# Patient Record
Sex: MALE | Race: WHITE | Employment: FULL TIME | URBAN - NONMETROPOLITAN AREA
[De-identification: names, ages, dates, MRNs, and addresses within clinical notes are randomized per-mention and may not be internally consistent; named-entity substitution may affect disease eponyms.]

---

## 2017-03-28 DIAGNOSIS — Z01.810 PREOP CARDIOVASCULAR EXAM: ICD-10-CM

## 2017-03-28 DIAGNOSIS — I25.10 CORONARY ARTERY DISEASE WITHOUT ANGINA PECTORIS, UNSPECIFIED VESSEL OR LESION TYPE, UNSPECIFIED WHETHER NATIVE OR TRANSPLANTED HEART: Primary | ICD-10-CM

## 2017-03-28 DIAGNOSIS — I10 ESSENTIAL HYPERTENSION: ICD-10-CM

## 2017-04-11 ENCOUNTER — HOSPITAL ENCOUNTER (OUTPATIENT)
Dept: NON INVASIVE DIAGNOSTICS | Age: 47
Discharge: HOME | End: 2017-04-11
Payer: COMMERCIAL

## 2017-04-11 DIAGNOSIS — I25.10 CORONARY ARTERY DISEASE WITHOUT ANGINA PECTORIS, UNSPECIFIED VESSEL OR LESION TYPE, UNSPECIFIED WHETHER NATIVE OR TRANSPLANTED HEART: ICD-10-CM

## 2017-04-11 DIAGNOSIS — I10 ESSENTIAL HYPERTENSION: ICD-10-CM

## 2017-04-11 DIAGNOSIS — Z01.810 PREOP CARDIOVASCULAR EXAM: ICD-10-CM

## 2017-04-11 LAB
LV EF: 50 %
LVEF MODALITY: NORMAL

## 2017-04-11 PROCEDURE — 93350 STRESS TTE ONLY: CPT

## 2017-04-20 ENCOUNTER — TELEPHONE (OUTPATIENT)
Dept: CARDIOLOGY | Age: 47
End: 2017-04-20

## 2017-08-03 ENCOUNTER — HOSPITAL ENCOUNTER (OUTPATIENT)
Dept: WOUND CARE | Age: 47
Discharge: HOME | End: 2017-08-03
Payer: COMMERCIAL

## 2017-08-03 VITALS
TEMPERATURE: 97.8 F | HEART RATE: 69 BPM | SYSTOLIC BLOOD PRESSURE: 165 MMHG | WEIGHT: 213 LBS | DIASTOLIC BLOOD PRESSURE: 89 MMHG | HEIGHT: 75 IN | BODY MASS INDEX: 26.49 KG/M2 | RESPIRATION RATE: 18 BRPM

## 2017-08-03 DIAGNOSIS — Z79.4 TYPE 2 DIABETES MELLITUS WITH FOOT ULCER, WITH LONG-TERM CURRENT USE OF INSULIN (HCC): Chronic | ICD-10-CM

## 2017-08-03 DIAGNOSIS — L97.509 TYPE 2 DIABETES MELLITUS WITH FOOT ULCER, WITH LONG-TERM CURRENT USE OF INSULIN (HCC): Chronic | ICD-10-CM

## 2017-08-03 DIAGNOSIS — L97.522 SKIN ULCER OF TOE OF LEFT FOOT WITH FAT LAYER EXPOSED (HCC): Chronic | ICD-10-CM

## 2017-08-03 DIAGNOSIS — E11.621 TYPE 2 DIABETES MELLITUS WITH FOOT ULCER, WITH LONG-TERM CURRENT USE OF INSULIN (HCC): Chronic | ICD-10-CM

## 2017-08-03 PROCEDURE — 11042 DBRDMT SUBQ TIS 1ST 20SQCM/<: CPT | Performed by: SURGERY

## 2017-08-03 PROCEDURE — 99213 OFFICE O/P EST LOW 20 MIN: CPT

## 2017-08-03 PROCEDURE — 99203 OFFICE O/P NEW LOW 30 MIN: CPT | Performed by: SURGERY

## 2017-08-03 PROCEDURE — 11042 DBRDMT SUBQ TIS 1ST 20SQCM/<: CPT

## 2017-08-09 ENCOUNTER — HOSPITAL ENCOUNTER (OUTPATIENT)
Dept: WOUND CARE | Age: 47
Discharge: HOME | End: 2017-08-09
Payer: COMMERCIAL

## 2017-08-09 VITALS
SYSTOLIC BLOOD PRESSURE: 159 MMHG | TEMPERATURE: 98 F | BODY MASS INDEX: 26.49 KG/M2 | HEART RATE: 77 BPM | RESPIRATION RATE: 16 BRPM | DIASTOLIC BLOOD PRESSURE: 82 MMHG | WEIGHT: 213 LBS | HEIGHT: 75 IN

## 2017-08-09 DIAGNOSIS — E11.621 TYPE 2 DIABETES MELLITUS WITH FOOT ULCER, WITH LONG-TERM CURRENT USE OF INSULIN (HCC): ICD-10-CM

## 2017-08-09 DIAGNOSIS — L97.509 TYPE 2 DIABETES MELLITUS WITH FOOT ULCER, WITH LONG-TERM CURRENT USE OF INSULIN (HCC): ICD-10-CM

## 2017-08-09 DIAGNOSIS — Z79.4 TYPE 2 DIABETES MELLITUS WITH FOOT ULCER, WITH LONG-TERM CURRENT USE OF INSULIN (HCC): ICD-10-CM

## 2017-08-09 DIAGNOSIS — L97.522 SKIN ULCER OF TOE OF LEFT FOOT WITH FAT LAYER EXPOSED (HCC): ICD-10-CM

## 2017-08-09 PROCEDURE — 11042 DBRDMT SUBQ TIS 1ST 20SQCM/<: CPT

## 2017-08-09 PROCEDURE — 11042 DBRDMT SUBQ TIS 1ST 20SQCM/<: CPT | Performed by: SURGERY

## 2017-08-09 ASSESSMENT — PAIN SCALES - GENERAL: PAINLEVEL_OUTOF10: 0

## 2017-08-16 ENCOUNTER — HOSPITAL ENCOUNTER (OUTPATIENT)
Dept: WOUND CARE | Age: 47
Discharge: HOME | End: 2017-08-16
Payer: COMMERCIAL

## 2017-08-16 VITALS
WEIGHT: 213 LBS | BODY MASS INDEX: 26.49 KG/M2 | HEART RATE: 72 BPM | HEIGHT: 75 IN | RESPIRATION RATE: 16 BRPM | DIASTOLIC BLOOD PRESSURE: 80 MMHG | TEMPERATURE: 98 F | SYSTOLIC BLOOD PRESSURE: 150 MMHG

## 2017-08-16 DIAGNOSIS — Z79.4 TYPE 2 DIABETES MELLITUS WITH FOOT ULCER, WITH LONG-TERM CURRENT USE OF INSULIN (HCC): ICD-10-CM

## 2017-08-16 DIAGNOSIS — L97.512 SKIN ULCER OF SMALL TOE OF RIGHT FOOT WITH FAT LAYER EXPOSED (HCC): Chronic | ICD-10-CM

## 2017-08-16 DIAGNOSIS — L97.522 SKIN ULCER OF TOE OF LEFT FOOT WITH FAT LAYER EXPOSED (HCC): ICD-10-CM

## 2017-08-16 DIAGNOSIS — L97.509 TYPE 2 DIABETES MELLITUS WITH FOOT ULCER, WITH LONG-TERM CURRENT USE OF INSULIN (HCC): ICD-10-CM

## 2017-08-16 DIAGNOSIS — E11.621 TYPE 2 DIABETES MELLITUS WITH FOOT ULCER, WITH LONG-TERM CURRENT USE OF INSULIN (HCC): ICD-10-CM

## 2017-08-16 PROCEDURE — 11042 DBRDMT SUBQ TIS 1ST 20SQCM/<: CPT

## 2017-08-16 PROCEDURE — 11042 DBRDMT SUBQ TIS 1ST 20SQCM/<: CPT | Performed by: SURGERY

## 2017-08-16 PROCEDURE — 97597 DBRDMT OPN WND 1ST 20 CM/<: CPT

## 2017-08-16 ASSESSMENT — PAIN SCALES - GENERAL: PAINLEVEL_OUTOF10: 0

## 2017-08-30 ENCOUNTER — HOSPITAL ENCOUNTER (OUTPATIENT)
Dept: WOUND CARE | Age: 47
Discharge: HOME | End: 2017-08-30
Payer: COMMERCIAL

## 2017-08-30 VITALS
BODY MASS INDEX: 26.49 KG/M2 | RESPIRATION RATE: 16 BRPM | DIASTOLIC BLOOD PRESSURE: 80 MMHG | SYSTOLIC BLOOD PRESSURE: 165 MMHG | HEART RATE: 68 BPM | HEIGHT: 75 IN | TEMPERATURE: 98.1 F | WEIGHT: 213 LBS

## 2017-08-30 DIAGNOSIS — L97.522 SKIN ULCER OF TOE OF LEFT FOOT WITH FAT LAYER EXPOSED (HCC): ICD-10-CM

## 2017-08-30 DIAGNOSIS — Z79.4 TYPE 2 DIABETES MELLITUS WITH FOOT ULCER, WITH LONG-TERM CURRENT USE OF INSULIN (HCC): ICD-10-CM

## 2017-08-30 DIAGNOSIS — L97.509 TYPE 2 DIABETES MELLITUS WITH FOOT ULCER, WITH LONG-TERM CURRENT USE OF INSULIN (HCC): ICD-10-CM

## 2017-08-30 DIAGNOSIS — E11.621 TYPE 2 DIABETES MELLITUS WITH FOOT ULCER, WITH LONG-TERM CURRENT USE OF INSULIN (HCC): ICD-10-CM

## 2017-08-30 DIAGNOSIS — L97.512 SKIN ULCER OF SMALL TOE OF RIGHT FOOT WITH FAT LAYER EXPOSED (HCC): ICD-10-CM

## 2017-08-30 PROCEDURE — 97597 DBRDMT OPN WND 1ST 20 CM/<: CPT

## 2017-08-30 PROCEDURE — 97597 DBRDMT OPN WND 1ST 20 CM/<: CPT | Performed by: SURGERY

## 2017-09-13 ENCOUNTER — HOSPITAL ENCOUNTER (OUTPATIENT)
Dept: WOUND CARE | Age: 47
Discharge: HOME | End: 2017-09-13
Payer: COMMERCIAL

## 2017-09-13 VITALS
RESPIRATION RATE: 16 BRPM | BODY MASS INDEX: 26.49 KG/M2 | DIASTOLIC BLOOD PRESSURE: 84 MMHG | WEIGHT: 213 LBS | SYSTOLIC BLOOD PRESSURE: 175 MMHG | HEART RATE: 73 BPM | TEMPERATURE: 98 F | HEIGHT: 75 IN

## 2017-09-13 DIAGNOSIS — L97.509 TYPE 2 DIABETES MELLITUS WITH FOOT ULCER, WITH LONG-TERM CURRENT USE OF INSULIN (HCC): ICD-10-CM

## 2017-09-13 DIAGNOSIS — L97.512 SKIN ULCER OF SMALL TOE OF RIGHT FOOT WITH FAT LAYER EXPOSED (HCC): ICD-10-CM

## 2017-09-13 DIAGNOSIS — Z79.4 TYPE 2 DIABETES MELLITUS WITH FOOT ULCER, WITH LONG-TERM CURRENT USE OF INSULIN (HCC): ICD-10-CM

## 2017-09-13 DIAGNOSIS — L97.522 SKIN ULCER OF TOE OF LEFT FOOT WITH FAT LAYER EXPOSED (HCC): ICD-10-CM

## 2017-09-13 DIAGNOSIS — E11.621 TYPE 2 DIABETES MELLITUS WITH FOOT ULCER, WITH LONG-TERM CURRENT USE OF INSULIN (HCC): ICD-10-CM

## 2017-09-13 PROCEDURE — 97597 DBRDMT OPN WND 1ST 20 CM/<: CPT | Performed by: SURGERY

## 2017-09-13 PROCEDURE — 97597 DBRDMT OPN WND 1ST 20 CM/<: CPT

## 2017-09-13 ASSESSMENT — PAIN SCALES - GENERAL: PAINLEVEL_OUTOF10: 0

## 2017-09-27 ENCOUNTER — HOSPITAL ENCOUNTER (OUTPATIENT)
Dept: WOUND CARE | Age: 47
Discharge: HOME | End: 2017-09-27
Payer: COMMERCIAL

## 2017-09-27 VITALS
DIASTOLIC BLOOD PRESSURE: 82 MMHG | SYSTOLIC BLOOD PRESSURE: 181 MMHG | RESPIRATION RATE: 16 BRPM | TEMPERATURE: 98.1 F | BODY MASS INDEX: 26.49 KG/M2 | WEIGHT: 213 LBS | HEIGHT: 75 IN | HEART RATE: 69 BPM

## 2017-09-27 DIAGNOSIS — L97.522 SKIN ULCER OF TOE OF LEFT FOOT WITH FAT LAYER EXPOSED (HCC): ICD-10-CM

## 2017-09-27 DIAGNOSIS — Z79.4 TYPE 2 DIABETES MELLITUS WITH FOOT ULCER, WITH LONG-TERM CURRENT USE OF INSULIN (HCC): ICD-10-CM

## 2017-09-27 DIAGNOSIS — E11.621 TYPE 2 DIABETES MELLITUS WITH FOOT ULCER, WITH LONG-TERM CURRENT USE OF INSULIN (HCC): ICD-10-CM

## 2017-09-27 DIAGNOSIS — L97.509 TYPE 2 DIABETES MELLITUS WITH FOOT ULCER, WITH LONG-TERM CURRENT USE OF INSULIN (HCC): ICD-10-CM

## 2017-09-27 DIAGNOSIS — L97.512 SKIN ULCER OF SMALL TOE OF RIGHT FOOT WITH FAT LAYER EXPOSED (HCC): ICD-10-CM

## 2017-09-27 PROCEDURE — 97597 DBRDMT OPN WND 1ST 20 CM/<: CPT | Performed by: SURGERY

## 2017-09-27 PROCEDURE — 97597 DBRDMT OPN WND 1ST 20 CM/<: CPT

## 2017-09-27 ASSESSMENT — PAIN SCALES - GENERAL: PAINLEVEL_OUTOF10: 0

## 2017-10-18 ENCOUNTER — HOSPITAL ENCOUNTER (OUTPATIENT)
Dept: WOUND CARE | Age: 47
Discharge: HOME | End: 2017-10-18
Payer: COMMERCIAL

## 2017-10-18 VITALS
WEIGHT: 213 LBS | RESPIRATION RATE: 16 BRPM | SYSTOLIC BLOOD PRESSURE: 174 MMHG | DIASTOLIC BLOOD PRESSURE: 80 MMHG | HEIGHT: 75 IN | BODY MASS INDEX: 26.49 KG/M2 | TEMPERATURE: 98.2 F | HEART RATE: 70 BPM

## 2017-10-18 DIAGNOSIS — L97.522 SKIN ULCER OF TOE OF LEFT FOOT WITH FAT LAYER EXPOSED (HCC): ICD-10-CM

## 2017-10-18 DIAGNOSIS — L97.509 TYPE 2 DIABETES MELLITUS WITH FOOT ULCER, WITH LONG-TERM CURRENT USE OF INSULIN (HCC): ICD-10-CM

## 2017-10-18 DIAGNOSIS — E11.621 TYPE 2 DIABETES MELLITUS WITH FOOT ULCER, WITH LONG-TERM CURRENT USE OF INSULIN (HCC): ICD-10-CM

## 2017-10-18 DIAGNOSIS — L97.512 SKIN ULCER OF SMALL TOE OF RIGHT FOOT WITH FAT LAYER EXPOSED (HCC): ICD-10-CM

## 2017-10-18 DIAGNOSIS — Z79.4 TYPE 2 DIABETES MELLITUS WITH FOOT ULCER, WITH LONG-TERM CURRENT USE OF INSULIN (HCC): ICD-10-CM

## 2017-10-18 PROCEDURE — 99213 OFFICE O/P EST LOW 20 MIN: CPT | Performed by: SURGERY

## 2017-10-18 PROCEDURE — 99211 OFF/OP EST MAY X REQ PHY/QHP: CPT

## 2017-10-18 ASSESSMENT — PAIN SCALES - GENERAL: PAINLEVEL_OUTOF10: 0

## 2017-10-18 NOTE — PROGRESS NOTES
[facility]  Progress Note       Herberth Barun  AGE: 50 y.o. GENDER: male  : 1970  TODAY'S DATE:  10/18/2017    Subjective:        HISTORY of PRESENT ILLNESS HPI   Herberth Braun is a 50 y.o. male who presents today for wound evaluation. Wound Type:non-healing surgical  Wound Location:5th toe on the right  Modifying factors:chronic pressure    Patient Active Problem List   Diagnosis Code    CAD (coronary artery disease) I25.10    Hypertension I10    Hyperlipidemia E78.5    Type II or unspecified type diabetes mellitus without mention of complication, not stated as uncontrolled E11.9    Cigarette smoker F17.210    Skin ulcer of toe of left foot with fat layer exposed ([address] L97.522    Type 2 diabetes mellitus with foot ulcer, with long-term current use of insulin ([address] E11.621, L97.509, Z79.4    Skin ulcer of small toe of right foot with fat layer exposed [facility]) L97.512       Mr. Deisy Mahoney has a past medical history of CAD (coronary artery disease); Cigarette smoker; History of nephrolithiasis; Hyperlipidemia; Hypertension; Obesity; and Type II or unspecified type diabetes mellitus without mention of complication, not stated as uncontrolled. He has a past surgical history that includes Cardiac catheterization (3/14/12); Tonsillectomy; Nasal fracture surgery; Cardiac catheterization (14  JDT); Coronary artery bypass graft (3/15/2012); and hernia repair. His family history includes Diabetes in an other family member; Heart Disease in an other family member; High Blood Pressure in an other family member. Mr. Deisy Mahoney reports that he quit smoking about 5 years ago. His smoking use included Cigarettes. He has a 30.00 pack-year smoking history. He has never used smokeless tobacco. He reports that he drinks alcohol. He reports that he does not use drugs.     His current medication list consists of     Current Outpatient Prescriptions on File Prior to Encounter   Medication Sig Dispense Refill    metFORMIN (GLUCOPHAGE) 500 MG tablet Take 500 mg by mouth 2 times daily (with meals)       No current facility-administered medications on file prior to encounter. ALLERGIES    Review of patient's allergies indicates no known allergies. Objective:         Vitals:    10/18/17 1543   BP: (!) 174/80   Pulse: 70   Resp: 16   Temp: 98.2 °F (36.8 °C)        BP (!) 174/80   Pulse 70   Temp 98.2 °F (36.8 °C) (Temporal)   Resp 16   Ht 6' 3\" (1.905 m)   Wt 213 lb (96.6 kg)   BMI 26.62 kg/m²     ROS:  Constitutional - Alert and oriented x 3, no complaints, pleasant co-operative  Integumentary - edema improved, no erythema, no cellulitis, no new wounds   The patients pain isPain Level: 0  . Wound Measurements and Assessment:  Wound 08/03/17 Other (Comment) Foot Lateral;Right wound 5; right lateral 5th toe amp site (Active)   Wound Type Wound 10/18/2017  3:47 PM   Dressing Status Changed; Intact;Dry;Clean 8/30/2017  4:04 PM   Dressing Changed Changed/New 8/3/2017 10:25 AM   Dressing/Treatment Antibacterial Ointment;4x4 8/3/2017 10:25 AM   Wound Cleansed Not Cleansed 10/18/2017  3:47 PM   Wound Length (cm) 0 cm 10/18/2017  3:47 PM   Wound Width (cm) 0 cm 10/18/2017  3:47 PM   Wound Depth (cm)  0 10/18/2017  3:47 PM   Calculated Wound Size (cm^2) (l*w) 0 cm^2 10/18/2017  3:47 PM   Change in Wound Size % (l*w) 100 10/18/2017  3:47 PM   Distance Tunneling (cm) 0 cm 10/18/2017  3:47 PM   Tunneling Position ___ O'Clock 0 10/18/2017  3:47 PM   Undermining Starts ___ O'Clock 0 10/18/2017  3:47 PM   Undermining Ends___ O'Clock 0 10/18/2017  3:47 PM   Undermining Maxium Distance (cm) 0 10/18/2017  3:47 PM   Wound Assessment Dry; Intact; Clean 10/18/2017  3:47 PM   Margins Attached edges 9/27/2017  3:44 PM   Velia-wound Assessment Dry 10/18/2017  3:47 PM   [place]%Wound Bed 0 10/18/2017  3:47 PM   Red%Wound Bed 0 10/18/2017  3:47 PM   Yellow%Wound Bed 0 10/18/2017  3:47 PM   Black%Wound Bed 0 10/18/2017 3:47 PM   Purple%Wound Bed 0 10/18/2017  3:47 PM   Other%Wound Bed 0 10/18/2017  3:47 PM   Drainage Amount None 10/18/2017  3:47 PM   Drainage Description Yellow; Tan 9/13/2017  4:02 PM   Odor Mild 9/13/2017  4:02 PM   Culture Taken No 9/13/2017  4:02 PM   Debridement per physician None 10/18/2017  3:47 PM   Time out Yes 9/27/2017  3:49 PM   Procedural Pain 0 9/27/2017  3:49 PM   Post procedural Pain 0 9/27/2017  3:49 PM   Number of days: 76        Wound is HEALED! !!.    Please refer to nursing documentation for wound measurements. Assessment:      Patient Active Problem List   Diagnosis    CAD (coronary artery disease)    Hypertension    Hyperlipidemia    Type II or unspecified type diabetes mellitus without mention of complication, not stated as uncontrolled    Cigarette smoker    Skin ulcer of toe of left foot with fat layer exposed ([address]    Type 2 diabetes mellitus with foot ulcer, with long-term current use of insulin ([address]    Skin ulcer of small toe of right foot with fat layer exposed ([address]      Plan:     Compliance issues: No    Plan for wound - Dress per physician order  Treatment:     Compression : No   Offloading : No   Dressing : See AVS   Additional Therapy : 0     1. Discussed appropriate home care of this wound. Wound redressed. 2. Patient instructions were given. 3. Follow up: prn.          Electronically signed by Maryanna Ahumada, MD on 10/18/2017 at 3:52 PM

## 2017-10-18 NOTE — PLAN OF CARE
Problem: Wound:  Goal: Will show signs of wound healing; wound closure and no evidence of infection  Will show signs of wound healing; wound closure and no evidence of infection   Outcome: Completed Date Met: 10/18/17

## 2018-03-21 ENCOUNTER — HOSPITAL ENCOUNTER (OUTPATIENT)
Dept: GENERAL RADIOLOGY | Age: 48
Discharge: HOME | End: 2018-03-21
Payer: COMMERCIAL

## 2018-03-21 ENCOUNTER — TELEPHONE (OUTPATIENT)
Dept: CARDIOLOGY | Age: 48
End: 2018-03-21

## 2018-03-21 ENCOUNTER — OFFICE VISIT (OUTPATIENT)
Dept: CARDIOLOGY | Age: 48
End: 2018-03-21
Payer: COMMERCIAL

## 2018-03-21 VITALS
BODY MASS INDEX: 30.21 KG/M2 | DIASTOLIC BLOOD PRESSURE: 102 MMHG | SYSTOLIC BLOOD PRESSURE: 164 MMHG | HEIGHT: 75 IN | WEIGHT: 243 LBS | HEART RATE: 66 BPM

## 2018-03-21 DIAGNOSIS — I10 ESSENTIAL HYPERTENSION: ICD-10-CM

## 2018-03-21 DIAGNOSIS — Z86.39 HISTORY OF DIABETES MELLITUS: ICD-10-CM

## 2018-03-21 DIAGNOSIS — R94.39 ABNORMAL CARDIOVASCULAR STRESS TEST: Primary | ICD-10-CM

## 2018-03-21 DIAGNOSIS — R94.39 ABNORMAL CARDIOVASCULAR STRESS TEST: ICD-10-CM

## 2018-03-21 DIAGNOSIS — R07.9 CHEST PAIN, UNSPECIFIED TYPE: Primary | ICD-10-CM

## 2018-03-21 DIAGNOSIS — E78.2 MIXED HYPERLIPIDEMIA: ICD-10-CM

## 2018-03-21 DIAGNOSIS — R07.9 CHEST PAIN, UNSPECIFIED TYPE: ICD-10-CM

## 2018-03-21 DIAGNOSIS — F17.200 SMOKER: ICD-10-CM

## 2018-03-21 LAB
ANION GAP SERPL CALCULATED.3IONS-SCNC: 18 MMOL/L (ref 7–19)
BUN BLDV-MCNC: 20 MG/DL (ref 6–20)
CALCIUM SERPL-MCNC: 9.7 MG/DL (ref 8.6–10)
CHLORIDE BLD-SCNC: 99 MMOL/L (ref 98–111)
CO2: 27 MMOL/L (ref 22–29)
CREAT SERPL-MCNC: 1 MG/DL (ref 0.5–1.2)
GFR NON-AFRICAN AMERICAN: >60
GLUCOSE BLD-MCNC: 218 MG/DL (ref 74–109)
HCT VFR BLD CALC: 51.9 % (ref 42–52)
HEMOGLOBIN: 17.2 G/DL (ref 14–18)
MCH RBC QN AUTO: 29.7 PG (ref 27–31)
MCHC RBC AUTO-ENTMCNC: 33.1 G/DL (ref 33–37)
MCV RBC AUTO: 89.6 FL (ref 80–94)
PDW BLD-RTO: 14.9 % (ref 11.5–14.5)
PLATELET # BLD: 240 K/UL (ref 130–400)
PMV BLD AUTO: 11.4 FL (ref 9.4–12.4)
POTASSIUM SERPL-SCNC: 4.4 MMOL/L (ref 3.5–5)
RBC # BLD: 5.79 M/UL (ref 4.7–6.1)
SODIUM BLD-SCNC: 144 MMOL/L (ref 136–145)
WBC # BLD: 12.3 K/UL (ref 4.8–10.8)

## 2018-03-21 PROCEDURE — 99214 OFFICE O/P EST MOD 30 MIN: CPT | Performed by: NURSE PRACTITIONER

## 2018-03-21 PROCEDURE — 71046 X-RAY EXAM CHEST 2 VIEWS: CPT

## 2018-03-21 PROCEDURE — 93000 ELECTROCARDIOGRAM COMPLETE: CPT | Performed by: NURSE PRACTITIONER

## 2018-03-21 RX ORDER — NITROGLYCERIN 0.4 MG/1
0.4 TABLET SUBLINGUAL EVERY 5 MIN PRN
Qty: 25 TABLET | Refills: 3 | Status: SHIPPED | OUTPATIENT
Start: 2018-03-21 | End: 2019-01-18 | Stop reason: ALTCHOICE

## 2018-03-21 RX ORDER — ROSUVASTATIN CALCIUM 10 MG/1
10 TABLET, COATED ORAL DAILY
COMMUNITY
End: 2018-12-17

## 2018-03-21 RX ORDER — SILDENAFIL 25 MG/1
25 TABLET, FILM COATED ORAL PRN
COMMUNITY
End: 2019-01-18 | Stop reason: ALTCHOICE

## 2018-03-21 RX ORDER — LISINOPRIL 5 MG/1
0.5 TABLET ORAL DAILY
Refills: 1 | COMMUNITY
Start: 2018-03-08 | End: 2018-03-21 | Stop reason: ALTCHOICE

## 2018-03-21 RX ORDER — METOPROLOL SUCCINATE 25 MG/1
25 TABLET, EXTENDED RELEASE ORAL NIGHTLY
Qty: 30 TABLET | Refills: 5 | Status: SHIPPED | OUTPATIENT
Start: 2018-03-21 | End: 2018-08-31 | Stop reason: SDUPTHER

## 2018-03-21 RX ORDER — LISINOPRIL 10 MG/1
10 TABLET ORAL DAILY
Qty: 30 TABLET | Refills: 5 | Status: SHIPPED | OUTPATIENT
Start: 2018-03-21 | End: 2018-08-31 | Stop reason: SDUPTHER

## 2018-03-21 NOTE — PATIENT INSTRUCTIONS
Start taking Lisinopril 10 mg daily - a new prescription has been faxed to our pharmacy. Start Toprol XL 25 mg (1) tab at bedtime - a new prescription has been faxed to our pharmacy. Continue other current medications as prescribed. Continue to follow up with primary care provider for non cardiac medical problems. Call the office with any problems, questions or concerns at [phone]. Follow up as scheduled with your cardiologist - as scheduled after cardiac catheterization. The following educational material has been included in this after visit summary for your review: cardiac catheterization, smoking cessation.     Additional instructions:  Coronary artery disease risk factors you can control: Smoking, high blood pressure, high cholesterol, diabetes, being overweight, lack of exercise and stress. Continue heart healthy diet. Take medications as directed. Exercise as tolerated. Strive for 15 minutes of exercise most days of the week.   keep a blood pressure log, do so for 2 weeks. Call the office to report readings at [phone]. Blood pressure goal is 140/90 or less. If you are a diabetic, the goal is 130/80 or less. If you are taking cholesterol lowering medications, it is recommended that lab work be checked annually. Always keep a current medication list. Bring your medications to every office visit.        Cardiac Catheterization Instructions   (before 10:00 am)  You will need to have lab and a chest x-ray completed today in preparation for the cardiac catheterization. Please go to first floor of this building to Outpatient registration for the testing. Date/Time:  Thursday 3/29/18 at 7:30 am.  Check in at 6:3 am.  [place] at the [facility] and [facility] located on the first floor [place] [address] through the hospital main entrance and turn immediately to your left.     Test preparation:  · Do not eat or drink anything after midnight on the night Your doctor will explain to you which medicines you can take and which ones to avoid. Take medicines as instructed. Ice may help decrease discomfort at the insertion site. You may apply the ice for 15-20 minutes each hour, for the first few days. To lower your risk for further complications of heart disease, you can make lifestyle changes. These include eating a healthier diet, exercising regularly, and managing stress. Ask your doctor about when it is safe to shower, bathe, or soak in water. Be sure to follow your doctor's instructions . After arriving home, contact your doctor if any of the following occurs:   Signs of infection, including fever and chills   Extreme sweating, nausea, or vomiting   Change in sensation to affected leg, including numbness, feeling cold, or change in color   Redness, swelling, increasing pain, excessive bleeding, or discharge at point of catheter insertion   Cough, shortness of breath, or difficulty breathing   Extreme pain   Chest pain   Drooping facial muscles   Changes in vision or speech   Difficulty walking or using your limbs     In case of an emergency, Call 911. Patient Education        Learning About Benefits From Quitting Smoking  How does quitting smoking make you healthier? If you're thinking about quitting smoking, you may have a few reasons to be smoke-free. Your health may be one of them. · When you quit smoking, you lower your risks for cancer, lung disease, heart attack, stroke, blood vessel disease, and blindness from macular degeneration. · When you're smoke-free, you get sick less often, and you heal faster. You are less likely to get colds, flu, bronchitis, and pneumonia. · As a nonsmoker, you may find that your mood is better and you are less stressed. When and how will you feel healthier? Quitting has real health benefits that start from day 1 of being smoke-free.  And the longer you stay smoke-free, the healthier you get and the better you feel. The first hours  · After just 20 minutes, your blood pressure and heart rate go down. That means there's less stress on your heart and blood vessels. · Within 12 hours, the level of carbon monoxide in your blood drops back to normal. That makes room for more oxygen. With more oxygen in your body, you may notice that you have more energy than when you smoked. After 2 weeks  · Your lungs start to work better. · Your risk of heart attack starts to drop. After 1 month  · When your lungs are clear, you cough less and breathe deeper, so it's easier to be active. · Your sense of taste and smell return. That means you can enjoy food more than you have since you started smoking. Over the years  · After 1 year, your risk of heart disease is half what it would be if you kept smoking. · After 5 years, your risk of stroke starts to shrink. Within a few years after that, it's about the same as if you'd never smoked. · After 10 years, your risk of dying from lung cancer is cut by about half. And your risk for many other types of cancer is lower too. How would quitting help others in your life? When you quit smoking, you improve the health of everyone who now breathes in your smoke. · Their heart, lung, and cancer risks drop, much like yours. · They are sick less. For babies and small children, living smoke-free means they're less likely to have ear infections, pneumonia, and bronchitis. · If you're a woman who is or will be pregnant someday, quitting smoking means a healthier . · Children who are close to you are less likely to become adult smokers. Where can you learn more? Go to https://lucy.[employer] org and sign in to your [employer] account. Enter [phone] 72 11 in the [employer] box to learn more about \"Learning About Benefits From Quitting Smoking. \"     If you do not have an account, please click on the \"Sign Up Now\" link.   Current as of: 2017  Content

## 2018-03-28 NOTE — H&P
MELITON Reyes Nurse Practitioner Signed   Progress Notes Encounter Date: 3/21/2018   Related encounter: Office Visit from 3/21/2018 in [facility]       Expand All Collapse All    []Manual[]Template  []Copied  [facility], [place]. [address] [facility]  [phone] office  [phone] fax        OFFICE VISIT:  3/21/2018     [place] Dose - : 1970     Reason For Visit:  Vasquez Hamilton is a 50 y.o. male who is here for Abnormal Test Results (The patient presents regarding abnormal stress echo by Dr. Octavio Kaur on 3/14/18.); Chest Pain; Hypertension; Nicotine Dependence; and Hyperlipidemia     The patient presents today for cardiology follow up. 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 The test showed anterior/apical and septal wall motion abnormalities before and after exercise. Multiple attempts were made to contact patient followed by a registered letter. Recently, on 3/14/18 at [facility], the patient had a stress echo showing evidence of electrocardiographic and echocardiographic myocardial necrosis compatible with previous anterior wall myocardial infarction. The patient has no history of known CAD. He does have a hx of HTN, DM, nicotine abuse and hyperlipidemia. The patient reports working out 3 days a week without angina. He does reports some vague precordial chest pain over the last few days which he attributes to be muscular in origin from lifting weights. The patient reports BP has been uncontrolled on current regimen. .  The patient's PCP monitors cholesterol.       Subjective  Vasquez Hamilton denies exertional chest pain, shortness of breath, orthopnea, paroxysmal nocturnal dyspnea, syncope, presyncope, sustained arrythmia, edema and fatigue. The patient denies numbness or weakness to suggest cerebrovascular accident or transient ischemic attack.   + vague intermittent mmol/L     Potassium 4.4 3.5 - 5.0 mmol/L     Chloride 99 98 - 111 mmol/L     CO2 27 22 - 29 mmol/L     Anion Gap 18 7 - 19 mmol/L     Glucose 218 (H) 74 - 109 mg/dL     BUN 20 6 - 20 mg/dL     CREATININE 1.0 0.5 - 1.2 mg/dL     GFR Non- >60 >60     Calcium 9.7 8.6 - 10.0 mg/dL         Plan  Previous cardiac history and records reviewed. Start taking Lisinopril 10 mg daily one daily - a new prescription has been faxed to our pharmacy. Start Toprol XL 25 mg (1) tab at bedtime - a new prescription has been faxed to our pharmacy. Cardiac cath scheduled 3/29/18 at 7:30 am.  Continue other current medications as prescribed. Continue to follow up with primary care provider for non cardiac medical problems. Call the office with any problems, questions or concerns at [phone]. Follow up as scheduled with your cardiologist - as scheduled after cardiac catheterization. The following educational material has been included in this after visit summary for your review: cardiac catheterization, smoking cessation.     Additional instructions:  Coronary artery disease risk factors you can control: Smoking, high blood pressure, high cholesterol, diabetes, being overweight, lack of exercise and stress. Continue heart healthy diet. Take medications as directed. Exercise as tolerated. Strive for 15 minutes of exercise most days of the week.   keep a blood pressure log, do so for 2 weeks. Call the office to report readings at [phone]. Blood pressure goal is 140/90 or less. If you are a diabetic, the goal is 130/80 or less. If you are taking cholesterol lowering medications, it is recommended that lab work be checked annually. Always keep a current medication list. Bring your medications to every office visit.      Cardiac Catheterization Instructions   (before 10:00 am)  You will need to have lab and a chest x-ray completed today in preparation for the cardiac catheterization.   Please go to first floor of this building to Outpatient registration for the testing. Date/Time:  Thursday 3/29/18 at 7:30 am.  Check in at 6:3 am.  [place] [facility] [address] [facility] located on the first floor of [place] [address] through the hospital main entrance and turn immediately to your left. Test preparation:  · Do not eat or drink anything after midnight on the night before your procedure. You can take your morning medications with a sip of water unless otherwise directed not to. Bring a list of the names and dosages of all the medications you are taking. · Glucophage (Metformin) and/or Coumadin (Warfarin) should be stopped two days prior to the cardiac catheterization procedure. Pradaxa should be stopped one day prior to procedure. · You should arrange to have someone take you home rather than drive yourself. · Further plan will depend upon the result of the cardiac catheterization.     If for any reason you are unable to keep this appointment, please contact [facility] Registration, [phone], as soon as possible to reschedule.      MELITON Nelson

## 2018-03-29 ENCOUNTER — HOSPITAL ENCOUNTER (OUTPATIENT)
Dept: CARDIAC CATH/INVASIVE PROCEDURES | Age: 48
Setting detail: OUTPATIENT SURGERY
Discharge: HOME | End: 2018-03-29
Attending: INTERNAL MEDICINE | Admitting: INTERNAL MEDICINE
Payer: COMMERCIAL

## 2018-03-29 VITALS
RESPIRATION RATE: 18 BRPM | HEIGHT: 75 IN | TEMPERATURE: 97.4 F | OXYGEN SATURATION: 100 % | HEART RATE: 66 BPM | BODY MASS INDEX: 30.21 KG/M2 | SYSTOLIC BLOOD PRESSURE: 164 MMHG | DIASTOLIC BLOOD PRESSURE: 74 MMHG | WEIGHT: 243 LBS

## 2018-03-29 DIAGNOSIS — R07.9 CHEST PAIN, UNSPECIFIED TYPE: ICD-10-CM

## 2018-03-29 LAB
LV EF: 53 %
LVEF MODALITY: NORMAL

## 2018-03-29 PROCEDURE — 93459 L HRT ART/GRFT ANGIO: CPT | Performed by: INTERNAL MEDICINE

## 2018-03-29 PROCEDURE — C1894 INTRO/SHEATH, NON-LASER: HCPCS

## 2018-03-29 PROCEDURE — 2709999900 HC NON-CHARGEABLE SUPPLY

## 2018-03-29 PROCEDURE — 99024 POSTOP FOLLOW-UP VISIT: CPT | Performed by: INTERNAL MEDICINE

## 2018-03-29 PROCEDURE — 2720000001 HC MISC SURG SUPPLY STERILE $51-500

## 2018-03-29 PROCEDURE — C1760 CLOSURE DEV, VASC: HCPCS

## 2018-03-29 PROCEDURE — 6360000002 HC RX W HCPCS

## 2018-03-29 PROCEDURE — 99999 PR OFFICE/OUTPT VISIT,PROCEDURE ONLY: CPT | Performed by: INTERNAL MEDICINE

## 2018-03-29 PROCEDURE — 2580000003 HC RX 258: Performed by: INTERNAL MEDICINE

## 2018-03-29 RX ORDER — SODIUM CHLORIDE 9 MG/ML
INJECTION, SOLUTION INTRAVENOUS CONTINUOUS
Status: DISCONTINUED | OUTPATIENT
Start: 2018-03-29 | End: 2018-03-29 | Stop reason: HOSPADM

## 2018-03-29 RX ORDER — SODIUM CHLORIDE 0.9 % (FLUSH) 0.9 %
10 SYRINGE (ML) INJECTION PRN
Status: DISCONTINUED | OUTPATIENT
Start: 2018-03-29 | End: 2018-03-29 | Stop reason: HOSPADM

## 2018-03-29 RX ORDER — SODIUM CHLORIDE 0.9 % (FLUSH) 0.9 %
10 SYRINGE (ML) INJECTION EVERY 12 HOURS SCHEDULED
Status: DISCONTINUED | OUTPATIENT
Start: 2018-03-29 | End: 2018-03-29 | Stop reason: HOSPADM

## 2018-03-29 RX ADMIN — SODIUM CHLORIDE: 9 INJECTION, SOLUTION INTRAVENOUS at 07:29

## 2018-03-29 NOTE — DISCHARGE SUMMARY
[facility] Discharge Summary    Patient ID: Roshni Matt   1970    Patient's PCP: Roni Aguiar CNP    Admit Date: 3/29/2018     Discharge Date:  3/29/2018    Admitting Physician: Kanika Packer MD     Discharge Physician: Kanika Packer MD     Discharge Diagnoses:  Coronary artery disease with documentation of graft patency on this visit   There are no active hospital problems to display for this patient. The patient was seen and examined on day of discharge and this discharge summary is in conjunction with any daily progress note from day of discharge. Hospital Course: The patient is a 58-year-old white male who presented for cardiac catheterization. 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 This revealed sodium and evidence of possible myocardial ischemia. The patient had exertional chest discomfort. The patient underwent an uneventful cardiac catheterization this study revealed mild apical hypokinesis with the overall ejection fraction estimated to be 5055%. There is a patent stent in the left anterior descending with total occlusion of the left anterior descending diagonal and distal obtuse marginal branch and right coronary was totally occluded at its origin. The saphenous vein graft to right coronary artery was patent as was the saphenous vein graft to the obtuse marginal branch. Left internal mammary graft to the left anterior descending diagonal was patent and the patient is felt to be best treated by continued medical management    Consults:   None        Disposition:  Home with follow-up in our office in 6 weeks     Discharge Instructions/Follow-up:  See separate instructions. Activity and Diet: as directed per discharge instructions. Labs:  For convenience and continuity at follow-up the following most recent labs are provided:  CBC:    Lab Results   Component Value Date    WBC 12.3 03/21/2018    HGB 17.2 03/21/2018    HCT 51.9 03/21/2018    HCT 31.2 03/27/2012     03/21/2018     03/27/2012       Renal:    Lab Results   Component Value Date     03/21/2018     03/27/2012    K 4.4 03/21/2018    K 3.8 03/27/2012    CL 99 03/21/2018     03/27/2012    CO2 27 03/21/2018    BUN 20 03/21/2018    CREATININE 1.0 03/21/2018    CREATININE 0.8 03/27/2012    CALCIUM 9.7 03/21/2018       Discharge Medications:   Current Discharge Medication List           Details   NONFORMULARY Herbal blood sugar otc      MLO NICOTINE 15 MG/16 HR PATCH AND REMOVAL, INPATIENT,       Insulin Aspart (NOVOLOG FLEXPEN SC) Inject 30 Units into the skin 2 times daily       metoprolol succinate (TOPROL XL) 25 MG extended release tablet Take 1 tablet by mouth nightly  Qty: 30 tablet, Refills: 5    Associated Diagnoses: Essential hypertension      lisinopril (PRINIVIL;ZESTRIL) 10 MG tablet Take 1 tablet by mouth daily  Qty: 30 tablet, Refills: 5    Comments: Dose increase  Associated Diagnoses: Essential hypertension      rosuvastatin (CRESTOR) 10 MG tablet Take 10 mg by mouth daily      sildenafil (VIAGRA) 25 MG tablet Take 25 mg by mouth as needed for Erectile Dysfunction      nitroGLYCERIN (NITROSTAT) 0.4 MG SL tablet Place 1 tablet under the tongue every 5 minutes as needed for Chest pain  Qty: 25 tablet, Refills: 3    Associated Diagnoses: Chest pain, unspecified type      metFORMIN (GLUCOPHAGE) 500 MG tablet Take 1,000 mg by mouth 2 times daily (with meals)                Hank Singer MD

## 2018-04-10 ENCOUNTER — TELEPHONE (OUTPATIENT)
Dept: CARDIOLOGY | Age: 48
End: 2018-04-10

## 2018-04-20 ENCOUNTER — TELEPHONE (OUTPATIENT)
Dept: CARDIOLOGY | Age: 48
End: 2018-04-20

## 2018-06-26 LAB — HBA1C MFR BLD: 8.6 %

## 2018-08-31 DIAGNOSIS — I10 ESSENTIAL HYPERTENSION: ICD-10-CM

## 2018-08-31 RX ORDER — METOPROLOL SUCCINATE 25 MG/1
25 TABLET, EXTENDED RELEASE ORAL NIGHTLY
Qty: 90 TABLET | Refills: 3 | Status: SHIPPED | OUTPATIENT
Start: 2018-08-31 | End: 2018-12-28 | Stop reason: SDUPTHER

## 2018-08-31 RX ORDER — LISINOPRIL 10 MG/1
10 TABLET ORAL DAILY
Qty: 90 TABLET | Refills: 3 | Status: SHIPPED | OUTPATIENT
Start: 2018-08-31 | End: 2020-06-02

## 2018-09-14 ENCOUNTER — HOSPITAL ENCOUNTER (OUTPATIENT)
Dept: CT IMAGING | Age: 48
Discharge: HOME | End: 2018-09-14
Payer: COMMERCIAL

## 2018-09-14 DIAGNOSIS — N23 RENAL COLIC: ICD-10-CM

## 2018-09-14 PROCEDURE — 74176 CT ABD & PELVIS W/O CONTRAST: CPT

## 2018-12-17 ENCOUNTER — HOSPITAL ENCOUNTER (OUTPATIENT)
Dept: WOUND CARE | Age: 48
Discharge: HOME | End: 2018-12-17
Payer: COMMERCIAL

## 2018-12-17 VITALS
HEIGHT: 75 IN | HEART RATE: 65 BPM | TEMPERATURE: 97.5 F | RESPIRATION RATE: 18 BRPM | BODY MASS INDEX: 29.84 KG/M2 | SYSTOLIC BLOOD PRESSURE: 158 MMHG | WEIGHT: 240 LBS | DIASTOLIC BLOOD PRESSURE: 83 MMHG

## 2018-12-17 DIAGNOSIS — Z79.4 TYPE 2 DIABETES MELLITUS WITH FOOT ULCER, WITH LONG-TERM CURRENT USE OF INSULIN (HCC): Chronic | ICD-10-CM

## 2018-12-17 DIAGNOSIS — L97.522 NON-PRESSURE CHRONIC ULCER OF OTHER PART OF LEFT FOOT WITH FAT LAYER EXPOSED (HCC): ICD-10-CM

## 2018-12-17 DIAGNOSIS — E11.621 TYPE 2 DIABETES MELLITUS WITH FOOT ULCER, WITH LONG-TERM CURRENT USE OF INSULIN (HCC): Chronic | ICD-10-CM

## 2018-12-17 DIAGNOSIS — L97.509 TYPE 2 DIABETES MELLITUS WITH FOOT ULCER, WITH LONG-TERM CURRENT USE OF INSULIN (HCC): Chronic | ICD-10-CM

## 2018-12-17 DIAGNOSIS — L97.522 SKIN ULCER OF TOE OF LEFT FOOT WITH FAT LAYER EXPOSED (HCC): Chronic | ICD-10-CM

## 2018-12-17 DIAGNOSIS — L97.522 SKIN ULCER OF TOE OF LEFT FOOT WITH FAT LAYER EXPOSED (HCC): Primary | Chronic | ICD-10-CM

## 2018-12-17 LAB
ALBUMIN SERPL-MCNC: 3.8 G/DL (ref 3.5–5.2)
ALP BLD-CCNC: 86 U/L (ref 40–130)
ALT SERPL-CCNC: 17 U/L (ref 5–41)
ANION GAP SERPL CALCULATED.3IONS-SCNC: 15 MMOL/L (ref 7–19)
AST SERPL-CCNC: 9 U/L (ref 5–40)
BASOPHILS ABSOLUTE: 0.1 K/UL (ref 0–0.2)
BASOPHILS RELATIVE PERCENT: 0.9 % (ref 0–1)
BILIRUB SERPL-MCNC: 0.3 MG/DL (ref 0.2–1.2)
BUN BLDV-MCNC: 18 MG/DL (ref 6–20)
C-REACTIVE PROTEIN: 0.49 MG/DL (ref 0–0.5)
CALCIUM SERPL-MCNC: 9.6 MG/DL (ref 8.6–10)
CHLORIDE BLD-SCNC: 100 MMOL/L (ref 98–111)
CO2: 26 MMOL/L (ref 22–29)
CREAT SERPL-MCNC: 0.9 MG/DL (ref 0.5–1.2)
EOSINOPHILS ABSOLUTE: 0.5 K/UL (ref 0–0.6)
EOSINOPHILS RELATIVE PERCENT: 4.3 % (ref 0–5)
GFR NON-AFRICAN AMERICAN: >60
GLUCOSE BLD-MCNC: 197 MG/DL (ref 74–109)
HBA1C MFR BLD: 10.1 % (ref 4–6)
HCT VFR BLD CALC: 47.5 % (ref 42–52)
HEMOGLOBIN: 15.2 G/DL (ref 14–18)
LYMPHOCYTES ABSOLUTE: 2.6 K/UL (ref 1.1–4.5)
LYMPHOCYTES RELATIVE PERCENT: 22.3 % (ref 20–40)
MCH RBC QN AUTO: 28.3 PG (ref 27–31)
MCHC RBC AUTO-ENTMCNC: 32 G/DL (ref 33–37)
MCV RBC AUTO: 88.5 FL (ref 80–94)
MONOCYTES ABSOLUTE: 1.2 K/UL (ref 0–0.9)
MONOCYTES RELATIVE PERCENT: 10.6 % (ref 0–10)
NEUTROPHILS ABSOLUTE: 7 K/UL (ref 1.5–7.5)
NEUTROPHILS RELATIVE PERCENT: 61.3 % (ref 50–65)
PDW BLD-RTO: 14.9 % (ref 11.5–14.5)
PLATELET # BLD: 309 K/UL (ref 130–400)
PMV BLD AUTO: 11 FL (ref 9.4–12.4)
POTASSIUM SERPL-SCNC: 4.3 MMOL/L (ref 3.5–5)
PREALBUMIN: 28 MG/DL (ref 20–40)
RBC # BLD: 5.37 M/UL (ref 4.7–6.1)
SEDIMENTATION RATE, ERYTHROCYTE: 16 MM/HR (ref 0–15)
SODIUM BLD-SCNC: 141 MMOL/L (ref 136–145)
TOTAL PROTEIN: 7.3 G/DL (ref 6.6–8.7)
WBC # BLD: 11.4 K/UL (ref 4.8–10.8)

## 2018-12-17 PROCEDURE — 84134 ASSAY OF PREALBUMIN: CPT

## 2018-12-17 PROCEDURE — 85652 RBC SED RATE AUTOMATED: CPT

## 2018-12-17 PROCEDURE — 86140 C-REACTIVE PROTEIN: CPT

## 2018-12-17 PROCEDURE — 80053 COMPREHEN METABOLIC PANEL: CPT

## 2018-12-17 PROCEDURE — 85025 COMPLETE CBC W/AUTO DIFF WBC: CPT

## 2018-12-17 PROCEDURE — 83036 HEMOGLOBIN GLYCOSYLATED A1C: CPT

## 2018-12-17 PROCEDURE — 99214 OFFICE O/P EST MOD 30 MIN: CPT | Performed by: NURSE PRACTITIONER

## 2018-12-17 PROCEDURE — 36415 COLL VENOUS BLD VENIPUNCTURE: CPT

## 2018-12-17 PROCEDURE — 99214 OFFICE O/P EST MOD 30 MIN: CPT

## 2018-12-17 RX ORDER — CITALOPRAM 20 MG/1
20 TABLET ORAL DAILY
COMMUNITY
End: 2019-01-14 | Stop reason: ALTCHOICE

## 2018-12-17 RX ORDER — INSULIN GLARGINE 100 [IU]/ML
INJECTION, SOLUTION SUBCUTANEOUS NIGHTLY
COMMUNITY
End: 2019-01-14 | Stop reason: ALTCHOICE

## 2018-12-17 RX ORDER — GABAPENTIN 300 MG/1
300 CAPSULE ORAL DAILY
COMMUNITY
End: 2019-01-14 | Stop reason: ALTCHOICE

## 2018-12-17 RX ORDER — ATORVASTATIN CALCIUM 80 MG/1
80 TABLET, FILM COATED ORAL DAILY
COMMUNITY
End: 2019-01-18 | Stop reason: ALTCHOICE

## 2018-12-17 RX ORDER — PRASUGREL 10 MG/1
10 TABLET, FILM COATED ORAL DAILY
COMMUNITY
End: 2019-01-07

## 2018-12-17 NOTE — PROGRESS NOTES
normal LVFX  3/15/2012  CABG x 3 LIMA-LAD, patent VG-OM, patent VG-RCA [place]ie Ka)  3/16/2012  Cath  Patent LIMA-LAD, patent VG-OM, patent VG-RCA  8/27/2014  SE  Positive for ECG and echo myocardial ischemia, high- risk findings, AUC indication 18, AUC score 7 ([facility])          Hypertension      Priority: High    Hyperlipidemia      Priority: High    Type II or unspecified type diabetes mellitus without mention of complication, not stated as uncontrolled      Priority: High    Non-pressure chronic ulcer of other part of left foot with fat layer exposed ([facility] [address] 12/17/2018    History of diabetes mellitus 03/21/2018    Abnormal cardiovascular stress test 03/21/2018    Chest pain 03/21/2018    Skin ulcer of small toe of right foot with fat layer exposed ([address] 08/16/2017    Skin ulcer of toe of left foot with fat layer exposed ([facility] [address] 08/03/2017    Type 2 diabetes mellitus with foot ulcer, with long-term current use of insulin ([facility]) 08/03/2017     Current Outpatient Prescriptions   Medication Sig Dispense Refill    metoprolol succinate (TOPROL XL) 25 MG extended release tablet Take 1 tablet by mouth nightly 90 tablet 3    lisinopril (PRINIVIL;ZESTRIL) 10 MG tablet Take 1 tablet by mouth daily 90 tablet 3    rosuvastatin (CRESTOR) 10 MG tablet Take 10 mg by mouth daily      NONFORMULARY Herbal blood sugar otc      sildenafil (VIAGRA) 25 MG tablet Take 25 mg by mouth as needed for Erectile Dysfunction      MLO NICOTINE 15 MG/16 HR PATCH AND REMOVAL, INPATIENT,       Insulin Aspart (NOVOLOG FLEXPEN SC) Inject 30 Units into the skin 2 times daily       nitroGLYCERIN (NITROSTAT) 0.4 MG SL tablet Place 1 tablet under the tongue every 5 minutes as needed for Chest pain 25 tablet 3     No current facility-administered medications for this encounter. Allergies: Patient has no known allergies.     Past Medical History:   Diagnosis Date    Arthritis     CAD (coronary artery disease)     Cigarette smoker 9/2/2014 up Dr. Abdelrahman Carvalho at scheduled   3. Follow up Dr., Bethena Fothergill     I spent a total of 60 minutes face to face with the patient. Over 50% of that time was spent on counseling and care coordination. Patient was told that if symptoms worsen or new symptoms develop they are to go to the emergency department immediately. Patient was educated on diagnosis and treatment plan. All of patient's questions were answered, and the patient understands the discharge plan. Discussed appropriate home care of this wound. Wound redressed. Patient instructions were given. Recommend no smoking  Offloading instructions given    The patient was educated on care and need for follow-up. Strict return instructions including red flag signs and symptoms were discussed with the patient. Medications for discharge discussed, and adverse effects reviewed. Questions invited and answered. Patient shows understanding of the discharge information and agrees to follow-up. Discharge Instructions       Visit Discharge/Physician Orders    Discharge condition: Stable    Discharge to: Home    Left via:Private automobile    Accompanied by: Self     ECF/HHA:     Dressing Orders:  Left plantar foot wound:   Wash with soap and water. Apply Xeroform gauze to wound bed. Cover with gauze. Secure with medipore tape. Change twice daily  Met pad to off load     Treatment Orders:  Labs today [address]     Three phase bone scan 12/27/18 at [facility] per Dr. Abdelrahman Carvalho orders     Patient will have LEAs prior to next visit. Monday January 7th at 1pm   Please go to Room 103 downstairs to register. Procedure will be completed in Room 401. Please be aware of appointment time for this procedure. [facility] followup visit:           Monday January 7th at 2pm Dr. Bethena Fothergill   (Please note your next appointment above and if you are unable to keep, kindly give a 24 hour notice.  Thank you.)    If you experience any of the following, please call the Wound

## 2018-12-28 DIAGNOSIS — I10 ESSENTIAL HYPERTENSION: ICD-10-CM

## 2018-12-28 RX ORDER — METOPROLOL SUCCINATE 25 MG/1
25 TABLET, EXTENDED RELEASE ORAL NIGHTLY
Qty: 90 TABLET | Refills: 0 | Status: ON HOLD | OUTPATIENT
Start: 2018-12-28 | End: 2019-06-12 | Stop reason: ALTCHOICE

## 2019-01-07 ENCOUNTER — HOSPITAL ENCOUNTER (OUTPATIENT)
Dept: WOUND CARE | Age: 49
Discharge: HOME | End: 2019-01-07
Payer: COMMERCIAL

## 2019-01-07 ENCOUNTER — HOSPITAL ENCOUNTER (OUTPATIENT)
Dept: NON INVASIVE DIAGNOSTICS | Age: 49
Discharge: HOME | End: 2019-01-07
Payer: COMMERCIAL

## 2019-01-07 VITALS — BODY MASS INDEX: 29.84 KG/M2 | WEIGHT: 240 LBS | TEMPERATURE: 98 F | HEIGHT: 75 IN

## 2019-01-07 DIAGNOSIS — L97.509 TYPE 2 DIABETES MELLITUS WITH FOOT ULCER, WITH LONG-TERM CURRENT USE OF INSULIN (HCC): Primary | Chronic | ICD-10-CM

## 2019-01-07 DIAGNOSIS — Z79.4 TYPE 2 DIABETES MELLITUS WITH FOOT ULCER, WITH LONG-TERM CURRENT USE OF INSULIN (HCC): Chronic | ICD-10-CM

## 2019-01-07 DIAGNOSIS — L97.522 SKIN ULCER OF TOE OF LEFT FOOT WITH FAT LAYER EXPOSED (HCC): Chronic | ICD-10-CM

## 2019-01-07 DIAGNOSIS — E11.621 TYPE 2 DIABETES MELLITUS WITH FOOT ULCER, WITH LONG-TERM CURRENT USE OF INSULIN (HCC): Chronic | ICD-10-CM

## 2019-01-07 DIAGNOSIS — L97.522 NON-PRESSURE CHRONIC ULCER OF OTHER PART OF LEFT FOOT WITH FAT LAYER EXPOSED (HCC): ICD-10-CM

## 2019-01-07 DIAGNOSIS — Z79.4 TYPE 2 DIABETES MELLITUS WITH FOOT ULCER, WITH LONG-TERM CURRENT USE OF INSULIN (HCC): Primary | Chronic | ICD-10-CM

## 2019-01-07 DIAGNOSIS — E11.621 TYPE 2 DIABETES MELLITUS WITH FOOT ULCER, WITH LONG-TERM CURRENT USE OF INSULIN (HCC): Primary | Chronic | ICD-10-CM

## 2019-01-07 DIAGNOSIS — L97.509 TYPE 2 DIABETES MELLITUS WITH FOOT ULCER, WITH LONG-TERM CURRENT USE OF INSULIN (HCC): Chronic | ICD-10-CM

## 2019-01-07 PROCEDURE — 93923 UPR/LXTR ART STDY 3+ LVLS: CPT

## 2019-01-07 PROCEDURE — 97597 DBRDMT OPN WND 1ST 20 CM/<: CPT

## 2019-01-07 PROCEDURE — 97597 DBRDMT OPN WND 1ST 20 CM/<: CPT | Performed by: SURGERY

## 2019-01-07 ASSESSMENT — PAIN DESCRIPTION - PAIN TYPE: TYPE: ACUTE PAIN

## 2019-01-07 ASSESSMENT — PAIN DESCRIPTION - ORIENTATION: ORIENTATION: LEFT

## 2019-01-07 ASSESSMENT — PAIN DESCRIPTION - ONSET: ONSET: ON-GOING

## 2019-01-07 ASSESSMENT — PAIN DESCRIPTION - FREQUENCY: FREQUENCY: INTERMITTENT

## 2019-01-07 ASSESSMENT — PAIN DESCRIPTION - PROGRESSION: CLINICAL_PROGRESSION: NOT CHANGED

## 2019-01-14 ENCOUNTER — HOSPITAL ENCOUNTER (OUTPATIENT)
Dept: WOUND CARE | Age: 49
Discharge: HOME | End: 2019-01-14
Payer: COMMERCIAL

## 2019-01-14 VITALS
RESPIRATION RATE: 18 BRPM | BODY MASS INDEX: 29.84 KG/M2 | TEMPERATURE: 98.2 F | DIASTOLIC BLOOD PRESSURE: 87 MMHG | SYSTOLIC BLOOD PRESSURE: 186 MMHG | HEIGHT: 75 IN | WEIGHT: 240 LBS | HEART RATE: 58 BPM

## 2019-01-14 DIAGNOSIS — L97.522 NON-PRESSURE CHRONIC ULCER OF OTHER PART OF LEFT FOOT WITH FAT LAYER EXPOSED (HCC): ICD-10-CM

## 2019-01-14 PROCEDURE — 11042 DBRDMT SUBQ TIS 1ST 20SQCM/<: CPT | Performed by: SURGERY

## 2019-01-14 PROCEDURE — 11042 DBRDMT SUBQ TIS 1ST 20SQCM/<: CPT

## 2019-01-14 RX ORDER — INSULIN GLARGINE 100 [IU]/ML
30 INJECTION, SOLUTION SUBCUTANEOUS DAILY
Refills: 6 | COMMUNITY
Start: 2018-11-16 | End: 2020-02-20 | Stop reason: DRUGHIGH

## 2019-01-14 RX ORDER — CIPROFLOXACIN 750 MG/1
TABLET, FILM COATED ORAL
Refills: 1 | COMMUNITY
Start: 2019-01-09 | End: 2019-01-18 | Stop reason: ALTCHOICE

## 2019-01-14 ASSESSMENT — PAIN SCALES - GENERAL: PAINLEVEL_OUTOF10: 0

## 2019-01-16 ENCOUNTER — HOSPITAL ENCOUNTER (OUTPATIENT)
Dept: GENERAL RADIOLOGY | Age: 49
Discharge: HOME | End: 2019-01-16
Payer: COMMERCIAL

## 2019-01-16 ENCOUNTER — HOSPITAL ENCOUNTER (OUTPATIENT)
Dept: WOUND CARE | Age: 49
Discharge: HOME | End: 2019-01-16
Payer: COMMERCIAL

## 2019-01-16 VITALS — HEIGHT: 75 IN | BODY MASS INDEX: 29.84 KG/M2 | WEIGHT: 240 LBS

## 2019-01-16 DIAGNOSIS — L97.522 NON-PRESSURE CHRONIC ULCER OF OTHER PART OF LEFT FOOT WITH FAT LAYER EXPOSED (HCC): ICD-10-CM

## 2019-01-16 DIAGNOSIS — Z79.4 TYPE 2 DIABETES MELLITUS WITH FOOT ULCER, WITH LONG-TERM CURRENT USE OF INSULIN (HCC): Primary | Chronic | ICD-10-CM

## 2019-01-16 DIAGNOSIS — L97.522 ULCER OF LEFT FOOT, WITH FAT LAYER EXPOSED (HCC): ICD-10-CM

## 2019-01-16 DIAGNOSIS — L97.509 TYPE 2 DIABETES MELLITUS WITH FOOT ULCER, WITH LONG-TERM CURRENT USE OF INSULIN (HCC): Primary | Chronic | ICD-10-CM

## 2019-01-16 DIAGNOSIS — E11.621 TYPE 2 DIABETES MELLITUS WITH FOOT ULCER, WITH LONG-TERM CURRENT USE OF INSULIN (HCC): Primary | Chronic | ICD-10-CM

## 2019-01-16 PROCEDURE — 87186 SC STD MICRODIL/AGAR DIL: CPT

## 2019-01-16 PROCEDURE — 99213 OFFICE O/P EST LOW 20 MIN: CPT | Performed by: SURGERY

## 2019-01-16 PROCEDURE — 87205 SMEAR GRAM STAIN: CPT

## 2019-01-16 PROCEDURE — 86403 PARTICLE AGGLUT ANTBDY SCRN: CPT

## 2019-01-16 PROCEDURE — 87070 CULTURE OTHR SPECIMN AEROBIC: CPT

## 2019-01-16 PROCEDURE — 87075 CULTR BACTERIA EXCEPT BLOOD: CPT

## 2019-01-16 PROCEDURE — 73630 X-RAY EXAM OF FOOT: CPT

## 2019-01-16 PROCEDURE — 99213 OFFICE O/P EST LOW 20 MIN: CPT

## 2019-01-16 RX ORDER — MINOCYCLINE HYDROCHLORIDE 100 MG/1
100 CAPSULE ORAL 2 TIMES DAILY
Qty: 20 CAPSULE | Refills: 0 | Status: ON HOLD | OUTPATIENT
Start: 2019-01-16 | End: 2019-01-30 | Stop reason: HOSPADM

## 2019-01-16 RX ORDER — SODIUM HYPOCHLORITE 1.25 MG/ML
SOLUTION TOPICAL
Qty: 1 BOTTLE | Refills: 2 | Status: SHIPPED | OUTPATIENT
Start: 2019-01-16 | End: 2019-10-03 | Stop reason: ALTCHOICE

## 2019-01-16 ASSESSMENT — PAIN SCALES - GENERAL: PAINLEVEL_OUTOF10: 0

## 2019-01-18 ENCOUNTER — HOSPITAL ENCOUNTER (OUTPATIENT)
Dept: WOUND CARE | Age: 49
Discharge: HOME | End: 2019-01-18
Payer: COMMERCIAL

## 2019-01-18 VITALS
HEIGHT: 75 IN | RESPIRATION RATE: 18 BRPM | DIASTOLIC BLOOD PRESSURE: 94 MMHG | TEMPERATURE: 98.2 F | WEIGHT: 240 LBS | HEART RATE: 65 BPM | BODY MASS INDEX: 29.84 KG/M2 | SYSTOLIC BLOOD PRESSURE: 181 MMHG

## 2019-01-18 DIAGNOSIS — Z79.4 TYPE 2 DIABETES MELLITUS WITH FOOT ULCER, WITH LONG-TERM CURRENT USE OF INSULIN (HCC): Primary | Chronic | ICD-10-CM

## 2019-01-18 DIAGNOSIS — E11.621 TYPE 2 DIABETES MELLITUS WITH FOOT ULCER, WITH LONG-TERM CURRENT USE OF INSULIN (HCC): Primary | Chronic | ICD-10-CM

## 2019-01-18 DIAGNOSIS — L97.509 TYPE 2 DIABETES MELLITUS WITH FOOT ULCER, WITH LONG-TERM CURRENT USE OF INSULIN (HCC): Primary | Chronic | ICD-10-CM

## 2019-01-18 DIAGNOSIS — L97.522 NON-PRESSURE CHRONIC ULCER OF OTHER PART OF LEFT FOOT WITH FAT LAYER EXPOSED (HCC): ICD-10-CM

## 2019-01-18 PROCEDURE — 99213 OFFICE O/P EST LOW 20 MIN: CPT | Performed by: SURGERY

## 2019-01-18 PROCEDURE — 99212 OFFICE O/P EST SF 10 MIN: CPT

## 2019-01-18 RX ORDER — ROSUVASTATIN CALCIUM 10 MG/1
10 TABLET, COATED ORAL DAILY
COMMUNITY
End: 2020-02-14

## 2019-01-18 RX ORDER — GLIPIZIDE 10 MG/1
10 TABLET ORAL
COMMUNITY

## 2019-01-18 ASSESSMENT — PAIN DESCRIPTION - ONSET: ONSET: ON-GOING

## 2019-01-18 ASSESSMENT — PAIN DESCRIPTION - DESCRIPTORS: DESCRIPTORS: DULL

## 2019-01-18 ASSESSMENT — PAIN DESCRIPTION - PROGRESSION: CLINICAL_PROGRESSION: GRADUALLY IMPROVING

## 2019-01-18 ASSESSMENT — PAIN DESCRIPTION - FREQUENCY: FREQUENCY: INTERMITTENT

## 2019-01-18 ASSESSMENT — PAIN SCALES - GENERAL: PAINLEVEL_OUTOF10: 3

## 2019-01-18 ASSESSMENT — PAIN DESCRIPTION - LOCATION: LOCATION: FOOT

## 2019-01-18 ASSESSMENT — PAIN DESCRIPTION - ORIENTATION: ORIENTATION: LEFT

## 2019-01-18 ASSESSMENT — PAIN DESCRIPTION - PAIN TYPE: TYPE: ACUTE PAIN

## 2019-01-20 LAB
ANAEROBIC CULTURE: ABNORMAL
GRAM STAIN RESULT: ABNORMAL
ORGANISM: ABNORMAL
WOUND/ABSCESS: ABNORMAL

## 2019-01-24 ENCOUNTER — HOSPITAL ENCOUNTER (OUTPATIENT)
Dept: WOUND CARE | Age: 49
Discharge: HOME | DRG: 617 | End: 2019-01-24
Payer: COMMERCIAL

## 2019-01-24 ENCOUNTER — HOSPITAL ENCOUNTER (INPATIENT)
Age: 49
LOS: 6 days | Discharge: HOME HEALTH | DRG: 617 | End: 2019-01-30
Attending: EMERGENCY MEDICINE | Admitting: HOSPITALIST
Payer: COMMERCIAL

## 2019-01-24 ENCOUNTER — APPOINTMENT (OUTPATIENT)
Dept: GENERAL RADIOLOGY | Age: 49
DRG: 617 | End: 2019-01-24
Payer: COMMERCIAL

## 2019-01-24 ENCOUNTER — PRE-PROCEDURE TELEPHONE (OUTPATIENT)
Dept: WOUND CARE | Age: 49
End: 2019-01-24

## 2019-01-24 VITALS
SYSTOLIC BLOOD PRESSURE: 166 MMHG | HEIGHT: 75 IN | DIASTOLIC BLOOD PRESSURE: 79 MMHG | BODY MASS INDEX: 29.84 KG/M2 | HEART RATE: 63 BPM | RESPIRATION RATE: 18 BRPM | TEMPERATURE: 98.1 F | WEIGHT: 240 LBS

## 2019-01-24 DIAGNOSIS — L03.116 CELLULITIS OF LEFT FOOT: Primary | ICD-10-CM

## 2019-01-24 DIAGNOSIS — L97.522 NON-PRESSURE CHRONIC ULCER OF OTHER PART OF LEFT FOOT WITH FAT LAYER EXPOSED (HCC): ICD-10-CM

## 2019-01-24 DIAGNOSIS — S91.302A OPEN WOUND OF LEFT FOOT, INITIAL ENCOUNTER: ICD-10-CM

## 2019-01-24 PROBLEM — L03.90 CELLULITIS: Status: ACTIVE | Noted: 2019-01-24

## 2019-01-24 LAB
ALBUMIN SERPL-MCNC: 3.4 G/DL (ref 3.5–5.2)
ALP BLD-CCNC: 88 U/L (ref 40–130)
ALT SERPL-CCNC: 26 U/L (ref 5–41)
ANION GAP SERPL CALCULATED.3IONS-SCNC: 12 MMOL/L (ref 7–19)
AST SERPL-CCNC: 15 U/L (ref 5–40)
BASOPHILS ABSOLUTE: 0.1 K/UL (ref 0–0.2)
BASOPHILS RELATIVE PERCENT: 0.7 % (ref 0–1)
BILIRUB SERPL-MCNC: 0.5 MG/DL (ref 0.2–1.2)
BUN BLDV-MCNC: 15 MG/DL (ref 6–20)
CALCIUM SERPL-MCNC: 9.3 MG/DL (ref 8.6–10)
CHLORIDE BLD-SCNC: 100 MMOL/L (ref 98–111)
CO2: 27 MMOL/L (ref 22–29)
CREAT SERPL-MCNC: 0.9 MG/DL (ref 0.5–1.2)
EOSINOPHILS ABSOLUTE: 0.5 K/UL (ref 0–0.6)
EOSINOPHILS RELATIVE PERCENT: 4.5 % (ref 0–5)
GFR NON-AFRICAN AMERICAN: >60
GLUCOSE BLD-MCNC: 117 MG/DL (ref 74–109)
GLUCOSE BLD-MCNC: 133 MG/DL (ref 70–99)
GLUCOSE BLD-MCNC: 155 MG/DL (ref 70–99)
HCT VFR BLD CALC: 47.3 % (ref 42–52)
HEMOGLOBIN: 15.1 G/DL (ref 14–18)
LACTIC ACID, SEPSIS: 1 MG/DL (ref 0.5–1.9)
LYMPHOCYTES ABSOLUTE: 2 K/UL (ref 1.1–4.5)
LYMPHOCYTES RELATIVE PERCENT: 18.8 % (ref 20–40)
MCH RBC QN AUTO: 28 PG (ref 27–31)
MCHC RBC AUTO-ENTMCNC: 31.9 G/DL (ref 33–37)
MCV RBC AUTO: 87.6 FL (ref 80–94)
MONOCYTES ABSOLUTE: 1.1 K/UL (ref 0–0.9)
MONOCYTES RELATIVE PERCENT: 10.1 % (ref 0–10)
NEUTROPHILS ABSOLUTE: 7 K/UL (ref 1.5–7.5)
NEUTROPHILS RELATIVE PERCENT: 65.2 % (ref 50–65)
PDW BLD-RTO: 15 % (ref 11.5–14.5)
PERFORMED ON: ABNORMAL
PERFORMED ON: ABNORMAL
PLATELET # BLD: 349 K/UL (ref 130–400)
PMV BLD AUTO: 10.5 FL (ref 9.4–12.4)
POTASSIUM SERPL-SCNC: 4.5 MMOL/L (ref 3.5–5)
RBC # BLD: 5.4 M/UL (ref 4.7–6.1)
SODIUM BLD-SCNC: 139 MMOL/L (ref 136–145)
TOTAL PROTEIN: 7.8 G/DL (ref 6.6–8.7)
WBC # BLD: 10.7 K/UL (ref 4.8–10.8)

## 2019-01-24 PROCEDURE — 99285 EMERGENCY DEPT VISIT HI MDM: CPT | Performed by: EMERGENCY MEDICINE

## 2019-01-24 PROCEDURE — 2580000003 HC RX 258: Performed by: HOSPITALIST

## 2019-01-24 PROCEDURE — 99222 1ST HOSP IP/OBS MODERATE 55: CPT | Performed by: HOSPITALIST

## 2019-01-24 PROCEDURE — 85025 COMPLETE CBC W/AUTO DIFF WBC: CPT

## 2019-01-24 PROCEDURE — 11042 DBRDMT SUBQ TIS 1ST 20SQCM/<: CPT | Performed by: SURGERY

## 2019-01-24 PROCEDURE — 99284 EMERGENCY DEPT VISIT MOD MDM: CPT

## 2019-01-24 PROCEDURE — 6360000002 HC RX W HCPCS: Performed by: HOSPITALIST

## 2019-01-24 PROCEDURE — 87070 CULTURE OTHR SPECIMN AEROBIC: CPT

## 2019-01-24 PROCEDURE — 11042 DBRDMT SUBQ TIS 1ST 20SQCM/<: CPT

## 2019-01-24 PROCEDURE — 83605 ASSAY OF LACTIC ACID: CPT

## 2019-01-24 PROCEDURE — 73630 X-RAY EXAM OF FOOT: CPT

## 2019-01-24 PROCEDURE — 87205 SMEAR GRAM STAIN: CPT

## 2019-01-24 PROCEDURE — 80053 COMPREHEN METABOLIC PANEL: CPT

## 2019-01-24 PROCEDURE — 6360000002 HC RX W HCPCS: Performed by: PHYSICIAN ASSISTANT

## 2019-01-24 PROCEDURE — 6370000000 HC RX 637 (ALT 250 FOR IP): Performed by: HOSPITALIST

## 2019-01-24 PROCEDURE — 2580000003 HC RX 258: Performed by: PHYSICIAN ASSISTANT

## 2019-01-24 PROCEDURE — 87186 SC STD MICRODIL/AGAR DIL: CPT

## 2019-01-24 PROCEDURE — 93005 ELECTROCARDIOGRAM TRACING: CPT

## 2019-01-24 PROCEDURE — 87040 BLOOD CULTURE FOR BACTERIA: CPT

## 2019-01-24 PROCEDURE — 1210000000 HC MED SURG R&B

## 2019-01-24 PROCEDURE — 36415 COLL VENOUS BLD VENIPUNCTURE: CPT

## 2019-01-24 PROCEDURE — 82948 REAGENT STRIP/BLOOD GLUCOSE: CPT

## 2019-01-24 PROCEDURE — 87075 CULTR BACTERIA EXCEPT BLOOD: CPT

## 2019-01-24 PROCEDURE — 86403 PARTICLE AGGLUT ANTBDY SCRN: CPT

## 2019-01-24 RX ORDER — NICOTINE 21 MG/24HR
1 PATCH, TRANSDERMAL 24 HOURS TRANSDERMAL DAILY
Status: DISCONTINUED | OUTPATIENT
Start: 2019-01-24 | End: 2019-01-30 | Stop reason: HOSPADM

## 2019-01-24 RX ORDER — METOPROLOL SUCCINATE 25 MG/1
25 TABLET, EXTENDED RELEASE ORAL NIGHTLY
Status: DISCONTINUED | OUTPATIENT
Start: 2019-01-24 | End: 2019-01-30 | Stop reason: HOSPADM

## 2019-01-24 RX ORDER — SODIUM CHLORIDE 9 MG/ML
INJECTION, SOLUTION INTRAVENOUS CONTINUOUS
Status: DISCONTINUED | OUTPATIENT
Start: 2019-01-24 | End: 2019-01-25 | Stop reason: SDUPTHER

## 2019-01-24 RX ORDER — LISINOPRIL 10 MG/1
10 TABLET ORAL DAILY
Status: DISCONTINUED | OUTPATIENT
Start: 2019-01-25 | End: 2019-01-30 | Stop reason: HOSPADM

## 2019-01-24 RX ORDER — ROSUVASTATIN CALCIUM 10 MG/1
10 TABLET, COATED ORAL DAILY
Status: DISCONTINUED | OUTPATIENT
Start: 2019-01-25 | End: 2019-01-30 | Stop reason: HOSPADM

## 2019-01-24 RX ORDER — VANCOMYCIN HYDROCHLORIDE 1 G/200ML
1000 INJECTION, SOLUTION INTRAVENOUS ONCE
Status: DISCONTINUED | OUTPATIENT
Start: 2019-01-24 | End: 2019-01-24

## 2019-01-24 RX ADMIN — MEROPENEM 1 G: 1 INJECTION, POWDER, FOR SOLUTION INTRAVENOUS at 21:38

## 2019-01-24 RX ADMIN — SODIUM CHLORIDE: 9 INJECTION, SOLUTION INTRAVENOUS at 18:33

## 2019-01-24 RX ADMIN — METOPROLOL SUCCINATE 25 MG: 25 TABLET, EXTENDED RELEASE ORAL at 21:38

## 2019-01-24 RX ADMIN — HYDROMORPHONE HYDROCHLORIDE 0.5 MG: 1 INJECTION, SOLUTION INTRAMUSCULAR; INTRAVENOUS; SUBCUTANEOUS at 21:38

## 2019-01-24 RX ADMIN — VANCOMYCIN HYDROCHLORIDE 1500 MG: 10 INJECTION, POWDER, LYOPHILIZED, FOR SOLUTION INTRAVENOUS at 23:11

## 2019-01-24 ASSESSMENT — ENCOUNTER SYMPTOMS
SHORTNESS OF BREATH: 0
BACK PAIN: 0
ABDOMINAL PAIN: 0
COLOR CHANGE: 1
EYE ITCHING: 0
PHOTOPHOBIA: 0
APNEA: 0
COUGH: 0
EYE DISCHARGE: 0

## 2019-01-24 ASSESSMENT — PAIN DESCRIPTION - LOCATION
LOCATION: FOOT

## 2019-01-24 ASSESSMENT — PAIN DESCRIPTION - ONSET
ONSET: ON-GOING
ONSET: ON-GOING

## 2019-01-24 ASSESSMENT — PAIN - FUNCTIONAL ASSESSMENT: PAIN_FUNCTIONAL_ASSESSMENT: ACTIVITIES ARE NOT PREVENTED

## 2019-01-24 ASSESSMENT — PAIN DESCRIPTION - ORIENTATION
ORIENTATION: LEFT

## 2019-01-24 ASSESSMENT — PAIN SCALES - GENERAL
PAINLEVEL_OUTOF10: 4
PAINLEVEL_OUTOF10: 5
PAINLEVEL_OUTOF10: 0
PAINLEVEL_OUTOF10: 8
PAINLEVEL_OUTOF10: 1
PAINLEVEL_OUTOF10: 8

## 2019-01-24 ASSESSMENT — PAIN DESCRIPTION - PAIN TYPE
TYPE: ACUTE PAIN

## 2019-01-24 ASSESSMENT — PAIN DESCRIPTION - PROGRESSION
CLINICAL_PROGRESSION: NOT CHANGED
CLINICAL_PROGRESSION: GRADUALLY IMPROVING

## 2019-01-24 ASSESSMENT — PAIN DESCRIPTION - DESCRIPTORS
DESCRIPTORS: DISCOMFORT
DESCRIPTORS: DULL

## 2019-01-24 ASSESSMENT — PAIN DESCRIPTION - FREQUENCY
FREQUENCY: INTERMITTENT
FREQUENCY: INTERMITTENT

## 2019-01-25 ENCOUNTER — ANESTHESIA (OUTPATIENT)
Dept: OPERATING ROOM | Age: 49
DRG: 617 | End: 2019-01-25
Payer: COMMERCIAL

## 2019-01-25 ENCOUNTER — ANESTHESIA EVENT (OUTPATIENT)
Dept: OPERATING ROOM | Age: 49
DRG: 617 | End: 2019-01-25
Payer: COMMERCIAL

## 2019-01-25 VITALS
SYSTOLIC BLOOD PRESSURE: 102 MMHG | OXYGEN SATURATION: 99 % | RESPIRATION RATE: 12 BRPM | DIASTOLIC BLOOD PRESSURE: 56 MMHG | TEMPERATURE: 96.8 F

## 2019-01-25 PROBLEM — L97.522 FOOT ULCER WITH FAT LAYER EXPOSED, LEFT (HCC): Status: ACTIVE | Noted: 2019-01-25

## 2019-01-25 PROBLEM — R07.9 CHEST PAIN: Status: RESOLVED | Noted: 2018-03-21 | Resolved: 2019-01-25

## 2019-01-25 PROBLEM — R94.39 ABNORMAL CARDIOVASCULAR STRESS TEST: Status: RESOLVED | Noted: 2018-03-21 | Resolved: 2019-01-25

## 2019-01-25 LAB
CHOLESTEROL, TOTAL: 117 MG/DL (ref 160–199)
EKG P AXIS: 9 DEGREES
EKG P-R INTERVAL: 168 MS
EKG Q-T INTERVAL: 400 MS
EKG QRS DURATION: 100 MS
EKG QTC CALCULATION (BAZETT): 421 MS
EKG T AXIS: 126 DEGREES
GLUCOSE BLD-MCNC: 179 MG/DL (ref 70–99)
GLUCOSE BLD-MCNC: 192 MG/DL (ref 70–99)
GLUCOSE BLD-MCNC: 215 MG/DL (ref 70–99)
GLUCOSE BLD-MCNC: 226 MG/DL (ref 70–99)
HDLC SERPL-MCNC: 17 MG/DL (ref 55–121)
LDL CHOLESTEROL CALCULATED: 73 MG/DL
PERFORMED ON: ABNORMAL
TRIGL SERPL-MCNC: 135 MG/DL (ref 0–149)

## 2019-01-25 PROCEDURE — 7100000001 HC PACU RECOVERY - ADDTL 15 MIN: Performed by: SURGERY

## 2019-01-25 PROCEDURE — 3600000004 HC SURGERY LEVEL 4 BASE: Performed by: SURGERY

## 2019-01-25 PROCEDURE — 87205 SMEAR GRAM STAIN: CPT

## 2019-01-25 PROCEDURE — 6360000002 HC RX W HCPCS: Performed by: NURSE ANESTHETIST, CERTIFIED REGISTERED

## 2019-01-25 PROCEDURE — 36415 COLL VENOUS BLD VENIPUNCTURE: CPT

## 2019-01-25 PROCEDURE — 99232 SBSQ HOSP IP/OBS MODERATE 35: CPT | Performed by: INTERNAL MEDICINE

## 2019-01-25 PROCEDURE — 0Y6U0Z0 DETACHMENT AT LEFT 3RD TOE, COMPLETE, OPEN APPROACH: ICD-10-PCS | Performed by: SURGERY

## 2019-01-25 PROCEDURE — 6360000002 HC RX W HCPCS: Performed by: HOSPITALIST

## 2019-01-25 PROCEDURE — 2580000003 HC RX 258: Performed by: HOSPITALIST

## 2019-01-25 PROCEDURE — 2580000003 HC RX 258: Performed by: PHYSICIAN ASSISTANT

## 2019-01-25 PROCEDURE — 6370000000 HC RX 637 (ALT 250 FOR IP): Performed by: SURGERY

## 2019-01-25 PROCEDURE — 2709999900 HC NON-CHARGEABLE SUPPLY: Performed by: SURGERY

## 2019-01-25 PROCEDURE — 87075 CULTR BACTERIA EXCEPT BLOOD: CPT

## 2019-01-25 PROCEDURE — 0JBR0ZZ EXCISION OF LEFT FOOT SUBCUTANEOUS TISSUE AND FASCIA, OPEN APPROACH: ICD-10-PCS | Performed by: SURGERY

## 2019-01-25 PROCEDURE — 99222 1ST HOSP IP/OBS MODERATE 55: CPT | Performed by: NURSE PRACTITIONER

## 2019-01-25 PROCEDURE — 86403 PARTICLE AGGLUT ANTBDY SCRN: CPT

## 2019-01-25 PROCEDURE — 93005 ELECTROCARDIOGRAM TRACING: CPT

## 2019-01-25 PROCEDURE — 6360000002 HC RX W HCPCS: Performed by: PHYSICIAN ASSISTANT

## 2019-01-25 PROCEDURE — 87070 CULTURE OTHR SPECIMN AEROBIC: CPT

## 2019-01-25 PROCEDURE — 80061 LIPID PANEL: CPT

## 2019-01-25 PROCEDURE — 6370000000 HC RX 637 (ALT 250 FOR IP): Performed by: INTERNAL MEDICINE

## 2019-01-25 PROCEDURE — 2500000003 HC RX 250 WO HCPCS: Performed by: NURSE ANESTHETIST, CERTIFIED REGISTERED

## 2019-01-25 PROCEDURE — 7100000000 HC PACU RECOVERY - FIRST 15 MIN: Performed by: SURGERY

## 2019-01-25 PROCEDURE — 87186 SC STD MICRODIL/AGAR DIL: CPT

## 2019-01-25 PROCEDURE — 88305 TISSUE EXAM BY PATHOLOGIST: CPT

## 2019-01-25 PROCEDURE — 3700000000 HC ANESTHESIA ATTENDED CARE: Performed by: SURGERY

## 2019-01-25 PROCEDURE — 6360000002 HC RX W HCPCS: Performed by: SURGERY

## 2019-01-25 PROCEDURE — 0LBW0ZZ EXCISION OF LEFT FOOT TENDON, OPEN APPROACH: ICD-10-PCS | Performed by: SURGERY

## 2019-01-25 PROCEDURE — 2580000003 HC RX 258: Performed by: ANESTHESIOLOGY

## 2019-01-25 PROCEDURE — 3700000001 HC ADD 15 MINUTES (ANESTHESIA): Performed by: SURGERY

## 2019-01-25 PROCEDURE — 6370000000 HC RX 637 (ALT 250 FOR IP): Performed by: HOSPITALIST

## 2019-01-25 PROCEDURE — 2580000003 HC RX 258: Performed by: SURGERY

## 2019-01-25 PROCEDURE — 1210000000 HC MED SURG R&B

## 2019-01-25 PROCEDURE — 3600000014 HC SURGERY LEVEL 4 ADDTL 15MIN: Performed by: SURGERY

## 2019-01-25 PROCEDURE — 28820 AMPUTATION OF TOE: CPT | Performed by: SURGERY

## 2019-01-25 PROCEDURE — 82948 REAGENT STRIP/BLOOD GLUCOSE: CPT

## 2019-01-25 RX ORDER — SODIUM HYPOCHLORITE 1.25 MG/ML
SOLUTION TOPICAL PRN
Status: DISCONTINUED | OUTPATIENT
Start: 2019-01-25 | End: 2019-01-25 | Stop reason: HOSPADM

## 2019-01-25 RX ORDER — ACETAMINOPHEN 325 MG/1
650 TABLET ORAL EVERY 4 HOURS PRN
Status: DISCONTINUED | OUTPATIENT
Start: 2019-01-25 | End: 2019-01-30 | Stop reason: HOSPADM

## 2019-01-25 RX ORDER — HYDROCODONE BITARTRATE AND ACETAMINOPHEN 5; 325 MG/1; MG/1
2 TABLET ORAL EVERY 4 HOURS PRN
Status: DISCONTINUED | OUTPATIENT
Start: 2019-01-25 | End: 2019-01-30 | Stop reason: HOSPADM

## 2019-01-25 RX ORDER — PROMETHAZINE HYDROCHLORIDE 25 MG/ML
6.25 INJECTION, SOLUTION INTRAMUSCULAR; INTRAVENOUS
Status: DISCONTINUED | OUTPATIENT
Start: 2019-01-25 | End: 2019-01-25 | Stop reason: HOSPADM

## 2019-01-25 RX ORDER — ENALAPRILAT 2.5 MG/2ML
1.25 INJECTION INTRAVENOUS
Status: DISCONTINUED | OUTPATIENT
Start: 2019-01-25 | End: 2019-01-25 | Stop reason: HOSPADM

## 2019-01-25 RX ORDER — DEXTROSE MONOHYDRATE 50 MG/ML
100 INJECTION, SOLUTION INTRAVENOUS PRN
Status: DISCONTINUED | OUTPATIENT
Start: 2019-01-25 | End: 2019-01-30 | Stop reason: HOSPADM

## 2019-01-25 RX ORDER — EPHEDRINE SULFATE 50 MG/ML
INJECTION, SOLUTION INTRAVENOUS PRN
Status: DISCONTINUED | OUTPATIENT
Start: 2019-01-25 | End: 2019-01-25 | Stop reason: SDUPTHER

## 2019-01-25 RX ORDER — FENTANYL CITRATE 50 UG/ML
INJECTION, SOLUTION INTRAMUSCULAR; INTRAVENOUS PRN
Status: DISCONTINUED | OUTPATIENT
Start: 2019-01-25 | End: 2019-01-25 | Stop reason: SDUPTHER

## 2019-01-25 RX ORDER — METHYLENE BLUE 10 MG/ML
INJECTION INTRAVENOUS PRN
Status: DISCONTINUED | OUTPATIENT
Start: 2019-01-25 | End: 2019-01-25 | Stop reason: HOSPADM

## 2019-01-25 RX ORDER — MORPHINE SULFATE/0.9% NACL/PF 1 MG/ML
2 SYRINGE (ML) INJECTION EVERY 5 MIN PRN
Status: DISCONTINUED | OUTPATIENT
Start: 2019-01-25 | End: 2019-01-25 | Stop reason: HOSPADM

## 2019-01-25 RX ORDER — PROPOFOL 10 MG/ML
INJECTION, EMULSION INTRAVENOUS PRN
Status: DISCONTINUED | OUTPATIENT
Start: 2019-01-25 | End: 2019-01-25 | Stop reason: SDUPTHER

## 2019-01-25 RX ORDER — HYDROCODONE BITARTRATE AND ACETAMINOPHEN 5; 325 MG/1; MG/1
1 TABLET ORAL EVERY 4 HOURS PRN
Status: DISCONTINUED | OUTPATIENT
Start: 2019-01-25 | End: 2019-01-30 | Stop reason: HOSPADM

## 2019-01-25 RX ORDER — SODIUM CHLORIDE, SODIUM LACTATE, POTASSIUM CHLORIDE, CALCIUM CHLORIDE 600; 310; 30; 20 MG/100ML; MG/100ML; MG/100ML; MG/100ML
INJECTION, SOLUTION INTRAVENOUS CONTINUOUS
Status: DISCONTINUED | OUTPATIENT
Start: 2019-01-25 | End: 2019-01-25

## 2019-01-25 RX ORDER — NICOTINE POLACRILEX 4 MG
15 LOZENGE BUCCAL PRN
Status: DISCONTINUED | OUTPATIENT
Start: 2019-01-25 | End: 2019-01-30 | Stop reason: HOSPADM

## 2019-01-25 RX ORDER — SODIUM CHLORIDE 0.9 % (FLUSH) 0.9 %
10 SYRINGE (ML) INJECTION PRN
Status: DISCONTINUED | OUTPATIENT
Start: 2019-01-25 | End: 2019-01-30 | Stop reason: HOSPADM

## 2019-01-25 RX ORDER — LIDOCAINE HYDROCHLORIDE 10 MG/ML
1 INJECTION, SOLUTION EPIDURAL; INFILTRATION; INTRACAUDAL; PERINEURAL
Status: DISCONTINUED | OUTPATIENT
Start: 2019-01-25 | End: 2019-01-25 | Stop reason: HOSPADM

## 2019-01-25 RX ORDER — LIDOCAINE HYDROCHLORIDE 10 MG/ML
5 INJECTION, SOLUTION EPIDURAL; INFILTRATION; INTRACAUDAL; PERINEURAL ONCE
Status: COMPLETED | OUTPATIENT
Start: 2019-01-25 | End: 2019-01-28

## 2019-01-25 RX ORDER — SODIUM HYPOCHLORITE 1.25 MG/ML
SOLUTION TOPICAL DAILY
Status: DISCONTINUED | OUTPATIENT
Start: 2019-01-25 | End: 2019-01-25

## 2019-01-25 RX ORDER — SODIUM CHLORIDE 0.9 % (FLUSH) 0.9 %
10 SYRINGE (ML) INJECTION EVERY 12 HOURS SCHEDULED
Status: DISCONTINUED | OUTPATIENT
Start: 2019-01-25 | End: 2019-01-30 | Stop reason: HOSPADM

## 2019-01-25 RX ORDER — MORPHINE SULFATE/0.9% NACL/PF 1 MG/ML
4 SYRINGE (ML) INJECTION EVERY 5 MIN PRN
Status: DISCONTINUED | OUTPATIENT
Start: 2019-01-25 | End: 2019-01-25 | Stop reason: HOSPADM

## 2019-01-25 RX ORDER — MEPERIDINE HYDROCHLORIDE 50 MG/ML
12.5 INJECTION INTRAMUSCULAR; INTRAVENOUS; SUBCUTANEOUS EVERY 5 MIN PRN
Status: DISCONTINUED | OUTPATIENT
Start: 2019-01-25 | End: 2019-01-25 | Stop reason: HOSPADM

## 2019-01-25 RX ORDER — METOCLOPRAMIDE HYDROCHLORIDE 5 MG/ML
10 INJECTION INTRAMUSCULAR; INTRAVENOUS
Status: DISCONTINUED | OUTPATIENT
Start: 2019-01-25 | End: 2019-01-25 | Stop reason: HOSPADM

## 2019-01-25 RX ORDER — DEXTROSE MONOHYDRATE 25 G/50ML
12.5 INJECTION, SOLUTION INTRAVENOUS PRN
Status: DISCONTINUED | OUTPATIENT
Start: 2019-01-25 | End: 2019-01-30 | Stop reason: HOSPADM

## 2019-01-25 RX ORDER — ONDANSETRON 2 MG/ML
INJECTION INTRAMUSCULAR; INTRAVENOUS PRN
Status: DISCONTINUED | OUTPATIENT
Start: 2019-01-25 | End: 2019-01-25 | Stop reason: SDUPTHER

## 2019-01-25 RX ORDER — HYDRALAZINE HYDROCHLORIDE 20 MG/ML
5 INJECTION INTRAMUSCULAR; INTRAVENOUS EVERY 10 MIN PRN
Status: DISCONTINUED | OUTPATIENT
Start: 2019-01-25 | End: 2019-01-25 | Stop reason: HOSPADM

## 2019-01-25 RX ORDER — ONDANSETRON 2 MG/ML
4 INJECTION INTRAMUSCULAR; INTRAVENOUS EVERY 6 HOURS PRN
Status: DISCONTINUED | OUTPATIENT
Start: 2019-01-25 | End: 2019-01-30 | Stop reason: HOSPADM

## 2019-01-25 RX ORDER — SODIUM HYPOCHLORITE 1.25 MG/ML
SOLUTION TOPICAL 2 TIMES DAILY
Status: DISCONTINUED | OUTPATIENT
Start: 2019-01-25 | End: 2019-01-30 | Stop reason: HOSPADM

## 2019-01-25 RX ORDER — SODIUM CHLORIDE 9 MG/ML
INJECTION, SOLUTION INTRAVENOUS CONTINUOUS
Status: DISCONTINUED | OUTPATIENT
Start: 2019-01-25 | End: 2019-01-30 | Stop reason: HOSPADM

## 2019-01-25 RX ORDER — MIDAZOLAM HYDROCHLORIDE 1 MG/ML
2 INJECTION INTRAMUSCULAR; INTRAVENOUS
Status: DISCONTINUED | OUTPATIENT
Start: 2019-01-25 | End: 2019-01-25 | Stop reason: HOSPADM

## 2019-01-25 RX ORDER — FENTANYL CITRATE 50 UG/ML
50 INJECTION, SOLUTION INTRAMUSCULAR; INTRAVENOUS
Status: DISCONTINUED | OUTPATIENT
Start: 2019-01-25 | End: 2019-01-25 | Stop reason: HOSPADM

## 2019-01-25 RX ORDER — LIDOCAINE HYDROCHLORIDE 10 MG/ML
INJECTION, SOLUTION INFILTRATION; PERINEURAL PRN
Status: DISCONTINUED | OUTPATIENT
Start: 2019-01-25 | End: 2019-01-25 | Stop reason: SDUPTHER

## 2019-01-25 RX ORDER — SODIUM CHLORIDE 9 MG/ML
INJECTION, SOLUTION INTRAVENOUS CONTINUOUS
Status: DISCONTINUED | OUTPATIENT
Start: 2019-01-25 | End: 2019-01-25

## 2019-01-25 RX ORDER — FENTANYL CITRATE 50 UG/ML
25 INJECTION, SOLUTION INTRAMUSCULAR; INTRAVENOUS
Status: DISCONTINUED | OUTPATIENT
Start: 2019-01-25 | End: 2019-01-25 | Stop reason: HOSPADM

## 2019-01-25 RX ORDER — SODIUM CHLORIDE 0.9 % (FLUSH) 0.9 %
10 SYRINGE (ML) INJECTION PRN
Status: DISCONTINUED | OUTPATIENT
Start: 2019-01-25 | End: 2019-01-25 | Stop reason: HOSPADM

## 2019-01-25 RX ORDER — LABETALOL HYDROCHLORIDE 5 MG/ML
5 INJECTION, SOLUTION INTRAVENOUS EVERY 10 MIN PRN
Status: DISCONTINUED | OUTPATIENT
Start: 2019-01-25 | End: 2019-01-25 | Stop reason: HOSPADM

## 2019-01-25 RX ORDER — SODIUM CHLORIDE 0.9 % (FLUSH) 0.9 %
10 SYRINGE (ML) INJECTION EVERY 12 HOURS SCHEDULED
Status: DISCONTINUED | OUTPATIENT
Start: 2019-01-25 | End: 2019-01-25 | Stop reason: HOSPADM

## 2019-01-25 RX ORDER — SODIUM HYPOCHLORITE 1.25 MG/ML
SOLUTION TOPICAL 2 TIMES DAILY
Status: DISCONTINUED | OUTPATIENT
Start: 2019-01-25 | End: 2019-01-25 | Stop reason: SDUPTHER

## 2019-01-25 RX ORDER — DIPHENHYDRAMINE HYDROCHLORIDE 50 MG/ML
12.5 INJECTION INTRAMUSCULAR; INTRAVENOUS
Status: DISCONTINUED | OUTPATIENT
Start: 2019-01-25 | End: 2019-01-25 | Stop reason: HOSPADM

## 2019-01-25 RX ORDER — INSULIN GLARGINE 100 [IU]/ML
10 INJECTION, SOLUTION SUBCUTANEOUS NIGHTLY
Status: DISCONTINUED | OUTPATIENT
Start: 2019-01-25 | End: 2019-01-30 | Stop reason: HOSPADM

## 2019-01-25 RX ADMIN — FENTANYL CITRATE 100 MCG: 50 INJECTION INTRAMUSCULAR; INTRAVENOUS at 08:32

## 2019-01-25 RX ADMIN — HYDROCODONE BITARTRATE AND ACETAMINOPHEN 2 TABLET: 5; 325 TABLET ORAL at 13:16

## 2019-01-25 RX ADMIN — FENTANYL CITRATE 50 MCG: 50 INJECTION INTRAMUSCULAR; INTRAVENOUS at 08:37

## 2019-01-25 RX ADMIN — HYDROMORPHONE HYDROCHLORIDE 0.5 MG: 1 INJECTION, SOLUTION INTRAMUSCULAR; INTRAVENOUS; SUBCUTANEOUS at 06:15

## 2019-01-25 RX ADMIN — MEROPENEM 1 G: 1 INJECTION, POWDER, FOR SOLUTION INTRAVENOUS at 05:36

## 2019-01-25 RX ADMIN — SODIUM CHLORIDE: 9 INJECTION, SOLUTION INTRAVENOUS at 10:45

## 2019-01-25 RX ADMIN — DAKIN'S SOLUTION 0.125% (QUARTER STRENGTH): 0.12 SOLUTION at 21:30

## 2019-01-25 RX ADMIN — EPHEDRINE SULFATE 15 MG: 50 INJECTION, SOLUTION INTRAMUSCULAR; INTRAVENOUS; SUBCUTANEOUS at 08:47

## 2019-01-25 RX ADMIN — INSULIN LISPRO 1 UNITS: 100 INJECTION, SOLUTION INTRAVENOUS; SUBCUTANEOUS at 18:38

## 2019-01-25 RX ADMIN — ONDANSETRON HYDROCHLORIDE 4 MG: 2 INJECTION, SOLUTION INTRAMUSCULAR; INTRAVENOUS at 08:37

## 2019-01-25 RX ADMIN — INSULIN GLARGINE 10 UNITS: 100 INJECTION, SOLUTION SUBCUTANEOUS at 21:34

## 2019-01-25 RX ADMIN — MEROPENEM 1 G: 1 INJECTION, POWDER, FOR SOLUTION INTRAVENOUS at 21:30

## 2019-01-25 RX ADMIN — HYDROMORPHONE HYDROCHLORIDE 0.5 MG: 1 INJECTION, SOLUTION INTRAMUSCULAR; INTRAVENOUS; SUBCUTANEOUS at 19:56

## 2019-01-25 RX ADMIN — FENTANYL CITRATE 50 MCG: 50 INJECTION INTRAMUSCULAR; INTRAVENOUS at 08:58

## 2019-01-25 RX ADMIN — VANCOMYCIN HYDROCHLORIDE 1500 MG: 10 INJECTION, POWDER, LYOPHILIZED, FOR SOLUTION INTRAVENOUS at 08:19

## 2019-01-25 RX ADMIN — VANCOMYCIN HYDROCHLORIDE 1500 MG: 10 INJECTION, POWDER, LYOPHILIZED, FOR SOLUTION INTRAVENOUS at 21:35

## 2019-01-25 RX ADMIN — METOPROLOL SUCCINATE 25 MG: 25 TABLET, EXTENDED RELEASE ORAL at 21:35

## 2019-01-25 RX ADMIN — PROPOFOL 150 MG: 10 INJECTION, EMULSION INTRAVENOUS at 08:32

## 2019-01-25 RX ADMIN — FENTANYL CITRATE 50 MCG: 50 INJECTION INTRAMUSCULAR; INTRAVENOUS at 08:49

## 2019-01-25 RX ADMIN — SODIUM CHLORIDE, SODIUM LACTATE, POTASSIUM CHLORIDE, AND CALCIUM CHLORIDE: 600; 310; 30; 20 INJECTION, SOLUTION INTRAVENOUS at 08:22

## 2019-01-25 RX ADMIN — LIDOCAINE HYDROCHLORIDE 50 MG: 10 INJECTION, SOLUTION INFILTRATION; PERINEURAL at 08:32

## 2019-01-25 RX ADMIN — MEROPENEM 1 G: 1 INJECTION, POWDER, FOR SOLUTION INTRAVENOUS at 15:00

## 2019-01-25 RX ADMIN — HYDROCODONE BITARTRATE AND ACETAMINOPHEN 2 TABLET: 5; 325 TABLET ORAL at 23:12

## 2019-01-25 RX ADMIN — HYDROMORPHONE HYDROCHLORIDE 0.5 MG: 1 INJECTION, SOLUTION INTRAMUSCULAR; INTRAVENOUS; SUBCUTANEOUS at 02:13

## 2019-01-25 RX ADMIN — INSULIN LISPRO 1 UNITS: 100 INJECTION, SOLUTION INTRAVENOUS; SUBCUTANEOUS at 21:35

## 2019-01-25 ASSESSMENT — PAIN SCALES - GENERAL
PAINLEVEL_OUTOF10: 8
PAINLEVEL_OUTOF10: 3
PAINLEVEL_OUTOF10: 5
PAINLEVEL_OUTOF10: 5
PAINLEVEL_OUTOF10: 8
PAINLEVEL_OUTOF10: 8
PAINLEVEL_OUTOF10: 3
PAINLEVEL_OUTOF10: 8

## 2019-01-25 ASSESSMENT — ENCOUNTER SYMPTOMS: SHORTNESS OF BREATH: 0

## 2019-01-25 ASSESSMENT — LIFESTYLE VARIABLES: SMOKING_STATUS: 1

## 2019-01-26 LAB
ANION GAP SERPL CALCULATED.3IONS-SCNC: 9 MMOL/L (ref 7–19)
BUN BLDV-MCNC: 14 MG/DL (ref 6–20)
CALCIUM SERPL-MCNC: 8.4 MG/DL (ref 8.6–10)
CHLORIDE BLD-SCNC: 103 MMOL/L (ref 98–111)
CO2: 27 MMOL/L (ref 22–29)
CREAT SERPL-MCNC: 0.9 MG/DL (ref 0.5–1.2)
GFR NON-AFRICAN AMERICAN: >60
GLUCOSE BLD-MCNC: 106 MG/DL (ref 70–99)
GLUCOSE BLD-MCNC: 109 MG/DL (ref 70–99)
GLUCOSE BLD-MCNC: 127 MG/DL (ref 70–99)
GLUCOSE BLD-MCNC: 155 MG/DL (ref 74–109)
GLUCOSE BLD-MCNC: 200 MG/DL (ref 70–99)
GRAM STAIN RESULT: ABNORMAL
HCT VFR BLD CALC: 38.1 % (ref 42–52)
HEMOGLOBIN: 11.8 G/DL (ref 14–18)
MCH RBC QN AUTO: 27.9 PG (ref 27–31)
MCHC RBC AUTO-ENTMCNC: 31 G/DL (ref 33–37)
MCV RBC AUTO: 90.1 FL (ref 80–94)
ORGANISM: ABNORMAL
PDW BLD-RTO: 14.7 % (ref 11.5–14.5)
PERFORMED ON: ABNORMAL
PLATELET # BLD: 266 K/UL (ref 130–400)
PMV BLD AUTO: 11 FL (ref 9.4–12.4)
POTASSIUM SERPL-SCNC: 4.7 MMOL/L (ref 3.5–5)
RBC # BLD: 4.23 M/UL (ref 4.7–6.1)
SODIUM BLD-SCNC: 139 MMOL/L (ref 136–145)
VANCOMYCIN TROUGH: 10.1 UG/ML (ref 10–20)
WBC # BLD: 9.2 K/UL (ref 4.8–10.8)
WOUND/ABSCESS: ABNORMAL
WOUND/ABSCESS: ABNORMAL

## 2019-01-26 PROCEDURE — 80202 ASSAY OF VANCOMYCIN: CPT

## 2019-01-26 PROCEDURE — 6370000000 HC RX 637 (ALT 250 FOR IP): Performed by: HOSPITALIST

## 2019-01-26 PROCEDURE — 2580000003 HC RX 258: Performed by: HOSPITALIST

## 2019-01-26 PROCEDURE — 2580000003 HC RX 258: Performed by: PHYSICIAN ASSISTANT

## 2019-01-26 PROCEDURE — 1210000000 HC MED SURG R&B

## 2019-01-26 PROCEDURE — 99232 SBSQ HOSP IP/OBS MODERATE 35: CPT | Performed by: INTERNAL MEDICINE

## 2019-01-26 PROCEDURE — 82948 REAGENT STRIP/BLOOD GLUCOSE: CPT

## 2019-01-26 PROCEDURE — 6370000000 HC RX 637 (ALT 250 FOR IP): Performed by: SURGERY

## 2019-01-26 PROCEDURE — 2580000003 HC RX 258: Performed by: SURGERY

## 2019-01-26 PROCEDURE — 6360000002 HC RX W HCPCS: Performed by: SURGERY

## 2019-01-26 PROCEDURE — 80048 BASIC METABOLIC PNL TOTAL CA: CPT

## 2019-01-26 PROCEDURE — 6370000000 HC RX 637 (ALT 250 FOR IP): Performed by: INTERNAL MEDICINE

## 2019-01-26 PROCEDURE — 6360000002 HC RX W HCPCS: Performed by: HOSPITALIST

## 2019-01-26 PROCEDURE — 85027 COMPLETE CBC AUTOMATED: CPT

## 2019-01-26 PROCEDURE — 36415 COLL VENOUS BLD VENIPUNCTURE: CPT

## 2019-01-26 PROCEDURE — 6360000002 HC RX W HCPCS: Performed by: PHYSICIAN ASSISTANT

## 2019-01-26 PROCEDURE — 99024 POSTOP FOLLOW-UP VISIT: CPT | Performed by: SURGERY

## 2019-01-26 RX ORDER — ZOLPIDEM TARTRATE 5 MG/1
5 TABLET ORAL NIGHTLY PRN
Status: DISCONTINUED | OUTPATIENT
Start: 2019-01-26 | End: 2019-01-30 | Stop reason: HOSPADM

## 2019-01-26 RX ADMIN — INSULIN LISPRO 2 UNITS: 100 INJECTION, SOLUTION INTRAVENOUS; SUBCUTANEOUS at 13:02

## 2019-01-26 RX ADMIN — HYDROCODONE BITARTRATE AND ACETAMINOPHEN 2 TABLET: 5; 325 TABLET ORAL at 11:40

## 2019-01-26 RX ADMIN — MEROPENEM 1 G: 1 INJECTION, POWDER, FOR SOLUTION INTRAVENOUS at 20:43

## 2019-01-26 RX ADMIN — MEROPENEM 1 G: 1 INJECTION, POWDER, FOR SOLUTION INTRAVENOUS at 06:01

## 2019-01-26 RX ADMIN — HYDROMORPHONE HYDROCHLORIDE 0.5 MG: 1 INJECTION, SOLUTION INTRAMUSCULAR; INTRAVENOUS; SUBCUTANEOUS at 02:02

## 2019-01-26 RX ADMIN — ZOLPIDEM TARTRATE 5 MG: 5 TABLET ORAL at 20:43

## 2019-01-26 RX ADMIN — SODIUM CHLORIDE: 9 INJECTION, SOLUTION INTRAVENOUS at 02:02

## 2019-01-26 RX ADMIN — INSULIN GLARGINE 10 UNITS: 100 INJECTION, SOLUTION SUBCUTANEOUS at 21:20

## 2019-01-26 RX ADMIN — HYDROCODONE BITARTRATE AND ACETAMINOPHEN 2 TABLET: 5; 325 TABLET ORAL at 16:03

## 2019-01-26 RX ADMIN — MEROPENEM 1 G: 1 INJECTION, POWDER, FOR SOLUTION INTRAVENOUS at 13:01

## 2019-01-26 RX ADMIN — ENOXAPARIN SODIUM 40 MG: 40 INJECTION, SOLUTION INTRAVENOUS; SUBCUTANEOUS at 09:12

## 2019-01-26 RX ADMIN — INSULIN LISPRO 3 UNITS: 100 INJECTION, SOLUTION INTRAVENOUS; SUBCUTANEOUS at 18:13

## 2019-01-26 RX ADMIN — METOPROLOL SUCCINATE 25 MG: 25 TABLET, EXTENDED RELEASE ORAL at 20:43

## 2019-01-26 RX ADMIN — HYDROMORPHONE HYDROCHLORIDE 0.5 MG: 1 INJECTION, SOLUTION INTRAMUSCULAR; INTRAVENOUS; SUBCUTANEOUS at 09:13

## 2019-01-26 RX ADMIN — DAKIN'S SOLUTION 0.125% (QUARTER STRENGTH): 0.12 SOLUTION at 11:40

## 2019-01-26 RX ADMIN — HYDROMORPHONE HYDROCHLORIDE 0.5 MG: 1 INJECTION, SOLUTION INTRAMUSCULAR; INTRAVENOUS; SUBCUTANEOUS at 20:43

## 2019-01-26 RX ADMIN — VANCOMYCIN HYDROCHLORIDE 2000 MG: 10 INJECTION, POWDER, LYOPHILIZED, FOR SOLUTION INTRAVENOUS at 21:20

## 2019-01-26 RX ADMIN — LISINOPRIL 10 MG: 10 TABLET ORAL at 09:12

## 2019-01-26 RX ADMIN — VANCOMYCIN HYDROCHLORIDE 1500 MG: 10 INJECTION, POWDER, LYOPHILIZED, FOR SOLUTION INTRAVENOUS at 09:12

## 2019-01-26 RX ADMIN — HYDROMORPHONE HYDROCHLORIDE 0.5 MG: 1 INJECTION, SOLUTION INTRAMUSCULAR; INTRAVENOUS; SUBCUTANEOUS at 13:01

## 2019-01-26 ASSESSMENT — PAIN SCALES - GENERAL
PAINLEVEL_OUTOF10: 8
PAINLEVEL_OUTOF10: 6
PAINLEVEL_OUTOF10: 7
PAINLEVEL_OUTOF10: 2
PAINLEVEL_OUTOF10: 9

## 2019-01-26 ASSESSMENT — PAIN DESCRIPTION - ORIENTATION: ORIENTATION: LEFT

## 2019-01-26 ASSESSMENT — PAIN DESCRIPTION - LOCATION: LOCATION: FOOT

## 2019-01-26 ASSESSMENT — PAIN DESCRIPTION - DESCRIPTORS: DESCRIPTORS: NAGGING

## 2019-01-27 LAB
GLUCOSE BLD-MCNC: 113 MG/DL (ref 70–99)
GLUCOSE BLD-MCNC: 117 MG/DL (ref 70–99)
GLUCOSE BLD-MCNC: 134 MG/DL (ref 70–99)
GLUCOSE BLD-MCNC: 223 MG/DL (ref 70–99)
PERFORMED ON: ABNORMAL
VANCOMYCIN TROUGH: 13.5 UG/ML (ref 10–20)

## 2019-01-27 PROCEDURE — 6360000002 HC RX W HCPCS: Performed by: PHYSICIAN ASSISTANT

## 2019-01-27 PROCEDURE — 80202 ASSAY OF VANCOMYCIN: CPT

## 2019-01-27 PROCEDURE — 99232 SBSQ HOSP IP/OBS MODERATE 35: CPT | Performed by: INTERNAL MEDICINE

## 2019-01-27 PROCEDURE — 99024 POSTOP FOLLOW-UP VISIT: CPT | Performed by: SURGERY

## 2019-01-27 PROCEDURE — 2580000003 HC RX 258: Performed by: INTERNAL MEDICINE

## 2019-01-27 PROCEDURE — 6360000002 HC RX W HCPCS: Performed by: SURGERY

## 2019-01-27 PROCEDURE — 2580000003 HC RX 258: Performed by: PHYSICIAN ASSISTANT

## 2019-01-27 PROCEDURE — 6370000000 HC RX 637 (ALT 250 FOR IP): Performed by: INTERNAL MEDICINE

## 2019-01-27 PROCEDURE — 82948 REAGENT STRIP/BLOOD GLUCOSE: CPT

## 2019-01-27 PROCEDURE — 6370000000 HC RX 637 (ALT 250 FOR IP): Performed by: SURGERY

## 2019-01-27 PROCEDURE — 36415 COLL VENOUS BLD VENIPUNCTURE: CPT

## 2019-01-27 PROCEDURE — 6370000000 HC RX 637 (ALT 250 FOR IP): Performed by: HOSPITALIST

## 2019-01-27 PROCEDURE — 2580000003 HC RX 258: Performed by: SURGERY

## 2019-01-27 PROCEDURE — 2580000003 HC RX 258: Performed by: HOSPITALIST

## 2019-01-27 PROCEDURE — 1210000000 HC MED SURG R&B

## 2019-01-27 PROCEDURE — 6360000002 HC RX W HCPCS: Performed by: HOSPITALIST

## 2019-01-27 RX ADMIN — MEROPENEM 1 G: 1 INJECTION, POWDER, FOR SOLUTION INTRAVENOUS at 20:46

## 2019-01-27 RX ADMIN — MEROPENEM 1 G: 1 INJECTION, POWDER, FOR SOLUTION INTRAVENOUS at 05:48

## 2019-01-27 RX ADMIN — ROSUVASTATIN CALCIUM 10 MG: 10 TABLET, FILM COATED ORAL at 08:38

## 2019-01-27 RX ADMIN — INSULIN LISPRO 1 UNITS: 100 INJECTION, SOLUTION INTRAVENOUS; SUBCUTANEOUS at 20:49

## 2019-01-27 RX ADMIN — VANCOMYCIN HYDROCHLORIDE 2000 MG: 10 INJECTION, POWDER, LYOPHILIZED, FOR SOLUTION INTRAVENOUS at 23:13

## 2019-01-27 RX ADMIN — INSULIN GLARGINE 10 UNITS: 100 INJECTION, SOLUTION SUBCUTANEOUS at 20:46

## 2019-01-27 RX ADMIN — Medication 10 ML: at 20:46

## 2019-01-27 RX ADMIN — HYDROMORPHONE HYDROCHLORIDE 0.5 MG: 1 INJECTION, SOLUTION INTRAMUSCULAR; INTRAVENOUS; SUBCUTANEOUS at 10:20

## 2019-01-27 RX ADMIN — ENOXAPARIN SODIUM 40 MG: 40 INJECTION, SOLUTION INTRAVENOUS; SUBCUTANEOUS at 08:37

## 2019-01-27 RX ADMIN — INSULIN LISPRO 3 UNITS: 100 INJECTION, SOLUTION INTRAVENOUS; SUBCUTANEOUS at 08:39

## 2019-01-27 RX ADMIN — DAKIN'S SOLUTION 0.125% (QUARTER STRENGTH): 0.12 SOLUTION at 20:53

## 2019-01-27 RX ADMIN — MEROPENEM 1 G: 1 INJECTION, POWDER, FOR SOLUTION INTRAVENOUS at 13:40

## 2019-01-27 RX ADMIN — SODIUM CHLORIDE: 9 INJECTION, SOLUTION INTRAVENOUS at 23:17

## 2019-01-27 RX ADMIN — HYDROMORPHONE HYDROCHLORIDE 0.5 MG: 1 INJECTION, SOLUTION INTRAMUSCULAR; INTRAVENOUS; SUBCUTANEOUS at 20:47

## 2019-01-27 RX ADMIN — HYDROCODONE BITARTRATE AND ACETAMINOPHEN 2 TABLET: 5; 325 TABLET ORAL at 17:30

## 2019-01-27 RX ADMIN — DAKIN'S SOLUTION 0.125% (QUARTER STRENGTH): 0.12 SOLUTION at 08:39

## 2019-01-27 RX ADMIN — HYDROCODONE BITARTRATE AND ACETAMINOPHEN 2 TABLET: 5; 325 TABLET ORAL at 08:38

## 2019-01-27 RX ADMIN — ZOLPIDEM TARTRATE 5 MG: 5 TABLET ORAL at 20:46

## 2019-01-27 RX ADMIN — INSULIN LISPRO 3 UNITS: 100 INJECTION, SOLUTION INTRAVENOUS; SUBCUTANEOUS at 17:30

## 2019-01-27 RX ADMIN — VANCOMYCIN HYDROCHLORIDE 2000 MG: 10 INJECTION, POWDER, LYOPHILIZED, FOR SOLUTION INTRAVENOUS at 08:39

## 2019-01-27 RX ADMIN — METOPROLOL SUCCINATE 25 MG: 25 TABLET, EXTENDED RELEASE ORAL at 20:51

## 2019-01-27 RX ADMIN — HYDROCODONE BITARTRATE AND ACETAMINOPHEN 2 TABLET: 5; 325 TABLET ORAL at 13:40

## 2019-01-27 RX ADMIN — INSULIN LISPRO 3 UNITS: 100 INJECTION, SOLUTION INTRAVENOUS; SUBCUTANEOUS at 12:58

## 2019-01-27 RX ADMIN — LISINOPRIL 10 MG: 10 TABLET ORAL at 08:38

## 2019-01-27 ASSESSMENT — PAIN SCALES - GENERAL
PAINLEVEL_OUTOF10: 7
PAINLEVEL_OUTOF10: 7
PAINLEVEL_OUTOF10: 8
PAINLEVEL_OUTOF10: 9
PAINLEVEL_OUTOF10: 4
PAINLEVEL_OUTOF10: 6
PAINLEVEL_OUTOF10: 0
PAINLEVEL_OUTOF10: 7
PAINLEVEL_OUTOF10: 5
PAINLEVEL_OUTOF10: 0
PAINLEVEL_OUTOF10: 7
PAINLEVEL_OUTOF10: 8

## 2019-01-28 ENCOUNTER — ANESTHESIA EVENT (OUTPATIENT)
Dept: OPERATING ROOM | Age: 49
DRG: 617 | End: 2019-01-28
Payer: COMMERCIAL

## 2019-01-28 ENCOUNTER — ANESTHESIA (OUTPATIENT)
Dept: OPERATING ROOM | Age: 49
DRG: 617 | End: 2019-01-28
Payer: COMMERCIAL

## 2019-01-28 VITALS
OXYGEN SATURATION: 98 % | SYSTOLIC BLOOD PRESSURE: 101 MMHG | RESPIRATION RATE: 8 BRPM | DIASTOLIC BLOOD PRESSURE: 55 MMHG

## 2019-01-28 LAB
ANAEROBIC CULTURE: ABNORMAL
ANION GAP SERPL CALCULATED.3IONS-SCNC: 8 MMOL/L (ref 7–19)
BUN BLDV-MCNC: 14 MG/DL (ref 6–20)
CALCIUM SERPL-MCNC: 8.5 MG/DL (ref 8.6–10)
CHLORIDE BLD-SCNC: 103 MMOL/L (ref 98–111)
CO2: 26 MMOL/L (ref 22–29)
CREAT SERPL-MCNC: 0.9 MG/DL (ref 0.5–1.2)
GFR NON-AFRICAN AMERICAN: >60
GLUCOSE BLD-MCNC: 105 MG/DL (ref 74–109)
GLUCOSE BLD-MCNC: 278 MG/DL (ref 70–99)
GLUCOSE BLD-MCNC: 376 MG/DL (ref 70–99)
GLUCOSE BLD-MCNC: 97 MG/DL (ref 70–99)
GRAM STAIN RESULT: ABNORMAL
HCT VFR BLD CALC: 37.5 % (ref 42–52)
HEMOGLOBIN: 12.1 G/DL (ref 14–18)
MCH RBC QN AUTO: 28.3 PG (ref 27–31)
MCHC RBC AUTO-ENTMCNC: 32.3 G/DL (ref 33–37)
MCV RBC AUTO: 87.6 FL (ref 80–94)
ORGANISM: ABNORMAL
PDW BLD-RTO: 14.5 % (ref 11.5–14.5)
PERFORMED ON: ABNORMAL
PERFORMED ON: ABNORMAL
PERFORMED ON: NORMAL
PLATELET # BLD: 284 K/UL (ref 130–400)
PMV BLD AUTO: 10.9 FL (ref 9.4–12.4)
POTASSIUM REFLEX MAGNESIUM: 4.5 MMOL/L (ref 3.5–5)
RBC # BLD: 4.28 M/UL (ref 4.7–6.1)
SODIUM BLD-SCNC: 137 MMOL/L (ref 136–145)
WBC # BLD: 9.4 K/UL (ref 4.8–10.8)
WOUND/ABSCESS: ABNORMAL

## 2019-01-28 PROCEDURE — 7100000000 HC PACU RECOVERY - FIRST 15 MIN: Performed by: SURGERY

## 2019-01-28 PROCEDURE — 2500000003 HC RX 250 WO HCPCS: Performed by: INTERNAL MEDICINE

## 2019-01-28 PROCEDURE — 3600000014 HC SURGERY LEVEL 4 ADDTL 15MIN: Performed by: SURGERY

## 2019-01-28 PROCEDURE — 11043 DBRDMT MUSC&/FSCA 1ST 20/<: CPT | Performed by: SURGERY

## 2019-01-28 PROCEDURE — 36415 COLL VENOUS BLD VENIPUNCTURE: CPT

## 2019-01-28 PROCEDURE — 87070 CULTURE OTHR SPECIMN AEROBIC: CPT

## 2019-01-28 PROCEDURE — 2709999900 HC NON-CHARGEABLE SUPPLY: Performed by: SURGERY

## 2019-01-28 PROCEDURE — 76937 US GUIDE VASCULAR ACCESS: CPT

## 2019-01-28 PROCEDURE — 2580000003 HC RX 258: Performed by: PHYSICIAN ASSISTANT

## 2019-01-28 PROCEDURE — 87075 CULTR BACTERIA EXCEPT BLOOD: CPT

## 2019-01-28 PROCEDURE — 11046 DBRDMT MUSC&/FSCA EA ADDL: CPT | Performed by: SURGERY

## 2019-01-28 PROCEDURE — 0Y6N0ZC DETACHMENT AT LEFT FOOT, PARTIAL 3RD RAY, OPEN APPROACH: ICD-10-PCS | Performed by: SURGERY

## 2019-01-28 PROCEDURE — 2580000003 HC RX 258: Performed by: HOSPITALIST

## 2019-01-28 PROCEDURE — 6370000000 HC RX 637 (ALT 250 FOR IP): Performed by: HOSPITALIST

## 2019-01-28 PROCEDURE — 02HV33Z INSERTION OF INFUSION DEVICE INTO SUPERIOR VENA CAVA, PERCUTANEOUS APPROACH: ICD-10-PCS | Performed by: INTERNAL MEDICINE

## 2019-01-28 PROCEDURE — 2580000003 HC RX 258: Performed by: INTERNAL MEDICINE

## 2019-01-28 PROCEDURE — 36569 INSJ PICC 5 YR+ W/O IMAGING: CPT

## 2019-01-28 PROCEDURE — 2500000003 HC RX 250 WO HCPCS: Performed by: NURSE ANESTHETIST, CERTIFIED REGISTERED

## 2019-01-28 PROCEDURE — 80048 BASIC METABOLIC PNL TOTAL CA: CPT

## 2019-01-28 PROCEDURE — 85027 COMPLETE CBC AUTOMATED: CPT

## 2019-01-28 PROCEDURE — 6360000002 HC RX W HCPCS: Performed by: PHYSICIAN ASSISTANT

## 2019-01-28 PROCEDURE — 6370000000 HC RX 637 (ALT 250 FOR IP): Performed by: SURGERY

## 2019-01-28 PROCEDURE — 6360000002 HC RX W HCPCS: Performed by: HOSPITALIST

## 2019-01-28 PROCEDURE — 6370000000 HC RX 637 (ALT 250 FOR IP): Performed by: INTERNAL MEDICINE

## 2019-01-28 PROCEDURE — 3600000004 HC SURGERY LEVEL 4 BASE: Performed by: SURGERY

## 2019-01-28 PROCEDURE — 87186 SC STD MICRODIL/AGAR DIL: CPT

## 2019-01-28 PROCEDURE — 82948 REAGENT STRIP/BLOOD GLUCOSE: CPT

## 2019-01-28 PROCEDURE — 0KBW0ZZ EXCISION OF LEFT FOOT MUSCLE, OPEN APPROACH: ICD-10-PCS | Performed by: SURGERY

## 2019-01-28 PROCEDURE — 6360000002 HC RX W HCPCS: Performed by: NURSE ANESTHETIST, CERTIFIED REGISTERED

## 2019-01-28 PROCEDURE — 7100000001 HC PACU RECOVERY - ADDTL 15 MIN: Performed by: SURGERY

## 2019-01-28 PROCEDURE — 1210000000 HC MED SURG R&B

## 2019-01-28 PROCEDURE — 3700000001 HC ADD 15 MINUTES (ANESTHESIA): Performed by: SURGERY

## 2019-01-28 PROCEDURE — 86403 PARTICLE AGGLUT ANTBDY SCRN: CPT

## 2019-01-28 PROCEDURE — C1751 CATH, INF, PER/CENT/MIDLINE: HCPCS

## 2019-01-28 PROCEDURE — 2580000003 HC RX 258: Performed by: ANESTHESIOLOGY

## 2019-01-28 PROCEDURE — 99232 SBSQ HOSP IP/OBS MODERATE 35: CPT | Performed by: INTERNAL MEDICINE

## 2019-01-28 PROCEDURE — 3700000000 HC ANESTHESIA ATTENDED CARE: Performed by: SURGERY

## 2019-01-28 RX ORDER — LIDOCAINE HYDROCHLORIDE 10 MG/ML
INJECTION, SOLUTION INFILTRATION; PERINEURAL PRN
Status: DISCONTINUED | OUTPATIENT
Start: 2019-01-28 | End: 2019-01-28 | Stop reason: SDUPTHER

## 2019-01-28 RX ORDER — MEPERIDINE HYDROCHLORIDE 50 MG/ML
12.5 INJECTION INTRAMUSCULAR; INTRAVENOUS; SUBCUTANEOUS EVERY 5 MIN PRN
Status: DISCONTINUED | OUTPATIENT
Start: 2019-01-28 | End: 2019-01-28

## 2019-01-28 RX ORDER — LIDOCAINE HYDROCHLORIDE 10 MG/ML
1 INJECTION, SOLUTION EPIDURAL; INFILTRATION; INTRACAUDAL; PERINEURAL
Status: DISCONTINUED | OUTPATIENT
Start: 2019-01-28 | End: 2019-01-28

## 2019-01-28 RX ORDER — FENTANYL CITRATE 50 UG/ML
INJECTION, SOLUTION INTRAMUSCULAR; INTRAVENOUS PRN
Status: DISCONTINUED | OUTPATIENT
Start: 2019-01-28 | End: 2019-01-28 | Stop reason: SDUPTHER

## 2019-01-28 RX ORDER — DEXAMETHASONE SODIUM PHOSPHATE 4 MG/ML
INJECTION, SOLUTION INTRA-ARTICULAR; INTRALESIONAL; INTRAMUSCULAR; INTRAVENOUS; SOFT TISSUE PRN
Status: DISCONTINUED | OUTPATIENT
Start: 2019-01-28 | End: 2019-01-28 | Stop reason: SDUPTHER

## 2019-01-28 RX ORDER — METOCLOPRAMIDE HYDROCHLORIDE 5 MG/ML
10 INJECTION INTRAMUSCULAR; INTRAVENOUS
Status: DISCONTINUED | OUTPATIENT
Start: 2019-01-28 | End: 2019-01-28

## 2019-01-28 RX ORDER — MORPHINE SULFATE/0.9% NACL/PF 1 MG/ML
2 SYRINGE (ML) INJECTION EVERY 5 MIN PRN
Status: DISCONTINUED | OUTPATIENT
Start: 2019-01-28 | End: 2019-01-28

## 2019-01-28 RX ORDER — SODIUM CHLORIDE 9 MG/ML
INJECTION, SOLUTION INTRAVENOUS CONTINUOUS
Status: DISCONTINUED | OUTPATIENT
Start: 2019-01-28 | End: 2019-01-28

## 2019-01-28 RX ORDER — FENTANYL CITRATE 50 UG/ML
50 INJECTION, SOLUTION INTRAMUSCULAR; INTRAVENOUS
Status: DISCONTINUED | OUTPATIENT
Start: 2019-01-28 | End: 2019-01-28

## 2019-01-28 RX ORDER — SODIUM CHLORIDE 0.9 % (FLUSH) 0.9 %
10 SYRINGE (ML) INJECTION EVERY 12 HOURS SCHEDULED
Status: DISCONTINUED | OUTPATIENT
Start: 2019-01-28 | End: 2019-01-28

## 2019-01-28 RX ORDER — SODIUM CHLORIDE, SODIUM LACTATE, POTASSIUM CHLORIDE, CALCIUM CHLORIDE 600; 310; 30; 20 MG/100ML; MG/100ML; MG/100ML; MG/100ML
INJECTION, SOLUTION INTRAVENOUS CONTINUOUS
Status: DISCONTINUED | OUTPATIENT
Start: 2019-01-28 | End: 2019-01-28

## 2019-01-28 RX ORDER — DIPHENHYDRAMINE HYDROCHLORIDE 50 MG/ML
12.5 INJECTION INTRAMUSCULAR; INTRAVENOUS
Status: DISCONTINUED | OUTPATIENT
Start: 2019-01-28 | End: 2019-01-28

## 2019-01-28 RX ORDER — PROMETHAZINE HYDROCHLORIDE 25 MG/ML
6.25 INJECTION, SOLUTION INTRAMUSCULAR; INTRAVENOUS
Status: DISCONTINUED | OUTPATIENT
Start: 2019-01-28 | End: 2019-01-28

## 2019-01-28 RX ORDER — PROPOFOL 10 MG/ML
INJECTION, EMULSION INTRAVENOUS PRN
Status: DISCONTINUED | OUTPATIENT
Start: 2019-01-28 | End: 2019-01-28 | Stop reason: SDUPTHER

## 2019-01-28 RX ORDER — MIDAZOLAM HYDROCHLORIDE 1 MG/ML
2 INJECTION INTRAMUSCULAR; INTRAVENOUS
Status: DISCONTINUED | OUTPATIENT
Start: 2019-01-28 | End: 2019-01-28

## 2019-01-28 RX ORDER — HYDRALAZINE HYDROCHLORIDE 20 MG/ML
5 INJECTION INTRAMUSCULAR; INTRAVENOUS EVERY 10 MIN PRN
Status: DISCONTINUED | OUTPATIENT
Start: 2019-01-28 | End: 2019-01-28

## 2019-01-28 RX ORDER — SODIUM CHLORIDE 0.9 % (FLUSH) 0.9 %
10 SYRINGE (ML) INJECTION PRN
Status: DISCONTINUED | OUTPATIENT
Start: 2019-01-28 | End: 2019-01-28

## 2019-01-28 RX ORDER — ONDANSETRON 2 MG/ML
INJECTION INTRAMUSCULAR; INTRAVENOUS PRN
Status: DISCONTINUED | OUTPATIENT
Start: 2019-01-28 | End: 2019-01-28 | Stop reason: SDUPTHER

## 2019-01-28 RX ORDER — LABETALOL HYDROCHLORIDE 5 MG/ML
5 INJECTION, SOLUTION INTRAVENOUS EVERY 10 MIN PRN
Status: DISCONTINUED | OUTPATIENT
Start: 2019-01-28 | End: 2019-01-28

## 2019-01-28 RX ORDER — MORPHINE SULFATE/0.9% NACL/PF 1 MG/ML
4 SYRINGE (ML) INJECTION EVERY 5 MIN PRN
Status: DISCONTINUED | OUTPATIENT
Start: 2019-01-28 | End: 2019-01-28

## 2019-01-28 RX ORDER — SODIUM HYPOCHLORITE 1.25 MG/ML
SOLUTION TOPICAL PRN
Status: DISCONTINUED | OUTPATIENT
Start: 2019-01-28 | End: 2019-01-28 | Stop reason: HOSPADM

## 2019-01-28 RX ORDER — FENTANYL CITRATE 50 UG/ML
25 INJECTION, SOLUTION INTRAMUSCULAR; INTRAVENOUS
Status: DISCONTINUED | OUTPATIENT
Start: 2019-01-28 | End: 2019-01-28

## 2019-01-28 RX ADMIN — FENTANYL CITRATE 50 MCG: 50 INJECTION INTRAMUSCULAR; INTRAVENOUS at 10:52

## 2019-01-28 RX ADMIN — VANCOMYCIN HYDROCHLORIDE 2000 MG: 10 INJECTION, POWDER, LYOPHILIZED, FOR SOLUTION INTRAVENOUS at 21:33

## 2019-01-28 RX ADMIN — HYDROCODONE BITARTRATE AND ACETAMINOPHEN 2 TABLET: 5; 325 TABLET ORAL at 13:06

## 2019-01-28 RX ADMIN — FENTANYL CITRATE 25 MCG: 50 INJECTION INTRAMUSCULAR; INTRAVENOUS at 11:15

## 2019-01-28 RX ADMIN — FENTANYL CITRATE 25 MCG: 50 INJECTION INTRAMUSCULAR; INTRAVENOUS at 11:08

## 2019-01-28 RX ADMIN — Medication 10 ML: at 19:58

## 2019-01-28 RX ADMIN — HYDROMORPHONE HYDROCHLORIDE 0.5 MG: 1 INJECTION, SOLUTION INTRAMUSCULAR; INTRAVENOUS; SUBCUTANEOUS at 15:12

## 2019-01-28 RX ADMIN — INSULIN LISPRO 3 UNITS: 100 INJECTION, SOLUTION INTRAVENOUS; SUBCUTANEOUS at 17:12

## 2019-01-28 RX ADMIN — PROPOFOL 200 MG: 10 INJECTION, EMULSION INTRAVENOUS at 10:45

## 2019-01-28 RX ADMIN — LIDOCAINE HYDROCHLORIDE 5 ML: 10 INJECTION, SOLUTION INFILTRATION; PERINEURAL at 10:45

## 2019-01-28 RX ADMIN — HYDROCODONE BITARTRATE AND ACETAMINOPHEN 2 TABLET: 5; 325 TABLET ORAL at 19:57

## 2019-01-28 RX ADMIN — METOPROLOL SUCCINATE 25 MG: 25 TABLET, EXTENDED RELEASE ORAL at 19:56

## 2019-01-28 RX ADMIN — SODIUM CHLORIDE, SODIUM LACTATE, POTASSIUM CHLORIDE, AND CALCIUM CHLORIDE: 600; 310; 30; 20 INJECTION, SOLUTION INTRAVENOUS at 10:00

## 2019-01-28 RX ADMIN — HYDROMORPHONE HYDROCHLORIDE 0.5 MG: 1 INJECTION, SOLUTION INTRAMUSCULAR; INTRAVENOUS; SUBCUTANEOUS at 21:32

## 2019-01-28 RX ADMIN — MEROPENEM 1 G: 1 INJECTION, POWDER, FOR SOLUTION INTRAVENOUS at 05:45

## 2019-01-28 RX ADMIN — FENTANYL CITRATE 50 MCG: 50 INJECTION INTRAMUSCULAR; INTRAVENOUS at 10:38

## 2019-01-28 RX ADMIN — ONDANSETRON HYDROCHLORIDE 4 MG: 2 INJECTION, SOLUTION INTRAMUSCULAR; INTRAVENOUS at 11:32

## 2019-01-28 RX ADMIN — HYDROCODONE BITARTRATE AND ACETAMINOPHEN 2 TABLET: 5; 325 TABLET ORAL at 23:59

## 2019-01-28 RX ADMIN — ZOLPIDEM TARTRATE 5 MG: 5 TABLET ORAL at 21:32

## 2019-01-28 RX ADMIN — LISINOPRIL 10 MG: 10 TABLET ORAL at 09:24

## 2019-01-28 RX ADMIN — FENTANYL CITRATE 25 MCG: 50 INJECTION INTRAMUSCULAR; INTRAVENOUS at 11:22

## 2019-01-28 RX ADMIN — FENTANYL CITRATE 25 MCG: 50 INJECTION INTRAMUSCULAR; INTRAVENOUS at 10:56

## 2019-01-28 RX ADMIN — LIDOCAINE HYDROCHLORIDE 5 ML: 10 INJECTION, SOLUTION EPIDURAL; INFILTRATION; INTRACAUDAL; PERINEURAL at 15:06

## 2019-01-28 RX ADMIN — INSULIN GLARGINE 10 UNITS: 100 INJECTION, SOLUTION SUBCUTANEOUS at 19:56

## 2019-01-28 RX ADMIN — VANCOMYCIN HYDROCHLORIDE 2000 MG: 10 INJECTION, POWDER, LYOPHILIZED, FOR SOLUTION INTRAVENOUS at 09:24

## 2019-01-28 RX ADMIN — INSULIN LISPRO 3 UNITS: 100 INJECTION, SOLUTION INTRAVENOUS; SUBCUTANEOUS at 19:56

## 2019-01-28 RX ADMIN — FENTANYL CITRATE 50 MCG: 50 INJECTION INTRAMUSCULAR; INTRAVENOUS at 10:44

## 2019-01-28 RX ADMIN — DEXAMETHASONE SODIUM PHOSPHATE 4 MG: 4 INJECTION, SOLUTION INTRAMUSCULAR; INTRAVENOUS at 11:00

## 2019-01-28 RX ADMIN — HYDROMORPHONE HYDROCHLORIDE 0.5 MG: 1 INJECTION, SOLUTION INTRAMUSCULAR; INTRAVENOUS; SUBCUTANEOUS at 03:54

## 2019-01-28 RX ADMIN — MEROPENEM 1 G: 1 INJECTION, POWDER, FOR SOLUTION INTRAVENOUS at 13:06

## 2019-01-28 RX ADMIN — MEROPENEM 1 G: 1 INJECTION, POWDER, FOR SOLUTION INTRAVENOUS at 20:30

## 2019-01-28 ASSESSMENT — PAIN SCALES - GENERAL
PAINLEVEL_OUTOF10: 7
PAINLEVEL_OUTOF10: 0
PAINLEVEL_OUTOF10: 8
PAINLEVEL_OUTOF10: 9
PAINLEVEL_OUTOF10: 8
PAINLEVEL_OUTOF10: 5
PAINLEVEL_OUTOF10: 7
PAINLEVEL_OUTOF10: 8
PAINLEVEL_OUTOF10: 7
PAINLEVEL_OUTOF10: 5
PAINLEVEL_OUTOF10: 8

## 2019-01-28 ASSESSMENT — PAIN DESCRIPTION - ORIENTATION: ORIENTATION: LEFT

## 2019-01-28 ASSESSMENT — PAIN DESCRIPTION - PAIN TYPE: TYPE: ACUTE PAIN

## 2019-01-28 ASSESSMENT — ENCOUNTER SYMPTOMS: SHORTNESS OF BREATH: 0

## 2019-01-28 ASSESSMENT — PAIN DESCRIPTION - DESCRIPTORS: DESCRIPTORS: NAGGING;DISCOMFORT

## 2019-01-28 ASSESSMENT — PAIN DESCRIPTION - FREQUENCY: FREQUENCY: INTERMITTENT

## 2019-01-28 ASSESSMENT — PAIN DESCRIPTION - ONSET: ONSET: ON-GOING

## 2019-01-28 ASSESSMENT — PAIN - FUNCTIONAL ASSESSMENT: PAIN_FUNCTIONAL_ASSESSMENT: ACTIVITIES ARE NOT PREVENTED

## 2019-01-28 ASSESSMENT — LIFESTYLE VARIABLES: SMOKING_STATUS: 1

## 2019-01-28 ASSESSMENT — PAIN DESCRIPTION - PROGRESSION: CLINICAL_PROGRESSION: NOT CHANGED

## 2019-01-28 ASSESSMENT — PAIN DESCRIPTION - LOCATION: LOCATION: FOOT

## 2019-01-29 LAB
ANAEROBIC CULTURE: ABNORMAL
ANION GAP SERPL CALCULATED.3IONS-SCNC: 7 MMOL/L (ref 7–19)
BLOOD CULTURE, ROUTINE: NORMAL
BUN BLDV-MCNC: 19 MG/DL (ref 6–20)
CALCIUM SERPL-MCNC: 8.3 MG/DL (ref 8.6–10)
CHLORIDE BLD-SCNC: 100 MMOL/L (ref 98–111)
CO2: 31 MMOL/L (ref 22–29)
CREAT SERPL-MCNC: 1 MG/DL (ref 0.5–1.2)
CULTURE SURGICAL: ABNORMAL
CULTURE, BLOOD 2: NORMAL
GFR NON-AFRICAN AMERICAN: >60
GLUCOSE BLD-MCNC: 216 MG/DL (ref 70–99)
GLUCOSE BLD-MCNC: 274 MG/DL (ref 70–99)
GLUCOSE BLD-MCNC: 277 MG/DL (ref 70–99)
GLUCOSE BLD-MCNC: 298 MG/DL (ref 70–99)
GLUCOSE BLD-MCNC: 408 MG/DL (ref 74–109)
GRAM STAIN RESULT: ABNORMAL
HCT VFR BLD CALC: 38 % (ref 42–52)
HEMOGLOBIN: 12.4 G/DL (ref 14–18)
MCH RBC QN AUTO: 28.2 PG (ref 27–31)
MCHC RBC AUTO-ENTMCNC: 32.6 G/DL (ref 33–37)
MCV RBC AUTO: 86.6 FL (ref 80–94)
ORGANISM: ABNORMAL
PDW BLD-RTO: 14 % (ref 11.5–14.5)
PERFORMED ON: ABNORMAL
PLATELET # BLD: 308 K/UL (ref 130–400)
PMV BLD AUTO: 11.3 FL (ref 9.4–12.4)
POTASSIUM REFLEX MAGNESIUM: 4.3 MMOL/L (ref 3.5–5)
RBC # BLD: 4.39 M/UL (ref 4.7–6.1)
SODIUM BLD-SCNC: 138 MMOL/L (ref 136–145)
WBC # BLD: 14.6 K/UL (ref 4.8–10.8)

## 2019-01-29 PROCEDURE — 6370000000 HC RX 637 (ALT 250 FOR IP): Performed by: INTERNAL MEDICINE

## 2019-01-29 PROCEDURE — 6370000000 HC RX 637 (ALT 250 FOR IP): Performed by: HOSPITALIST

## 2019-01-29 PROCEDURE — 6360000002 HC RX W HCPCS: Performed by: SURGERY

## 2019-01-29 PROCEDURE — 36415 COLL VENOUS BLD VENIPUNCTURE: CPT

## 2019-01-29 PROCEDURE — 2580000003 HC RX 258: Performed by: INTERNAL MEDICINE

## 2019-01-29 PROCEDURE — 99232 SBSQ HOSP IP/OBS MODERATE 35: CPT | Performed by: INTERNAL MEDICINE

## 2019-01-29 PROCEDURE — 6370000000 HC RX 637 (ALT 250 FOR IP): Performed by: SURGERY

## 2019-01-29 PROCEDURE — 36591 DRAW BLOOD OFF VENOUS DEVICE: CPT

## 2019-01-29 PROCEDURE — 85027 COMPLETE CBC AUTOMATED: CPT

## 2019-01-29 PROCEDURE — 1210000000 HC MED SURG R&B

## 2019-01-29 PROCEDURE — 82948 REAGENT STRIP/BLOOD GLUCOSE: CPT

## 2019-01-29 PROCEDURE — 6360000002 HC RX W HCPCS: Performed by: INTERNAL MEDICINE

## 2019-01-29 PROCEDURE — 6360000002 HC RX W HCPCS: Performed by: HOSPITALIST

## 2019-01-29 PROCEDURE — 80048 BASIC METABOLIC PNL TOTAL CA: CPT

## 2019-01-29 PROCEDURE — 2580000003 HC RX 258: Performed by: HOSPITALIST

## 2019-01-29 PROCEDURE — 36592 COLLECT BLOOD FROM PICC: CPT

## 2019-01-29 PROCEDURE — 6360000002 HC RX W HCPCS: Performed by: PHYSICIAN ASSISTANT

## 2019-01-29 PROCEDURE — 2580000003 HC RX 258: Performed by: PHYSICIAN ASSISTANT

## 2019-01-29 RX ORDER — HYDRALAZINE HYDROCHLORIDE 20 MG/ML
10 INJECTION INTRAMUSCULAR; INTRAVENOUS EVERY 6 HOURS PRN
Status: DISCONTINUED | OUTPATIENT
Start: 2019-01-29 | End: 2019-01-30 | Stop reason: HOSPADM

## 2019-01-29 RX ORDER — LINEZOLID 600 MG/1
600 TABLET, FILM COATED ORAL EVERY 12 HOURS SCHEDULED
Status: DISCONTINUED | OUTPATIENT
Start: 2019-01-29 | End: 2019-01-30 | Stop reason: HOSPADM

## 2019-01-29 RX ADMIN — VANCOMYCIN HYDROCHLORIDE 2000 MG: 10 INJECTION, POWDER, LYOPHILIZED, FOR SOLUTION INTRAVENOUS at 09:34

## 2019-01-29 RX ADMIN — HYDROCODONE BITARTRATE AND ACETAMINOPHEN 2 TABLET: 5; 325 TABLET ORAL at 05:19

## 2019-01-29 RX ADMIN — DAKIN'S SOLUTION 0.125% (QUARTER STRENGTH): 0.12 SOLUTION at 20:21

## 2019-01-29 RX ADMIN — ROSUVASTATIN CALCIUM 10 MG: 10 TABLET, FILM COATED ORAL at 08:27

## 2019-01-29 RX ADMIN — ENOXAPARIN SODIUM 40 MG: 40 INJECTION, SOLUTION INTRAVENOUS; SUBCUTANEOUS at 08:27

## 2019-01-29 RX ADMIN — INSULIN LISPRO 6 UNITS: 100 INJECTION, SOLUTION INTRAVENOUS; SUBCUTANEOUS at 12:24

## 2019-01-29 RX ADMIN — MEROPENEM 1 G: 1 INJECTION, POWDER, FOR SOLUTION INTRAVENOUS at 05:00

## 2019-01-29 RX ADMIN — Medication 10 ML: at 19:59

## 2019-01-29 RX ADMIN — INSULIN LISPRO 3 UNITS: 100 INJECTION, SOLUTION INTRAVENOUS; SUBCUTANEOUS at 19:58

## 2019-01-29 RX ADMIN — DAKIN'S SOLUTION 0.125% (QUARTER STRENGTH): 0.12 SOLUTION at 09:34

## 2019-01-29 RX ADMIN — HYDRALAZINE HYDROCHLORIDE 10 MG: 20 INJECTION INTRAMUSCULAR; INTRAVENOUS at 05:00

## 2019-01-29 RX ADMIN — METOPROLOL SUCCINATE 25 MG: 25 TABLET, EXTENDED RELEASE ORAL at 19:59

## 2019-01-29 RX ADMIN — HYDRALAZINE HYDROCHLORIDE 10 MG: 20 INJECTION INTRAMUSCULAR; INTRAVENOUS at 19:59

## 2019-01-29 RX ADMIN — INSULIN LISPRO 6 UNITS: 100 INJECTION, SOLUTION INTRAVENOUS; SUBCUTANEOUS at 08:28

## 2019-01-29 RX ADMIN — INSULIN LISPRO 4 UNITS: 100 INJECTION, SOLUTION INTRAVENOUS; SUBCUTANEOUS at 16:57

## 2019-01-29 RX ADMIN — LINEZOLID 600 MG: 600 TABLET, FILM COATED ORAL at 20:20

## 2019-01-29 RX ADMIN — HYDROMORPHONE HYDROCHLORIDE 0.5 MG: 1 INJECTION, SOLUTION INTRAMUSCULAR; INTRAVENOUS; SUBCUTANEOUS at 08:38

## 2019-01-29 RX ADMIN — HYDROMORPHONE HYDROCHLORIDE 0.5 MG: 1 INJECTION, SOLUTION INTRAMUSCULAR; INTRAVENOUS; SUBCUTANEOUS at 20:01

## 2019-01-29 RX ADMIN — HYDROMORPHONE HYDROCHLORIDE 0.5 MG: 1 INJECTION, SOLUTION INTRAMUSCULAR; INTRAVENOUS; SUBCUTANEOUS at 03:21

## 2019-01-29 RX ADMIN — HYDROCODONE BITARTRATE AND ACETAMINOPHEN 2 TABLET: 5; 325 TABLET ORAL at 14:31

## 2019-01-29 RX ADMIN — LISINOPRIL 10 MG: 10 TABLET ORAL at 08:27

## 2019-01-29 RX ADMIN — INSULIN GLARGINE 10 UNITS: 100 INJECTION, SOLUTION SUBCUTANEOUS at 19:58

## 2019-01-29 ASSESSMENT — PAIN DESCRIPTION - DESCRIPTORS: DESCRIPTORS: PRESSURE

## 2019-01-29 ASSESSMENT — PAIN SCALES - GENERAL
PAINLEVEL_OUTOF10: 5
PAINLEVEL_OUTOF10: 5
PAINLEVEL_OUTOF10: 6
PAINLEVEL_OUTOF10: 6
PAINLEVEL_OUTOF10: 7
PAINLEVEL_OUTOF10: 8
PAINLEVEL_OUTOF10: 5
PAINLEVEL_OUTOF10: 8
PAINLEVEL_OUTOF10: 7
PAINLEVEL_OUTOF10: 8
PAINLEVEL_OUTOF10: 9

## 2019-01-29 ASSESSMENT — PAIN DESCRIPTION - ONSET: ONSET: ON-GOING

## 2019-01-29 ASSESSMENT — PAIN DESCRIPTION - ORIENTATION: ORIENTATION: LEFT

## 2019-01-29 ASSESSMENT — PAIN DESCRIPTION - FREQUENCY: FREQUENCY: INTERMITTENT

## 2019-01-29 ASSESSMENT — PAIN DESCRIPTION - PROGRESSION: CLINICAL_PROGRESSION: NOT CHANGED

## 2019-01-29 ASSESSMENT — PAIN - FUNCTIONAL ASSESSMENT: PAIN_FUNCTIONAL_ASSESSMENT: ACTIVITIES ARE NOT PREVENTED

## 2019-01-29 ASSESSMENT — PAIN DESCRIPTION - LOCATION: LOCATION: FOOT

## 2019-01-29 ASSESSMENT — PAIN DESCRIPTION - PAIN TYPE: TYPE: ACUTE PAIN

## 2019-01-30 VITALS
RESPIRATION RATE: 18 BRPM | HEART RATE: 56 BPM | WEIGHT: 240 LBS | OXYGEN SATURATION: 97 % | BODY MASS INDEX: 29.84 KG/M2 | SYSTOLIC BLOOD PRESSURE: 177 MMHG | HEIGHT: 75 IN | DIASTOLIC BLOOD PRESSURE: 90 MMHG | TEMPERATURE: 98.6 F

## 2019-01-30 LAB
ANION GAP SERPL CALCULATED.3IONS-SCNC: 9 MMOL/L (ref 7–19)
BASOPHILS ABSOLUTE: 0.1 K/UL (ref 0–0.2)
BASOPHILS RELATIVE PERCENT: 0.9 % (ref 0–1)
BUN BLDV-MCNC: 15 MG/DL (ref 6–20)
CALCIUM SERPL-MCNC: 8.5 MG/DL (ref 8.6–10)
CHLORIDE BLD-SCNC: 100 MMOL/L (ref 98–111)
CO2: 29 MMOL/L (ref 22–29)
CREAT SERPL-MCNC: 0.9 MG/DL (ref 0.5–1.2)
EOSINOPHILS ABSOLUTE: 0.6 K/UL (ref 0–0.6)
EOSINOPHILS RELATIVE PERCENT: 4.2 % (ref 0–5)
GFR NON-AFRICAN AMERICAN: >60
GLUCOSE BLD-MCNC: 131 MG/DL (ref 70–99)
GLUCOSE BLD-MCNC: 166 MG/DL (ref 74–109)
GLUCOSE BLD-MCNC: 195 MG/DL (ref 70–99)
GLUCOSE BLD-MCNC: 262 MG/DL (ref 70–99)
HCT VFR BLD CALC: 39.5 % (ref 42–52)
HEMOGLOBIN: 12.8 G/DL (ref 14–18)
LYMPHOCYTES ABSOLUTE: 3.2 K/UL (ref 1.1–4.5)
LYMPHOCYTES RELATIVE PERCENT: 23.7 % (ref 20–40)
MCH RBC QN AUTO: 28.3 PG (ref 27–31)
MCHC RBC AUTO-ENTMCNC: 32.4 G/DL (ref 33–37)
MCV RBC AUTO: 87.2 FL (ref 80–94)
MONOCYTES ABSOLUTE: 1.3 K/UL (ref 0–0.9)
MONOCYTES RELATIVE PERCENT: 9.5 % (ref 0–10)
NEUTROPHILS ABSOLUTE: 8.1 K/UL (ref 1.5–7.5)
NEUTROPHILS RELATIVE PERCENT: 61.2 % (ref 50–65)
PDW BLD-RTO: 14.4 % (ref 11.5–14.5)
PERFORMED ON: ABNORMAL
PLATELET # BLD: 341 K/UL (ref 130–400)
PMV BLD AUTO: 10.5 FL (ref 9.4–12.4)
POTASSIUM SERPL-SCNC: 4.3 MMOL/L (ref 3.5–5)
RBC # BLD: 4.53 M/UL (ref 4.7–6.1)
SODIUM BLD-SCNC: 138 MMOL/L (ref 136–145)
WBC # BLD: 13.3 K/UL (ref 4.8–10.8)

## 2019-01-30 PROCEDURE — 36592 COLLECT BLOOD FROM PICC: CPT

## 2019-01-30 PROCEDURE — 85025 COMPLETE CBC W/AUTO DIFF WBC: CPT

## 2019-01-30 PROCEDURE — 6370000000 HC RX 637 (ALT 250 FOR IP): Performed by: INTERNAL MEDICINE

## 2019-01-30 PROCEDURE — 6370000000 HC RX 637 (ALT 250 FOR IP): Performed by: HOSPITALIST

## 2019-01-30 PROCEDURE — 82948 REAGENT STRIP/BLOOD GLUCOSE: CPT

## 2019-01-30 PROCEDURE — 6360000002 HC RX W HCPCS: Performed by: SURGERY

## 2019-01-30 PROCEDURE — 6360000002 HC RX W HCPCS: Performed by: HOSPITALIST

## 2019-01-30 PROCEDURE — 99024 POSTOP FOLLOW-UP VISIT: CPT | Performed by: SURGERY

## 2019-01-30 PROCEDURE — 80048 BASIC METABOLIC PNL TOTAL CA: CPT

## 2019-01-30 PROCEDURE — 99239 HOSP IP/OBS DSCHRG MGMT >30: CPT | Performed by: INTERNAL MEDICINE

## 2019-01-30 PROCEDURE — 6370000000 HC RX 637 (ALT 250 FOR IP): Performed by: SURGERY

## 2019-01-30 RX ORDER — LINEZOLID 600 MG/1
600 TABLET, FILM COATED ORAL EVERY 12 HOURS SCHEDULED
Qty: 28 TABLET | Refills: 0 | Status: SHIPPED | OUTPATIENT
Start: 2019-01-30 | End: 2019-02-13

## 2019-01-30 RX ADMIN — LISINOPRIL 10 MG: 10 TABLET ORAL at 10:02

## 2019-01-30 RX ADMIN — HYDROCODONE BITARTRATE AND ACETAMINOPHEN 2 TABLET: 5; 325 TABLET ORAL at 03:00

## 2019-01-30 RX ADMIN — INSULIN LISPRO 2 UNITS: 100 INJECTION, SOLUTION INTRAVENOUS; SUBCUTANEOUS at 13:07

## 2019-01-30 RX ADMIN — LINEZOLID 600 MG: 600 TABLET, FILM COATED ORAL at 10:02

## 2019-01-30 RX ADMIN — ROSUVASTATIN CALCIUM 10 MG: 10 TABLET, FILM COATED ORAL at 10:02

## 2019-01-30 RX ADMIN — DAKIN'S SOLUTION 0.125% (QUARTER STRENGTH): 0.12 SOLUTION at 10:01

## 2019-01-30 RX ADMIN — ENOXAPARIN SODIUM 40 MG: 40 INJECTION, SOLUTION INTRAVENOUS; SUBCUTANEOUS at 10:01

## 2019-01-30 RX ADMIN — HYDROMORPHONE HYDROCHLORIDE 0.5 MG: 1 INJECTION, SOLUTION INTRAMUSCULAR; INTRAVENOUS; SUBCUTANEOUS at 00:04

## 2019-01-30 ASSESSMENT — PAIN DESCRIPTION - PROGRESSION: CLINICAL_PROGRESSION: NOT CHANGED

## 2019-01-30 ASSESSMENT — PAIN SCALES - GENERAL
PAINLEVEL_OUTOF10: 6
PAINLEVEL_OUTOF10: 8
PAINLEVEL_OUTOF10: 5
PAINLEVEL_OUTOF10: 9

## 2019-02-02 LAB
ANAEROBIC CULTURE: ABNORMAL
CULTURE SURGICAL: ABNORMAL
GRAM STAIN RESULT: ABNORMAL
ORGANISM: ABNORMAL

## 2019-02-06 ENCOUNTER — HOSPITAL ENCOUNTER (OUTPATIENT)
Dept: WOUND CARE | Age: 49
Discharge: HOME | End: 2019-02-06
Payer: COMMERCIAL

## 2019-02-06 VITALS
WEIGHT: 240 LBS | HEIGHT: 75 IN | BODY MASS INDEX: 29.84 KG/M2 | SYSTOLIC BLOOD PRESSURE: 154 MMHG | RESPIRATION RATE: 18 BRPM | DIASTOLIC BLOOD PRESSURE: 86 MMHG | HEART RATE: 55 BPM | TEMPERATURE: 98.2 F

## 2019-02-06 DIAGNOSIS — L97.522 NON-PRESSURE CHRONIC ULCER OF OTHER PART OF LEFT FOOT WITH FAT LAYER EXPOSED (HCC): ICD-10-CM

## 2019-02-06 PROCEDURE — 97597 DBRDMT OPN WND 1ST 20 CM/<: CPT

## 2019-02-06 PROCEDURE — 99024 POSTOP FOLLOW-UP VISIT: CPT | Performed by: SURGERY

## 2019-02-06 PROCEDURE — 97597 DBRDMT OPN WND 1ST 20 CM/<: CPT | Performed by: SURGERY

## 2019-02-06 RX ORDER — MELOXICAM 15 MG/1
15 TABLET ORAL DAILY
COMMUNITY
End: 2021-01-01 | Stop reason: SDUPTHER

## 2019-02-06 RX ORDER — OXYCODONE HYDROCHLORIDE AND ACETAMINOPHEN 5; 325 MG/1; MG/1
1 TABLET ORAL EVERY 4 HOURS PRN
COMMUNITY
End: 2019-10-03 | Stop reason: ALTCHOICE

## 2019-02-20 ENCOUNTER — HOSPITAL ENCOUNTER (OUTPATIENT)
Dept: WOUND CARE | Age: 49
Discharge: HOME | End: 2019-02-20
Payer: COMMERCIAL

## 2019-02-20 VITALS
HEIGHT: 75 IN | HEART RATE: 66 BPM | BODY MASS INDEX: 29.84 KG/M2 | WEIGHT: 240 LBS | RESPIRATION RATE: 16 BRPM | TEMPERATURE: 97.4 F | DIASTOLIC BLOOD PRESSURE: 92 MMHG | SYSTOLIC BLOOD PRESSURE: 193 MMHG

## 2019-02-20 DIAGNOSIS — L97.522 NON-PRESSURE CHRONIC ULCER OF OTHER PART OF LEFT FOOT WITH FAT LAYER EXPOSED (HCC): ICD-10-CM

## 2019-02-20 PROCEDURE — 97597 DBRDMT OPN WND 1ST 20 CM/<: CPT | Performed by: SURGERY

## 2019-02-20 PROCEDURE — 99024 POSTOP FOLLOW-UP VISIT: CPT | Performed by: SURGERY

## 2019-02-20 PROCEDURE — 97597 DBRDMT OPN WND 1ST 20 CM/<: CPT

## 2019-02-20 ASSESSMENT — PAIN SCALES - GENERAL: PAINLEVEL_OUTOF10: 0

## 2019-03-06 ENCOUNTER — HOSPITAL ENCOUNTER (OUTPATIENT)
Dept: WOUND CARE | Age: 49
Discharge: HOME | End: 2019-03-06
Payer: COMMERCIAL

## 2019-03-06 VITALS
DIASTOLIC BLOOD PRESSURE: 101 MMHG | SYSTOLIC BLOOD PRESSURE: 182 MMHG | RESPIRATION RATE: 18 BRPM | HEIGHT: 75 IN | TEMPERATURE: 97.7 F | HEART RATE: 62 BPM | BODY MASS INDEX: 29.84 KG/M2 | WEIGHT: 240 LBS

## 2019-03-06 DIAGNOSIS — L97.522 NON-PRESSURE CHRONIC ULCER OF OTHER PART OF LEFT FOOT WITH FAT LAYER EXPOSED (HCC): ICD-10-CM

## 2019-03-06 PROCEDURE — 97597 DBRDMT OPN WND 1ST 20 CM/<: CPT

## 2019-03-06 PROCEDURE — 99024 POSTOP FOLLOW-UP VISIT: CPT | Performed by: SURGERY

## 2019-03-06 ASSESSMENT — PAIN SCALES - GENERAL: PAINLEVEL_OUTOF10: 0

## 2019-03-27 ENCOUNTER — HOSPITAL ENCOUNTER (OUTPATIENT)
Dept: WOUND CARE | Age: 49
Discharge: HOME | End: 2019-03-27
Payer: COMMERCIAL

## 2019-03-27 VITALS
BODY MASS INDEX: 29.84 KG/M2 | WEIGHT: 240 LBS | DIASTOLIC BLOOD PRESSURE: 78 MMHG | TEMPERATURE: 97.2 F | HEART RATE: 63 BPM | HEIGHT: 75 IN | SYSTOLIC BLOOD PRESSURE: 157 MMHG | RESPIRATION RATE: 16 BRPM

## 2019-03-27 DIAGNOSIS — L97.522 NON-PRESSURE CHRONIC ULCER OF OTHER PART OF LEFT FOOT WITH FAT LAYER EXPOSED (HCC): ICD-10-CM

## 2019-03-27 PROCEDURE — 99212 OFFICE O/P EST SF 10 MIN: CPT

## 2019-03-27 PROCEDURE — 99024 POSTOP FOLLOW-UP VISIT: CPT | Performed by: SURGERY

## 2019-03-27 ASSESSMENT — PAIN SCALES - GENERAL: PAINLEVEL_OUTOF10: 0

## 2019-05-08 ENCOUNTER — HOSPITAL ENCOUNTER (OUTPATIENT)
Dept: WOUND CARE | Age: 49
Discharge: HOME | End: 2019-05-08

## 2019-05-31 ENCOUNTER — OFFICE VISIT (OUTPATIENT)
Dept: CARDIOLOGY | Age: 49
End: 2019-05-31
Payer: COMMERCIAL

## 2019-05-31 VITALS
SYSTOLIC BLOOD PRESSURE: 130 MMHG | HEIGHT: 75 IN | HEART RATE: 55 BPM | WEIGHT: 239 LBS | BODY MASS INDEX: 29.72 KG/M2 | DIASTOLIC BLOOD PRESSURE: 80 MMHG

## 2019-05-31 DIAGNOSIS — I10 ESSENTIAL HYPERTENSION: Primary | ICD-10-CM

## 2019-05-31 DIAGNOSIS — I25.10 CORONARY ARTERY DISEASE INVOLVING NATIVE CORONARY ARTERY OF NATIVE HEART WITHOUT ANGINA PECTORIS: ICD-10-CM

## 2019-05-31 DIAGNOSIS — E78.5 HYPERLIPIDEMIA, UNSPECIFIED HYPERLIPIDEMIA TYPE: ICD-10-CM

## 2019-05-31 PROCEDURE — 99213 OFFICE O/P EST LOW 20 MIN: CPT | Performed by: NURSE PRACTITIONER

## 2019-05-31 PROCEDURE — 93000 ELECTROCARDIOGRAM COMPLETE: CPT | Performed by: NURSE PRACTITIONER

## 2019-05-31 NOTE — PATIENT INSTRUCTIONS
Go to the [address] [place] [address] in [address]    Date/Arrival Time:  Mon June 3rd arrive a few minutes before 10:30 A. M    Stress Echocardiogram      This records the heart's activity during a cardiac stress test.  A stress echocardiogram is a very effective, noninvasive test that can help determine whether you have blockages in your coronary arteries. The exam takes approximately thirty minutes. To help ensure accurate results, patients should take the following steps in preparation for a stress echocardiogram:   Refrain from strenuous activity for 12 hours before the test.   Do not eat, drink, or smoke for two hours prior to the test.  Unless instructed otherwise by your physician, you should continue to take prescribed medications. Wear loose, comfortable clothing and walking shoes. If you need to change this appointment, please call [phone] to reschedule.

## 2019-05-31 NOTE — PROGRESS NOTES
(ADVIL;MOTRIN) 600 MG tablet Take 600 mg by mouth every 6 hours as needed for Pain      meloxicam (MOBIC) 15 MG tablet Take 15 mg by mouth daily      oxyCODONE-acetaminophen (PERCOCET) 5-325 MG per tablet Take 1 tablet by mouth every 4 hours as needed for Pain. Marcello Mireles rosuvastatin (CRESTOR) 10 MG tablet Take 10 mg by mouth daily      glipiZIDE (GLUCOTROL) 10 MG tablet Take 10 mg by mouth 2 times daily (before meals)      sodium hypochlorite (DAKINS) 0.125 % SOLN external solution Moisten gauze apply to wound twice daily 1 Bottle 2    BASAGLAR KWIKPEN 100 UNIT/ML injection pen Inject 30 Units into the skin daily  6    metoprolol succinate (TOPROL XL) 25 MG extended release tablet Take 1 tablet by mouth nightly 90 tablet 0    lisinopril (PRINIVIL;ZESTRIL) 10 MG tablet Take 1 tablet by mouth daily 90 tablet 3     No current facility-administered medications for this visit. Allergies: Patient has no known allergies. Past Medical History:   Diagnosis Date    Arthritis     CAD (coronary artery disease)     Cigarette smoker 9/2/2014    Diabetes ([facility] [address]     History of nephrolithiasis     Hyperlipidemia     Hypertension     Obesity     Type II or unspecified type diabetes mellitus without mention of complication, not stated as uncontrolled      Past Surgical History:   Procedure Laterality Date    CARDIAC CATHETERIZATION  2014    CARDIAC CATHETERIZATION  3/14/12    CARDIAC CATHETERIZATION  9/2/14  JDT    stent to mid LAD.  EF 50%    CORONARY ARTERY BYPASS GRAFT      CORONARY ARTERY BYPASS GRAFT  3/15/2012    3V CABG (LIMA-Diag, SVG-RCA, SVG-OM) JPO    EXPLORATION OF WOUND OF EXTREMITY N/A 1/28/2019    EXCISIONAL DEBRIDEMENT OF SKIN, SUBCUTANEOUS TISSUE, MUSCLE AND BONE 14CM X 3CM X 3CM DEEP performed by Pham Hyde MD at [facility] 238 Left 1/25/2019    EXPLORATION OF LEFT FOOT; EXCISIONAL DEBRIDEMENT OF SUBCUTANEOUS SKIN, TISSUE PROBLEMS / CONDITIONS WERE ADDRESSED AND TREATED DURING THE OFFICE VISIT TODAY:                                                                                            MEDICAL DECISION MAKING             Cardiac Specific Problem / Diagnosis  Discussion and Data Reviewed Diagnostic Procedures Ordered Management Options Selected           1. CAD  Stable   Review and summation of old records:    No chest pain. 3/15/2012 coronary artery bypass graft. 9/2/2014 cardiac catheterization. 3/9/2018. Cardiac catheterization patent stent in the LAD with total occlusion of the LAD diagonal and distal obtuse marginal branch and RCA was totally occluded at its origin. Saphenous vein graft to RCA patent as well as saphenous vein graft to the obtuse marginal branch. LIMA graft to the LAD diagonal was patent. Stress Echo for DOT  Continue current medications:     Yes           2. Hypertension   Stable   Initial blood pressure in the office 142/88. Rechecked 130/80. Patient states home blood pressures run 120 over 80s. No Continue current medications:    Yes           3. Hyperlipidemia  Stable Lipids followed by primary care provider. On Crestor 10 mg daily. No Continue current medications: Yes                     Orders Placed This Encounter   Procedures    EKG 12 lead     Order Specific Question:   Reason for Exam?     Answer:   Hypertension    Echo stress test     Standing Status:   Future     Standing Expiration Date:   5/31/2020     Order Specific Question:   Reason for exam:     Answer:   DOT physical and cad     No orders of the defined types were placed in this encounter. Discussed with patient. Return in about 1 year (around 5/31/2020) for yearly . I greatly appreciate the opportunity to meet Negin Ko and your confidence in allowing me to participate in his cardiovascular care.     MELITON Patel NP  5/31/2019 10:57 AM

## 2019-06-03 ENCOUNTER — HOSPITAL ENCOUNTER (OUTPATIENT)
Dept: NON INVASIVE DIAGNOSTICS | Age: 49
Discharge: HOME | End: 2019-06-03
Payer: COMMERCIAL

## 2019-06-03 DIAGNOSIS — I25.10 CORONARY ARTERY DISEASE INVOLVING NATIVE CORONARY ARTERY OF NATIVE HEART WITHOUT ANGINA PECTORIS: ICD-10-CM

## 2019-06-03 LAB
LV EF: 50 %
LVEF MODALITY: NORMAL

## 2019-06-03 PROCEDURE — 93350 STRESS TTE ONLY: CPT

## 2019-06-04 ENCOUNTER — TELEPHONE (OUTPATIENT)
Dept: CARDIOLOGY | Age: 49
End: 2019-06-04

## 2019-06-05 ENCOUNTER — TELEPHONE (OUTPATIENT)
Dept: CARDIOLOGY | Age: 49
End: 2019-06-05

## 2019-06-05 NOTE — TELEPHONE ENCOUNTER
Spoke with patient and cardiac cath is scheduled for 6/11/19. Arrive at [facility] at 0900am.Npo after midnight. May take morning meds. Adjust insulin dose according to glucose level. Bring someone to drive. He voiced understanding.

## 2019-06-05 NOTE — TELEPHONE ENCOUNTER
Patient call ed back and needed to reschedule cath. He will arrive at I 6/12/19 at 0700 am. With same instructions. He voiced understanding.

## 2019-06-05 NOTE — TELEPHONE ENCOUNTER
Patient called back and stated that he needs to reschedule the heart cath do to testing at work. Please call the patient to reschedule.   Thank you

## 2019-06-12 ENCOUNTER — HOSPITAL ENCOUNTER (OUTPATIENT)
Dept: CARDIAC CATH/INVASIVE PROCEDURES | Age: 49
Discharge: HOME | End: 2019-06-12
Attending: INTERNAL MEDICINE | Admitting: INTERNAL MEDICINE
Payer: COMMERCIAL

## 2019-06-12 VITALS
HEIGHT: 75 IN | DIASTOLIC BLOOD PRESSURE: 95 MMHG | BODY MASS INDEX: 29.84 KG/M2 | WEIGHT: 240 LBS | HEART RATE: 63 BPM | TEMPERATURE: 98.3 F | RESPIRATION RATE: 22 BRPM | SYSTOLIC BLOOD PRESSURE: 176 MMHG | OXYGEN SATURATION: 95 %

## 2019-06-12 DIAGNOSIS — R94.39 ABNORMAL STRESS ECHO: ICD-10-CM

## 2019-06-12 PROBLEM — I25.9 CHEST PAIN DUE TO MYOCARDIAL ISCHEMIA: Status: ACTIVE | Noted: 2018-03-21

## 2019-06-12 LAB
ANION GAP SERPL CALCULATED.3IONS-SCNC: 11 MMOL/L (ref 7–19)
BUN BLDV-MCNC: 21 MG/DL (ref 6–20)
CALCIUM SERPL-MCNC: 9.5 MG/DL (ref 8.6–10)
CHLORIDE BLD-SCNC: 104 MMOL/L (ref 98–111)
CO2: 26 MMOL/L (ref 22–29)
CREAT SERPL-MCNC: 0.9 MG/DL (ref 0.5–1.2)
GFR NON-AFRICAN AMERICAN: >60
GLUCOSE BLD-MCNC: 228 MG/DL (ref 74–109)
HCT VFR BLD CALC: 50.4 % (ref 42–52)
HEMOGLOBIN: 16 G/DL (ref 14–18)
MCH RBC QN AUTO: 28.2 PG (ref 27–31)
MCHC RBC AUTO-ENTMCNC: 31.7 G/DL (ref 33–37)
MCV RBC AUTO: 88.9 FL (ref 80–94)
PDW BLD-RTO: 13.8 % (ref 11.5–14.5)
PLATELET # BLD: 209 K/UL (ref 130–400)
PMV BLD AUTO: 12.4 FL (ref 9.4–12.4)
POTASSIUM SERPL-SCNC: 4.5 MMOL/L (ref 3.5–5)
RBC # BLD: 5.67 M/UL (ref 4.7–6.1)
SODIUM BLD-SCNC: 141 MMOL/L (ref 136–145)
WBC # BLD: 8.6 K/UL (ref 4.8–10.8)

## 2019-06-12 PROCEDURE — 99153 MOD SED SAME PHYS/QHP EA: CPT | Performed by: INTERNAL MEDICINE

## 2019-06-12 PROCEDURE — C1894 INTRO/SHEATH, NON-LASER: HCPCS

## 2019-06-12 PROCEDURE — 99999 PR OFFICE/OUTPT VISIT,PROCEDURE ONLY: CPT | Performed by: INTERNAL MEDICINE

## 2019-06-12 PROCEDURE — C1769 GUIDE WIRE: HCPCS

## 2019-06-12 PROCEDURE — 99152 MOD SED SAME PHYS/QHP 5/>YRS: CPT | Performed by: INTERNAL MEDICINE

## 2019-06-12 PROCEDURE — 85027 COMPLETE CBC AUTOMATED: CPT

## 2019-06-12 PROCEDURE — 6370000000 HC RX 637 (ALT 250 FOR IP)

## 2019-06-12 PROCEDURE — 2709999900 HC NON-CHARGEABLE SUPPLY

## 2019-06-12 PROCEDURE — 80048 BASIC METABOLIC PNL TOTAL CA: CPT

## 2019-06-12 PROCEDURE — 93459 L HRT ART/GRFT ANGIO: CPT | Performed by: INTERNAL MEDICINE

## 2019-06-12 PROCEDURE — 6360000002 HC RX W HCPCS

## 2019-06-12 PROCEDURE — C1887 CATHETER, GUIDING: HCPCS

## 2019-06-12 PROCEDURE — 2580000003 HC RX 258: Performed by: INTERNAL MEDICINE

## 2019-06-12 PROCEDURE — 36415 COLL VENOUS BLD VENIPUNCTURE: CPT

## 2019-06-12 PROCEDURE — 2500000003 HC RX 250 WO HCPCS

## 2019-06-12 PROCEDURE — 92928 PRQ TCAT PLMT NTRAC ST 1 LES: CPT | Performed by: INTERNAL MEDICINE

## 2019-06-12 PROCEDURE — 6360000004 HC RX CONTRAST MEDICATION: Performed by: INTERNAL MEDICINE

## 2019-06-12 PROCEDURE — 93005 ELECTROCARDIOGRAM TRACING: CPT

## 2019-06-12 PROCEDURE — C1874 STENT, COATED/COV W/DEL SYS: HCPCS

## 2019-06-12 RX ORDER — SODIUM CHLORIDE 0.9 % (FLUSH) 0.9 %
10 SYRINGE (ML) INJECTION EVERY 12 HOURS SCHEDULED
Status: DISCONTINUED | OUTPATIENT
Start: 2019-06-12 | End: 2019-06-12 | Stop reason: HOSPADM

## 2019-06-12 RX ORDER — SODIUM CHLORIDE 9 MG/ML
INJECTION, SOLUTION INTRAVENOUS CONTINUOUS
Status: DISCONTINUED | OUTPATIENT
Start: 2019-06-12 | End: 2019-06-12 | Stop reason: SDUPTHER

## 2019-06-12 RX ORDER — SODIUM CHLORIDE 9 MG/ML
INJECTION, SOLUTION INTRAVENOUS CONTINUOUS
Status: DISCONTINUED | OUTPATIENT
Start: 2019-06-12 | End: 2019-06-12 | Stop reason: HOSPADM

## 2019-06-12 RX ORDER — ACETAMINOPHEN 325 MG/1
650 TABLET ORAL EVERY 4 HOURS PRN
Status: DISCONTINUED | OUTPATIENT
Start: 2019-06-12 | End: 2019-06-12 | Stop reason: HOSPADM

## 2019-06-12 RX ORDER — ASPIRIN 81 MG/1
81 TABLET ORAL DAILY
Status: DISCONTINUED | OUTPATIENT
Start: 2019-06-13 | End: 2019-06-12 | Stop reason: HOSPADM

## 2019-06-12 RX ORDER — SODIUM CHLORIDE 0.9 % (FLUSH) 0.9 %
10 SYRINGE (ML) INJECTION PRN
Status: DISCONTINUED | OUTPATIENT
Start: 2019-06-12 | End: 2019-06-12 | Stop reason: HOSPADM

## 2019-06-12 RX ORDER — ONDANSETRON 2 MG/ML
4 INJECTION INTRAMUSCULAR; INTRAVENOUS EVERY 6 HOURS PRN
Status: DISCONTINUED | OUTPATIENT
Start: 2019-06-12 | End: 2019-06-12 | Stop reason: HOSPADM

## 2019-06-12 RX ORDER — PRASUGREL 10 MG/1
10 TABLET, FILM COATED ORAL DAILY
Status: DISCONTINUED | OUTPATIENT
Start: 2019-06-13 | End: 2019-06-12 | Stop reason: SDUPTHER

## 2019-06-12 RX ADMIN — SODIUM CHLORIDE: 9 INJECTION, SOLUTION INTRAVENOUS at 08:04

## 2019-06-12 RX ADMIN — IOPAMIDOL 330 ML: 612 INJECTION, SOLUTION INTRAVENOUS at 11:09

## 2019-06-12 NOTE — H&P
[facility]      History and Physical       Date of Admission:  6/12/2019  6:37 AM    Date of Initially Being Seen / Consultation:  6/12/19    Cardiologist:  MIGUEL Santo MD    Attending: Dr. Maritza Gimenez      PCP:  Bentley Burr MD    Reason for Consultation or Admission / Chief Complaint:  Chest discomfort and abnormal stress echocardiogram    SUBJECTIVE AND HISTORY OF PRESENT ILLNESS:    Source of the history:  Patient, family, previous inpatient and outpatient records in [facility], and from office note    Olivia Estrada is a 44 y.o. male who presents to [facility] cath holding with symptoms / signs / problem or diagnosis of abnormal stress echo. 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 This was difficult to interpret because the echocardiogram revealed wall motion abnormalities at rest and post exercise. It turns out that the patient has been having some chest tightness occurring with exertion and at rest. It is rather vague in nature and does not radiate and only last for a few minutes. There are no relieving or precipitating factors. There is no associated nausea or vomiting. Interestingly the patient had what sounds like a presyncopal episode while driving this weekend. He was going home from a camping weekend in [address] [place] [address]. He became very concerned at that time and states that he had never felt this way before. At that time however there was no associated chest discomfort or palpitations. The episode appeared to last for several minutes.       CARDIAC RISK PROFILE:    Risk Factor Yes / No / Unknown       Cigarette Use As listed    Diabetes Mellitus Yes    Family History of CAD Yes    Hypercholesteremia Yes    Hypertension Yes           Cardiac Specific Problems:    Specialty Problems        Cardiology Problems    Coronary artery disease involving native coronary artery of native heart without angina pectoris        Essential hypertension                PRIOR CARDIAC PROBLEM LIST  (IF APPLICABLE):    As listed      Past Medical History:  Past Medical History:   Diagnosis Date    Arthritis     CAD (coronary artery disease)     Cigarette smoker 9/2/2014    Diabetes ([facility] [address]     History of nephrolithiasis     Hyperlipidemia     Hypertension     Obesity     Type II or unspecified type diabetes mellitus without mention of complication, not stated as uncontrolled        Past Surgical History:  Past Surgical History:   Procedure Laterality Date    CARDIAC CATHETERIZATION  2014    CARDIAC CATHETERIZATION  3/14/12    CARDIAC CATHETERIZATION  9/2/14  JDT    stent to mid LAD. EF 50%    CORONARY ARTERY BYPASS GRAFT      CORONARY ARTERY BYPASS GRAFT  3/15/2012    3V CABG (LIMA-Diag, SVG-RCA, SVG-OM) JPO    EXPLORATION OF WOUND OF EXTREMITY N/A 1/28/2019    EXCISIONAL DEBRIDEMENT OF SKIN, SUBCUTANEOUS TISSUE, MUSCLE AND BONE 14CM X 3CM X 3CM DEEP performed by Jeffery Pizano MD at [facility] 238 Left 1/25/2019    EXPLORATION OF LEFT FOOT; EXCISIONAL DEBRIDEMENT OF SUBCUTANEOUS SKIN, TISSUE AND MUSCLE; AMPUTATION OF THIRD TOE AT METATARSAL PHALYNGEAL JOINT performed by Jeffery Pizano MD at [address] [place] [address]      TOE AMPUTATION  2017    TONSILLECTOMY         Home Medications:   Prior to Admission medications    Medication Sig Start Date End Date Taking?  Authorizing Provider   Multiple Vitamins-Minerals (DIABETES HEALTH PO) Take 1 tablet by mouth daily   Yes Historical Provider, MD   meloxicam (MOBIC) 15 MG tablet Take 15 mg by mouth daily   Yes Historical Provider, MD   rosuvastatin (CRESTOR) 10 MG tablet Take 10 mg by mouth daily   Yes Historical Provider, MD   glipiZIDE (GLUCOTROL) 10 MG tablet Take 10 mg by mouth 2 times daily (before meals)   Yes Historical Provider, MD   lisinopril (PRINIVIL;ZESTRIL) 10 MG tablet Take 1 tablet by mouth daily 8/31/18  Yes Doloris Second, APRN   oxyCODONE-acetaminophen (PERCOCET) 5-325 MG per tablet Take 1 tablet by mouth every 4 hours as needed for Pain. Delores Barrow Historical Provider, MD   sodium hypochlorite (DAKINS) 0.125 % SOLN external solution Moisten gauze apply to wound twice daily 1/16/19   Joi De La Torre MD   [facility] KWIKPEN 100 UNIT/ML injection pen Inject 30 Units into the skin daily 11/16/18   Historical Provider, MD   ibuprofen (ADVIL;MOTRIN) 600 MG tablet Take 600 mg by mouth every 6 hours as needed for Pain    Historical Provider, MD        Facility Administered Medications: Allergies:    Patient has no known allergies.      Social History:    Social History     Socioeconomic History    Marital status:      Spouse name: Not on file    Number of children: Not on file    Years of education: Not on file    Highest education level: Not on file   Occupational History    Not on file   Social Needs    Financial resource strain: Not on file    Food insecurity:     Worry: Not on file     Inability: Not on file    Transportation needs:     Medical: Not on file     Non-medical: Not on file   Tobacco Use    Smoking status: Current Every Day Smoker     Packs/day: 0.50     Years: 30.00     Pack years: 15.00     Types: Cigarettes    Smokeless tobacco: Never Used   Substance and Sexual Activity    Alcohol use: No     Comment: Rarely    Drug use: No    Sexual activity: Yes     Partners: Female   Lifestyle    Physical activity:     Days per week: Not on file     Minutes per session: Not on file    Stress: Not on file   Relationships    Social connections:     Talks on phone: Not on file     Gets together: Not on file     Attends [employer] service: Not on file     Active member of club or organization: Not on file     Attends meetings of clubs or organizations: Not on file     Relationship status: Not on file    Intimate partner violence:     Fear of current or ex partner: Not on file     Emotionally abused: Not on file     Physically abused: Not on file     Forced sexual activity: Not on file   Other Topics Concern    Not on file   Social History Narrative    ** Merged History Encounter **            Family History:  Family History   Problem Relation Age of Onset    Heart Disease Other     High Blood Pressure Other     Diabetes Other          REVIEW OF SYSTEMS:     Except as noted in the HPI, all other systems are negative  CONSTITUTIONAL:  No fevers, chills, night sweats, or weight loss. HEENT:  No vision loss, double vision, blurred vision, or tearing. No hearing loss, tinnitus, or infection. No nasal discharge or epistaxis. No dysphagia. RESPIRATORY:  No shortness of breath, cough, or sputum production. No history of TB exposure. CARDIOVASCULAR:  No hypertension, hypotension, anginal type chest pain, dizziness, or syncope. No history of palpitations. PERIPHERAL VASCULAR:  No history of claudication. GASTROINTESTINAL:  No nausea, vomiting, diarrhea, or constipation. No reflux or gastroesophageal reflux disease. GENITOURINARY:  No dysuria, urgency, frequency, or history of urinary tract infections. No history of nephrolithiasis or renal insufficiency. NEUROLOGIC:  No history of cerebrovascular accident, transient ischemic attack, or amaurosis fugax. No history of seizure disorder. INTEGUMENTARY:  No history of nonhealing wounds or skin cancer removal.  PSYCHIATRIC:  No excessive anxiety or depression. ENDOCRINE:  No polyuria, polydipsia, or significant weight gain. No heat or cold intolerance. MUSCULOSKELETAL:  No limit to range of motion of joints or swelling of limbs. HEMATOLOGIC:  No history of DVT, PE, or anemia. All other review of systems is negative.       PHYSICAL EXAMINATION:     BP (!) 156/82   Pulse 54   Temp 98.3 °F (36.8 °C) (Temporal)   Resp 17   Ht 6' 3\" (1.905 m)   Wt 240 lb (108.9 kg)   SpO2 99%   BMI 30.00 kg/m² GENERAL:  Alert and oriented X3 in no apparent distress. Short term and long term memory intact. Judgement intact. HEENT:  Normocephalic without evidence of old or recent trauma. Sclerae clear. Conjunctivae pink. EOMs intact. Pupils equal and round. Tympanic membranes not visualized. No epistaxis, runny nose. No lesions on lips or buccal mucosa. Tongue protrudes in midline and is well papillated. NECK:  Supple without mass or JVD. Carotid pulses 2+ to palpation bilaterally without bruit. No thyromegaly noted. CARDIOVASCULAR:  Regular rate and rhythm without S3, S4 or murmur. PULMONARY:  Equal bilateral expansion. Clear to auscultation and percussion. ABDOMEN:  Soft, non tender. Bowel sounds X4 quadrants. No hepatomegaly, splenomegaly or palpable mass. MUSCULOSKELETAL:  Negative. UPPER EXTREMITIES:  Radial pulses palpable bilaterally. No cyanosis, clubbing, or edema. LOWER EXTREMITIES:  Femoral, popliteal, dorsalis pedis, and posterior tibial pulses 2+ to palpation bilaterally. No cyanosis, clubbing, or edema or signs of atheroembolic event. NEUROLOGIC:  Physiologic. SKIN:  Warm, dry, intact. LABORATORY EVALUATION & TESTING:    I have personally reviewed and interpreted the results of the following diagnostic testing      EKG and or Telemetry:  which was personally reviewed me:  Sinus rhythm, 54 bpm    Troponin:  Not obtained for myocardial necrosis    CBC:   Recent Labs     06/12/19  0657   WBC 8.6   HGB 16.0   HCT 50.4   MCV 88.9        BMP:   Recent Labs     06/12/19  0657      K 4.5      CO2 26   BUN 21*   CREATININE 0.9     Cardiac Enzymes: No results for input(s): CKTOTAL, CKMB, CKMBINDEX, TROPONINI in the last 72 hours. PT/INR: No results for input(s): PROTIME, INR in the last 72 hours. APTT: No results for input(s): APTT in the last 72 hours.   Liver Profile:  Lab Results   Component Value Date    AST 15 01/24/2019    ALT 26 01/24/2019    BILITOT 0.5 01/24/2019    ALKPHOS 88 01/24/2019    ALKPHOS 85 03/27/2012     Lab Results   Component Value Date    CHOL 117 01/25/2019    HDL 17 01/25/2019    TRIG 135 01/25/2019     TSH:  No results found for: TSH  UA:   Lab Results   Component Value Date    COLORU YELLOW 03/14/2012    PHUR 6.0 03/14/2012    LEUKOCYTESUR NEGATIVE 03/14/2012    UROBILINOGEN 0.2 03/14/2012    BILIRUBINUR NEGATIVE 03/14/2012    GLUCOSEU >=1000 03/14/2012             ALL THE CARDIOLOGY PROBLEMS ARE LISTED ABOVE; HOWEVER, THE FOLLOWING SPECIFIC CARDIAC PROBLEMS WERE ADDRESSED AND TREATED DURING THE HOSPITAL VISIT TODAY:       Cardiac Specific Problem / Diagnosis  Discussion / Medical Decision Making Plan          1. Chest discomfort and abnormal ischemic study   are worsening   Cardiac catheterization and possible percutaneous intervention today. Risks benefits and other options explained to the patient and family and they agreed to proceed. 2. Recent presyncopal episode   are worsening   There are a multitude of potential etiologies including arrhythmia, hypoglycemia, TIA, vasovagal, orthostasis etc.  Perhaps the results of the catheterization will provide some enlightenment regarding this. 3. Type II diabetes mellitus   show no change   Continue hypoglycemic medications except any medications containing metformin will be held for 48-72 hours post procedure. Monitor glucose levels. PLAN:    1. Continue present medications except for changes as noted above  2. Continue to monitor rhythm  3. Further orders per clinical course. 4. Left heart catheterization with possible percutaneous intervention today. The plan is to use moderate sedation. The patient has an acceptable Mallampati score. The patient's ASA level is 3. Discussed with patient and wife and nursing. Electronically signed by MIGUEL Sam MD on 6/12/19    [facility]

## 2019-06-12 NOTE — PROCEDURES
Cardiac Catheterization    Patient name:  Buck Evans    :  1970  Med Rec No:  [id number]    Room:  [facility] CATH POOL/NONE  Account No:  [unfilled]  Admission date:  2019  Physician:  C. Rudie Schlatter, MD      Date of procedure:  19    Procedure:    Left heart catheterization with LAZO left ventriculography, selective coronary arteriography, arteriography of left internal mammary and proximal saphenous vein graft ×2, primary stent placement to native 3rd circumflex marginal branch. Catheters:  5-Surinamese Tomas, 6-Surinamese AL-1 diagnostic catheter, 6-Surinamese JCL 4 guide catheter for the intervention. Type and site of entry:  Percutaneous right radial artery was used with modified Seldinger technique. Contrast material:  Isovue-300. Comments: There were no complications. The patient is returned to his room in satisfactory condition. Description:  The patient was brought to the cardiac catheterization laboratory and identified as correct patient for this procedure. The right wrist was prepped with chlorhexidine solution, draped in a sterile fashion and anesthetized with 2% plain Xylocaine. With ultrasound-guided and a 6-Surinamese slender sheath was placed in the right radial artery. Catheters were advanced under fluoroscopic guidance into the ascending aorta and the procedure ensued as follows: LAZO left ventriculography, native left coronary arteriography and right saphenous vein arteriography were performed using the Tomas catheter. Native right coronary arteriography and arteriography of the saphenous vein graft to the 2nd obtuse marginal branch were performed with the AL-1 catheter and a left internal mammary guide catheter was used to performed left internal mammary bypass injection. This was nonselective. Percutaneous intervention was carried out on the 3rd obtuse marginal branch using the JCL 4 guide catheter as described below.     At the conclusion of the procedure the access site was closed using a TR band with adequate hemostasis and intact distal pulses. FINDINGS:    Hemodynamics:  LV pressure 146/22. AO pressure 146/84. LAZO Left Ventriculography:  The left ventricle is mildly enlarged and there is a large area of anterolateral and apical hypokinesis with an estimated left ventricular ejection fraction of 45%. There was no noted mitral regurgitation. Selective Coronary Arteriography:      1. The native right coronary artery is totally occluded at its origin as it has been in the past.    2.  The left main coronary artery has diffuse plaque and diffuse mild narrowing of less than 20%. 3.  The circumflex coronary artery gives rise to a very proximal 1st obtuse marginal branch which distributes as though it were an intermediate vessel. The 2nd obtuse marginal branch is small and not well visualized secondary to competitive flow from a vein graft. The 3rd obtuse marginal branch is a fair-sized vessel with a focal 90% stenosis in its midportion. 4.  The left anterior descending coronary artery is patent with a patent stent in its proximal segment. The 1st diagonal branch is not well visualized, probably due to competitive flow from the internal mammary graft. 5.  The left internal mammary graft to the 1st LAD diagonal branch is widely patent. 6.  The saphenous vein graft to the posterior descending branch of the right coronary artery is widely patent and fills this vessel probably as well as filling the posterolateral branch by backflow. 7.  The saphenous vein graft to the 2nd circumflex marginal branch is degenerated with diffuse irregularities. There is proximal 50-60% narrowing in this vein graft however at this level the graft is still larger in diameter than the native vessel. Percutaneous coronary intervention was carried out on the severely diseased native 3rd obtuse marginal branch.  A PT graphics guidewire crossed the lesion followed by 2.5 x 15 mm drug-eluting stent which was deployed at a pressure of 18 rosy with excellent angiographic results and SANGITA grade 3 distal flow. Impression:      1. Anterolateral and apical hypokinesis with an ejection fraction of approximately 45%. 2.  Elevated left ventricular end-diastolic pressure. 3.  Severe native triple-vessel coronary artery disease. 4.  Widely patent left internal mammary graft to the 1st LAD diagonal branch. 5.  Widely patent saphenous vein graft to the native posterior descending branch of the right coronary artery. 6.  Patent but degenerated and moderately narrowed saphenous vein graft to a small 2nd circumflex marginal branch. 7.  Successful percutaneous coronary intervention with primary stent placement to the native 3rd circumflex marginal branch using a single drug-eluting stent. Electronically signed: MIGUEL Jay MD, 6/12/2019  [facility], [place].

## 2019-06-12 NOTE — FLOWSHEET NOTE
PT RETURNED from cath lab with stent to his heart. Iv infusing with Angiomax 250mg/100 ml IVPB at rate of 37.8 ml/hr  (Second bag). NS infusing at 125 ml/hr.

## 2019-06-12 NOTE — LETTER
[facility]  Cardiac Rehab Department  [address] [place] [address] [address]  [phone]  Toll Free [phone]              June 13, 2019    Dear Walter Rodríguez,    In follow-up to your recent hospitalization, please find this informational packet that has been sent to you on heart disease and the guidelines you are to follow concerning your present cardiac condition and immediate recovery. Due to your cardiac diagnosis and intervention you now qualify for participation in a Phase II Outpatient Cardiac Rehab Program.  This elective service has been shown to significantly reduce cardiac mortality 26-31% among patients such as yourself! A brochure has been included in this mailing for the purpose of providing you with a brief overview of program components. Simply call [facility] ([phone]) or more readily [facility] ([phone]) to inquire about program specifics and the opportunity to enroll. Feel free to reach out to us now or at any other time and our staff will be more than pleased to assist you. Thank you.     To the betterment of your health,        [facility] Cardiac Rehab Staff    LA Patton, MA  Exercise Physiologist

## 2019-06-13 LAB
EKG P AXIS: NORMAL DEGREES
EKG P-R INTERVAL: NORMAL MS
EKG Q-T INTERVAL: 434 MS
EKG QRS DURATION: 100 MS
EKG QTC CALCULATION (BAZETT): 429 MS
EKG T AXIS: 109 DEGREES
LV EF: 45 %
LVEF MODALITY: NORMAL

## 2019-06-13 NOTE — FLOWSHEET NOTE
1620 :TR band removed from right wrist with no signs of bleeding. Hematoma remains above TR band without any increase or without any signs of bruising. Area cleaned with chloraprep, gauze dressing applied to site with Tegaderm dressing applied over gauze. 1710 12 lead EKG ran at [facility] as post stent. 1711   Pt to be started on Brilinta 90 mg po bid, and samples from the office given to the patient. 1730:  Pt given discharge instructions with explanations given and voiced understanding. Pt's girlfriend at [facility] and voiced understanding as she is an RN. Pt stable.

## 2019-06-27 ENCOUNTER — TELEPHONE (OUTPATIENT)
Dept: CARDIOLOGY | Age: 49
End: 2019-06-27

## 2019-07-01 ENCOUNTER — TELEPHONE (OUTPATIENT)
Dept: CARDIOLOGY | Age: 49
End: 2019-07-01

## 2019-07-01 NOTE — TELEPHONE ENCOUNTER
Pt was given the start dose in hospital. Pt has samples that he has been taking and is about to run out is why he needed new script sent in. Pt notified and verbally understood that script was sent and he is to take one tab daily.

## 2019-07-01 NOTE — TELEPHONE ENCOUNTER
Left message for patient that I am calling script to [facility] in [place] for Plavix 75 mgm daily to replace his brilinta and I spoke to the pharmacy and told them to leave the patient a message when he starts the Plavix he should take 300 mgm the first day then 75 mgm daily.

## 2019-07-29 ENCOUNTER — TELEPHONE (OUTPATIENT)
Dept: CARDIOLOGY | Age: 49
End: 2019-07-29

## 2019-10-01 ENCOUNTER — TELEPHONE (OUTPATIENT)
Dept: CARDIOLOGY | Age: 49
End: 2019-10-01

## 2019-10-03 ENCOUNTER — OFFICE VISIT (OUTPATIENT)
Dept: CARDIOLOGY | Age: 49
End: 2019-10-03
Payer: COMMERCIAL

## 2019-10-03 VITALS
SYSTOLIC BLOOD PRESSURE: 130 MMHG | BODY MASS INDEX: 31.33 KG/M2 | DIASTOLIC BLOOD PRESSURE: 84 MMHG | HEIGHT: 75 IN | HEART RATE: 56 BPM | WEIGHT: 252 LBS

## 2019-10-03 DIAGNOSIS — I10 ESSENTIAL HYPERTENSION: Chronic | ICD-10-CM

## 2019-10-03 DIAGNOSIS — F17.210 CIGARETTE SMOKER: Chronic | ICD-10-CM

## 2019-10-03 DIAGNOSIS — I25.10 CORONARY ARTERY DISEASE INVOLVING NATIVE CORONARY ARTERY OF NATIVE HEART WITHOUT ANGINA PECTORIS: Primary | ICD-10-CM

## 2019-10-03 DIAGNOSIS — E78.2 MIXED HYPERLIPIDEMIA: Chronic | ICD-10-CM

## 2019-10-03 DIAGNOSIS — Z95.1 HX OF CABG: ICD-10-CM

## 2019-10-03 DIAGNOSIS — Z95.5 HISTORY OF CORONARY ARTERY STENT PLACEMENT: ICD-10-CM

## 2019-10-03 PROCEDURE — 99213 OFFICE O/P EST LOW 20 MIN: CPT | Performed by: NURSE PRACTITIONER

## 2019-10-03 RX ORDER — CLOPIDOGREL BISULFATE 75 MG/1
75 TABLET ORAL DAILY
Status: ON HOLD | COMMUNITY
End: 2021-01-01

## 2019-11-05 ENCOUNTER — HOSPITAL ENCOUNTER (OUTPATIENT)
Dept: CT IMAGING | Age: 49
Discharge: HOME | End: 2019-11-05
Payer: COMMERCIAL

## 2019-11-05 DIAGNOSIS — N23 RENAL COLIC: ICD-10-CM

## 2019-11-05 LAB
ALBUMIN SERPL-MCNC: 4 G/DL (ref 3.5–5.2)
ALP BLD-CCNC: 100 U/L (ref 40–130)
ALT SERPL-CCNC: 22 U/L (ref 5–41)
ANION GAP SERPL CALCULATED.3IONS-SCNC: 14 MMOL/L (ref 7–19)
AST SERPL-CCNC: 13 U/L (ref 5–40)
BASOPHILS ABSOLUTE: 0.1 K/UL (ref 0–0.2)
BASOPHILS RELATIVE PERCENT: 0.5 % (ref 0–1)
BILIRUB SERPL-MCNC: 0.7 MG/DL (ref 0.2–1.2)
BUN BLDV-MCNC: 19 MG/DL (ref 6–20)
CALCIUM SERPL-MCNC: 9.7 MG/DL (ref 8.6–10)
CHLORIDE BLD-SCNC: 101 MMOL/L (ref 98–111)
CHOLESTEROL, TOTAL: 167 MG/DL (ref 160–199)
CO2: 26 MMOL/L (ref 22–29)
CREAT SERPL-MCNC: 1 MG/DL (ref 0.5–1.2)
EOSINOPHILS ABSOLUTE: 0.2 K/UL (ref 0–0.6)
EOSINOPHILS RELATIVE PERCENT: 1.4 % (ref 0–5)
GFR NON-AFRICAN AMERICAN: >60
GLUCOSE BLD-MCNC: 97 MG/DL (ref 74–109)
HBA1C MFR BLD: 9.1 % (ref 4–6)
HCT VFR BLD CALC: 52.1 % (ref 42–52)
HDLC SERPL-MCNC: 39 MG/DL (ref 55–121)
HEMOGLOBIN: 17 G/DL (ref 14–18)
IMMATURE GRANULOCYTES #: 0.1 K/UL
LDL CHOLESTEROL CALCULATED: 100 MG/DL
LYMPHOCYTES ABSOLUTE: 2.4 K/UL (ref 1.1–4.5)
LYMPHOCYTES RELATIVE PERCENT: 16.1 % (ref 20–40)
MCH RBC QN AUTO: 30.2 PG (ref 27–31)
MCHC RBC AUTO-ENTMCNC: 32.6 G/DL (ref 33–37)
MCV RBC AUTO: 92.5 FL (ref 80–94)
MONOCYTES ABSOLUTE: 1.8 K/UL (ref 0–0.9)
MONOCYTES RELATIVE PERCENT: 12.3 % (ref 0–10)
NEUTROPHILS ABSOLUTE: 10.1 K/UL (ref 1.5–7.5)
NEUTROPHILS RELATIVE PERCENT: 69.2 % (ref 50–65)
PDW BLD-RTO: 13.4 % (ref 11.5–14.5)
PLATELET # BLD: 241 K/UL (ref 130–400)
PMV BLD AUTO: 12 FL (ref 9.4–12.4)
POTASSIUM SERPL-SCNC: 4 MMOL/L (ref 3.5–5)
RBC # BLD: 5.63 M/UL (ref 4.7–6.1)
SODIUM BLD-SCNC: 141 MMOL/L (ref 136–145)
TOTAL PROTEIN: 7.4 G/DL (ref 6.6–8.7)
TRIGL SERPL-MCNC: 142 MG/DL (ref 0–149)
URIC ACID, SERUM: 4.5 MG/DL (ref 3.4–7)
WBC # BLD: 14.6 K/UL (ref 4.8–10.8)

## 2019-11-05 PROCEDURE — 74176 CT ABD & PELVIS W/O CONTRAST: CPT

## 2019-11-11 ENCOUNTER — APPOINTMENT (OUTPATIENT)
Dept: GENERAL RADIOLOGY | Age: 49
End: 2019-11-11
Payer: COMMERCIAL

## 2019-11-11 ENCOUNTER — HOSPITAL ENCOUNTER (EMERGENCY)
Age: 49
Discharge: HOME | End: 2019-11-11
Attending: EMERGENCY MEDICINE
Payer: COMMERCIAL

## 2019-11-11 VITALS
WEIGHT: 252 LBS | TEMPERATURE: 98 F | RESPIRATION RATE: 18 BRPM | DIASTOLIC BLOOD PRESSURE: 91 MMHG | HEART RATE: 71 BPM | SYSTOLIC BLOOD PRESSURE: 149 MMHG | BODY MASS INDEX: 31.5 KG/M2 | OXYGEN SATURATION: 100 %

## 2019-11-11 DIAGNOSIS — L03.115 CELLULITIS OF RIGHT LOWER LIMB: Primary | ICD-10-CM

## 2019-11-11 DIAGNOSIS — L73.9 FOLLICULITIS: ICD-10-CM

## 2019-11-11 LAB
ALBUMIN SERPL-MCNC: 3.5 G/DL (ref 3.5–5.2)
ALP BLD-CCNC: 91 U/L (ref 40–130)
ALT SERPL-CCNC: 21 U/L (ref 5–41)
ANION GAP SERPL CALCULATED.3IONS-SCNC: 12 MMOL/L (ref 7–19)
APTT: 27.2 SEC (ref 26–36.2)
AST SERPL-CCNC: 11 U/L (ref 5–40)
BASOPHILS ABSOLUTE: 0.1 K/UL (ref 0–0.2)
BASOPHILS RELATIVE PERCENT: 0.8 % (ref 0–1)
BILIRUB SERPL-MCNC: 0.4 MG/DL (ref 0.2–1.2)
BUN BLDV-MCNC: 20 MG/DL (ref 6–20)
C-REACTIVE PROTEIN: 8.71 MG/DL (ref 0–0.5)
CALCIUM SERPL-MCNC: 9.6 MG/DL (ref 8.6–10)
CHLORIDE BLD-SCNC: 102 MMOL/L (ref 98–111)
CO2: 28 MMOL/L (ref 22–29)
CREAT SERPL-MCNC: 1.2 MG/DL (ref 0.5–1.2)
EOSINOPHILS ABSOLUTE: 0.4 K/UL (ref 0–0.6)
EOSINOPHILS RELATIVE PERCENT: 3.9 % (ref 0–5)
GFR NON-AFRICAN AMERICAN: >60
GLUCOSE BLD-MCNC: 136 MG/DL (ref 74–109)
HCT VFR BLD CALC: 47.3 % (ref 42–52)
HEMOGLOBIN: 15.4 G/DL (ref 14–18)
IMMATURE GRANULOCYTES #: 0 K/UL
INR BLD: 1.03 (ref 0.88–1.18)
LACTIC ACID: 0.8 MMOL/L (ref 0.5–1.9)
LYMPHOCYTES ABSOLUTE: 1.8 K/UL (ref 1.1–4.5)
LYMPHOCYTES RELATIVE PERCENT: 18.6 % (ref 20–40)
MCH RBC QN AUTO: 30.1 PG (ref 27–31)
MCHC RBC AUTO-ENTMCNC: 32.6 G/DL (ref 33–37)
MCV RBC AUTO: 92.4 FL (ref 80–94)
MONOCYTES ABSOLUTE: 1.4 K/UL (ref 0–0.9)
MONOCYTES RELATIVE PERCENT: 14 % (ref 0–10)
NEUTROPHILS ABSOLUTE: 6.1 K/UL (ref 1.5–7.5)
NEUTROPHILS RELATIVE PERCENT: 62.3 % (ref 50–65)
PDW BLD-RTO: 12.7 % (ref 11.5–14.5)
PLATELET # BLD: 264 K/UL (ref 130–400)
PMV BLD AUTO: 11.3 FL (ref 9.4–12.4)
POTASSIUM REFLEX MAGNESIUM: 4.3 MMOL/L (ref 3.5–5)
PROTHROMBIN TIME: 12.9 SEC (ref 12–14.6)
RBC # BLD: 5.12 M/UL (ref 4.7–6.1)
SEDIMENTATION RATE, ERYTHROCYTE: 82 MM/HR (ref 0–15)
SODIUM BLD-SCNC: 142 MMOL/L (ref 136–145)
TOTAL PROTEIN: 7.5 G/DL (ref 6.6–8.7)
URIC ACID, SERUM: 4.6 MG/DL (ref 3.4–7)
WBC # BLD: 9.7 K/UL (ref 4.8–10.8)

## 2019-11-11 PROCEDURE — 87040 BLOOD CULTURE FOR BACTERIA: CPT

## 2019-11-11 PROCEDURE — 85025 COMPLETE CBC W/AUTO DIFF WBC: CPT

## 2019-11-11 PROCEDURE — 99284 EMERGENCY DEPT VISIT MOD MDM: CPT

## 2019-11-11 PROCEDURE — 80053 COMPREHEN METABOLIC PANEL: CPT

## 2019-11-11 PROCEDURE — 93971 EXTREMITY STUDY: CPT

## 2019-11-11 PROCEDURE — 73560 X-RAY EXAM OF KNEE 1 OR 2: CPT

## 2019-11-11 PROCEDURE — 85610 PROTHROMBIN TIME: CPT

## 2019-11-11 PROCEDURE — 85730 THROMBOPLASTIN TIME PARTIAL: CPT

## 2019-11-11 PROCEDURE — 86140 C-REACTIVE PROTEIN: CPT

## 2019-11-11 PROCEDURE — 84550 ASSAY OF BLOOD/URIC ACID: CPT

## 2019-11-11 PROCEDURE — 36415 COLL VENOUS BLD VENIPUNCTURE: CPT

## 2019-11-11 PROCEDURE — 85652 RBC SED RATE AUTOMATED: CPT

## 2019-11-11 PROCEDURE — 6360000002 HC RX W HCPCS: Performed by: NURSE PRACTITIONER

## 2019-11-11 PROCEDURE — 83605 ASSAY OF LACTIC ACID: CPT

## 2019-11-11 PROCEDURE — 96365 THER/PROPH/DIAG IV INF INIT: CPT

## 2019-11-11 RX ORDER — CEPHALEXIN 500 MG/1
500 CAPSULE ORAL 4 TIMES DAILY
Qty: 40 CAPSULE | Refills: 0 | Status: SHIPPED | OUTPATIENT
Start: 2019-11-11 | End: 2019-11-21

## 2019-11-11 RX ADMIN — Medication 1000 MG: at 18:08

## 2019-11-16 LAB
BLOOD CULTURE, ROUTINE: NORMAL
CULTURE, BLOOD 2: NORMAL

## 2020-02-14 ENCOUNTER — OFFICE VISIT (OUTPATIENT)
Dept: FAMILY MEDICINE CLINIC | Age: 50
End: 2020-02-14
Payer: COMMERCIAL

## 2020-02-14 VITALS
SYSTOLIC BLOOD PRESSURE: 135 MMHG | HEIGHT: 75 IN | WEIGHT: 255 LBS | DIASTOLIC BLOOD PRESSURE: 90 MMHG | TEMPERATURE: 97.8 F | OXYGEN SATURATION: 96 % | RESPIRATION RATE: 20 BRPM | HEART RATE: 61 BPM | BODY MASS INDEX: 31.71 KG/M2

## 2020-02-14 PROCEDURE — 99204 OFFICE O/P NEW MOD 45 MIN: CPT | Performed by: FAMILY MEDICINE

## 2020-02-14 RX ORDER — DOXYCYCLINE HYCLATE 100 MG
100 TABLET ORAL 2 TIMES DAILY
Qty: 60 TABLET | Refills: 0 | Status: SHIPPED | OUTPATIENT
Start: 2020-02-14 | End: 2020-03-09 | Stop reason: ALTCHOICE

## 2020-02-14 RX ORDER — BACLOFEN 10 MG/1
10 TABLET ORAL 2 TIMES DAILY
Qty: 60 TABLET | Refills: 1 | Status: SHIPPED | OUTPATIENT
Start: 2020-02-14 | End: 2020-03-12

## 2020-02-14 ASSESSMENT — PATIENT HEALTH QUESTIONNAIRE - PHQ9
SUM OF ALL RESPONSES TO PHQ QUESTIONS 1-9: 0
1. LITTLE INTEREST OR PLEASURE IN DOING THINGS: 0
2. FEELING DOWN, DEPRESSED OR HOPELESS: 0
SUM OF ALL RESPONSES TO PHQ9 QUESTIONS 1 & 2: 0
SUM OF ALL RESPONSES TO PHQ QUESTIONS 1-9: 0

## 2020-02-14 ASSESSMENT — ENCOUNTER SYMPTOMS
BLOOD IN STOOL: 0
NAUSEA: 0
EYES NEGATIVE: 1
VOMITING: 0
SHORTNESS OF BREATH: 0
DIARRHEA: 0
WHEEZING: 0
COUGH: 0
CONSTIPATION: 0
CHEST TIGHTNESS: 0

## 2020-02-14 NOTE — PROGRESS NOTES
Subjective:      Patient ID: Martina García is a 44 y.o. male. HPI new patient who is being sent here by [facility] joy [facility]. He is referred here for ongoing medical follow-up. The patient previously was seen by Dr. Brenda Gates. He is a known diabetic with other multiple health issues as well. He had problems with  right knee in  2019 which evolved to a very serious situation as noted below. At that time, the wound in question involved  the right knee. He, In fact tells me today that he almost lost his right leg as a result of that wound. Today he does have multiple wounds on his legs and lower extremities. These appear to be where hair follicles have become inflamed,  such as in a case of folliculitis. He does have a history of diabetes mellitus. The possibility of MRSA certainly exists. There is no wound that is draining at this point in time so we are unable to do culture at this point. Spite these not draining, I am going to put him on doxycycline 100 twice daily and Bactrim DS twice daily each for 30 days, empirically. The patient did have a papular lesion on his right knee sometime late last year. He was seeing his doctor at that time , Dr. Brenda Gates. He also was having some pain in his right flank. Dr. Sridhar Soto, I am told, did pursue work-up on the right flank pain but not the right leg. [employer] tells me today that he did not even examine his right leg at that time. .  Shortly thereafter the right lower extremity began to swell rather significantly. 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 The patient was quite sick. All this in the face of his known diabetes mellitus as well. He does have a history of coronary artery disease. He has in fact had history of three-vessel coronary artery bypass graft done in 2012 by Dr. Franny Gil.   He also had stent placement made x1 in 2014 and once again had another General wound.        Multiple wounds on his legs and lower extremities. These appear to be where hair follicles have become inflamed. He does have a history of diabetes mellitus. The possibility of MRSA certainly exists. There is no wound that is draining at this point in time so we are unable to do culture at this point. The patient did have a papular lesion on his right knee sometime late last year. He was seeing his doctor at that time is Dr. Vickie Waters. He also was having some pain in his right flank. Dr. Cristino Ko I am told did pursue work-up on the right flank pain. Shortly thereafter the right lower extremity began to swell rather significantly. 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 The patient was quite sick. At this in the face of his known diabetes mellitus as well. Neurological: Negative. Psychiatric/Behavioral: Negative. Objective:   Physical Exam  Vitals signs and nursing note reviewed. Constitutional:       General: He is not in acute distress. Appearance: Normal appearance. He is well-developed. He is not ill-appearing, toxic-appearing or diaphoretic. HENT:      Head: Normocephalic and atraumatic. Right Ear: Tympanic membrane, ear canal and external ear normal. There is no impacted cerumen. Left Ear: Tympanic membrane, ear canal and external ear normal. There is no impacted cerumen. Nose: Nose normal.      Mouth/Throat:      Lips: Pink. Mouth: Mucous membranes are moist.      Dentition: Normal dentition. Tongue: No lesions. Pharynx: Oropharynx is clear. Uvula midline. Tonsils: No tonsillar exudate or tonsillar abscesses. Eyes:      General: Lids are normal.         Right eye: No discharge. Left eye: No discharge. Extraocular Movements: Extraocular movements intact. Right eye: Normal extraocular motion. Left eye: Normal extraocular motion.

## 2020-02-17 DIAGNOSIS — E11.621 TYPE 2 DIABETES MELLITUS WITH FOOT ULCER, WITH LONG-TERM CURRENT USE OF INSULIN (HCC): Chronic | ICD-10-CM

## 2020-02-17 DIAGNOSIS — L98.9 SKIN LESIONS: ICD-10-CM

## 2020-02-17 DIAGNOSIS — I10 ESSENTIAL HYPERTENSION: Chronic | ICD-10-CM

## 2020-02-17 DIAGNOSIS — I25.10 CORONARY ARTERY DISEASE INVOLVING NATIVE CORONARY ARTERY OF NATIVE HEART WITHOUT ANGINA PECTORIS: Chronic | ICD-10-CM

## 2020-02-17 DIAGNOSIS — L97.509 TYPE 2 DIABETES MELLITUS WITH FOOT ULCER, WITH LONG-TERM CURRENT USE OF INSULIN (HCC): Chronic | ICD-10-CM

## 2020-02-17 DIAGNOSIS — E78.2 MIXED HYPERLIPIDEMIA: Chronic | ICD-10-CM

## 2020-02-17 DIAGNOSIS — Z79.4 TYPE 2 DIABETES MELLITUS WITH FOOT ULCER, WITH LONG-TERM CURRENT USE OF INSULIN (HCC): Chronic | ICD-10-CM

## 2020-02-17 LAB
ALBUMIN SERPL-MCNC: 3.7 G/DL (ref 3.5–5.2)
ALP BLD-CCNC: 109 U/L (ref 40–130)
ALT SERPL-CCNC: 17 U/L (ref 5–41)
ANION GAP SERPL CALCULATED.3IONS-SCNC: 10 MMOL/L (ref 7–19)
AST SERPL-CCNC: 10 U/L (ref 5–40)
BASOPHILS ABSOLUTE: 0.1 K/UL (ref 0–0.2)
BASOPHILS RELATIVE PERCENT: 1.1 % (ref 0–1)
BILIRUB SERPL-MCNC: 0.3 MG/DL (ref 0.2–1.2)
BILIRUBIN DIRECT: 0.1 MG/DL (ref 0–0.3)
BILIRUBIN URINE: NEGATIVE
BILIRUBIN, INDIRECT: 0.2 MG/DL (ref 0.1–1)
BLOOD, URINE: ABNORMAL
BUN BLDV-MCNC: 26 MG/DL (ref 6–20)
CALCIUM SERPL-MCNC: 10 MG/DL (ref 8.6–10)
CHLORIDE BLD-SCNC: 96 MMOL/L (ref 98–111)
CHOLESTEROL, TOTAL: 233 MG/DL (ref 160–199)
CLARITY: CLEAR
CO2: 30 MMOL/L (ref 22–29)
COLOR: YELLOW
CREAT SERPL-MCNC: 1.1 MG/DL (ref 0.5–1.2)
EOSINOPHILS ABSOLUTE: 0.5 K/UL (ref 0–0.6)
EOSINOPHILS RELATIVE PERCENT: 5.7 % (ref 0–5)
GFR NON-AFRICAN AMERICAN: >60
GLUCOSE BLD-MCNC: 352 MG/DL (ref 74–109)
GLUCOSE URINE: >=1000 MG/DL
HCT VFR BLD CALC: 49.5 % (ref 42–52)
HDLC SERPL-MCNC: 36 MG/DL (ref 55–121)
HEMOGLOBIN: 16.2 G/DL (ref 14–18)
IMMATURE GRANULOCYTES #: 0.1 K/UL
KETONES, URINE: NEGATIVE MG/DL
LDL CHOLESTEROL CALCULATED: 141 MG/DL
LEUKOCYTE ESTERASE, URINE: NEGATIVE
LYMPHOCYTES ABSOLUTE: 2.2 K/UL (ref 1.1–4.5)
LYMPHOCYTES RELATIVE PERCENT: 26.7 % (ref 20–40)
MCH RBC QN AUTO: 29.2 PG (ref 27–31)
MCHC RBC AUTO-ENTMCNC: 32.7 G/DL (ref 33–37)
MCV RBC AUTO: 89.2 FL (ref 80–94)
MONOCYTES ABSOLUTE: 0.9 K/UL (ref 0–0.9)
MONOCYTES RELATIVE PERCENT: 10.6 % (ref 0–10)
NEUTROPHILS ABSOLUTE: 4.5 K/UL (ref 1.5–7.5)
NEUTROPHILS RELATIVE PERCENT: 55.3 % (ref 50–65)
NITRITE, URINE: NEGATIVE
PDW BLD-RTO: 13.4 % (ref 11.5–14.5)
PH UA: 5.5 (ref 5–8)
PLATELET # BLD: 310 K/UL (ref 130–400)
PMV BLD AUTO: 11.1 FL (ref 9.4–12.4)
POTASSIUM SERPL-SCNC: 4.8 MMOL/L (ref 3.5–5)
PROTEIN UA: 300 MG/DL
RBC # BLD: 5.55 M/UL (ref 4.7–6.1)
SODIUM BLD-SCNC: 136 MMOL/L (ref 136–145)
SPECIFIC GRAVITY UA: 1.03 (ref 1–1.03)
T4 FREE: 0.98 NG/DL (ref 0.93–1.7)
TOTAL PROTEIN: 7.1 G/DL (ref 6.6–8.7)
TRIGL SERPL-MCNC: 280 MG/DL (ref 0–149)
TSH SERPL DL<=0.05 MIU/L-ACNC: 3.4 UIU/ML (ref 0.27–4.2)
UROBILINOGEN, URINE: 0.2 E.U./DL
VITAMIN B-12: 659 PG/ML (ref 211–946)
VITAMIN D 25-HYDROXY: 9.4 NG/ML
WBC # BLD: 8.2 K/UL (ref 4.8–10.8)

## 2020-02-18 ENCOUNTER — TELEPHONE (OUTPATIENT)
Dept: FAMILY MEDICINE CLINIC | Age: 50
End: 2020-02-18

## 2020-02-18 LAB
SEX HORMONE BINDING GLOBULIN: 44 NMOL/L (ref 11–80)
TESTOSTERONE FREE-NONMALE: 49.5 PG/ML (ref 47–244)
TESTOSTERONE TOTAL: 302 NG/DL (ref 220–1000)

## 2020-02-18 RX ORDER — TAMSULOSIN HYDROCHLORIDE 0.4 MG/1
0.4 CAPSULE ORAL DAILY
COMMUNITY
End: 2020-06-02

## 2020-02-18 NOTE — TELEPHONE ENCOUNTER
----- Message from Bubba Mcburney, MD sent at 2/17/2020  4:41 PM CST -----  D level is exceedingly low at 9.4. Patient should be put on ergocalciferol at 50,000 units taken 2 days out of the week. We should recheck level in about 3 months. Vitamin B12 level was normal.  Analysis shows the patient to be spilling lots of sugar. It was evident in the urine which could be residual from his flareup of post streptococcal glomerulonephritis that he had last year. Groins are both normal.  Hepatic function was normal.  Sugar is exceptionally elevated at 352. He needs to drink more water as his BUN is elevated just a bit at 26 but creatinine and GFR are okay for now. Cholesterol is up slightly at 233. Triglycerides up at 280. This likely may be related to his blood sugar being up. Estrone and PSA are pending as of this dictation. Patient should actually get an but for follow-up where we can address these issues. It would be okay to call in the vitamin D because that is absolute but the decision as to what we do on the other abnormals will be depending  upon some further discussion with the patient. He likely he is going to have to increase his basic lar or watch his diet extremely closely. Most likely he will have to increase Basaglar up to around 40 units for right now. We may actually be required to split the dose into morning and a nighttime dose. We will see when we discussed the situation with the patient.

## 2020-02-19 LAB
PROSTATE SPECIFIC ANTIGEN FREE: 0.3 NG/ML
PROSTATE SPECIFIC ANTIGEN PERCENT FREE: 30 %
PROSTATE SPECIFIC ANTIGEN: 1 NG/ML (ref 0–4)

## 2020-02-20 ENCOUNTER — OFFICE VISIT (OUTPATIENT)
Dept: FAMILY MEDICINE CLINIC | Age: 50
End: 2020-02-20
Payer: COMMERCIAL

## 2020-02-20 VITALS
SYSTOLIC BLOOD PRESSURE: 150 MMHG | WEIGHT: 272.4 LBS | RESPIRATION RATE: 16 BRPM | HEIGHT: 75 IN | HEART RATE: 67 BPM | TEMPERATURE: 98.2 F | OXYGEN SATURATION: 98 % | DIASTOLIC BLOOD PRESSURE: 66 MMHG | BODY MASS INDEX: 33.87 KG/M2

## 2020-02-20 PROCEDURE — 99214 OFFICE O/P EST MOD 30 MIN: CPT | Performed by: FAMILY MEDICINE

## 2020-02-20 RX ORDER — INSULIN GLARGINE 100 [IU]/ML
50 INJECTION, SOLUTION SUBCUTANEOUS 2 TIMES DAILY
Qty: 5 PEN | Refills: 6 | Status: SHIPPED | COMMUNITY
Start: 2020-02-20

## 2020-02-20 RX ORDER — CHOLESTYRAMINE 4 G/9G
1 POWDER, FOR SUSPENSION ORAL 2 TIMES DAILY
Qty: 180 PACKET | Refills: 3 | Status: SHIPPED | OUTPATIENT
Start: 2020-02-20 | End: 2020-07-23 | Stop reason: ALTCHOICE

## 2020-02-20 RX ORDER — TESTOSTERONE CYPIONATE 200 MG/ML
200 INJECTION INTRAMUSCULAR
Qty: 10 ML | Refills: 1 | Status: SHIPPED | OUTPATIENT
Start: 2020-02-20 | End: 2020-03-09 | Stop reason: SDUPTHER

## 2020-02-20 RX ORDER — BUSPIRONE HYDROCHLORIDE 15 MG/1
15 TABLET ORAL 2 TIMES DAILY
Qty: 180 TABLET | Refills: 3 | Status: SHIPPED | OUTPATIENT
Start: 2020-02-20 | End: 2020-07-23 | Stop reason: ALTCHOICE

## 2020-02-20 RX ORDER — ERGOCALCIFEROL 1.25 MG/1
50000 CAPSULE ORAL
Qty: 32 CAPSULE | Refills: 3 | Status: SHIPPED | OUTPATIENT
Start: 2020-02-20 | End: 2021-01-01 | Stop reason: SDUPTHER

## 2020-02-20 ASSESSMENT — PATIENT HEALTH QUESTIONNAIRE - PHQ9
1. LITTLE INTEREST OR PLEASURE IN DOING THINGS: 0
SUM OF ALL RESPONSES TO PHQ QUESTIONS 1-9: 0
SUM OF ALL RESPONSES TO PHQ9 QUESTIONS 1 & 2: 0
2. FEELING DOWN, DEPRESSED OR HOPELESS: 0
SUM OF ALL RESPONSES TO PHQ QUESTIONS 1-9: 0

## 2020-02-20 ASSESSMENT — ENCOUNTER SYMPTOMS
CONSTIPATION: 0
DIARRHEA: 0
CHEST TIGHTNESS: 0
SHORTNESS OF BREATH: 0
WHEEZING: 0
BLOOD IN STOOL: 0
COUGH: 0
EYES NEGATIVE: 1
NAUSEA: 0
VOMITING: 0

## 2020-02-20 NOTE — PROGRESS NOTES
Subjective:      Patient ID: Shaji Braun is a 44 y.o. male. HPI   new patient who is being sent here by [facility] joy [facility]. He is referred here for ongoing medical follow-up. The patient previously was seen by Dr. Crystal Ruiz. He is a known diabetic with other multiple health issues as well. He had problems with  right knee in  2019 which evolved to a very serious situation as noted below. At that time, the wound in question involved  the right knee. He, In fact tells me his most recent visit on 2/14/2020 that he almost lost his right leg as a result of that wound. He comes back in today after he had labs resulted on 2/17/2020 for review thereof. Notably, the patient is known diabetic. Sugar was up a bit at 352. He states that it is still running somewhat high but much proved relative to that. He states that his blood sugar this morning was noted to be 171.  140s. I did instruct him to increase his Basaglar insulin to 35 units from the current dose of 30 units subcu daily. We will remain on his present dose of Glucotrol 10 taken p.o. twice daily. Laboratory testing, it was noted that testosterone level was borderline low at 302. Free testosterone was borderline low as well at 49.5. Because of this I am going to put the gentleman on testosterone cypionate 200 mg/cc administered at 1 cc IM every 3 weeks. We will recheck levels on this in about 3 months time. Was noted on lab testing that his cholesterol was elevated at 233. Total triglyceride was elevated at 280. HDL is a bit low at 36 which likely is genetic. LDL was elevated as well at 141. Because of these abnormalities, I am going to add Questran 1 packet in juice twice daily. This should help some irritable bowel complaints that he had as well. Additionally, he did tell me today that he has noted since he quit smoking that he has been extremely edgy and he would like something for that.   I will use BuSpar 15 mg p.o.twice cervical adenopathy. Right cervical: No superficial cervical adenopathy. Left cervical: No superficial cervical adenopathy. Skin:     General: Skin is warm and dry. Nails: There is no clubbing. Neurological:      General: No focal deficit present. Mental Status: He is alert and oriented to person, place, and time. Mental status is at baseline. Cranial Nerves: No facial asymmetry. Motor: No weakness or tremor. Coordination: Coordination normal.      Gait: Gait normal.      Deep Tendon Reflexes: Reflexes are normal and symmetric. Psychiatric:         Attention and Perception: Attention normal.         Mood and Affect: Mood normal.         Speech: Speech normal.         Behavior: Behavior normal.         Thought Content: Thought content normal.         Cognition and Memory: Memory normal.         Judgment: Judgment normal.             BP (!) 150/66   Pulse 67   Temp 98.2 °F (36.8 °C) (Oral)   Resp 16   Ht 6' 3\" (1.905 m)   Wt 272 lb 6.4 oz (123.6 kg)   SpO2 98%   BMI 34.05 kg/m²     Assessment:         Diagnosis Orders   1. Irritable bowel syndrome, unspecified type  July Campos MD, Gastroenterology, [place]    busPIRone (BUSPAR) 15 MG tablet    cholestyramine (QUESTRAN) 4 g packet   2. Testosterone deficiency  testosterone cypionate (DEPOTESTOTERONE CYPIONATE) 200 MG/ML injection    Hemoglobin A1C    Testosterone Free and Total Male    Needles & Syringes MISC   3. Type 2 diabetes mellitus with foot ulcer, with long-term current use of insulin ([facility])  BASAGLAR KWIKPEN 100 UNIT/ML injection pen    CBC    Basic Metabolic Panel   4. Vitamin D deficiency  vitamin D (ERGOCALCIFEROL) 1.25 MG (67340 UT) CAPS capsule    Vitamin D 25 Hydroxy   5. Anxiety  busPIRone (BUSPAR) 15 MG tablet    Doubly impacted negatively when the patient stopped smoking. Going to give him a trial of BuSpar for this. 6. Mixed hyperlipidemia  cholestyramine (QUESTRAN) 4 g packet   7.  Coronary artery disease involving native coronary artery of native heart without angina pectoris             Plan:       I am having Mr. Lydia Smith maintain his :   Outpatient Medications Marked as Taking for the 2/20/20 encounter (Office Visit) with Richerd Nyhan, MD   Medication Sig Dispense Refill    testosterone cypionate (DEPOTESTOTERONE CYPIONATE) 200 MG/ML injection Inject 1 mL into the muscle every 21 days for 270 doses. 10 mL 1    BASAGLAR KWIKPEN 100 UNIT/ML injection pen Inject 35 Units into the skin daily 5 pen 6    vitamin D (ERGOCALCIFEROL) 1.25 MG (02018 UT) CAPS capsule Take 1 capsule by mouth twice a week 32 capsule 3    busPIRone (BUSPAR) 15 MG tablet Take 15 mg by mouth 2 times daily 180 tablet 3    Needles & Syringes MISC 1 each by Does not apply route every 21 days Needs 3 cc syringes with 22 G  1/2 inch needle to give Testosterone and needs 18 Gauge to draw up med. 10 each 5    cholestyramine (QUESTRAN) 4 g packet Take 1 packet by mouth 2 times daily 180 packet 3    tamsulosin (FLOMAX) 0.4 MG capsule Take 0.4 mg by mouth daily      doxycycline hyclate (VIBRA-TABS) 100 MG tablet Take 1 tablet by mouth 2 times daily 60 tablet 0    baclofen (LIORESAL) 10 MG tablet Take 1 tablet by mouth 2 times daily 60 tablet 1    mupirocin (BACTROBAN) 2 % ointment Apply topically 3 times daily.  15 g 0    clopidogrel (PLAVIX) 75 MG tablet Take 75 mg by mouth daily      Multiple Vitamins-Minerals (DIABETES HEALTH PO) Take 1 tablet by mouth daily      meloxicam (MOBIC) 15 MG tablet Take 15 mg by mouth daily      glipiZIDE (GLUCOTROL) 10 MG tablet Take 10 mg by mouth 2 times daily (before meals)      lisinopril (PRINIVIL;ZESTRIL) 10 MG tablet Take 1 tablet by mouth daily 90 tablet 3    ibuprofen (ADVIL;MOTRIN) 600 MG tablet Take 600 mg by mouth every 6 hours as needed for Pain       Medications Discontinued During This Encounter   Medication Reason    BASAGLAR KWIKPEN 100 UNIT/ML injection pen DOSE (BUSPAR) 15 MG tablet     Sig: Take 15 mg by mouth 2 times daily     Dispense:  180 tablet     Refill:  3    Needles & Syringes MISC     Si each by Does not apply route every 21 days Needs 3 cc syringes with 22 G  1/2 inch needle to give Testosterone and needs 18 Gauge to draw up med.      Dispense:  10 each     Refill:  5    cholestyramine (QUESTRAN) 4 g packet     Sig: Take 1 packet by mouth 2 times daily     Dispense:  180 packet     Refill:  3             Brigid Escalante MD

## 2020-02-24 ENCOUNTER — OFFICE VISIT (OUTPATIENT)
Dept: GASTROENTEROLOGY | Age: 50
End: 2020-02-24
Payer: COMMERCIAL

## 2020-02-24 ENCOUNTER — HOSPITAL ENCOUNTER (OUTPATIENT)
Dept: GENERAL RADIOLOGY | Age: 50
Discharge: HOME | End: 2020-02-24
Payer: COMMERCIAL

## 2020-02-24 VITALS
HEIGHT: 75 IN | BODY MASS INDEX: 33.64 KG/M2 | WEIGHT: 270.6 LBS | HEART RATE: 59 BPM | SYSTOLIC BLOOD PRESSURE: 158 MMHG | DIASTOLIC BLOOD PRESSURE: 90 MMHG | OXYGEN SATURATION: 98 %

## 2020-02-24 PROCEDURE — 99205 OFFICE O/P NEW HI 60 MIN: CPT | Performed by: NURSE PRACTITIONER

## 2020-02-24 PROCEDURE — 71046 X-RAY EXAM CHEST 2 VIEWS: CPT

## 2020-02-24 ASSESSMENT — ENCOUNTER SYMPTOMS
BLOOD IN STOOL: 0
TROUBLE SWALLOWING: 0
ABDOMINAL PAIN: 0
CHOKING: 0
ANAL BLEEDING: 0
CONSTIPATION: 0
RECTAL PAIN: 0
ABDOMINAL DISTENTION: 0
SHORTNESS OF BREATH: 1
COUGH: 0
NAUSEA: 0
DIARRHEA: 0
VOMITING: 0

## 2020-02-24 NOTE — PROGRESS NOTES
Subjective:     Patient ID: Marlen De La Cruz is a 44 y.o. male  PCP: Frances Triana MD  Referring Provider: Jeffrey Bhardwaj MD    HPI  Patient presents to the office today with the following complaints: New Patient and Irritable Bowel Syndrome     No previous history of colonoscopy  He reports increased gas with all foods. Post prandial BM, going more often than in the past.  He reports 2-5 BM soft stools a day. This change first started December 2019. Denies any abdominal pain and blood in stool. Denies any nausea and vomiting. He reports activity change recently due to hernia surgery. Denies any family history of colon cancer or colon polyps    Hx CAD with CABG and stents last June 2019  He denies any chest pain or pressure   He reports increased shortness of breath with exertion since he stopped smoking on January 15th. He reports this is not like his heart symptoms. Pt has increased risk factors for sedation as listed above. This was my first time assessing Mr. Gerber    Assessment:     1. Change in bowel habits    2. Flatulence    3. Long term current use of anticoagulant    4. Shortness of breath    5. Coronary artery disease involving native coronary artery of native heart without angina pectoris            Plan:   - Chest xray for c/o shortness of breath  - Schedule colonoscopy  - Obtain clearance to hold Plavix  Instruct on bowel prep. Nothing to eat or drink after midnight the day of the exam.  Unable to drive for 24 hours after the procedure. No aspirin or nonsteroidal anti-inflammatories for 5 days before procedure. I have discussed the benefits, alternatives, and risks (including bleeding, perforation and death)  for pursuing Endoscopy (EGD/Colonscopy/EUS/ERCP) with the patient and they are willing to continue.  We also discussed the need for anesthesia, IV access, proper dietary changes, medication changes if necessary, and need for bowel prep (if ordered) prior to their Socioeconomic History    Marital status:      Spouse name: None    Number of children: None    Years of education: None    Highest education level: None   Occupational History    None   Social Needs    Financial resource strain: None    Food insecurity:     Worry: None     Inability: None    Transportation needs:     Medical: None     Non-medical: None   Tobacco Use    Smoking status: Former Smoker     Packs/day: 0.50     Years: 30.00     Pack years: 15.00     Types: Cigarettes     Last attempt to quit: 2019     Years since quittin.4    Smokeless tobacco: Never Used   Substance and Sexual Activity    Alcohol use: No     Comment: Rarely    Drug use: No    Sexual activity: Yes     Partners: Female   Lifestyle    Physical activity:     Days per week: None     Minutes per session: None    Stress: None   Relationships    Social connections:     Talks on phone: None     Gets together: None     Attends Hoahaoism service: None     Active member of club or organization: None     Attends meetings of clubs or organizations: None     Relationship status: None    Intimate partner violence:     Fear of current or ex partner: None     Emotionally abused: None     Physically abused: None     Forced sexual activity: None   Other Topics Concern    None   Social History Narrative    ** Merged History Encounter **            Current Outpatient Medications   Medication Sig Dispense Refill    Needles & Syringes MISC Needs 3 cc syringes with 22 G  1 1/2 inch needle to give Testosterone and needs 18 Gauge to draw up med. 10 each 5    testosterone cypionate (DEPOTESTOTERONE CYPIONATE) 200 MG/ML injection Inject 1 mL into the muscle every 21 days for 270 doses.  10 mL 1    BASAGLAR KWIKPEN 100 UNIT/ML injection pen Inject 35 Units into the skin daily 5 pen 6    vitamin D (ERGOCALCIFEROL) 1.25 MG (59831 UT) CAPS capsule Take 1 capsule by mouth twice a week 32 capsule 3    busPIRone (BUSPAR) 15 MG tablet Take 15 mg by mouth 2 times daily 180 tablet 3    cholestyramine (QUESTRAN) 4 g packet Take 1 packet by mouth 2 times daily 180 packet 3    tamsulosin (FLOMAX) 0.4 MG capsule Take 0.4 mg by mouth daily      doxycycline hyclate (VIBRA-TABS) 100 MG tablet Take 1 tablet by mouth 2 times daily 60 tablet 0    baclofen (LIORESAL) 10 MG tablet Take 1 tablet by mouth 2 times daily 60 tablet 1    mupirocin (BACTROBAN) 2 % ointment Apply topically 3 times daily. 15 g 0    clopidogrel (PLAVIX) 75 MG tablet Take 75 mg by mouth daily      Multiple Vitamins-Minerals (DIABETES HEALTH PO) Take 1 tablet by mouth daily      meloxicam (MOBIC) 15 MG tablet Take 15 mg by mouth daily      glipiZIDE (GLUCOTROL) 10 MG tablet Take 10 mg by mouth 2 times daily (before meals)      lisinopril (PRINIVIL;ZESTRIL) 10 MG tablet Take 1 tablet by mouth daily 90 tablet 3    ibuprofen (ADVIL;MOTRIN) 600 MG tablet Take 600 mg by mouth every 6 hours as needed for Pain       No current facility-administered medications for this visit. No Known Allergies    Review of Systems   Constitutional: Positive for fatigue. Negative for activity change, appetite change, fever and unexpected weight change. HENT: Negative for trouble swallowing. Respiratory: Positive for shortness of breath. Negative for cough and choking. Cardiovascular: Negative for chest pain. Gastrointestinal: Negative for abdominal distention, abdominal pain, anal bleeding, blood in stool, constipation, diarrhea, nausea, rectal pain and vomiting. Change in bowel habits  Flatulence     Skin: Positive for rash. Allergic/Immunologic: Negative for food allergies. All other systems reviewed and are negative. Objective:     BP (!) 158/90   Pulse 59   Ht 6' 3\" (1.905 m)   Wt 270 lb 9.6 oz (122.7 kg)   SpO2 98%   BMI 33.82 kg/m²     Physical Exam  Vitals signs reviewed. Constitutional:       General: He is not in acute distress.      Appearance: He is well-developed. HENT:      Head: Normocephalic and atraumatic. Right Ear: External ear normal.      Left Ear: External ear normal.      Nose: Nose normal.   Eyes:      Conjunctiva/sclera: Conjunctivae normal.      Pupils: Pupils are equal, round, and reactive to light. Neck:      Musculoskeletal: Normal range of motion and neck supple. Cardiovascular:      Rate and Rhythm: Normal rate and regular rhythm. Heart sounds: Normal heart sounds. No murmur. No friction rub. No gallop. Pulmonary:      Effort: Pulmonary effort is normal. No respiratory distress. Breath sounds: Normal breath sounds. Abdominal:      General: Bowel sounds are normal. There is no distension. Palpations: Abdomen is soft. There is no mass. Tenderness: There is no abdominal tenderness. There is no guarding or rebound. Musculoskeletal: Normal range of motion. Skin:     General: Skin is warm and dry. Findings: Rash present. Nails: There is no clubbing. Neurological:      Mental Status: He is alert and oriented to person, place, and time. Gait: Gait normal.   Psychiatric:         Behavior: Behavior normal.         Thought Content:  Thought content normal.

## 2020-02-24 NOTE — RESULT ENCOUNTER NOTE
No pneumonia or acute findings. Follow up with PCP regarding shortness of breath and chest xray. I have routed this to his PCP as well and left a VM for them also.

## 2020-02-24 NOTE — TELEPHONE ENCOUNTER
Patient did not receive needles and syringes that were sent to the pharmacy. I have called pharmacy and they did not receive prescription. Prescription resent.

## 2020-02-24 NOTE — PATIENT INSTRUCTIONS
Schedule colonoscopy. No aspirin, ibuprofen, naproxen, fish oil or vitamin E for 5 days before procedure. Do not eat or drink after midnight the day of the procedure. Allowed medications can be taken with a small sip of water. Please review your prep instructions for allowed medications. You will not be able to drive for 24 hours after the procedure due to sedation. You must have a responsible adult, 25 year or older, to accompany you and drive you home the day of your procedure. If you are on blood thinners, clearance from the prescribing physician will be obtained before your procedure is scheduled. If it is determined it is not safe to hold these medications for a short time an alternative procedure for evaluation may be recommended. Risks of colonoscopy include, but are not limited to, perforation, bleeding, and infection, Risk of perforation and bleeding are increased if there is a polyp removed. Anesthesia risks will be reviewed with you before the procedure by a member of the anesthesia department. Your physician may also schedule a follow up appointment with the nurse practitioner to discuss pathology, symptoms or to check if you have had any problems related to your procedure. If you prefer not to return to the office after your procedure please discuss this with your physician on the day of your colonoscopy. The physician will talk with you and/or your family after the procedure is completed. Final recommendations are based on the pathologist report if biopsies or specimens are taken. If polyps are removed during the procedure they will be sent to a pathologist for analysis. Unless you have a follow up appointment scheduled, you will be notified by mail of the pathology results within 4 weeks. If you have not received results after 4 weeks you may call the office to obtain this information. For Colonoscopy:   You will be given specific directions regarding colonoscopy, continue to drink plenty of clear fluids. It is important   to keep yourself hydrated before the exam.     Please follow all instructions as provided for cleansing the bowel. Failure to have an adequately prepped colon may cause you to have incomplete exam with further testing required.      [email or web address]

## 2020-02-25 ENCOUNTER — TELEPHONE (OUTPATIENT)
Dept: CARDIOLOGY | Age: 50
End: 2020-02-25

## 2020-02-25 ENCOUNTER — TELEPHONE (OUTPATIENT)
Dept: FAMILY MEDICINE CLINIC | Age: 50
End: 2020-02-25

## 2020-02-25 RX ORDER — CIPROFLOXACIN 500 MG/1
500 TABLET, FILM COATED ORAL 2 TIMES DAILY
Qty: 20 TABLET | Refills: 0 | Status: SHIPPED | OUTPATIENT
Start: 2020-02-25 | End: 2020-03-06

## 2020-02-28 RX ORDER — ALBUTEROL SULFATE 90 UG/1
1 AEROSOL, METERED RESPIRATORY (INHALATION) EVERY 6 HOURS PRN
Qty: 1 INHALER | Refills: 0 | Status: SHIPPED | OUTPATIENT
Start: 2020-02-28 | End: 2020-04-13

## 2020-03-09 ENCOUNTER — OFFICE VISIT (OUTPATIENT)
Dept: FAMILY MEDICINE CLINIC | Age: 50
End: 2020-03-09
Payer: COMMERCIAL

## 2020-03-09 VITALS
TEMPERATURE: 98.5 F | BODY MASS INDEX: 35.34 KG/M2 | DIASTOLIC BLOOD PRESSURE: 70 MMHG | SYSTOLIC BLOOD PRESSURE: 130 MMHG | RESPIRATION RATE: 16 BRPM | HEART RATE: 85 BPM | WEIGHT: 284.2 LBS | HEIGHT: 75 IN | OXYGEN SATURATION: 94 %

## 2020-03-09 PROCEDURE — 99214 OFFICE O/P EST MOD 30 MIN: CPT | Performed by: FAMILY MEDICINE

## 2020-03-09 RX ORDER — TESTOSTERONE CYPIONATE 200 MG/ML
200 INJECTION INTRAMUSCULAR
Qty: 10 ML | Refills: 1 | Status: SHIPPED | OUTPATIENT
Start: 2020-03-09 | End: 2020-07-23 | Stop reason: DRUGHIGH

## 2020-03-09 RX ORDER — FUROSEMIDE 40 MG/1
40 TABLET ORAL DAILY
Qty: 30 TABLET | Refills: 3 | Status: SHIPPED | OUTPATIENT
Start: 2020-03-09 | End: 2020-07-08

## 2020-03-09 ASSESSMENT — ENCOUNTER SYMPTOMS
NAUSEA: 0
CONSTIPATION: 0
BLOOD IN STOOL: 0
CHEST TIGHTNESS: 0
WHEEZING: 0
COUGH: 0
EYES NEGATIVE: 1
DIARRHEA: 0
SHORTNESS OF BREATH: 0
VOMITING: 0

## 2020-03-09 NOTE — PROGRESS NOTES
BuSpar 15 mg p.o.twice daily for his irritable bowel but also for his anxiety issues as well. Hopefully this will afford him some relief from both issues. I do believe that the Guido Plasencia will undoubtedly help his cholesterol and lipids but also likely help the irritable bowel issues 2. It is notable that his vitamin D level was exceedingly low at 9.4. He is not currently on vitamin D supplementation. I therefore I am going to put him on ergocalciferol at 50,000 unit capsule taken 2 days out of a week. We will check these back in 3 months along with CBC, BMP, total and free testosterone, vitamin D level, and hemoglobin A1c. Was notable that his blood pressure was elevated again today at 150/76 and upon repeat it really did improve to 130/70. He presently is on lisinopril 10 mg p.o. daily and I am going to raise this to 10 mg p.o. twice daily. He does have the history of a heart attack and because of that is on Plavix at 75 mg p.o. daily. I am not going to change that regimen at this time. Since he tells me that his blood sugars are running as high as 300 often, I am going to increase his basic lar up to 40 units each evening instead of the current dose of 35 units each evening. I told him that if this did not significantly impact the sugar along with his increase in exercise, I felt as though we might have to add Basaglar to a morning dose as well as his nighttime regimen. He states that he has recently purchased some free weights and a bench and plans on beginning his vigorous workout that he did previously when he lost a bunch of weight. The patient knows to use light  weights with increase in repetition without bulk weight to avoid building mass in the form of muscle. He also is noted today to have +2 pitting pedal edema. This is noted bilaterally. Because of this and his blood pressure being slightly elevated initially, I am adding Lasix 40 mg p.o. daily to his current regimen.   He is also asked to increase his utilization of bananas and oranges to avoid hypokalemia and weakness or muscle spasms secondary to this. .     At his most recent visit on 2/14/2020 noted to have multiple wounds on his legs and lower extremities.  These appear to be where hair follicles have become inflamed,  such as in a case of folliculitis. Shantelle Smith does have a history of diabetes mellitus.  The possibility of MRSA certainly exists. Beto Handler is no wound that is draining at this point in time so we are unable to do culture at this point.  Spite these not draining, I am going to put him on doxycycline 100 twice daily and Bactrim DS twice daily each for 30 days, empirically. The patient did have a papular lesion on his right knee sometime late last year. Shantelle Smith was seeing his doctor at that time , Dr. Julianna Vann also was having some pain in his right flank.  Dr. Vince Mcdonald am told, did pursue work-up on the right flank pain but not the right leg.  Skyler tells me today that he did not even examine his right leg at that time. Max Loots  Shortly thereafter the right lower extremity began to swell rather significantly.  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  The patient was quite sick.  All this in the face of his known diabetes mellitus as well.     He does have a history of coronary artery disease. Shantelle Smith has in fact had history of three-vessel coronary artery bypass graft done in 2012 by Dr. Barroso Exon also had stent placement made x1 in 2014 and once again had another stent last year early 2019 by Dr Devendra Yost any chest pain or shortness of breath at this time. 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     He does have wood heat source at home so we are giving him a handout on vaporizer utilization as well as water treatment solution to soften the water.  Also because of his multiple skin lesions I supple. No neck rigidity or muscular tenderness. Thyroid: No thyromegaly. Vascular: No carotid bruit or JVD. Cardiovascular:      Rate and Rhythm: Normal rate and regular rhythm. Pulses:           Carotid pulses are 2+ on the right side and 2+ on the left side. Radial pulses are 2+ on the right side and 2+ on the left side. Heart sounds: S1 normal and S2 normal. No murmur. No friction rub. No gallop. Pulmonary:      Effort: Pulmonary effort is normal. No accessory muscle usage or respiratory distress. Breath sounds: Normal breath sounds. No stridor. No wheezing, rhonchi or rales. Chest:      Chest wall: No tenderness. Abdominal:      General: Bowel sounds are normal. There is no distension or abdominal bruit. Palpations: Abdomen is soft. There is no mass. Tenderness: There is no abdominal tenderness. There is no right CVA tenderness, left CVA tenderness, guarding or rebound. Hernia: No hernia is present. Musculoskeletal: Normal range of motion. General: No swelling, tenderness, deformity or signs of injury. Right lower leg: Edema present. Left lower leg: Edema present. Comments: 2 pitting pedal edema bilateral lower extremities. Lymphadenopathy:      Cervical: No cervical adenopathy. Right cervical: No superficial cervical adenopathy. Left cervical: No superficial cervical adenopathy. Skin:     General: Skin is warm and dry. Nails: There is no clubbing. Neurological:      General: No focal deficit present. Mental Status: He is alert and oriented to person, place, and time. Mental status is at baseline. Cranial Nerves: No facial asymmetry. Motor: No weakness or tremor. Coordination: Coordination normal.      Gait: Gait normal.      Deep Tendon Reflexes: Reflexes are normal and symmetric.    Psychiatric:         Attention and Perception: Attention normal.         Mood and Affect: Mood normal. 3    Needles & Syringes MISC     Si each by Does not apply route every 21 days Needs 3 cc syringes with 22 G  1/2 inch needle to give Testosterone and needs 18 Gauge to draw up med.      Dispense:  10 each     Refill:  5             Joey Pires MD

## 2020-03-12 RX ORDER — BACLOFEN 10 MG/1
TABLET ORAL
Qty: 60 TABLET | Refills: 1 | Status: SHIPPED | OUTPATIENT
Start: 2020-03-12 | End: 2020-04-07

## 2020-03-12 RX ORDER — DOXYCYCLINE HYCLATE 100 MG
TABLET ORAL
Qty: 60 TABLET | Refills: 0 | Status: SHIPPED | OUTPATIENT
Start: 2020-03-12 | End: 2020-04-22

## 2020-04-07 RX ORDER — BACLOFEN 10 MG/1
TABLET ORAL
Qty: 60 TABLET | Refills: 1 | Status: SHIPPED | OUTPATIENT
Start: 2020-04-07 | End: 2020-04-29

## 2020-04-13 RX ORDER — ALBUTEROL SULFATE 90 UG/1
1 AEROSOL, METERED RESPIRATORY (INHALATION) EVERY 6 HOURS PRN
Qty: 6.7 INHALER | Refills: 0 | Status: SHIPPED | OUTPATIENT
Start: 2020-04-13 | End: 2020-05-11

## 2020-04-22 RX ORDER — DOXYCYCLINE HYCLATE 100 MG
TABLET ORAL
Qty: 60 TABLET | Refills: 0 | Status: SHIPPED | OUTPATIENT
Start: 2020-04-22 | End: 2020-05-20

## 2020-04-29 RX ORDER — BACLOFEN 10 MG/1
TABLET ORAL
Qty: 60 TABLET | Refills: 1 | Status: SHIPPED | OUTPATIENT
Start: 2020-04-29 | End: 2020-05-26

## 2020-05-08 ENCOUNTER — TELEPHONE (OUTPATIENT)
Dept: CARDIOLOGY | Age: 50
End: 2020-05-08

## 2020-05-11 RX ORDER — ALBUTEROL SULFATE 90 UG/1
1 AEROSOL, METERED RESPIRATORY (INHALATION) EVERY 6 HOURS PRN
Qty: 6.7 INHALER | Refills: 0 | Status: SHIPPED | OUTPATIENT
Start: 2020-05-11 | End: 2020-06-24

## 2020-05-20 RX ORDER — DOXYCYCLINE HYCLATE 100 MG
TABLET ORAL
Qty: 60 TABLET | Refills: 0 | Status: SHIPPED | OUTPATIENT
Start: 2020-05-20 | End: 2020-06-12

## 2020-05-26 RX ORDER — BACLOFEN 10 MG/1
TABLET ORAL
Qty: 60 TABLET | Refills: 1 | Status: SHIPPED | OUTPATIENT
Start: 2020-05-26 | End: 2020-06-23

## 2020-06-01 ENCOUNTER — TELEPHONE (OUTPATIENT)
Dept: FAMILY MEDICINE CLINIC | Age: 50
End: 2020-06-01

## 2020-06-01 NOTE — TELEPHONE ENCOUNTER
Patient called wanted results of chest xray. Stated was done at [facility].  Patient instructed would call for result.

## 2020-06-02 ENCOUNTER — TELEPHONE (OUTPATIENT)
Dept: FAMILY MEDICINE CLINIC | Age: 50
End: 2020-06-02

## 2020-06-02 ENCOUNTER — OFFICE VISIT (OUTPATIENT)
Dept: CARDIOLOGY | Age: 50
End: 2020-06-02
Payer: COMMERCIAL

## 2020-06-02 VITALS
BODY MASS INDEX: 33.45 KG/M2 | WEIGHT: 269 LBS | SYSTOLIC BLOOD PRESSURE: 144 MMHG | DIASTOLIC BLOOD PRESSURE: 92 MMHG | HEART RATE: 80 BPM | HEIGHT: 75 IN

## 2020-06-02 PROCEDURE — 99213 OFFICE O/P EST LOW 20 MIN: CPT | Performed by: NURSE PRACTITIONER

## 2020-06-02 RX ORDER — LISINOPRIL 20 MG/1
20 TABLET ORAL 2 TIMES DAILY
Qty: 60 TABLET | Refills: 3 | Status: SHIPPED | OUTPATIENT
Start: 2020-06-02 | End: 2020-06-03 | Stop reason: SDUPTHER

## 2020-06-02 RX ORDER — METOPROLOL SUCCINATE 25 MG/1
25 TABLET, EXTENDED RELEASE ORAL DAILY
COMMUNITY
End: 2020-07-23 | Stop reason: DRUGHIGH

## 2020-06-02 RX ORDER — CIPROFLOXACIN 500 MG/1
500 TABLET, FILM COATED ORAL 2 TIMES DAILY
Qty: 20 TABLET | Refills: 0 | Status: SHIPPED | OUTPATIENT
Start: 2020-06-02 | End: 2020-06-12

## 2020-06-02 NOTE — PATIENT INSTRUCTIONS
scoring a type of x-ray called a CAT scan that uses a computer to look for the presence of calcium in the heart arteries   Coronary angiography x-rays taken after a dye is injected into the arteries to allows the doctor to look for abnormalities in the arteries   Treatment   Treatment may include:   Nitroglycerin   This medicine is usually given during an attack of angina. It can be given as a tablet that dissolves under the tongue or as a spray. Longer-lasting types can be used to prevent angina before an activity known to cause it. These may be given as pills or applied as patches or ointments. Blood-Thinning Medications   A small, daily dose of aspirin has been shown to decrease the risk of heart attack. Ask your doctor before taking aspirin daily. Warfarin (Coumadin)   Ticlopidine (Ticlid)   Clopidogrel (Plavix)   Beta-Blockers, Calcium-Channel Blockers, and ACE-Inhibitors   These may help prevent angina. In some cases, they may lower the risk of heart attack. Medications to Lower Cholesterol   Medicines, like statins, are often prescribed to people who have CAD. Statins (eg, atorvastatin [Lipitor]) lower cholesterol levels, which can help to prevent CAD events. Revascularization   Patients with severe blockages in their coronary arteries may benefit from procedures to immediately improve blood flow to the heart muscle:   Percutaneous coronary interventions (PCI)such as balloon angioplasty , in some cases, a wire mesh stent is placed to hold the artery open   Coronary artery bypass grafting (CABG) segments of vessels are taken from other areas of the body and are sewn into the heart arteries to reroute blood flow around blockages   Some studies have shown that CABG may be more effective than PCI. Lifestyle changes and intensive medicine may also be just as effective as PCI.    Options for Refractory Angina   For patients who are not candidates for revascularization procedures but have continued angina despite medicine, options include:   Enhanced external counterpulsation (EECP) large air bags are inflated around the legs in tune with the heart beat. The patient receives 5 one-hour treatments per week for seven weeks. This has been shown to reduce angina and may improve symptom-free exercise duration. Transmyocardial revascularization (TMR) surgical procedure done with laser to reduce chest pain. Researchers are also studying gene therapy as a possible treatment. Prevention   To reduce your risk of getting coronary artery disease:   Maintain a healthy weight. Eat a heart healthy diet that is low in saturated fat , red meat and processed meats, and rich in whole grain , fruits, and vegetables . Begin a safe exercise program with the advice of your doctor. If you smoke, quit . Treat your high blood pressure and/or diabetes. Treat high cholesterol or triglycerides. Ask your doctor about taking a low-dose aspirin every day. In certain patients, taking rosuvastatin (Crestor) may be another option. Talk to your doctor. Hypertension  (High Blood Pressure)  Most people with high blood pressure (hypertension) have no symptoms. High blood pressure can be a dangerous problem. Hypertension is dangerous because you may have it and not know it. High blood pressure may mean that your heart needs to work harder to pump blood. Your blood pressure is measured with 2 numbers. The first number is when your heart flexes (contracts), and the second number is when your heart relaxes. The higher the numbers are, the more you are at risk for problems. Write down your blood pressure today. The best blood pressure for adults is 120/80 (mmHg) or lower. It is likely that your blood pressure was recorded at least 2 times today. It is important for you to give these numbers to your doctor. If you do not have a doctor, try to get follow-up care at a hospital or community clinic.  You may need to start high blood Causes may include:   A family history of hyperlipidemia   A diet high in total fat, saturated fat, or cholesterol   Obesity   Excess alcohol intake   Certain conditions, including:   Diabetes   Low thyroid   Kidney problems   Liver disease   Cushing's syndrome   Certain drugs, such as:   Hormones or birth control pills   Beta-blockers   Some diuretics   Cortisone drugs   Isotretinoin (for acne )   Some anti- HIV drugs   Risk Factors   These factors increase your chance of developing this condition. Tell your doctor if you have any of these:   Advancing age   Sex: male   Postmenopause   Lack of exercise   Smoking   Stress   Overuse of alcohol   Symptoms   Hyperlipidemia usually does not cause symptoms. Very high levels of lipids or triglycerides can cause:   Fat deposits in the skin or tendons ( xanthomas )   Pain, enlargement, or swelling of organs such as the liver, spleen, or pancreas ( pancreatitis )   Obstruction of blood vessels in heart and brain   Hyperlipidemia can increase your risk of atherosclerosis . This is a dangerous hardening of the arteries. It can end up blocking blood flow. In some cases, this may result in:   Angina   Heart attack   Stroke   Other serious complications   Blood Vessel with Atherosclerosis       [address] [place] [address].   Diagnosis   This condition is diagnosed with blood tests. These tests measure the levels of lipids in the blood. The National Cholesterol Education Program advises that you have your lipids checked at least once every five years, starting at age 21. Also, the American Academy of Pediatrics recommends lipid screening for children at risk (eg, a family history of hyperlipidemia).    Testing may consist of a fasting blood test for:   Total cholesterol   LDL (bad cholesterol)   HDL (good cholesterol)   Triglycerides   Your doctor may recommend more frequent or earlier testing if you have:   Family history of hyperlipidemia   Risk factor or disease that may

## 2020-06-03 RX ORDER — LISINOPRIL 20 MG/1
20 TABLET ORAL 2 TIMES DAILY
Qty: 60 TABLET | Refills: 3 | Status: SHIPPED | OUTPATIENT
Start: 2020-06-03

## 2020-06-12 RX ORDER — DOXYCYCLINE HYCLATE 100 MG
TABLET ORAL
Qty: 60 TABLET | Refills: 0 | Status: SHIPPED | OUTPATIENT
Start: 2020-06-12 | End: 2020-07-09

## 2020-06-15 ENCOUNTER — TELEPHONE (OUTPATIENT)
Dept: FAMILY MEDICINE CLINIC | Age: 50
End: 2020-06-15

## 2020-06-15 RX ORDER — CIPROFLOXACIN 500 MG/1
500 TABLET, FILM COATED ORAL 2 TIMES DAILY
Qty: 20 TABLET | Refills: 0 | Status: SHIPPED | OUTPATIENT
Start: 2020-06-15 | End: 2020-06-25

## 2020-06-15 RX ORDER — CLOPIDOGREL BISULFATE 75 MG/1
TABLET ORAL
Qty: 90 TABLET | Refills: 3 | OUTPATIENT
Start: 2020-06-15

## 2020-06-23 RX ORDER — BACLOFEN 10 MG/1
TABLET ORAL
Qty: 60 TABLET | Refills: 1 | Status: SHIPPED | OUTPATIENT
Start: 2020-06-23 | End: 2020-07-16

## 2020-06-24 RX ORDER — ROSUVASTATIN CALCIUM 10 MG/1
10 TABLET, COATED ORAL DAILY
Qty: 90 TABLET | Refills: 1 | Status: SHIPPED | OUTPATIENT
Start: 2020-06-24 | End: 2020-12-17

## 2020-06-24 RX ORDER — ALBUTEROL SULFATE 90 UG/1
1 AEROSOL, METERED RESPIRATORY (INHALATION) EVERY 6 HOURS PRN
Qty: 6.7 INHALER | Refills: 0 | Status: SHIPPED | OUTPATIENT
Start: 2020-06-24 | End: 2020-08-11

## 2020-06-28 ENCOUNTER — OFFICE VISIT (OUTPATIENT)
Age: 50
End: 2020-06-28

## 2020-06-29 LAB — SARS-COV-2, PCR: NOT DETECTED

## 2020-06-30 ENCOUNTER — TELEPHONE (OUTPATIENT)
Age: 50
End: 2020-06-30

## 2020-07-01 ENCOUNTER — HOSPITAL ENCOUNTER (OUTPATIENT)
Age: 50
Setting detail: OUTPATIENT SURGERY
Discharge: HOME | End: 2020-07-01
Attending: INTERNAL MEDICINE | Admitting: INTERNAL MEDICINE
Payer: COMMERCIAL

## 2020-07-01 ENCOUNTER — ANESTHESIA (OUTPATIENT)
Dept: ENDOSCOPY | Age: 50
End: 2020-07-01
Payer: COMMERCIAL

## 2020-07-01 ENCOUNTER — ANESTHESIA EVENT (OUTPATIENT)
Dept: ENDOSCOPY | Age: 50
End: 2020-07-01
Payer: COMMERCIAL

## 2020-07-01 VITALS
RESPIRATION RATE: 18 BRPM | HEIGHT: 75 IN | BODY MASS INDEX: 32.33 KG/M2 | SYSTOLIC BLOOD PRESSURE: 136 MMHG | TEMPERATURE: 98 F | HEART RATE: 67 BPM | DIASTOLIC BLOOD PRESSURE: 75 MMHG | WEIGHT: 260 LBS | OXYGEN SATURATION: 93 %

## 2020-07-01 VITALS
DIASTOLIC BLOOD PRESSURE: 60 MMHG | OXYGEN SATURATION: 91 % | RESPIRATION RATE: 24 BRPM | SYSTOLIC BLOOD PRESSURE: 102 MMHG

## 2020-07-01 LAB
GLUCOSE BLD-MCNC: 165 MG/DL (ref 70–99)
PERFORMED ON: ABNORMAL

## 2020-07-01 PROCEDURE — 2580000003 HC RX 258: Performed by: INTERNAL MEDICINE

## 2020-07-01 PROCEDURE — 45380 COLONOSCOPY AND BIOPSY: CPT | Performed by: INTERNAL MEDICINE

## 2020-07-01 PROCEDURE — 3609027000 HC COLONOSCOPY: Performed by: INTERNAL MEDICINE

## 2020-07-01 PROCEDURE — 6360000002 HC RX W HCPCS: Performed by: NURSE ANESTHETIST, CERTIFIED REGISTERED

## 2020-07-01 PROCEDURE — 82947 ASSAY GLUCOSE BLOOD QUANT: CPT

## 2020-07-01 PROCEDURE — 3609009300 HC COLONOSCOPY BIOPSY/STOMA: Performed by: INTERNAL MEDICINE

## 2020-07-01 PROCEDURE — 3700000001 HC ADD 15 MINUTES (ANESTHESIA): Performed by: INTERNAL MEDICINE

## 2020-07-01 PROCEDURE — 3700000000 HC ANESTHESIA ATTENDED CARE: Performed by: INTERNAL MEDICINE

## 2020-07-01 PROCEDURE — 7100000010 HC PHASE II RECOVERY - FIRST 15 MIN: Performed by: INTERNAL MEDICINE

## 2020-07-01 PROCEDURE — 7100000011 HC PHASE II RECOVERY - ADDTL 15 MIN: Performed by: INTERNAL MEDICINE

## 2020-07-01 PROCEDURE — 45385 COLONOSCOPY W/LESION REMOVAL: CPT | Performed by: INTERNAL MEDICINE

## 2020-07-01 PROCEDURE — 2500000003 HC RX 250 WO HCPCS: Performed by: NURSE ANESTHETIST, CERTIFIED REGISTERED

## 2020-07-01 PROCEDURE — 88305 TISSUE EXAM BY PATHOLOGIST: CPT

## 2020-07-01 PROCEDURE — 2720000010 HC SURG SUPPLY STERILE: Performed by: INTERNAL MEDICINE

## 2020-07-01 PROCEDURE — 2709999900 HC NON-CHARGEABLE SUPPLY: Performed by: INTERNAL MEDICINE

## 2020-07-01 RX ORDER — PROPOFOL 10 MG/ML
INJECTION, EMULSION INTRAVENOUS PRN
Status: DISCONTINUED | OUTPATIENT
Start: 2020-07-01 | End: 2020-07-01 | Stop reason: SDUPTHER

## 2020-07-01 RX ORDER — CIPROFLOXACIN 500 MG/1
500 TABLET, FILM COATED ORAL 2 TIMES DAILY
COMMUNITY
End: 2020-07-23 | Stop reason: ALTCHOICE

## 2020-07-01 RX ORDER — SODIUM CHLORIDE, SODIUM LACTATE, POTASSIUM CHLORIDE, CALCIUM CHLORIDE 600; 310; 30; 20 MG/100ML; MG/100ML; MG/100ML; MG/100ML
INJECTION, SOLUTION INTRAVENOUS CONTINUOUS
Status: DISCONTINUED | OUTPATIENT
Start: 2020-07-01 | End: 2020-07-01 | Stop reason: HOSPADM

## 2020-07-01 RX ORDER — LIDOCAINE HYDROCHLORIDE 20 MG/ML
INJECTION, SOLUTION INFILTRATION; PERINEURAL PRN
Status: DISCONTINUED | OUTPATIENT
Start: 2020-07-01 | End: 2020-07-01 | Stop reason: SDUPTHER

## 2020-07-01 RX ADMIN — SODIUM CHLORIDE, POTASSIUM CHLORIDE, SODIUM LACTATE AND CALCIUM CHLORIDE: 600; 310; 30; 20 INJECTION, SOLUTION INTRAVENOUS at 07:38

## 2020-07-01 RX ADMIN — PROPOFOL 650 MG: 10 INJECTION, EMULSION INTRAVENOUS at 07:47

## 2020-07-01 RX ADMIN — LIDOCAINE HYDROCHLORIDE 40 MG: 20 INJECTION, SOLUTION INFILTRATION; PERINEURAL at 07:47

## 2020-07-01 ASSESSMENT — PAIN SCALES - GENERAL
PAINLEVEL_OUTOF10: 0

## 2020-07-01 ASSESSMENT — PAIN - FUNCTIONAL ASSESSMENT: PAIN_FUNCTIONAL_ASSESSMENT: 0-10

## 2020-07-01 NOTE — ANESTHESIA PRE PROCEDURE
Department of Anesthesiology  Preprocedure Note       Name:  Dilip Mcdonald   Age:  44 y.o.  :  1970                                          MRN:  [id number]         Date:  2020      Surgeon: Ramsey Almaguer):  Santi Kolb MD    Procedure: Procedure(s):  COLONOSCOPY DIAGNOSTIC    Medications prior to admission:   Prior to Admission medications    Medication Sig Start Date End Date Taking? Authorizing Provider   ciprofloxacin (CIPRO) 500 MG tablet Take 500 mg by mouth 2 times daily Indications: pneumonia   Yes Historical Provider, MD   albuterol sulfate  (90 Base) MCG/ACT inhaler INHALE 1 PUFF INTO THE LUNGS EVERY 6 HOURS AS NEEDED FOR WHEEZING OR SHORTNESS OF BREATH 20   Payam Farfan MD   rosuvastatin (CRESTOR) 10 MG tablet Take 1 tablet by mouth daily 20   MELITON Lee - CNP   baclofen (LIORESAL) 10 MG tablet TAKE 1 TABLET BY MOUTH TWICE A DAY 20   Payam Farfan MD   doxycycline hyclate (VIBRA-TABS) 100 MG tablet TAKE 1 TABLET BY MOUTH TWICE A DAY 20   Payam Farfan MD   lisinopril (PRINIVIL;ZESTRIL) 20 MG tablet Take 1 tablet by mouth 2 times daily 6/3/20   MELITON Seth   metoprolol succinate (TOPROL XL) 25 MG extended release tablet Take 25 mg by mouth daily    Historical Provider, MD   testosterone cypionate (DEPOTESTOTERONE CYPIONATE) 200 MG/ML injection Inject 1 mL into the muscle every 21 days for 270 doses. 3/9/20 8/28/35  Payam Farfan MD   furosemide (LASIX) 40 MG tablet Take 1 tablet by mouth daily 3/9/20   Payam Farfan MD   Needles & Syringes MISC 1 each by Does not apply route every 21 days Needs 3 cc syringes with 22 G  1/2 inch needle to give Testosterone and needs 18 Gauge to draw up med.  3/9/20   Payam Farfan MD   Needles & Syringes MISC Needs 3 cc syringes with 22 G  1 1/2 inch needle to give Testosterone and needs 18 Gauge to draw up med. 20   Payam Farfan MD   [facility] KWIKPEN 100 UNIT/ML injection pen Inject 40 Units into the 06/28/2020         Anesthesia Evaluation  Patient summary reviewed and Nursing notes reviewed no history of anesthetic complications:   Airway: Mallampati: II  TM distance: >3 FB   Neck ROM: full  Mouth opening: < 3 FB Dental: normal exam         Pulmonary:normal exam    (+) pneumonia (tx with antibiotics 6/2/2020):                            ROS comment: Quit smoking 1/2015   Cardiovascular:    (+) hypertension:, CAD: no interval change, CABG/stent (Cabg 2012/ stent 2019):,          Beta Blocker:  Not on Beta Blocker         Neuro/Psych:   Negative Neuro/Psych ROS              GI/Hepatic/Renal:   (+) bowel prep,           Endo/Other:    (+) DiabetesType II DM, , blood dyscrasia (Plavix 6/21/20): anticoagulation therapy:., .                 Abdominal:           Vascular: negative vascular ROS. Anesthesia Plan      general and TIVA     ASA 3       Induction: intravenous. Anesthetic plan and risks discussed with patient.                       MELITON Mas - CRNA   7/1/2020

## 2020-07-01 NOTE — ANESTHESIA POSTPROCEDURE EVALUATION
Department of Anesthesiology  Postprocedure Note    Patient: Jessi Bar  MRN: [id number]  [place]: 1970  Date of evaluation: 7/1/2020  Time:  8:36 AM     Procedure Summary     Date:  07/01/20 Room / Location:  [address]    Anesthesia Start:  7766 Anesthesia Stop:      Procedures:       COLONOSCOPY DIAGNOSTIC (N/A )      COLONOSCOPY BIOPSY/STOMA Diagnosis:  (CHANGE IN BOWEL HABIT)    Surgeon:  Pieter Fitzpatrick MD Responsible Provider:  MELITON Hall CRNA    Anesthesia Type:  general, TIVA ASA Status:  3          Anesthesia Type: No value filed. Rachid Phase I: Rachid Score: 10    Rachid Phase II:      Last vitals: Reviewed and per EMR flowsheets.        Anesthesia Post Evaluation    Patient location during evaluation: bedside  Patient participation: complete - patient participated  Level of consciousness: sleepy but conscious  Pain score: 0  Airway patency: patent  Nausea & Vomiting: no nausea and no vomiting  Complications: no  Cardiovascular status: hemodynamically stable and blood pressure returned to baseline  Respiratory status: acceptable and nasal cannula  Hydration status: stable

## 2020-07-01 NOTE — OP NOTE
Patient: Erika Alvarez : 1970  [facility] Rec#: [id number] Acc#: [id number]   Primary Care Provider Chris Jeff MD    Date of Procedure:  2020    Endoscopist: Baldomero Garcia MD    Referring Provider: Chris Jeff MD,     Operation Performed:   Colonoscopy with snare polypectomy  Colonoscopy with biopsy polypectomy    Indications: screening,     Anesthesia:  Sedation was administered by anesthesia who monitored the patient during the procedure. I met with Erika Alvarez prior to procedure. We discussed the procedure itself, and I have discussed the risks of endoscopy (including-- but not limited to-- pain, discomfort, bleeding potentially requiring second endoscopic procedure and/or blood transfusion, organ perforation requiring operative repair, damage to organs near the colon, infection, aspiration, cardiopulmonary/allergic reaction), benefits, indications to endoscopy. Additionally, we discussed options other than colonoscopy. The patient expressed understanding. All questions answered. The patient decided to proceed with the procedure. Signed informed consent was placed on the chart. Blood Loss: minimal    Withdrawal time: > 6 minutes  Bowel Prep: adequate     Complications: no immediate complications    DESCRIPTION OF PROCEDURE:     A time out was performed. After written informed consent was obtained, the patient was placed in the left lateral position. The perianal area was inspected, and a digital rectal exam was performed. A rectal exam was performed: normal tone, no palpable lesions. At this point, a forward viewing Olympus colonoscope was inserted into the anus and carefully advanced to the cecum. The cecum was identified by the ileocecal valve and the appendiceal orifice. The colonoscope was then slowly withdrawn with careful inspection of the mucosa in a linear and circumferential fashion. The scope was retroflexed in the rectum.  Suction was utilized during the procedure to remove as much air as possible from the bowel. The colonoscope was removed from the patient, and the procedure was terminated. Findings are listed below. Findings: The mucosa appeared normal throughout the entire examined colon     In the base of the cecum, there was a 1cm flat lesion noted. This was removed with cold snare polypectomy. A small residual piece of tissue at the edge was noted and removed with cold forceps     In the sigmoid colon, a 4mm sessile polyp was removed with cold forceps polyepctomy    In the the rectum, a 5 mm sessile polyp was removed completely with cold snare polypectomy. Retroflexion in the rectum was normal and revealed no further abnormalities     Note- this procedure was increased in difficulty due to patient breathing pattern, increased tortuosity of the colon requiring multiple position changes (lateral, supine, etc) as well as multiple additional staff members to help with abdominal pressure in order to reach the cecum. The procedure time was increased, prolonged anesthesia, and increased risk to patient. Modifier 22 added to procedure coding. Recommendations:  1. Repeat colonoscopy: pending pathology - 1 yr due size of polyp and piecemeal polypectomy  2. Await biopsy results-you will receive a letter with your results within 7-10 days    Findings and recommendations were discussed w/ the patient. A copy of the images was provided.     Carli Richard MD  7/1/2020  8:34 AM

## 2020-07-01 NOTE — H&P
Patient Name: Ying Kellogg  : 1970  MRN: [id number]  DATE: 20    Allergies: No Known Allergies     ENDOSCOPY  History and Physical    Procedure:    [] Diagnostic Colonoscopy       [x] Screening Colonoscopy  [] EGD      [] ERCP      [] EUS       [] Other    [x] Previous office notes/History and Physical reviewed from the patients chart. Please see EMR for further details of HPI. I have examined the patient's status immediately prior to the procedure and:      Indications/HPI:       [x] Screening              [] History of Polyps      []Fhx of colon CA/polyps       Anesthesia:   [x] MAC [] Moderate Sedation   [] General   [] None     ROS: 12 pt review of systems was negative unless stated above    Medications:   Prior to Admission medications    Medication Sig Start Date End Date Taking? Authorizing Provider   ciprofloxacin (CIPRO) 500 MG tablet Take 500 mg by mouth 2 times daily Indications: pneumonia   Yes Historical Provider, MD   albuterol sulfate  (90 Base) MCG/ACT inhaler INHALE 1 PUFF INTO THE LUNGS EVERY 6 HOURS AS NEEDED FOR WHEEZING OR SHORTNESS OF BREATH 20   Sudheer Curtis MD   rosuvastatin (CRESTOR) 10 MG tablet Take 1 tablet by mouth daily 20   MELITON Duke - CNP   baclofen (LIORESAL) 10 MG tablet TAKE 1 TABLET BY MOUTH TWICE A DAY 20   Sudheer Curtis MD   doxycycline hyclate (VIBRA-TABS) 100 MG tablet TAKE 1 TABLET BY MOUTH TWICE A DAY 20   Sudheer Curtis MD   lisinopril (PRINIVIL;ZESTRIL) 20 MG tablet Take 1 tablet by mouth 2 times daily 6/3/20   MELITON Henley   metoprolol succinate (TOPROL XL) 25 MG extended release tablet Take 25 mg by mouth daily    Historical Provider, MD   testosterone cypionate (DEPOTESTOTERONE CYPIONATE) 200 MG/ML injection Inject 1 mL into the muscle every 21 days for 270 doses.  3/9/20 8/28/35  Sudheer Curtis MD   furosemide (LASIX) 40 MG tablet Take 1 tablet by mouth daily 3/9/20   Sudheer Curtis MD   [place] & Syringes MISC 1 each by Does not apply route every 21 days Needs 3 cc syringes with 22 G  1/2 inch needle to give Testosterone and needs 18 Gauge to draw up med. 3/9/20   Ez Rivas MD   [place] & Syringes MISC Needs 3 cc syringes with 22 G  1 1/2 inch needle to give Testosterone and needs 18 Gauge to draw up med. 2/24/20   Ez Rivas MD   [facility] 100 UNIT/ML injection pen Inject 40 Units into the skin daily  2/20/20   Ez Rivas MD   vitamin D (ERGOCALCIFEROL) 1.25 MG (85298 UT) CAPS capsule Take 1 capsule by mouth twice a week 2/20/20   Ez Rivas MD   busPIRone (BUSPAR) 15 MG tablet Take 15 mg by mouth 2 times daily  Patient taking differently: Take 15 mg by mouth 2 times daily Indications: uses prn basis  2/20/20   Ez Rivas MD   cholestyramine Freddrick Cake) 4 g packet Take 1 packet by mouth 2 times daily 2/20/20   Ez Rivas MD   clopidogrel (PLAVIX) 75 MG tablet Take 75 mg by mouth daily    Historical Provider, MD   Multiple Vitamins-Minerals (DIABETES HEALTH PO) Take 1 tablet by mouth daily    Historical Provider, MD   meloxicam (MOBIC) 15 MG tablet Take 15 mg by mouth daily    Historical Provider, MD   glipiZIDE (GLUCOTROL) 10 MG tablet Take 10 mg by mouth 2 times daily (before meals)    Historical Provider, MD       Past Medical History:  Past Medical History:   Diagnosis Date    Arthritis     CAD (coronary artery disease)     Cigarette smoker 9/2/2014    Diabetes ([facility] [address]     History of nephrolithiasis     Hyperlipidemia     Hypertension     Obesity     Type II or unspecified type diabetes mellitus without mention of complication, not stated as uncontrolled        Past Surgical History:  Past Surgical History:   Procedure Laterality Date    CARDIAC CATHETERIZATION  2014    CARDIAC CATHETERIZATION  3/14/12    CARDIAC CATHETERIZATION  9/2/14  JDT    stent to mid LAD.  EF 50%    CORONARY ARTERY BYPASS GRAFT      CORONARY ARTERY BYPASS GRAFT  3/15/2012    3V CABG (LIMA-Diag, SVG-RCA, SVG-OM) JPO    DIAGNOSTIC CARDIAC CATH LAB PROCEDURE      EXPLORATION OF WOUND OF EXTREMITY N/A 2019    EXCISIONAL DEBRIDEMENT OF SKIN, SUBCUTANEOUS TISSUE, MUSCLE AND BONE 14CM X 3CM X 3CM DEEP [facility]    EXPLORATION OF LEFT FOOT; EXCISIONAL DEBRIDEMENT OF SUBCUTANEOUS SKIN, TISSUE AND MUSCLE; AMPUTATION OF THIRD TOE AT METATARSAL PHALYNGEAL JOINT performed by Emelina Barksdale MD at [facility] [place]      TOE AMPUTATION  2017    TONSILLECTOMY         Social History:  Social History     Tobacco Use    Smoking status: Former Smoker     Packs/day: 0.50     Years: 30.00     Pack years: 15.00     Types: Cigarettes     Last attempt to quit: 2019     Years since quittin.7    Smokeless tobacco: Never Used   Substance Use Topics    Alcohol use: No     Comment: Rarely    Drug use: No       Vital Signs:   Vitals:    20 0715   BP: (!) 158/85   Pulse: 55   Resp: 16   Temp: 98 °F (36.7 °C)   SpO2: 96%        Physical Exam:  Cardiac:  [x]WNL  []Comments:  Pulmonary:  [x]WNL   []Comments:  Neuro/Mental Status:  [x]WNL  []Comments:  Abdominal:  [x]WNL    []Comments:  Other:   []WNL  []Comments:    Informed Consent:  The risks and benefits of the procedure have been discussed with either the patient or if they cannot consent, their representative. Assessment:  Patient examined and appropriate for planned sedation and procedure. Plan:  Proceed with planned sedation and procedure as above.     Isi Narvaez MD

## 2020-07-08 RX ORDER — FUROSEMIDE 40 MG/1
TABLET ORAL
Qty: 90 TABLET | Refills: 1 | Status: SHIPPED | OUTPATIENT
Start: 2020-07-08 | End: 2021-01-01 | Stop reason: ALTCHOICE

## 2020-07-09 RX ORDER — DOXYCYCLINE HYCLATE 100 MG
TABLET ORAL
Qty: 60 TABLET | Refills: 0 | Status: SHIPPED | OUTPATIENT
Start: 2020-07-09 | End: 2020-08-03

## 2020-07-13 ENCOUNTER — HOSPITAL ENCOUNTER (OUTPATIENT)
Dept: GENERAL RADIOLOGY | Age: 50
Discharge: HOME | End: 2020-07-13
Payer: COMMERCIAL

## 2020-07-13 PROCEDURE — 71046 X-RAY EXAM CHEST 2 VIEWS: CPT

## 2020-07-14 ENCOUNTER — TELEPHONE (OUTPATIENT)
Dept: FAMILY MEDICINE CLINIC | Age: 50
End: 2020-07-14

## 2020-07-16 RX ORDER — BACLOFEN 10 MG/1
TABLET ORAL
Qty: 60 TABLET | Refills: 1 | Status: SHIPPED | OUTPATIENT
Start: 2020-07-16 | End: 2020-08-13

## 2020-07-23 ENCOUNTER — OFFICE VISIT (OUTPATIENT)
Dept: FAMILY MEDICINE CLINIC | Age: 50
End: 2020-07-23
Payer: COMMERCIAL

## 2020-07-23 VITALS
DIASTOLIC BLOOD PRESSURE: 86 MMHG | HEART RATE: 92 BPM | HEIGHT: 75 IN | TEMPERATURE: 98.2 F | BODY MASS INDEX: 34.57 KG/M2 | WEIGHT: 278 LBS | RESPIRATION RATE: 16 BRPM | SYSTOLIC BLOOD PRESSURE: 158 MMHG | OXYGEN SATURATION: 96 %

## 2020-07-23 PROCEDURE — 99214 OFFICE O/P EST MOD 30 MIN: CPT | Performed by: FAMILY MEDICINE

## 2020-07-23 RX ORDER — TESTOSTERONE CYPIONATE 200 MG/ML
200 INJECTION INTRAMUSCULAR
Qty: 10 ML | Refills: 1 | Status: SHIPPED | OUTPATIENT
Start: 2020-07-23 | End: 2020-09-24

## 2020-07-23 RX ORDER — PANTOPRAZOLE SODIUM 40 MG/1
40 TABLET, DELAYED RELEASE ORAL
Qty: 30 TABLET | Refills: 0 | Status: SHIPPED | OUTPATIENT
Start: 2020-07-23 | End: 2020-08-17

## 2020-07-23 RX ORDER — METOPROLOL SUCCINATE 25 MG/1
25 TABLET, EXTENDED RELEASE ORAL 2 TIMES DAILY
Qty: 60 TABLET | Refills: 5 | Status: SHIPPED | OUTPATIENT
Start: 2020-07-23 | End: 2020-07-24 | Stop reason: DRUGHIGH

## 2020-07-23 RX ORDER — METOPROLOL SUCCINATE 25 MG/1
25 TABLET, EXTENDED RELEASE ORAL 2 TIMES DAILY
Qty: 60 TABLET | Refills: 5 | Status: SHIPPED | COMMUNITY
Start: 2020-07-23 | End: 2020-07-23 | Stop reason: SDUPTHER

## 2020-07-23 ASSESSMENT — ENCOUNTER SYMPTOMS
CHEST TIGHTNESS: 0
COUGH: 0
DIARRHEA: 0
SHORTNESS OF BREATH: 0
VOMITING: 0
EYES NEGATIVE: 1
BLOOD IN STOOL: 0
NAUSEA: 0
CONSTIPATION: 0
WHEEZING: 0

## 2020-07-23 NOTE — PROGRESS NOTES
Subjective:      Patient ID: Tegan Escalante is a 44 y.o. male. HPI  This patient is seen here intermittently by the undersigned for management of chronic disease states. He is known to be diabetic. We have ordered fasting labs on him but he has not yet been compliant with that. We did insist that he do this. We did perform diabetic foot exam on him here today. He has significant issues. He has to work on his feet as he works with the [employer] and is on his feet, including working on  black[place] much of the time. He has had previous issues with his lower extremities bilaterally. He has prior amputation of digits 4 and 5 on the right which were done in either 2017 or 2018. Amputation of left third toe was done 2019. He does have diabetic neuropathy. Sensation is more diminished on the right than on the left. It is noted to be present about 6 out of 10 locations. On the left however it is noted to be about 8/10. Pedal pulses are palpated and are felt to be symmetrical.  Posterior tibial pulses are diminished +1 bilaterally but dorsalis pedis are +2 bilaterally. He did report to me today that blood sugar was elevated at 269. It is imperative that we get this under control. I did encourage the patient to work more diligently on this. He does remain on glipizide at 10 mg p.o. twice daily. Additionally takes Basaglar at 40 units subcu daily. Labs being done in the very near future will include hemoglobin A1c, BMP, and microalbumin as well as other labs. He does have essential hypertension. Currently he is on Lasix 40 mg p.o. daily. Additionally he is on lisinopril 20 mg p.o. twice daily. He also takes metoprolol 25 mg p.o. daily. Heart rate today was 92 and blood pressure was noted to remain elevated at 172 initially over 95 with repeat being elevated as well at 158/86.   Because of this persistence in elevation of blood pressure, I am going to increase metoprolol to 25 mg p.o. twice Patient does have evidence of peripheral neuropathy affecting bilateral lower extremities with right seemingly worse than left. On right, sensation was noted to be adequate on 6 out of 10 sites tested where on the left it was noted to be appropriate on 8 out of 10 sites tested. Pedal pulses were palpated and are symmetrical.  Dorsalis pedis is noted to be +2 bilaterally whereas posterior tibial is only +1 bilaterally. Psychiatric/Behavioral: Negative. Objective:   Physical Exam  Vitals signs and nursing note reviewed. Constitutional:       General: He is not in acute distress. Appearance: Normal appearance. He is well-developed. He is not ill-appearing, toxic-appearing or diaphoretic. HENT:      Head: Normocephalic and atraumatic. Right Ear: Tympanic membrane, ear canal and external ear normal. There is no impacted cerumen. Left Ear: Tympanic membrane, ear canal and external ear normal. There is no impacted cerumen. Nose: Nose normal.      Mouth/Throat:      Lips: Pink. Mouth: Mucous membranes are moist.      Dentition: Normal dentition. Tongue: No lesions. Pharynx: Oropharynx is clear. Uvula midline. Tonsils: No tonsillar exudate or tonsillar abscesses. Eyes:      General: Lids are normal.         Right eye: No discharge. Left eye: No discharge. Extraocular Movements: Extraocular movements intact. Right eye: Normal extraocular motion. Left eye: Normal extraocular motion. Conjunctiva/sclera: Conjunctivae normal.      Right eye: Right conjunctiva is not injected. Left eye: Left conjunctiva is not injected. Pupils: Pupils are equal, round, and reactive to light. Neck:      Musculoskeletal: Normal range of motion and neck supple. No neck rigidity or muscular tenderness. Thyroid: No thyromegaly. Vascular: No carotid bruit or JVD. Cardiovascular:      Rate and Rhythm: Normal rate and regular rhythm.       Pulses: Carotid pulses are 2+ on the right side and 2+ on the left side. Radial pulses are 2+ on the right side and 2+ on the left side. Heart sounds: Normal heart sounds, S1 normal and S2 normal. No murmur. No friction rub. No gallop. Pulmonary:      Effort: Pulmonary effort is normal. No accessory muscle usage or respiratory distress. Breath sounds: Normal breath sounds. No stridor. No wheezing, rhonchi or rales. Chest:      Chest wall: No tenderness. Abdominal:      General: Bowel sounds are normal. There is no distension or abdominal bruit. Palpations: Abdomen is soft. There is no mass. Tenderness: There is no abdominal tenderness. There is no right CVA tenderness, left CVA tenderness or guarding. Hernia: No hernia is present. Musculoskeletal: Normal range of motion. General: No swelling, tenderness, deformity or signs of injury. Right lower leg: No edema. Left lower leg: No edema. Lymphadenopathy:      Cervical: No cervical adenopathy. Right cervical: No superficial cervical adenopathy. Left cervical: No superficial cervical adenopathy. Skin:     General: Skin is warm and dry. Coloration: Skin is not jaundiced or pale. Findings: No bruising, erythema, lesion or rash. Nails: There is no clubbing. Neurological:      General: No focal deficit present. Mental Status: He is alert and oriented to person, place, and time. Mental status is at baseline. Cranial Nerves: No facial asymmetry. Motor: No weakness or tremor. Coordination: Coordination normal.      Gait: Gait normal.      Deep Tendon Reflexes: Reflexes are normal and symmetric. Psychiatric:         Attention and Perception: Attention normal.         Mood and Affect: Mood normal.         Speech: Speech normal.         Behavior: Behavior normal.         Thought Content:  Thought content normal.         Cognition and Memory: Memory normal. Judgment: Judgment normal.         BP (!) 158/86 (Site: Left Upper Arm, Position: Sitting, Cuff Size: Large Adult)   Pulse 92   Temp 98.2 °F (36.8 °C) (Oral)   Resp 16   Ht 6' 3\" (1.905 m)   Wt 278 lb (126.1 kg)   SpO2 96%   BMI 34.75 kg/m²   Assessment:         Diagnosis Orders   1. Gastroesophageal reflux disease with esophagitis  pantoprazole (PROTONIX) 40 MG tablet   2. Testosterone deficiency  Testosterone Free and Total Male    testosterone cypionate (DEPOTESTOTERONE CYPIONATE) 200 MG/ML injection   3. Essential hypertension  CBC    Basic Metabolic Panel   4. Type 2 diabetes mellitus with foot ulcer, with long-term current use of insulin ([facility])  Basic Metabolic Panel    Hemoglobin A1C    Microalbumin, Ur    T4, Free    TSH without Reflex    Hepatic Function Panel   5. Vitamin D deficiency  Vitamin D 25 Hydroxy   6. Mixed hyperlipidemia  T4, Free    TSH without Reflex    Lipid Panel   7. Gastroesophageal reflux disease without esophagitis             Plan:      I am having Mr. Patricia Jorge maintain his :   Outpatient Medications Marked as Taking for the 7/23/20 encounter (Office Visit) with Liyah Newby MD   Medication Sig Dispense Refill    pantoprazole (PROTONIX) 40 MG tablet Take 1 tablet by mouth every morning (before breakfast) 30 tablet 0    testosterone cypionate (DEPOTESTOTERONE CYPIONATE) 200 MG/ML injection Inject 1 mL into the muscle every 14 days for 270 doses.  10 mL 1    metoprolol succinate (TOPROL XL) 25 MG extended release tablet Take 1 tablet by mouth 2 times daily 60 tablet 5    baclofen (LIORESAL) 10 MG tablet TAKE 1 TABLET BY MOUTH TWICE A DAY 60 tablet 1    doxycycline hyclate (VIBRA-TABS) 100 MG tablet TAKE 1 TABLET BY MOUTH TWICE A DAY 60 tablet 0    furosemide (LASIX) 40 MG tablet TAKE 1 TABLET BY MOUTH EVERY DAY 90 tablet 1    albuterol sulfate  (90 Base) MCG/ACT inhaler INHALE 1 PUFF INTO THE LUNGS EVERY 6 HOURS AS NEEDED FOR WHEEZING OR SHORTNESS OF BREATH 6.7 Inhaler 0    rosuvastatin (CRESTOR) 10 MG tablet Take 1 tablet by mouth daily 90 tablet 1    lisinopril (PRINIVIL;ZESTRIL) 20 MG tablet Take 1 tablet by mouth 2 times daily 60 tablet 3    Needles & Syringes MISC 1 each by Does not apply route every 21 days Needs 3 cc syringes with 22 G  1/2 inch needle to give Testosterone and needs 18 Gauge to draw up med. 10 each 5    BASAGLAR KWIKPEN 100 UNIT/ML injection pen Inject 40 Units into the skin daily  5 pen 6    vitamin D (ERGOCALCIFEROL) 1.25 MG (43236 UT) CAPS capsule Take 1 capsule by mouth twice a week 32 capsule 3    clopidogrel (PLAVIX) 75 MG tablet Take 75 mg by mouth daily      Multiple Vitamins-Minerals (DIABETES HEALTH PO) Take 1 tablet by mouth daily      meloxicam (MOBIC) 15 MG tablet Take 15 mg by mouth daily       Medications Discontinued During This Encounter   Medication Reason    cholestyramine (QUESTRAN) 4 g packet Therapy completed    busPIRone (BUSPAR) 15 MG tablet Therapy completed    ciprofloxacin (CIPRO) 500 MG tablet Therapy completed    [place] & Syringes MISC Therapy completed    testosterone cypionate (DEPOTESTOTERONE CYPIONATE) 200 MG/ML injection DOSE ADJUSTMENT    metoprolol succinate (TOPROL XL) 25 MG extended release tablet DOSE ADJUSTMENT    metoprolol succinate (TOPROL XL) 25 MG extended release tablet REORDER      Requested Prescriptions     Signed Prescriptions Disp Refills    pantoprazole (PROTONIX) 40 MG tablet 30 tablet 0     Sig: Take 1 tablet by mouth every morning (before breakfast)    testosterone cypionate (DEPOTESTOTERONE CYPIONATE) 200 MG/ML injection 10 mL 1     Sig: Inject 1 mL into the muscle every 14 days for 270 doses.  metoprolol succinate (TOPROL XL) 25 MG extended release tablet 60 tablet 5     Sig: Take 1 tablet by mouth 2 times daily   .      Orders Placed This Encounter   Procedures    CBC     Standing Status:   Future     Standing Expiration Date:   7/23/2021    Basic Metabolic Panel Standing Status:   Future     Standing Expiration Date:   7/23/2021    Hemoglobin A1C     Standing Status:   Future     Standing Expiration Date:   7/23/2021   Shaan Britt     Standing Status:   Future     Standing Expiration Date:   7/23/2021    Vitamin D 25 Hydroxy     Standing Status:   Future     Standing Expiration Date:   7/23/2021    T4, Free     Standing Status:   Future     Standing Expiration Date:   7/23/2021    TSH without Reflex     Standing Status:   Future     Standing Expiration Date:   7/23/2021    Lipid Panel     Standing Status:   Future     Standing Expiration Date:   7/23/2021     Order Specific Question:   Is Patient Fasting?/# of Hours     Answer:   midstream    Hepatic Function Panel     Standing Status:   Future     Standing Expiration Date:   7/23/2021    Testosterone Free and Total Male     Standing Status:   Future     Standing Expiration Date:   7/23/2021     Orders Placed This Encounter   Medications    pantoprazole (PROTONIX) 40 MG tablet     Sig: Take 1 tablet by mouth every morning (before breakfast)     Dispense:  30 tablet     Refill:  0    testosterone cypionate (DEPOTESTOTERONE CYPIONATE) 200 MG/ML injection     Sig: Inject 1 mL into the muscle every 14 days for 270 doses.      Dispense:  10 mL     Refill:  1    metoprolol succinate (TOPROL XL) 25 MG extended release tablet     Sig: Take 1 tablet by mouth 2 times daily     Dispense:  60 tablet     Refill:  5             Chadd Rockwell MD

## 2020-07-24 ENCOUNTER — TELEPHONE (OUTPATIENT)
Dept: FAMILY MEDICINE CLINIC | Age: 50
End: 2020-07-24

## 2020-07-24 RX ORDER — METOPROLOL SUCCINATE 50 MG/1
50 TABLET, EXTENDED RELEASE ORAL DAILY
Qty: 90 TABLET | Refills: 3 | Status: SHIPPED | OUTPATIENT
Start: 2020-07-24 | End: 2021-01-01 | Stop reason: ALTCHOICE

## 2020-07-24 RX ORDER — METOPROLOL SUCCINATE 50 MG/1
50 TABLET, EXTENDED RELEASE ORAL DAILY
Qty: 90 TABLET | Refills: 3 | Status: SHIPPED | OUTPATIENT
Start: 2020-07-24 | End: 2020-07-24 | Stop reason: SDUPTHER

## 2020-07-24 NOTE — TELEPHONE ENCOUNTER
Called said insurance will not pay for metoprolol er 25 mgs bid instead they want metoprolol 50 mg 1 a day.  Okay per dr bonilla sent to pharmacy
Left arm;

## 2020-07-27 DIAGNOSIS — Z79.4 TYPE 2 DIABETES MELLITUS WITH FOOT ULCER, WITH LONG-TERM CURRENT USE OF INSULIN (HCC): ICD-10-CM

## 2020-07-27 DIAGNOSIS — I10 ESSENTIAL HYPERTENSION: ICD-10-CM

## 2020-07-27 DIAGNOSIS — E11.621 TYPE 2 DIABETES MELLITUS WITH FOOT ULCER, WITH LONG-TERM CURRENT USE OF INSULIN (HCC): ICD-10-CM

## 2020-07-27 DIAGNOSIS — L97.509 TYPE 2 DIABETES MELLITUS WITH FOOT ULCER, WITH LONG-TERM CURRENT USE OF INSULIN (HCC): ICD-10-CM

## 2020-07-27 DIAGNOSIS — E78.2 MIXED HYPERLIPIDEMIA: ICD-10-CM

## 2020-07-27 DIAGNOSIS — E55.9 VITAMIN D DEFICIENCY: ICD-10-CM

## 2020-07-27 DIAGNOSIS — E34.9 TESTOSTERONE DEFICIENCY: ICD-10-CM

## 2020-07-27 LAB
ALBUMIN SERPL-MCNC: 3.6 G/DL (ref 3.5–5.2)
ALP BLD-CCNC: 103 U/L (ref 40–130)
ALT SERPL-CCNC: 23 U/L (ref 5–41)
ANION GAP SERPL CALCULATED.3IONS-SCNC: 19 MMOL/L (ref 7–19)
AST SERPL-CCNC: 13 U/L (ref 5–40)
BILIRUB SERPL-MCNC: 0.6 MG/DL (ref 0.2–1.2)
BILIRUBIN DIRECT: 0.1 MG/DL (ref 0–0.3)
BILIRUBIN, INDIRECT: 0.5 MG/DL (ref 0.1–1)
BUN BLDV-MCNC: 24 MG/DL (ref 6–20)
CALCIUM SERPL-MCNC: 9.6 MG/DL (ref 8.6–10)
CHLORIDE BLD-SCNC: 100 MMOL/L (ref 98–111)
CHOLESTEROL, TOTAL: 198 MG/DL (ref 160–199)
CO2: 23 MMOL/L (ref 22–29)
CREAT SERPL-MCNC: 1.2 MG/DL (ref 0.5–1.2)
GFR AFRICAN AMERICAN: >59
GFR NON-AFRICAN AMERICAN: >60
GLUCOSE BLD-MCNC: 281 MG/DL (ref 74–109)
HBA1C MFR BLD: 11.1 % (ref 4–6)
HCT VFR BLD CALC: 54.4 % (ref 42–52)
HDLC SERPL-MCNC: 29 MG/DL (ref 55–121)
HEMOGLOBIN: 17.4 G/DL (ref 14–18)
LDL CHOLESTEROL CALCULATED: 130 MG/DL
MCH RBC QN AUTO: 29 PG (ref 27–31)
MCHC RBC AUTO-ENTMCNC: 32 G/DL (ref 33–37)
MCV RBC AUTO: 90.5 FL (ref 80–94)
MICROALBUMIN UR-MCNC: 261.4 MG/DL (ref 0–19)
PDW BLD-RTO: 14.4 % (ref 11.5–14.5)
PLATELET # BLD: 245 K/UL (ref 130–400)
PMV BLD AUTO: 12.1 FL (ref 9.4–12.4)
POTASSIUM SERPL-SCNC: 4 MMOL/L (ref 3.5–5)
RBC # BLD: 6.01 M/UL (ref 4.7–6.1)
SODIUM BLD-SCNC: 142 MMOL/L (ref 136–145)
T4 FREE: 1.19 NG/DL (ref 0.93–1.7)
TOTAL PROTEIN: 6.4 G/DL (ref 6.6–8.7)
TRIGL SERPL-MCNC: 196 MG/DL (ref 0–149)
TSH SERPL DL<=0.05 MIU/L-ACNC: 2.63 UIU/ML (ref 0.27–4.2)
VITAMIN D 25-HYDROXY: 28.6 NG/ML
WBC # BLD: 8 K/UL (ref 4.8–10.8)

## 2020-07-28 LAB
SEX HORMONE BINDING GLOBULIN: 59 NMOL/L (ref 11–80)
TESTOSTERONE FREE-NONMALE: 79.3 PG/ML (ref 47–244)
TESTOSTERONE TOTAL: 553 NG/DL (ref 220–1000)

## 2020-08-03 RX ORDER — DOXYCYCLINE HYCLATE 100 MG
TABLET ORAL
Qty: 60 TABLET | Refills: 0 | Status: SHIPPED | OUTPATIENT
Start: 2020-08-03 | End: 2020-08-31

## 2020-08-06 ENCOUNTER — TELEPHONE (OUTPATIENT)
Dept: FAMILY MEDICINE CLINIC | Age: 50
End: 2020-08-06

## 2020-08-06 RX ORDER — CIPROFLOXACIN 500 MG/1
500 TABLET, FILM COATED ORAL 2 TIMES DAILY
Qty: 20 TABLET | Refills: 0 | Status: SHIPPED | OUTPATIENT
Start: 2020-08-06 | End: 2020-08-16

## 2020-08-06 NOTE — TELEPHONE ENCOUNTER
Patient called stated had UTI. Dr. Potter Mess notified. Meds sent to pharmacy. Patient notified of labs.

## 2020-08-11 RX ORDER — ALBUTEROL SULFATE 90 UG/1
1 AEROSOL, METERED RESPIRATORY (INHALATION) EVERY 6 HOURS PRN
Qty: 6.7 INHALER | Refills: 0 | Status: SHIPPED | OUTPATIENT
Start: 2020-08-11 | End: 2020-09-24

## 2020-08-13 RX ORDER — BACLOFEN 10 MG/1
TABLET ORAL
Qty: 60 TABLET | Refills: 1 | Status: SHIPPED | OUTPATIENT
Start: 2020-08-13 | End: 2020-09-09

## 2020-08-17 RX ORDER — PANTOPRAZOLE SODIUM 40 MG/1
TABLET, DELAYED RELEASE ORAL
Qty: 30 TABLET | Refills: 0 | Status: SHIPPED | OUTPATIENT
Start: 2020-08-17 | End: 2020-09-10

## 2020-08-31 RX ORDER — DOXYCYCLINE HYCLATE 100 MG
TABLET ORAL
Qty: 60 TABLET | Refills: 0 | Status: SHIPPED | OUTPATIENT
Start: 2020-08-31 | End: 2020-09-23

## 2020-09-09 RX ORDER — BACLOFEN 10 MG/1
TABLET ORAL
Qty: 60 TABLET | Refills: 1 | Status: SHIPPED | OUTPATIENT
Start: 2020-09-09 | End: 2021-01-01 | Stop reason: ALTCHOICE

## 2020-09-10 RX ORDER — PANTOPRAZOLE SODIUM 40 MG/1
TABLET, DELAYED RELEASE ORAL
Qty: 30 TABLET | Refills: 0 | Status: SHIPPED | OUTPATIENT
Start: 2020-09-10 | End: 2020-10-05

## 2020-09-14 ENCOUNTER — HOSPITAL ENCOUNTER (OUTPATIENT)
Dept: GENERAL RADIOLOGY | Age: 50
Discharge: HOME | End: 2020-09-14
Payer: COMMERCIAL

## 2020-09-14 ENCOUNTER — OFFICE VISIT (OUTPATIENT)
Dept: FAMILY MEDICINE CLINIC | Age: 50
End: 2020-09-14
Payer: COMMERCIAL

## 2020-09-14 VITALS
TEMPERATURE: 97.1 F | DIASTOLIC BLOOD PRESSURE: 84 MMHG | OXYGEN SATURATION: 93 % | RESPIRATION RATE: 16 BRPM | BODY MASS INDEX: 32.8 KG/M2 | WEIGHT: 263.8 LBS | HEART RATE: 84 BPM | HEIGHT: 75 IN | SYSTOLIC BLOOD PRESSURE: 158 MMHG

## 2020-09-14 DIAGNOSIS — R31.9 HEMATURIA, UNSPECIFIED TYPE: ICD-10-CM

## 2020-09-14 LAB
AMPHETAMINE SCREEN, URINE: NEGATIVE
BARBITURATE SCREEN, URINE: NEGATIVE
BENZODIAZEPINE SCREEN, URINE: NEGATIVE
BILIRUBIN URINE: NEGATIVE
BLOOD, URINE: ABNORMAL
BUPRENORPHINE URINE: NEGATIVE
CLARITY: CLEAR
COCAINE METABOLITE SCREEN URINE: NEGATIVE
COLOR: YELLOW
GABAPENTIN SCREEN, URINE: NORMAL
GLUCOSE URINE: 100 MG/DL
KETONES, URINE: NEGATIVE MG/DL
LEUKOCYTE ESTERASE, URINE: NEGATIVE
MDMA URINE: NEGATIVE
METHADONE SCREEN, URINE: NEGATIVE
METHAMPHETAMINE, URINE: NEGATIVE
NITRITE, URINE: NEGATIVE
OPIATE SCREEN URINE: NEGATIVE
OXYCODONE SCREEN URINE: NEGATIVE
PH UA: 5 (ref 5–8)
PHENCYCLIDINE SCREEN URINE: NEGATIVE
PROPOXYPHENE SCREEN, URINE: NORMAL
PROTEIN UA: >=300 MG/DL
SPECIFIC GRAVITY UA: 1.02 (ref 1–1.03)
THC SCREEN, URINE: NEGATIVE
TRICYCLIC ANTIDEPRESSANTS, UR: NORMAL
URINE TYPE: ABNORMAL
UROBILINOGEN, URINE: 0.2 E.U./DL

## 2020-09-14 PROCEDURE — 74176 CT ABD & PELVIS W/O CONTRAST: CPT

## 2020-09-14 PROCEDURE — 80305 DRUG TEST PRSMV DIR OPT OBS: CPT | Performed by: FAMILY MEDICINE

## 2020-09-14 PROCEDURE — 99214 OFFICE O/P EST MOD 30 MIN: CPT | Performed by: FAMILY MEDICINE

## 2020-09-14 RX ORDER — GLUCOSAMINE HCL/CHONDROITIN SU 500-400 MG
1 CAPSULE ORAL 3 TIMES DAILY
Qty: 100 STRIP | Refills: 3 | Status: ON HOLD | OUTPATIENT
Start: 2020-09-14 | End: 2021-01-01 | Stop reason: HOSPADM

## 2020-09-14 RX ORDER — OXYCODONE AND ACETAMINOPHEN 7.5; 325 MG/1; MG/1
1 TABLET ORAL EVERY 6 HOURS PRN
Qty: 120 TABLET | Refills: 0 | Status: SHIPPED | OUTPATIENT
Start: 2020-09-14 | End: 2020-09-18 | Stop reason: ALTCHOICE

## 2020-09-14 RX ORDER — TAMSULOSIN HYDROCHLORIDE 0.4 MG/1
0.4 CAPSULE ORAL DAILY
COMMUNITY

## 2020-09-14 RX ORDER — TIZANIDINE 4 MG/1
4 TABLET ORAL EVERY 8 HOURS PRN
Qty: 90 TABLET | Refills: 0 | Status: SHIPPED | OUTPATIENT
Start: 2020-09-14 | End: 2020-10-06

## 2020-09-14 RX ORDER — GLUCOSAMINE HCL/CHONDROITIN SU 500-400 MG
1 CAPSULE ORAL 3 TIMES DAILY
Qty: 100 STRIP | Refills: 3 | Status: SHIPPED | OUTPATIENT
Start: 2020-09-14 | End: 2020-09-14 | Stop reason: SDUPTHER

## 2020-09-14 ASSESSMENT — ENCOUNTER SYMPTOMS
BLOOD IN STOOL: 0
CONSTIPATION: 0
WHEEZING: 0
BACK PAIN: 1
CHEST TIGHTNESS: 0
COUGH: 0
EYES NEGATIVE: 1
DIARRHEA: 0
SHORTNESS OF BREATH: 0
NAUSEA: 0
VOMITING: 0

## 2020-09-14 NOTE — PROGRESS NOTES
Subjective:      Patient ID: Colleen Mendoza is a 44 y.o. male. HPI  This patient is seen here intermittently by the undersigned for management of chronic disease states. Additionally, he is seen when new problems arise. He is here today stating that for the last week and a half, he has had pain in his right flank. This has been intermittent. There has been no antecedent injury reported. Patient tells me that about 14 or 15 years ago he did have 1 episode of a kidney stone that was documented. He did state that moving certain ways does seem to aggravate this. Urinalysis did show small trace amount of blood. We did therefore do a renal protocol CT which essentially was unremarkable. There was no evidence of hydronephrosis. The patient does have unchanged identified perinephric fat infiltration. He also had a small fat-containing right inguinal hernia and small fat-containing left inguinal hernia. He was noted to have small fat-containing umbilical hernia. X-ray did have a show evidence of severe degenerative disc disease especially at L4/L5. Patient has had chronic back issues for some time. Since it appears that this is not related to kidney stones, I am giving him Percocet 7.5/325 to use up to every 6 hours for his back pain issue that will be used intermittently. He also is given tizanidine 4 mg to use up to 3 times daily for muscle spasms. Patient is advised that these medications may make him drowsy. Will ask him to strain all of his urine over the next week just to be sure that we do not capture a stone . He is known to be diabetic. We have ordered fasting labs on him but he has not yet been compliant with that. We did insist that he do this. We did perform diabetic foot exam on him here today. He has significant issues. He has to work on his feet as he works with the [employer] and is on his feet, including working on  [place] much of the time.   He has had previous issues with his lower extremities bilaterally. He has prior amputation of digits 4 and 5 on the right which were done in either 2017 or 2018. Amputation of left third toe was done 2019. He does have diabetic neuropathy. Sensation is more diminished on the right than on the left. It is noted to be present about 6 out of 10 locations. On the left however it is noted to be about 8/10. Pedal pulses are palpated and are felt to be symmetrical.  Posterior tibial pulses are diminished +1 bilaterally but dorsalis pedis are +2 bilaterally. He does check blood sugars but he is out of test strips so we need to refill those for him. Most recent blood sugar test was just somewhat in excess of 140. It is imperative that we get this under control keep it under control particularly with his history of having had amputations already. .  I did encourage the patient to work more diligently on this. He does remain on glipizide at 10 mg p.o. twice daily. Additionally takes Basaglar at 40 units subcu daily. Labs being done in the very near future will include hemoglobin A1c, BMP, and microalbumin as well as other labs.     He does have essential hypertension. Currently he is on Lasix 40 mg p.o. daily. Additionally he is on lisinopril 20 mg p.o. twice daily. He also takes metoprolol 25 mg p.o. daily. Patient has been having right flank pain intermittently. Pressure this morning was noted to be elevated at 170/90 initially. Blood pressure repeat was improved at 158/84 but he has still had some pain. Because of this persistence in elevation of blood pressure, I am going to increase metoprolol to 25 mg p.o. twice daily. He is maintained on Plavix at 75 mg p.o. daily.     For arthritis, he does take meloxicam 15 mg p.o. daily. Baclofen is utilized at 10 mg p.o. twice daily for spasms. It is noted that he has hyperlipidemia. We are going to continue his Crestor 10 mg p.o. daily but we will be monitoring his lab.   He is no tablet    POCT Rapid Drug Screen   3. Degenerative disc disease at L5-S1 level     4. Chronic right-sided low back pain without sciatica  oxyCODONE-acetaminophen (PERCOCET) 7.5-325 MG per tablet    tiZANidine (ZANAFLEX) 4 MG tablet    POCT Rapid Drug Screen   5. Coronary artery disease involving native coronary artery of native heart without angina pectoris     6. Essential hypertension     7. Type 2 diabetes mellitus with foot ulcer, with long-term current use of insulin ([facility] [address]     8. Mixed hyperlipidemia     9. Status post amputation of toe (HCC)      Left foot third toe. 2019     10. Status post amputation of toe (HCC)      Right foot fourth and fifth digits have been amputated dating 2017 or 2018. Plan:       I am having Mr. Salvatore Harry maintain his :   Outpatient Medications Marked as Taking for the 9/14/20 encounter (Office Visit) with Funmi Causey MD   Medication Sig Dispense Refill    tamsulosin (FLOMAX) 0.4 MG capsule Take 0.4 mg by mouth daily      pantoprazole (PROTONIX) 40 MG tablet TAKE 1 TABLET BY MOUTH EVERY MORNING BEFORE BREAKFAST 30 tablet 0    baclofen (LIORESAL) 10 MG tablet TAKE 1 TABLET BY MOUTH TWICE A DAY 60 tablet 1    doxycycline hyclate (VIBRA-TABS) 100 MG tablet TAKE 1 TABLET BY MOUTH TWICE A DAY 60 tablet 0    albuterol sulfate  (90 Base) MCG/ACT inhaler INHALE 1 PUFF INTO THE LUNGS EVERY 6 HOURS AS NEEDED FOR WHEEZING OR SHORTNESS OF BREATH 6.7 Inhaler 0    metoprolol succinate (TOPROL XL) 50 MG extended release tablet Take 1 tablet by mouth daily 90 tablet 3    testosterone cypionate (DEPOTESTOTERONE CYPIONATE) 200 MG/ML injection Inject 1 mL into the muscle every 14 days for 270 doses.  10 mL 1    furosemide (LASIX) 40 MG tablet TAKE 1 TABLET BY MOUTH EVERY DAY 90 tablet 1    rosuvastatin (CRESTOR) 10 MG tablet Take 1 tablet by mouth daily 90 tablet 1    lisinopril (PRINIVIL;ZESTRIL) 20 MG tablet Take 1 tablet by mouth 2 times daily 60 tablet 3    Needles & Syringes MISC 1 each by Does not apply route every 21 days Needs 3 cc syringes with 22 G  1/2 inch needle to give Testosterone and needs 18 Gauge to draw up med.  10 each 5    BASAGLAR KWIKPEN 100 UNIT/ML injection pen Inject 40 Units into the skin daily  5 pen 6    vitamin D (ERGOCALCIFEROL) 1.25 MG (83370 UT) CAPS capsule Take 1 capsule by mouth twice a week 32 capsule 3    clopidogrel (PLAVIX) 75 MG tablet Take 75 mg by mouth daily      Multiple Vitamins-Minerals (DIABETES HEALTH PO) Take 1 tablet by mouth daily      meloxicam (MOBIC) 15 MG tablet Take 15 mg by mouth daily      glipiZIDE (GLUCOTROL) 10 MG tablet Take 10 mg by mouth 2 times daily (before meals)     ,      Orders Placed This Encounter   Procedures    CT ABDOMEN PELVIS WO CONTRAST Additional Contrast? None     Standing Status:   Future     Number of Occurrences:   1     Standing Expiration Date:   9/14/2021     Order Specific Question:   Additional Contrast?     Answer:   None     Order Specific Question:   Reason for exam:     Answer:   Renal Protocol    Urinalysis     Standing Status:   Future     Number of Occurrences:   1     Standing Expiration Date:   9/14/2021           Aneesh Coulter MD

## 2020-09-14 NOTE — LETTER
CONTROLLED SUBSTANCE MEDICATION AGREEMENT     Patient Name: Denise Graves  Patient YOB: 1970   I understand, that controlled substance medications may be used to help better manage my symptoms and to improve my ability to function at home, work and in social settings. However, I also understand that these medications do have risks, which have been discussed with me, including possible development of physical or psychological dependence. I understand that successful treatment requires mutual trust and honesty between me and my provider. I understand and agree that following this Medication Agreement is necessary in continuing my provider-patient relationship and the success of my treatment plan. Explanation from my Provider: Benefits and Goals of Controlled Substance Medications: There are two potential goals for your treatment: (1) decreased pain and suffering (2) improved daily life functions. There are many possible treatments for your chronic condition(s). Alternatives such as physical therapy, yoga, massage, home daily exercise, meditation, relaxation techniques, injections, chiropractic manipulations, surgery, cognitive therapy, hypnosis and many medications that are not habit-forming may be used. Use of controlled substance medications may be helpful, but they are unlikely to resolve all symptoms or restore all function. Explanation from my Provider: Risks of Controlled Substance Medications:  Opioid pain medications: These medications can lead to problems such as addiction/dependence, sedation, lightheadedness/dizziness, memory issues, falls, constipation, nausea, or vomiting. They may also impair the ability to drive or operate machinery. Additionally, these medications may lower testosterone levels, leading to loss of bone strength, stamina and sex drive.   They may cause problems with breathing, sleep apnea and reduced coughing, which is especially dangerous for patients with lung disease. Overdose or dangerous interactions with alcohol and other medications may occur, leading to death. Hyperalgesia may develop, which means patients receiving opioids for the treatment of pain may become more sensitive to certain painful stimuli, and in some cases, experience pain from ordinarily non-painful stimuli. Women between the ages of 14-53 who could become pregnant should carefully weigh the risks and benefits of opioids with their physicians, as these medications increase the risk of pregnancy complications, including miscarriage,  delivery and stillbirth. It is also possible for babies to be born addicted to opioids. Opioid dependence withdrawal symptoms may include; feelings of uneasiness, increased pain, irritability, belly pain, diarrhea, sweats and goose-flesh. Benzodiazepines and non-benzodiazepine sleep medications: These medications can lead to problems such as addiction/dependence, sedation, fatigue, lightheadedness, dizziness, incoordination, falls, depression, hallucinations, and impaired judgment, memory and concentration. The ability to drive and operate machinery may also be affected. Abnormal sleep-related behaviors have been reported, including sleepwalking, driving, making telephone calls, eating, or having sex while not fully awake. These medications can suppress breathing and worsen sleep apnea, particularly when combined with alcohol or other sedating medications, potentially leading to death. Dependence withdrawal symptoms may include tremors, anxiety, hallucinations and seizures.   Stimulants:  Common adverse effects include addiction/dependence, increased blood  pressure and heart rate, decreased appetite, nausea, involuntary weight loss, insomnia,                                                                                                                     Initials:_______ irritability, and headaches. These risks may increase when these medications are combined with other stimulants, such as caffeine pills or energy drinks, certain weight loss supplements and oral decongestants. Dependence withdrawal symptoms may include depressed mood, loss of interest, suicidal thoughts, anxiety, fatigue, appetite changes and agitation. Testosterone replacement therapy:  Potential side effects include increased risk of stroke and heart attack, blood clots, increased blood pressure, increased cholesterol, enlarged prostate, sleep apnea, irritability/aggression and other mood disorders, and decreased fertility. I agree and understand that I and my prescriber have the following rights and responsibilities regarding my treatment plan:     1. MY RIGHTS:  To be informed of my treatment and medication plan. To be an active participant in my health and wellbeing. 2. MY RESPONSIBILITY AND UNDERSTANDING FOR USE OF MEDICATIONS  ? I will take medications at the dose and frequency as directed. For my safety, I will not increase or change how I take my medications without the recommendation of my healthcare provider. ? I will actively participate in any program recommended by my provider which may improve function, including social, physical, psychological programs. ? I will not take my medications with alcohol or other drugs not prescribed to me. I understand that drinking alcohol with my medications increases the chances of side effects, including reduced breathing rate and could lead to personal injury when operating machinery. ? I understand that if I have a history of substance use disorders, including alcohol or other illicit drugs, that I may be at increased risk of addiction to my medications. ? I agree to notify my provider immediately if I should become pregnant so that my treatment plan can be adjusted.   ? I agree and understand that I shall only receive controlled substance found at: [employer]    5. MY RESPONSIBILITY WITH ILLEGAL DRUGS   ? I will not use illegal or street drugs or another person's prescription medications not prescribed to me.   ? If there are identified addiction type symptoms, then referral to a program may be provided by my provider and I agree to follow through with this recommendation. 6. MY RESPONSIBILITY FOR COOPERATION WITH INVESTIGATIONS  ? I understand that my provider will comply with any applicable law and may discuss my use and/or possible misuse/abuse of controlled substances and alcohol, as appropriate, with any health care provider involved in my care, pharmacist, or legal authority. ? I authorize my provider and pharmacy to cooperate fully with law enforcement agencies (as permitted by law) in the investigation of any possible misuse, sale, or other diversion of my controlled substances. ? I agree to waive any applicable privilege or right of privacy or confidentiality with respect to these authorizations. 7. PROVIDERS RIGHT TO MONITOR FOR SAFETY: PRESCRIPTION MONITORING / DRUG TESTING  ? I consent to drug/toxicology screening and will submit to a drug screen upon my providers request to assure I am only taking the prescribed drugs for my safety monitoring. I understand that a drug screen is a laboratory test in which a sample of my urine, blood or saliva is checked to see what drugs I have been taking. This may entail an observed urine specimen, which means that a nurse or other health care provider may watch me provide urine, and I will cooperate if I am asked to provide an observed specimen. ? I understand that my provider will check a copy of my State Prescription Monitoring Program () Report in order to safely prescribe medications. ? Pill Counts: I consent to pill counts when requested.   I may be asked to I further agree to allow this office to contact my HIPAA contact if there are concerns about my safety and use of the controlled medications. I have agreed to use the prescribed controlled substance medications to me as instructed by my provider and as stated in this Medication Agreement. My initial on each page and my signature below shows that I have read each page and I have had the opportunity to ask questions with answers provided by my provider.     Patient Name (Printed): _____________________________________  Patient Signature:  ______________________   Date: _____________    Prescriber Name (Printed): ___________________________________  Prescriber Signature: _____________________  Date: _____________

## 2020-09-18 RX ORDER — HYDROCODONE BITARTRATE AND ACETAMINOPHEN 10; 325 MG/1; MG/1
1 TABLET ORAL EVERY 8 HOURS PRN
Qty: 90 TABLET | Refills: 0 | Status: SHIPPED | OUTPATIENT
Start: 2020-09-18 | End: 2020-10-18

## 2020-09-23 RX ORDER — DOXYCYCLINE HYCLATE 100 MG
TABLET ORAL
Qty: 60 TABLET | Refills: 0 | Status: SHIPPED | OUTPATIENT
Start: 2020-09-23 | End: 2020-10-19

## 2020-09-24 RX ORDER — TESTOSTERONE CYPIONATE 200 MG/ML
INJECTION INTRAMUSCULAR
Qty: 1 ML | Refills: 5 | Status: SHIPPED | OUTPATIENT
Start: 2020-09-24 | End: 2021-01-01

## 2020-09-24 RX ORDER — ALBUTEROL SULFATE 90 UG/1
1 AEROSOL, METERED RESPIRATORY (INHALATION) EVERY 6 HOURS PRN
Qty: 6.7 INHALER | Refills: 0 | Status: SHIPPED | OUTPATIENT
Start: 2020-09-24 | End: 2020-10-12

## 2020-09-28 ENCOUNTER — OFFICE VISIT (OUTPATIENT)
Dept: FAMILY MEDICINE CLINIC | Age: 50
End: 2020-09-28
Payer: COMMERCIAL

## 2020-09-28 ENCOUNTER — TELEPHONE (OUTPATIENT)
Dept: FAMILY MEDICINE CLINIC | Age: 50
End: 2020-09-28

## 2020-09-28 ENCOUNTER — HOSPITAL ENCOUNTER (OUTPATIENT)
Dept: GENERAL RADIOLOGY | Age: 50
Discharge: HOME | End: 2020-09-28
Payer: COMMERCIAL

## 2020-09-28 VITALS
HEIGHT: 75 IN | DIASTOLIC BLOOD PRESSURE: 80 MMHG | RESPIRATION RATE: 18 BRPM | TEMPERATURE: 96.6 F | SYSTOLIC BLOOD PRESSURE: 160 MMHG | HEART RATE: 95 BPM | WEIGHT: 260 LBS | BODY MASS INDEX: 32.33 KG/M2 | OXYGEN SATURATION: 94 %

## 2020-09-28 PROCEDURE — 99215 OFFICE O/P EST HI 40 MIN: CPT | Performed by: FAMILY MEDICINE

## 2020-09-28 PROCEDURE — 96372 THER/PROPH/DIAG INJ SC/IM: CPT | Performed by: FAMILY MEDICINE

## 2020-09-28 PROCEDURE — 71046 X-RAY EXAM CHEST 2 VIEWS: CPT

## 2020-09-28 RX ORDER — BLOOD-GLUCOSE TRANSMITTER
1 EACH MISCELLANEOUS CONTINUOUS
Qty: 1 EACH | Refills: 5 | Status: SHIPPED | OUTPATIENT
Start: 2020-09-28 | End: 2021-01-26 | Stop reason: SDUPTHER

## 2020-09-28 RX ORDER — CEFDINIR 300 MG/1
300 CAPSULE ORAL 2 TIMES DAILY
Qty: 20 CAPSULE | Refills: 0 | Status: SHIPPED | OUTPATIENT
Start: 2020-09-28 | End: 2020-10-08

## 2020-09-28 RX ORDER — IPRATROPIUM BROMIDE AND ALBUTEROL SULFATE 2.5; .5 MG/3ML; MG/3ML
1 SOLUTION RESPIRATORY (INHALATION) EVERY 6 HOURS PRN
Qty: 360 ML | Refills: 0 | Status: SHIPPED | OUTPATIENT
Start: 2020-09-28 | End: 2020-10-21

## 2020-09-28 RX ORDER — BLOOD-GLUCOSE,RECEIVER,CONT
1 EACH MISCELLANEOUS CONTINUOUS
Qty: 1 DEVICE | Refills: 0 | Status: SHIPPED | OUTPATIENT
Start: 2020-09-28

## 2020-09-28 RX ORDER — BLOOD-GLUCOSE SENSOR
1 EACH MISCELLANEOUS CONTINUOUS
Qty: 6 EACH | Refills: 5 | Status: SHIPPED | OUTPATIENT
Start: 2020-09-28 | End: 2020-11-16 | Stop reason: SDUPTHER

## 2020-09-28 RX ORDER — TRIAMCINOLONE ACETONIDE 40 MG/ML
80 INJECTION, SUSPENSION INTRA-ARTICULAR; INTRAMUSCULAR ONCE
Status: COMPLETED | OUTPATIENT
Start: 2020-09-28 | End: 2020-09-28

## 2020-09-28 RX ADMIN — TRIAMCINOLONE ACETONIDE 80 MG: 40 INJECTION, SUSPENSION INTRA-ARTICULAR; INTRAMUSCULAR at 16:42

## 2020-09-28 ASSESSMENT — ENCOUNTER SYMPTOMS
VOMITING: 0
BLOOD IN STOOL: 0
BACK PAIN: 1
NAUSEA: 0
CONSTIPATION: 0
WHEEZING: 0
COUGH: 1
EYES NEGATIVE: 1
CHEST TIGHTNESS: 0
SHORTNESS OF BREATH: 1
DIARRHEA: 0

## 2020-09-28 NOTE — PROGRESS NOTES
Subjective:      Patient ID: Angelique Sánchez is a 44 y.o. male. HPI  This patient is seen here intermittently by the undersigned. He is seen here for management of chronic disease states same as new issues arise as well. He is seen here today complaining of a new onset issue. He states that about 2 weeks ago he had onset of some shortness of breath. This actually began with what he felt like was allergy symptoms. He began with sneezing. He has had no temp and no chills. He has had some shortness of breath actually if laying down. No chest pain with the present illness is reported by the patient currently chest x-ray was done and showed some peribronchial thickening likely indicative of an inflammatory process but no evidence of infiltrate was seen and no pneumonia appreciated. The patient was not found to have any evidence of wheezing on auscultation here today. Regrettably, he does have a history of diabetes mellitus. He also has a history of coronary artery disease. He did have last heart cath done in 2012. Currently he does utilize insulin. He takes basagloar 40 units at night subcu. He states that this morning his sugar was around 240. Does state that he checks his sugar fairly often but it is difficult when he is at work because oftentimes he has to go outside to his vehicle to do so. We are going to see if we cannot get a paulo system or Dexcom monitor system for him to be able to get better control of his sugar. Because of the issue of the likelihood of this is a reactive airway disease process, I am going to give him Kenalog 80 mg IM. I told him that this would significantly stress his blood sugar and asked him to watch it very carefully as far as his diet goes. Additionally, he is given Rocephin 1 g IM by Briana Madsen RN as well. Patient does have an albuterol HFA HandiHaler at home. We are going to prescribe a nebulizer machine for him to use 4 times daily with duo nebs solution. Then will also be placed on Omnicef 300 mg p.o. twice daily for 10 days. A sample of Dulera 200 per 5 mcg inhaler is given to the patient which should have 15 days worth of medicines. This is also prescribed to his local pharmacy at [facility].  The patient should use 2 puffs twice daily for this. Giving him 6 ounces of Hycodan syrup to use as needed for cough at a teaspoon every 6 hours as needed. I am going to ask him to not work until Monday. He is given a slip for this. As far as his sugar control goes I think it is important that we increase his basaglar. I will ask him to take 10 units extra in the morning in an effort to get the sugar under better control and perhaps accommodate the fact that we are using corticosteroid. I asked him to maintain his current dose of 40 units taken at night. As stated previously, we are going to try to get approval for the continuous monitoring system to enable him to get his sugar under more ideal control. Patient does have a history of having been a smoker in the past and has smoked off and on but not currently. He does have a history of hyperlipidemia. He is maintained on Crestor 10 mg p.o. nightly. MND supplementation is done for deficiency with ergocalciferol 50,000 unit capsule taken twice weekly. Magda Avitia does have a history of BPH. He does utilize Flomax 0.4 at 1 daily. He also takes testosterone Eppy/N 8 at 200 mg/cc and uses 1 cc every 3 weeks. For symptoms of GERD, he is on Protonix 40 mg p.o. daily. He does have essential hypertension today. His pressure was up initially 170/90. Follow-up was somewhat improved though it still remained elevated. He is on lisinopril 1020 mg p.o. twice daily. Additionally, he is on Toprol-XL 50 mg at 1 daily and Lasix 40 mg is taken at 1 p.o. daily. The patient has no peripheral edema today and no rhonchi, wheezes nor rales were noted on auscultation. As stated above, he did have heart cath done on 3/15/2012.   He Pharynx: Oropharynx is clear. Uvula midline. Tonsils: No tonsillar exudate or tonsillar abscesses. Eyes:      General: Lids are normal.         Right eye: No discharge. Left eye: No discharge. Extraocular Movements: Extraocular movements intact. Right eye: Normal extraocular motion. Left eye: Normal extraocular motion. Conjunctiva/sclera: Conjunctivae normal.      Right eye: Right conjunctiva is not injected. Left eye: Left conjunctiva is not injected. Pupils: Pupils are equal, round, and reactive to light. Neck:      Musculoskeletal: Normal range of motion and neck supple. No neck rigidity or muscular tenderness. Thyroid: No thyromegaly. Vascular: No carotid bruit or JVD. Cardiovascular:      Rate and Rhythm: Normal rate and regular rhythm. Pulses:           Carotid pulses are 2+ on the right side and 2+ on the left side. Radial pulses are 2+ on the right side and 2+ on the left side. Heart sounds: Normal heart sounds, S1 normal and S2 normal. No murmur. No friction rub. No gallop. Pulmonary:      Effort: Pulmonary effort is normal. No accessory muscle usage or respiratory distress. Breath sounds: Normal breath sounds. No stridor. No wheezing, rhonchi or rales. Chest:      Chest wall: No tenderness. Abdominal:      General: Bowel sounds are normal. There is no distension or abdominal bruit. Palpations: Abdomen is soft. There is no mass. Tenderness: There is no abdominal tenderness. There is no right CVA tenderness, left CVA tenderness, guarding or rebound. Hernia: No hernia is present. Musculoskeletal: Normal range of motion. General: No swelling, tenderness, deformity or signs of injury. Right lower leg: No edema. Left lower leg: No edema. Lymphadenopathy:      Cervical: No cervical adenopathy. Right cervical: No superficial cervical adenopathy.      Left cervical: No superficial cervical daily for 10 days 20 capsule 0    Continuous Blood Gluc Transmit (DEXCOM G6 TRANSMITTER) MISC 1 each by Does not apply route continuous E11.62, L97.509, Z79.4 1 each 5    Continuous Blood Gluc Sensor (DEXCOM G6 SENSOR) MISC 1 each by Does not apply route continuous E11.62, L97.509, Z79.4 6 each 5    Continuous Blood Gluc  (DEXCOM G6 ) JOVANY 1 each by Does not apply route continuous DX E11.62, L97.509, Z79.4 1 Device 0    mometasone-formoterol (DULERA) 200-5 MCG/ACT inhaler Inhale 2 puffs into the lungs every 12 hours Hold script for now 2 Inhaler 5    HYDROcodone-homatropine (HYCODAN) 5-1.5 MG/5ML syrup Take 5 mLs by mouth every 6 hours as needed (cough) for up to 10 days. 180 mL 0    albuterol sulfate  (90 Base) MCG/ACT inhaler INHALE 1 PUFF INTO THE LUNGS EVERY 6 HOURS AS NEEDED FOR WHEEZING OR SHORTNESS OF BREATH 6.7 Inhaler 0    testosterone cypionate (DEPOTESTOTERONE CYPIONATE) 200 MG/ML injection INJECT 1ML INTRAMUSCULARLY EVERY 21 DAYS 1 mL 5    doxycycline hyclate (VIBRA-TABS) 100 MG tablet TAKE 1 TABLET BY MOUTH TWICE A DAY 60 tablet 0    HYDROcodone-acetaminophen (NORCO)  MG per tablet Take 1 tablet by mouth every 8 hours as needed for Pain for up to 30 days. 90 tablet 0    tamsulosin (FLOMAX) 0.4 MG capsule Take 0.4 mg by mouth daily      tiZANidine (ZANAFLEX) 4 MG tablet Take 1 tablet by mouth every 8 hours as needed (back pain) 90 tablet 0    blood glucose monitor strips 1 strip by Other route 3 times daily TID   Uses [employer]  Machine.   Dx E11.9, Z79.4 100 strip 3    pantoprazole (PROTONIX) 40 MG tablet TAKE 1 TABLET BY MOUTH EVERY MORNING BEFORE BREAKFAST 30 tablet 0    baclofen (LIORESAL) 10 MG tablet TAKE 1 TABLET BY MOUTH TWICE A DAY 60 tablet 1    metoprolol succinate (TOPROL XL) 50 MG extended release tablet Take 1 tablet by mouth daily 90 tablet 3    furosemide (LASIX) 40 MG tablet TAKE 1 TABLET BY MOUTH EVERY DAY 90 tablet 1    rosuvastatin (CRESTOR) 10 MG tablet Take 1 tablet by mouth daily 90 tablet 1    lisinopril (PRINIVIL;ZESTRIL) 20 MG tablet Take 1 tablet by mouth 2 times daily 60 tablet 3    Needles & Syringes MISC 1 each by Does not apply route every 21 days Needs 3 cc syringes with 22 G  1/2 inch needle to give Testosterone and needs 18 Gauge to draw up med.  10 each 5    BASAGLAR KWIKPEN 100 UNIT/ML injection pen Inject 40 Units into the skin daily  5 pen 6    vitamin D (ERGOCALCIFEROL) 1.25 MG (89344 UT) CAPS capsule Take 1 capsule by mouth twice a week 32 capsule 3    clopidogrel (PLAVIX) 75 MG tablet Take 75 mg by mouth daily      Multiple Vitamins-Minerals (DIABETES HEALTH PO) Take 1 tablet by mouth daily      meloxicam (MOBIC) 15 MG tablet Take 15 mg by mouth daily      glipiZIDE (GLUCOTROL) 10 MG tablet Take 10 mg by mouth 2 times daily (before meals)       Requested Prescriptions     Signed Prescriptions Disp Refills    Nebulizers (COMPRESSOR/NEBULIZER) MISC 1 each 3     Si each by Does not apply route 4 times daily as needed (cough)    ipratropium-albuterol (DUONEB) 0.5-2.5 (3) MG/3ML SOLN nebulizer solution 360 mL 0     Sig: Inhale 3 mLs into the lungs every 6 hours as needed for Shortness of Breath    cefdinir (OMNICEF) 300 MG capsule 20 capsule 0     Sig: Take 1 capsule by mouth 2 times daily for 10 days    Continuous Blood Gluc Transmit (DEXCOM G6 TRANSMITTER) MISC 1 each 5     Si each by Does not apply route continuous E11.62, L97.509, Z79.4    Continuous Blood Gluc Sensor (DEXCOM G6 SENSOR) MISC 6 each 5     Si each by Does not apply route continuous E11.62, L97.509, Z79.4    Continuous Blood Gluc  (DEXCOM G6 ) JOVANY 1 Device 0     Si each by Does not apply route continuous DX E11.62, L97.509, Z79.4    mometasone-formoterol (DULERA) 200-5 MCG/ACT inhaler 2 Inhaler 5     Sig: Inhale 2 puffs into the lungs every 12 hours Hold script for now    HYDROcodone-homatropine (HYCODAN) 5-1.5 MG/5ML syrup 180 mL 0     Sig: Take 5 mLs by mouth every 6 hours as needed (cough) for up to 10 days. .     Orders Placed This Encounter   Procedures    XR CHEST STANDARD (2 VW)     Standing Status:   Future     Number of Occurrences:   1     Standing Expiration Date:   2021     Orders Placed This Encounter   Medications    triamcinolone acetonide (KENALOG-40) injection 80 mg    Nebulizers (COMPRESSOR/NEBULIZER) MISC     Si each by Does not apply route 4 times daily as needed (cough)     Dispense:  1 each     Refill:  3    ipratropium-albuterol (DUONEB) 0.5-2.5 (3) MG/3ML SOLN nebulizer solution     Sig: Inhale 3 mLs into the lungs every 6 hours as needed for Shortness of Breath     Dispense:  360 mL     Refill:  0    cefTRIAXone (ROCEPHIN) 1,000 mg in lidocaine 1 % 2.86 mL IM Injection     Order Specific Question:   Antimicrobial Indications     Answer:   Upper Respiratory Infection     Order Specific Question:   URI duration of therapy     Answer:    Other    cefdinir (OMNICEF) 300 MG capsule     Sig: Take 1 capsule by mouth 2 times daily for 10 days     Dispense:  20 capsule     Refill:  0    Continuous Blood Gluc Transmit (DEXCOM G6 TRANSMITTER) MISC     Si each by Does not apply route continuous E11.62, L97.509, Z79.4     Dispense:  1 each     Refill:  5    Continuous Blood Gluc Sensor (DEXCOM G6 SENSOR) MISC     Si each by Does not apply route continuous E11.62, L97.509, Z79.4     Dispense:  6 each     Refill:  5    Continuous Blood Gluc  (DEXCOM G6 ) JOVANY     Si each by Does not apply route continuous DX E11.62, L97.509, Z79.4     Dispense:  1 Device     Refill:  0    mometasone-formoterol (DULERA) 200-5 MCG/ACT inhaler     Sig: Inhale 2 puffs into the lungs every 12 hours Hold script for now     Dispense:  2 Inhaler     Refill:  5    HYDROcodone-homatropine (HYCODAN) 5-1.5 MG/5ML syrup     Sig: Take 5 mLs by mouth every 6 hours as needed (cough) for up to 10 days.     Dispense:  180 mL     Refill:  0     Reduce doses taken as pain becomes manageable             Maggie Lepe MD

## 2020-10-05 RX ORDER — PANTOPRAZOLE SODIUM 40 MG/1
TABLET, DELAYED RELEASE ORAL
Qty: 30 TABLET | Refills: 0 | Status: SHIPPED | OUTPATIENT
Start: 2020-10-05 | End: 2021-01-01 | Stop reason: SDUPTHER

## 2020-10-06 RX ORDER — TIZANIDINE 4 MG/1
4 TABLET ORAL EVERY 8 HOURS PRN
Qty: 90 TABLET | Refills: 0 | Status: SHIPPED | OUTPATIENT
Start: 2020-10-06 | End: 2020-10-27 | Stop reason: SDUPTHER

## 2020-10-08 ENCOUNTER — TELEPHONE (OUTPATIENT)
Dept: FAMILY MEDICINE CLINIC | Age: 50
End: 2020-10-08

## 2020-10-08 NOTE — TELEPHONE ENCOUNTER
[facility] left message in regard to drug interaction between Lisinopril and Tizanidine. Dr. Brisa Nieto notified and stated was aware.

## 2020-10-12 RX ORDER — ALBUTEROL SULFATE 90 UG/1
1 AEROSOL, METERED RESPIRATORY (INHALATION) EVERY 6 HOURS PRN
Qty: 6.7 INHALER | Refills: 0 | Status: SHIPPED | OUTPATIENT
Start: 2020-10-12 | End: 2020-11-09

## 2020-10-19 RX ORDER — DOXYCYCLINE HYCLATE 100 MG
TABLET ORAL
Qty: 60 TABLET | Refills: 0 | Status: SHIPPED | OUTPATIENT
Start: 2020-10-19 | End: 2020-11-12

## 2020-10-21 RX ORDER — IPRATROPIUM BROMIDE AND ALBUTEROL SULFATE 2.5; .5 MG/3ML; MG/3ML
1 SOLUTION RESPIRATORY (INHALATION) EVERY 6 HOURS PRN
Qty: 360 ML | Refills: 0 | Status: SHIPPED | OUTPATIENT
Start: 2020-10-21

## 2020-10-27 ENCOUNTER — VIRTUAL VISIT (OUTPATIENT)
Dept: FAMILY MEDICINE CLINIC | Age: 50
End: 2020-10-27
Payer: COMMERCIAL

## 2020-10-27 PROCEDURE — 99214 OFFICE O/P EST MOD 30 MIN: CPT | Performed by: FAMILY MEDICINE

## 2020-10-27 RX ORDER — TIZANIDINE 4 MG/1
4 TABLET ORAL EVERY 6 HOURS PRN
Qty: 120 TABLET | Refills: 0 | Status: SHIPPED | OUTPATIENT
Start: 2020-10-27 | End: 2020-11-03

## 2020-10-27 ASSESSMENT — ENCOUNTER SYMPTOMS
COUGH: 0
SHORTNESS OF BREATH: 0
WHEEZING: 0
EYES NEGATIVE: 1
BLOOD IN STOOL: 0
CHEST TIGHTNESS: 0
CONSTIPATION: 0

## 2020-10-27 NOTE — PROGRESS NOTES
10/27/2020    TELEHEALTH EVALUATION -- Audio/Visual (During KFAPW-28 public health emergency)  Patient is being consulted today by way of telehealth through doxy. me a video conferencing. Telehealth is utilized due to the current pandemic in [place] caused by coronavirus. The patient has previously been advised that this services billed to his insurance provider by [facility] ([facility]). HPI:  This patient is seen here intermittently by the undersigned. Is doing video consultation today to follow-up on his blood sugar. His his blood sugar had been found to be markedly elevated as is noted from the note below. He is seen here for management of chronic disease states same as new issues arise as well.  His blood sugar prior to evaluating in September had been running extremely high. The patient had previously had A1c done on 7/27/2020 and it was noted then to be 11.1. We have increased Basaglar insulin up to 40 units in the morning and 40 units in the evening. The patient states that now his sugars are running about  in the morning generally. At night it is generally always less than 200. The most part they are doing so much better. He states that finally he was able to get the writewith BEHAVIORAL HEALTH monitoring device that this has been helpful. It took a while for him to be able to receive this but now he has everything up and running and is doing fairly well. For sugar control, he remains on glipizide at 10 mg twice daily. He is not currently on metformin stating that he took it in the past and he just simply did not tolerate it so he does refuses to take it. I am not going to change his insulin regimen at this time. We will be doing follow-up labs and may adjust it then. Also there are other medicines which could be additionally utilized with the current regimen including something such as Januvia but I will not add that until we have data to go on.   He will come in fasting and have fasting labs done in the near future. Seems to be doing better with regard to his respiratory status. He does have some residual cough so I am going to refill Hycodan syrup for him today. He does have a history of hyperlipidemia. He is maintained on Crestor 10 mg p.o. nightly.     MND supplementation is done for deficiency with ergocalciferol 50,000 unit capsule taken twice weekly.     Elba Marsh does have a history of BPH. He does utilize Flomax 0.4 at 1 daily. He also takes testosterone Eppy/N 8 at 200 mg/cc and uses 1 cc every 3 weeks.     For symptoms of GERD, he is on Protonix 40 mg p.o. daily.     He does have essential hypertension. Follow-up was somewhat improved though it still remained elevated. He is on lisinopril 1020 mg p.o. twice daily. Additionally, he is on Toprol-XL 50 mg at 1 daily and Lasix 40 mg is taken at 1 p.o. daily. The patient has no peripheral edema today and no rhonchi, wheezes nor rales were noted on auscultation. As stated above, he did have heart cath done on 3/15/2012. He does have a history of CABG x3. On 3/14/1212 it was noted that he had tricuspid valvular disease but had normal left ventricular function at that time. He has had prior history of myocardial injury. The respiratory status does not respond to appropriate measures, we will also consider cardiology evaluation and we have talked to him about this. Previously he had seen Dr. Lula Pepper and then Dr. Emi Lynn. We did have various discussions about this and he may elect to choose Dr. Karissa Lopez in the near future.     He does have a history of chronic back issues. He currently is on Norco 10/325 at every 8 hour intervals for pain. Additionally he has baclofen 10 mg available for twice daily usage. He has used Zanaflex in the past as well. He has found the Zanaflex to be somewhat helpful but he has been using 1 daily so I will represcribe this for him at utilization of up to 4 daily.       Tiffany Cowan (:  1970) has requested an audio/video evaluation for the following concern(s):        Review of Systems   Constitutional: Negative. HENT: Negative. Eyes: Negative. Respiratory: Negative for cough, chest tightness, shortness of breath and wheezing. Cardiovascular: Negative for chest pain, palpitations and leg swelling. Gastrointestinal: Negative for blood in stool and constipation. Genitourinary: Negative for hematuria. Skin: Negative. Neurological: Negative. Psychiatric/Behavioral: Negative. Prior to Visit Medications    Medication Sig Taking? Authorizing Provider   tiZANidine (ZANAFLEX) 4 MG tablet Take 1 tablet by mouth every 6 hours as needed (back pain) Yes Aguila Pineda MD   HYDROcodone-homatropine [facility]) 5-1.5 MG/5ML syrup Take 5 mLs by mouth every 6 hours as needed (cough) for up to 10 days. Yes Aguila Pineda MD   ipratropium-albuterol (DUONEB) 0.5-2.5 (3) MG/3ML SOLN nebulizer solution INHALE 3 MLS INTO THE LUNGS EVERY 6 HOURS AS NEEDED FOR SHORTNESS OF BREATH  Aguila Pineda MD   doxycycline hyclate (VIBRA-TABS) 100 MG tablet TAKE 1 TABLET BY MOUTH TWICE A DAY  Aguila Pineda MD   albuterol sulfate  (90 Base) MCG/ACT inhaler INHALE 1 PUFF INTO THE LUNGS EVERY 6 HOURS AS NEEDED FOR WHEEZING OR SHORTNESS OF BREATH.   Aguila Pineda MD   pantoprazole (PROTONIX) 40 MG tablet TAKE 1 TABLET BY MOUTH EVERY DAY BEFORE BREAKFAST  Aguila Pineda MD   Nebulizers (COMPRESSOR/NEBULIZER) MISC 1 each by Does not apply route 4 times daily as needed (cough)  Aguila Pineda MD   Continuous Blood Gluc Transmit (DEXCOM G6 TRANSMITTER) MISC 1 each by Does not apply route continuous E11.62, L97.509, Z79.4  Aguila Pineda MD   Continuous Blood Gluc Sensor (DEXCOM G6 SENSOR) MISC 1 each by Does not apply route continuous E11.62, L97.509, Z79.4  Aguila Pineda MD   Continuous Blood Gluc  ([address]) [address] 1 each by Does not apply route continuous DX E11.62, L97.509, Z79.4  Aguila Pineda MD mometasone-formoterol (DULERA) 200-5 MCG/ACT inhaler Inhale 2 puffs into the lungs every 12 hours Hold script for now  Maddy Costello MD   testosterone cypionate (DEPOTESTOTERONE CYPIONATE) 200 MG/ML injection INJECT 1ML INTRAMUSCULARLY EVERY 21 DAYS  Maddy Costello MD   tamsulosin (FLOMAX) 0.4 MG capsule Take 0.4 mg by mouth daily  Historical Provider, MD   blood glucose monitor strips 1 strip by Other route 3 times daily TID   Uses Accucheck  Machine. Dx E11.9, Z79.4  Maddy Costello MD   baclofen (LIORESAL) 10 MG tablet TAKE 1 TABLET BY MOUTH TWICE A DAY  Maddy Costello MD   metoprolol succinate (TOPROL XL) 50 MG extended release tablet Take 1 tablet by mouth daily  Maddy Costello MD   furosemide (LASIX) 40 MG tablet TAKE 1 TABLET BY MOUTH EVERY DAY  Maddy Costello MD   rosuvastatin (CRESTOR) 10 MG tablet Take 1 tablet by mouth daily  MELITON Quijano - CNP   lisinopril (PRINIVIL;ZESTRIL) 20 MG tablet Take 1 tablet by mouth 2 times daily  MELITON Feng   Needles & Syringes MISC 1 each by Does not apply route every 21 days Needs 3 cc syringes with 22 G  1/2 inch needle to give Testosterone and needs 18 Gauge to draw up med.   Maddy Costello MD   BASDEE DEE BOOIKPEN 100 UNIT/ML injection pen Inject 40 Units into the skin daily   Maddy Costello MD   vitamin D (ERGOCALCIFEROL) 1.25 MG (84936 UT) CAPS capsule Take 1 capsule by mouth twice a week  Maddy Costello MD   clopidogrel (PLAVIX) 75 MG tablet Take 75 mg by mouth daily  Historical Provider, MD   Multiple Vitamins-Minerals (DIABETES HEALTH PO) Take 1 tablet by mouth daily  Historical Provider, MD   meloxicam (MOBIC) 15 MG tablet Take 15 mg by mouth daily  Historical Provider, MD   glipiZIDE (GLUCOTROL) 10 MG tablet Take 10 mg by mouth 2 times daily (before meals)  Historical Provider, MD       Social History     Tobacco Use    Smoking status: Former Smoker     Packs/day: 0.50     Years: 30.00     Pack years: 15.00     Types: Cigarettes     Last Alcohol use: No     Comment: Rarely    Drug use: No   ,   Family History   Problem Relation Age of Onset    Heart Disease Other     High Blood Pressure Other     Diabetes Other    ,   There is no immunization history on file for this patient. PHYSICAL EXAMINATION:  [ INSTRUCTIONS:  \"[x]\" Indicates a positive item  \"[]\" Indicates a negative item  -- DELETE ALL ITEMS NOT EXAMINED]  Vital Signs: (As obtained by patient/caregiver or practitioner observation)    Blood pressure-  Heart rate-    Respiratory rate-    Temperature-  Pulse oximetry-     Constitutional: [x] Appears well-developed and well-nourished [x] No apparent distress      [] Abnormal-   Mental status  [x] Alert and awake  [x] Oriented to person/place/time [x]Able to follow commands      Eyes:  EOM    [x]  Normal  [] Abnormal-  Sclera  [x]  Normal  [] Abnormal -         Discharge [x]  None visible  [] Abnormal -    HENT:   [x] Normocephalic, atraumatic. [] Abnormal   [x] Mouth/Throat: Mucous membranes are moist.     External Ears [x] Normal  [] Abnormal-     Neck: [x] No visualized mass     Pulmonary/Chest: [x] Respiratory effort normal.  [x] No visualized signs of difficulty breathing or respiratory distress        [] Abnormal-      Musculoskeletal:   [] Normal gait with no signs of ataxia         [] Normal range of motion of neck        [] Abnormal-       Neurological:        [x] No Facial Asymmetry (Cranial nerve 7 motor function) (limited exam to video visit)          [x] No gaze palsy        [] Abnormal-         Skin:        [x] No significant exanthematous lesions or discoloration noted on facial skin         [] Abnormal-            Psychiatric:       [x] Normal Affect [x] No Hallucinations        [] Abnormal-     Other pertinent observable physical exam findings-     ASSESSMENT/PLAN:   Diagnosis Orders   1. Right flank pain  tiZANidine (ZANAFLEX) 4 MG tablet   2.  Chronic right-sided low back pain without sciatica  tiZANidine (ZANAFLEX) 4 MG tablet   3. Shortness of breath  HYDROcodone-homatropine (HYCODAN) 5-1.5 MG/5ML syrup   4. Cough  HYDROcodone-homatropine (HYCODAN) 5-1.5 MG/5ML syrup   5. Type 2 diabetes mellitus with foot ulcer, with long-term current use of insulin (HCC)  Hemoglobin A1C    CBC    Basic Metabolic Panel    Lipid, Fasting    Hepatic Function Panel    Urinalysis Reflex to Culture   6. Coronary artery disease involving native coronary artery of native heart without angina pectoris     7. Essential hypertension     8. Mixed hyperlipidemia       I am having Mr. Krzysztof Arzola maintain his :   Outpatient Medications Marked as Taking for the 10/27/20 encounter (Virtual Visit) with Gloria Villela MD   Medication Sig Dispense Refill    tiZANidine (ZANAFLEX) 4 MG tablet Take 1 tablet by mouth every 6 hours as needed (back pain) 120 tablet 0    HYDROcodone-homatropine (HYCODAN) 5-1.5 MG/5ML syrup Take 5 mLs by mouth every 6 hours as needed (cough) for up to 10 days. 180 mL 0     Medications Discontinued During This Encounter   Medication Reason    tiZANidine (ZANAFLEX) 4 MG tablet REORDER    HYDROcodone-homatropine (HYCODAN) 5-1.5 MG/5ML syrup REORDER     Requested Prescriptions     Signed Prescriptions Disp Refills    tiZANidine (ZANAFLEX) 4 MG tablet 120 tablet 0     Sig: Take 1 tablet by mouth every 6 hours as needed (back pain)    HYDROcodone-homatropine (HYCODAN) 5-1.5 MG/5ML syrup 180 mL 0     Sig: Take 5 mLs by mouth every 6 hours as needed (cough) for up to 10 days. Dae Dinh is a 44 y.o. male being evaluated by a Virtual Visit (video visit) encounter to address concerns as mentioned above. A caregiver was present when appropriate. Due to this being a TeleHealth encounter (During YYHJF-96 public health emergency), evaluation of the following organ systems was limited: Vitals/Constitutional/EENT/Resp/CV/GI//MS/Neuro/Skin/Heme-Lymph-Imm.   Pursuant to the emergency declaration under the [address] and the National Emergencies Act, [facility] waiver authority and the [employer] Act, this Virtual Visit was conducted with patient's (and/or legal guardian's) consent, to reduce the patient's risk of exposure to COVID-19 and provide necessary medical care. The patient (and/or legal guardian) has also been advised to contact this office for worsening conditions or problems, and seek emergency medical treatment and/or call 911 if deemed necessary. Patient identification was verified at the start of the visit: Yes    Total time spent on this encounter: Not billed by time    Services were provided through a video synchronous discussion virtually to substitute for in-person clinic visit. Patient and provider were located at their individual homes. --Thom Palma MD on 10/27/2020 at 12:33 PM    An electronic signature was used to authenticate this note.

## 2020-11-03 RX ORDER — TIZANIDINE 4 MG/1
TABLET ORAL
Qty: 90 TABLET | Refills: 2 | Status: SHIPPED | OUTPATIENT
Start: 2020-11-03 | End: 2020-11-18

## 2020-11-09 RX ORDER — ALBUTEROL SULFATE 90 UG/1
1 AEROSOL, METERED RESPIRATORY (INHALATION) EVERY 6 HOURS PRN
Qty: 6.7 INHALER | Refills: 0 | Status: SHIPPED | OUTPATIENT
Start: 2020-11-09 | End: 2020-12-21

## 2020-11-12 RX ORDER — DOXYCYCLINE HYCLATE 100 MG
TABLET ORAL
Qty: 60 TABLET | Refills: 0 | Status: SHIPPED | OUTPATIENT
Start: 2020-11-12 | End: 2020-12-17

## 2020-11-16 RX ORDER — BLOOD-GLUCOSE SENSOR
1 EACH MISCELLANEOUS CONTINUOUS
Qty: 6 EACH | Refills: 5 | Status: SHIPPED | OUTPATIENT
Start: 2020-11-16 | End: 2021-01-26 | Stop reason: SDUPTHER

## 2020-11-17 DIAGNOSIS — Z79.4 TYPE 2 DIABETES MELLITUS WITH FOOT ULCER, WITH LONG-TERM CURRENT USE OF INSULIN (HCC): ICD-10-CM

## 2020-11-17 DIAGNOSIS — E11.621 TYPE 2 DIABETES MELLITUS WITH FOOT ULCER, WITH LONG-TERM CURRENT USE OF INSULIN (HCC): ICD-10-CM

## 2020-11-17 DIAGNOSIS — L97.509 TYPE 2 DIABETES MELLITUS WITH FOOT ULCER, WITH LONG-TERM CURRENT USE OF INSULIN (HCC): ICD-10-CM

## 2020-11-17 LAB
ALBUMIN SERPL-MCNC: 3.4 G/DL (ref 3.5–5.2)
ALP BLD-CCNC: 90 U/L (ref 40–130)
ALT SERPL-CCNC: 30 U/L (ref 5–41)
ANION GAP SERPL CALCULATED.3IONS-SCNC: 11 MMOL/L (ref 7–19)
AST SERPL-CCNC: 15 U/L (ref 5–40)
BILIRUB SERPL-MCNC: 0.4 MG/DL (ref 0.2–1.2)
BILIRUBIN DIRECT: 0.1 MG/DL (ref 0–0.3)
BILIRUBIN URINE: NEGATIVE
BILIRUBIN, INDIRECT: 0.3 MG/DL (ref 0.1–1)
BLOOD, URINE: ABNORMAL
BUN BLDV-MCNC: 22 MG/DL (ref 6–20)
CALCIUM SERPL-MCNC: 9.2 MG/DL (ref 8.6–10)
CHLORIDE BLD-SCNC: 101 MMOL/L (ref 98–111)
CHOLESTEROL, FASTING: 284 MG/DL (ref 160–199)
CLARITY: CLEAR
CO2: 30 MMOL/L (ref 22–29)
COLOR: YELLOW
CREAT SERPL-MCNC: 1.2 MG/DL (ref 0.5–1.2)
GFR AFRICAN AMERICAN: >59
GFR NON-AFRICAN AMERICAN: >60
GLUCOSE BLD-MCNC: 388 MG/DL (ref 74–109)
GLUCOSE URINE: =>1000 MG/DL
HBA1C MFR BLD: 10.3 % (ref 4–6)
HCT VFR BLD CALC: 58.8 % (ref 42–52)
HDLC SERPL-MCNC: 35 MG/DL (ref 55–121)
HEMOGLOBIN: 18.2 G/DL (ref 14–18)
KETONES, URINE: NEGATIVE MG/DL
LDL CHOLESTEROL CALCULATED: 195 MG/DL
LEUKOCYTE ESTERASE, URINE: NEGATIVE
MCH RBC QN AUTO: 27 PG (ref 27–31)
MCHC RBC AUTO-ENTMCNC: 31 G/DL (ref 33–37)
MCV RBC AUTO: 87.1 FL (ref 80–94)
NITRITE, URINE: NEGATIVE
PDW BLD-RTO: 19 % (ref 11.5–14.5)
PH UA: 5.5 (ref 5–8)
PLATELET # BLD: 186 K/UL (ref 130–400)
PMV BLD AUTO: 11.9 FL (ref 9.4–12.4)
POTASSIUM SERPL-SCNC: 4.9 MMOL/L (ref 3.5–5)
PROTEIN UA: 300 MG/DL
RBC # BLD: 6.75 M/UL (ref 4.7–6.1)
SODIUM BLD-SCNC: 142 MMOL/L (ref 136–145)
SPECIFIC GRAVITY UA: 1.03 (ref 1–1.03)
TOTAL PROTEIN: 6.2 G/DL (ref 6.6–8.7)
TRIGLYCERIDE, FASTING: 269 MG/DL (ref 0–149)
UROBILINOGEN, URINE: 0.2 E.U./DL
WBC # BLD: 7.7 K/UL (ref 4.8–10.8)

## 2020-11-18 RX ORDER — TIZANIDINE 4 MG/1
TABLET ORAL
Qty: 120 TABLET | Refills: 5 | Status: SHIPPED | OUTPATIENT
Start: 2020-11-18 | End: 2021-01-01

## 2020-11-20 ENCOUNTER — TELEPHONE (OUTPATIENT)
Dept: FAMILY MEDICINE CLINIC | Age: 50
End: 2020-11-20

## 2020-12-17 RX ORDER — DOXYCYCLINE HYCLATE 100 MG
TABLET ORAL
Qty: 60 TABLET | Refills: 0 | Status: SHIPPED | OUTPATIENT
Start: 2020-12-17 | End: 2021-01-11

## 2020-12-17 RX ORDER — ROSUVASTATIN CALCIUM 10 MG/1
TABLET, COATED ORAL
Qty: 90 TABLET | Refills: 1 | Status: ON HOLD | OUTPATIENT
Start: 2020-12-17 | End: 2021-01-01 | Stop reason: HOSPADM

## 2020-12-21 RX ORDER — ALBUTEROL SULFATE 90 UG/1
1 AEROSOL, METERED RESPIRATORY (INHALATION) EVERY 6 HOURS PRN
Qty: 6.7 INHALER | Refills: 0 | Status: SHIPPED | OUTPATIENT
Start: 2020-12-21 | End: 2021-01-01

## 2021-01-01 ENCOUNTER — CARE COORDINATION (OUTPATIENT)
Dept: CASE MANAGEMENT | Age: 51
End: 2021-01-01

## 2021-01-01 ENCOUNTER — TELEPHONE (OUTPATIENT)
Dept: FAMILY MEDICINE CLINIC | Age: 51
End: 2021-01-01

## 2021-01-01 ENCOUNTER — OFFICE VISIT (OUTPATIENT)
Dept: CARDIOLOGY CLINIC | Age: 51
End: 2021-01-01
Payer: COMMERCIAL

## 2021-01-01 ENCOUNTER — APPOINTMENT (OUTPATIENT)
Dept: GENERAL RADIOLOGY | Age: 51
DRG: 246 | End: 2021-01-01
Payer: COMMERCIAL

## 2021-01-01 ENCOUNTER — PROCEDURE VISIT (OUTPATIENT)
Dept: UROLOGY | Age: 51
End: 2021-01-01
Payer: COMMERCIAL

## 2021-01-01 ENCOUNTER — TELEPHONE (OUTPATIENT)
Dept: UROLOGY | Age: 51
End: 2021-01-01

## 2021-01-01 ENCOUNTER — HOSPITAL ENCOUNTER (OUTPATIENT)
Dept: GENERAL RADIOLOGY | Age: 51
Discharge: HOME | End: 2021-02-04
Payer: COMMERCIAL

## 2021-01-01 ENCOUNTER — OFFICE VISIT (OUTPATIENT)
Dept: UROLOGY | Age: 51
End: 2021-01-01
Payer: COMMERCIAL

## 2021-01-01 ENCOUNTER — HOSPITAL ENCOUNTER (INPATIENT)
Age: 51
LOS: 2 days | Discharge: HOME | DRG: 246 | End: 2021-07-04
Attending: EMERGENCY MEDICINE | Admitting: HOSPITALIST
Payer: COMMERCIAL

## 2021-01-01 ENCOUNTER — HOSPITAL ENCOUNTER (OUTPATIENT)
Dept: GENERAL RADIOLOGY | Age: 51
Discharge: HOME | DRG: 246 | End: 2021-07-02
Payer: COMMERCIAL

## 2021-01-01 ENCOUNTER — HOSPITAL ENCOUNTER (OUTPATIENT)
Dept: GENERAL RADIOLOGY | Age: 51
Discharge: HOME | End: 2021-04-05
Payer: COMMERCIAL

## 2021-01-01 ENCOUNTER — TELEPHONE (OUTPATIENT)
Dept: CARDIOLOGY CLINIC | Age: 51
End: 2021-01-01

## 2021-01-01 ENCOUNTER — OFFICE VISIT (OUTPATIENT)
Dept: PRIMARY CARE CLINIC | Age: 51
End: 2021-01-01

## 2021-01-01 ENCOUNTER — VIRTUAL VISIT (OUTPATIENT)
Dept: FAMILY MEDICINE CLINIC | Age: 51
End: 2021-01-01

## 2021-01-01 ENCOUNTER — TELEPHONE (OUTPATIENT)
Dept: PRIMARY CARE CLINIC | Age: 51
End: 2021-01-01

## 2021-01-01 ENCOUNTER — OFFICE VISIT (OUTPATIENT)
Dept: FAMILY MEDICINE CLINIC | Age: 51
End: 2021-01-01
Payer: COMMERCIAL

## 2021-01-01 ENCOUNTER — VIRTUAL VISIT (OUTPATIENT)
Dept: FAMILY MEDICINE CLINIC | Age: 51
End: 2021-01-01
Payer: COMMERCIAL

## 2021-01-01 ENCOUNTER — TELEPHONE (OUTPATIENT)
Dept: CARDIAC REHAB | Age: 51
End: 2021-01-01

## 2021-01-01 ENCOUNTER — OFFICE VISIT (OUTPATIENT)
Dept: PRIMARY CARE CLINIC | Age: 51
End: 2021-01-01
Payer: COMMERCIAL

## 2021-01-01 ENCOUNTER — HOSPITAL ENCOUNTER (OUTPATIENT)
Dept: GENERAL RADIOLOGY | Age: 51
Discharge: HOME | End: 2021-03-25
Payer: COMMERCIAL

## 2021-01-01 VITALS — WEIGHT: 247.4 LBS | TEMPERATURE: 98 F | HEIGHT: 75 IN | BODY MASS INDEX: 30.76 KG/M2

## 2021-01-01 VITALS
BODY MASS INDEX: 32.23 KG/M2 | TEMPERATURE: 96.7 F | WEIGHT: 259.2 LBS | OXYGEN SATURATION: 95 % | RESPIRATION RATE: 16 BRPM | HEIGHT: 75 IN | SYSTOLIC BLOOD PRESSURE: 160 MMHG | HEART RATE: 89 BPM | DIASTOLIC BLOOD PRESSURE: 78 MMHG

## 2021-01-01 VITALS
HEART RATE: 90 BPM | WEIGHT: 255.8 LBS | DIASTOLIC BLOOD PRESSURE: 60 MMHG | TEMPERATURE: 97.7 F | SYSTOLIC BLOOD PRESSURE: 110 MMHG | HEIGHT: 75 IN | RESPIRATION RATE: 16 BRPM | OXYGEN SATURATION: 99 % | BODY MASS INDEX: 31.81 KG/M2

## 2021-01-01 VITALS
WEIGHT: 262 LBS | BODY MASS INDEX: 32.58 KG/M2 | OXYGEN SATURATION: 97 % | TEMPERATURE: 98.1 F | SYSTOLIC BLOOD PRESSURE: 118 MMHG | HEIGHT: 75 IN | DIASTOLIC BLOOD PRESSURE: 64 MMHG | RESPIRATION RATE: 18 BRPM | HEART RATE: 97 BPM

## 2021-01-01 VITALS
BODY MASS INDEX: 32.2 KG/M2 | HEART RATE: 91 BPM | TEMPERATURE: 96.6 F | WEIGHT: 264.4 LBS | SYSTOLIC BLOOD PRESSURE: 146 MMHG | OXYGEN SATURATION: 93 % | RESPIRATION RATE: 16 BRPM | HEIGHT: 76 IN | DIASTOLIC BLOOD PRESSURE: 70 MMHG

## 2021-01-01 VITALS — WEIGHT: 258 LBS | HEIGHT: 75 IN | BODY MASS INDEX: 32.08 KG/M2 | TEMPERATURE: 98.8 F

## 2021-01-01 VITALS
RESPIRATION RATE: 16 BRPM | BODY MASS INDEX: 31.21 KG/M2 | DIASTOLIC BLOOD PRESSURE: 90 MMHG | SYSTOLIC BLOOD PRESSURE: 147 MMHG | TEMPERATURE: 97.9 F | HEART RATE: 69 BPM | OXYGEN SATURATION: 98 % | WEIGHT: 251 LBS | HEIGHT: 75 IN

## 2021-01-01 VITALS
WEIGHT: 262 LBS | SYSTOLIC BLOOD PRESSURE: 138 MMHG | HEIGHT: 75 IN | TEMPERATURE: 96.8 F | HEART RATE: 78 BPM | RESPIRATION RATE: 14 BRPM | DIASTOLIC BLOOD PRESSURE: 86 MMHG | BODY MASS INDEX: 32.58 KG/M2 | OXYGEN SATURATION: 96 %

## 2021-01-01 VITALS — BODY MASS INDEX: 30.71 KG/M2 | HEIGHT: 75 IN | WEIGHT: 247 LBS

## 2021-01-01 VITALS
BODY MASS INDEX: 31.21 KG/M2 | OXYGEN SATURATION: 94 % | WEIGHT: 251 LBS | DIASTOLIC BLOOD PRESSURE: 72 MMHG | HEIGHT: 75 IN | SYSTOLIC BLOOD PRESSURE: 116 MMHG | HEART RATE: 58 BPM

## 2021-01-01 DIAGNOSIS — I10 ESSENTIAL HYPERTENSION: ICD-10-CM

## 2021-01-01 DIAGNOSIS — R91.1 LUNG NODULE: ICD-10-CM

## 2021-01-01 DIAGNOSIS — L97.509 TYPE 2 DIABETES MELLITUS WITH FOOT ULCER, WITH LONG-TERM CURRENT USE OF INSULIN (HCC): ICD-10-CM

## 2021-01-01 DIAGNOSIS — T14.90XA INJURY: ICD-10-CM

## 2021-01-01 DIAGNOSIS — J30.2 SEASONAL ALLERGIES: ICD-10-CM

## 2021-01-01 DIAGNOSIS — E11.621 DIABETIC ULCER OF RIGHT MIDFOOT ASSOCIATED WITH TYPE 2 DIABETES MELLITUS, UNSPECIFIED ULCER STAGE (HCC): ICD-10-CM

## 2021-01-01 DIAGNOSIS — G47.30 SLEEP APNEA, UNSPECIFIED TYPE: Primary | ICD-10-CM

## 2021-01-01 DIAGNOSIS — I25.10 CORONARY ARTERY DISEASE INVOLVING NATIVE CORONARY ARTERY OF NATIVE HEART WITHOUT ANGINA PECTORIS: Chronic | ICD-10-CM

## 2021-01-01 DIAGNOSIS — E11.621 TYPE 2 DIABETES MELLITUS WITH FOOT ULCER, WITH LONG-TERM CURRENT USE OF INSULIN (HCC): ICD-10-CM

## 2021-01-01 DIAGNOSIS — R91.8 INFILTRATE OF LUNG PRESENT ON CHEST X-RAY: ICD-10-CM

## 2021-01-01 DIAGNOSIS — M54.9 MID BACK PAIN: ICD-10-CM

## 2021-01-01 DIAGNOSIS — Z11.59 SPECIAL SCREENING EXAMINATION FOR VIRAL DISEASE: ICD-10-CM

## 2021-01-01 DIAGNOSIS — E34.9 TESTOSTERONE DEFICIENCY: ICD-10-CM

## 2021-01-01 DIAGNOSIS — E78.5 HYPERLIPIDEMIA, UNSPECIFIED HYPERLIPIDEMIA TYPE: ICD-10-CM

## 2021-01-01 DIAGNOSIS — M54.50 ACUTE MIDLINE LOW BACK PAIN, UNSPECIFIED WHETHER SCIATICA PRESENT: ICD-10-CM

## 2021-01-01 DIAGNOSIS — L97.509 TYPE 2 DIABETES MELLITUS WITH FOOT ULCER, WITH LONG-TERM CURRENT USE OF INSULIN (HCC): Chronic | ICD-10-CM

## 2021-01-01 DIAGNOSIS — E78.2 MIXED HYPERLIPIDEMIA: Chronic | ICD-10-CM

## 2021-01-01 DIAGNOSIS — I20.9 ANGINA PECTORIS (HCC): ICD-10-CM

## 2021-01-01 DIAGNOSIS — I50.9 ACUTE CONGESTIVE HEART FAILURE, UNSPECIFIED HEART FAILURE TYPE (HCC): ICD-10-CM

## 2021-01-01 DIAGNOSIS — Z11.59 SPECIAL SCREENING EXAMINATION FOR VIRAL DISEASE: Primary | ICD-10-CM

## 2021-01-01 DIAGNOSIS — J40 BRONCHITIS: ICD-10-CM

## 2021-01-01 DIAGNOSIS — Z79.4 TYPE 2 DIABETES MELLITUS WITH FOOT ULCER, WITH LONG-TERM CURRENT USE OF INSULIN (HCC): ICD-10-CM

## 2021-01-01 DIAGNOSIS — S91.301A OPEN WOUND OF RIGHT FOOT, INITIAL ENCOUNTER: ICD-10-CM

## 2021-01-01 DIAGNOSIS — L98.9 SKIN LESIONS: ICD-10-CM

## 2021-01-01 DIAGNOSIS — Z79.4 TYPE 2 DIABETES MELLITUS WITH FOOT ULCER, WITH LONG-TERM CURRENT USE OF INSULIN (HCC): Chronic | ICD-10-CM

## 2021-01-01 DIAGNOSIS — R31.9 HEMATURIA, UNSPECIFIED TYPE: ICD-10-CM

## 2021-01-01 DIAGNOSIS — I25.10 CORONARY ARTERY DISEASE INVOLVING NATIVE CORONARY ARTERY OF NATIVE HEART WITHOUT ANGINA PECTORIS: ICD-10-CM

## 2021-01-01 DIAGNOSIS — Z87.891 FORMER CIGARETTE SMOKER: ICD-10-CM

## 2021-01-01 DIAGNOSIS — E55.9 VITAMIN D DEFICIENCY: ICD-10-CM

## 2021-01-01 DIAGNOSIS — U07.1 COVID-19: Primary | ICD-10-CM

## 2021-01-01 DIAGNOSIS — I51.7 CARDIOMEGALY: ICD-10-CM

## 2021-01-01 DIAGNOSIS — I21.4 NSTEMI (NON-ST ELEVATION MYOCARDIAL INFARCTION) (HCC): ICD-10-CM

## 2021-01-01 DIAGNOSIS — R06.02 SHORTNESS OF BREATH: ICD-10-CM

## 2021-01-01 DIAGNOSIS — I50.20 SYSTOLIC CONGESTIVE HEART FAILURE, UNSPECIFIED HF CHRONICITY (HCC): ICD-10-CM

## 2021-01-01 DIAGNOSIS — F17.210 CIGARETTE SMOKER: Chronic | ICD-10-CM

## 2021-01-01 DIAGNOSIS — I10 ESSENTIAL HYPERTENSION: Chronic | ICD-10-CM

## 2021-01-01 DIAGNOSIS — T14.8XXA WOUND INFECTION: ICD-10-CM

## 2021-01-01 DIAGNOSIS — I25.10 CORONARY ARTERY DISEASE INVOLVING NATIVE CORONARY ARTERY OF NATIVE HEART WITHOUT ANGINA PECTORIS: Primary | ICD-10-CM

## 2021-01-01 DIAGNOSIS — R07.9 CHEST PAIN, UNSPECIFIED TYPE: ICD-10-CM

## 2021-01-01 DIAGNOSIS — R10.9 RIGHT FLANK PAIN: ICD-10-CM

## 2021-01-01 DIAGNOSIS — L97.509 TYPE 2 DIABETES MELLITUS WITH FOOT ULCER, WITH LONG-TERM CURRENT USE OF INSULIN (HCC): Primary | ICD-10-CM

## 2021-01-01 DIAGNOSIS — N52.9 ERECTILE DYSFUNCTION, UNSPECIFIED ERECTILE DYSFUNCTION TYPE: Primary | ICD-10-CM

## 2021-01-01 DIAGNOSIS — G62.9 PERIPHERAL POLYNEUROPATHY: ICD-10-CM

## 2021-01-01 DIAGNOSIS — Z95.5 HISTORY OF CORONARY ARTERY STENT PLACEMENT: ICD-10-CM

## 2021-01-01 DIAGNOSIS — F17.200 SMOKER: ICD-10-CM

## 2021-01-01 DIAGNOSIS — Z89.431 STATUS POST AMPUTATION OF RIGHT FOOT THROUGH METATARSAL BONE (HCC): ICD-10-CM

## 2021-01-01 DIAGNOSIS — I21.4 NSTEMI (NON-ST ELEVATED MYOCARDIAL INFARCTION) (HCC): Primary | ICD-10-CM

## 2021-01-01 DIAGNOSIS — Z79.4 TYPE 2 DIABETES MELLITUS WITH FOOT ULCER, WITH LONG-TERM CURRENT USE OF INSULIN (HCC): Primary | ICD-10-CM

## 2021-01-01 DIAGNOSIS — Z95.5 STATUS POST CORONARY ARTERY STENT PLACEMENT: ICD-10-CM

## 2021-01-01 DIAGNOSIS — Z95.1 STATUS POST CORONARY ARTERY BYPASS GRAFT: ICD-10-CM

## 2021-01-01 DIAGNOSIS — E11.621 TYPE 2 DIABETES MELLITUS WITH FOOT ULCER, WITH LONG-TERM CURRENT USE OF INSULIN (HCC): Chronic | ICD-10-CM

## 2021-01-01 DIAGNOSIS — E11.621 TYPE 2 DIABETES MELLITUS WITH FOOT ULCER, WITH LONG-TERM CURRENT USE OF INSULIN (HCC): Primary | ICD-10-CM

## 2021-01-01 DIAGNOSIS — F41.9 ANXIETY: ICD-10-CM

## 2021-01-01 DIAGNOSIS — L08.9 WOUND INFECTION: ICD-10-CM

## 2021-01-01 DIAGNOSIS — R52 GENERALIZED BODY ACHES: ICD-10-CM

## 2021-01-01 DIAGNOSIS — Z20.822 COVID-19 VIRUS TEST RESULT UNKNOWN: Primary | ICD-10-CM

## 2021-01-01 DIAGNOSIS — M54.50 CHRONIC RIGHT-SIDED LOW BACK PAIN WITHOUT SCIATICA: ICD-10-CM

## 2021-01-01 DIAGNOSIS — Z95.1 STATUS POST AORTO-CORONARY ARTERY BYPASS GRAFT: ICD-10-CM

## 2021-01-01 DIAGNOSIS — G89.29 CHRONIC RIGHT-SIDED LOW BACK PAIN WITHOUT SCIATICA: ICD-10-CM

## 2021-01-01 DIAGNOSIS — I50.21 ACUTE SYSTOLIC CONGESTIVE HEART FAILURE (HCC): ICD-10-CM

## 2021-01-01 DIAGNOSIS — R06.02 SOB (SHORTNESS OF BREATH): ICD-10-CM

## 2021-01-01 DIAGNOSIS — R63.5 WEIGHT GAIN: ICD-10-CM

## 2021-01-01 DIAGNOSIS — R09.02 OXYGEN DESATURATION: Primary | ICD-10-CM

## 2021-01-01 DIAGNOSIS — L97.419 DIABETIC ULCER OF RIGHT MIDFOOT ASSOCIATED WITH TYPE 2 DIABETES MELLITUS, UNSPECIFIED ULCER STAGE (HCC): ICD-10-CM

## 2021-01-01 DIAGNOSIS — K58.9 IRRITABLE BOWEL SYNDROME, UNSPECIFIED TYPE: ICD-10-CM

## 2021-01-01 LAB
ALBUMIN SERPL-MCNC: 2.9 G/DL (ref 3.5–5.2)
ALCOHOL URINE: NORMAL
ALP BLD-CCNC: 99 U/L (ref 40–130)
ALT SERPL-CCNC: 18 U/L (ref 5–41)
AMPHETAMINE SCREEN, URINE: NEGATIVE
ANION GAP SERPL CALCULATED.3IONS-SCNC: 5 MMOL/L (ref 7–19)
ANION GAP SERPL CALCULATED.3IONS-SCNC: 6 MMOL/L (ref 7–19)
ANION GAP SERPL CALCULATED.3IONS-SCNC: 9 MMOL/L (ref 7–19)
APTT: 30.8 SEC (ref 26–36.2)
APTT: 31.4 SEC (ref 26–36.2)
APTT: 32.7 SEC (ref 26–36.2)
APTT: 34.9 SEC (ref 26–36.2)
AST SERPL-CCNC: 13 U/L (ref 5–40)
BACTERIA URINE, POC: ABNORMAL
BARBITURATE SCREEN, URINE: NEGATIVE
BASOPHILS ABSOLUTE: 0.1 K/UL (ref 0–0.2)
BASOPHILS RELATIVE PERCENT: 0.8 % (ref 0–1)
BENZODIAZEPINE SCREEN, URINE: NEGATIVE
BILIRUB SERPL-MCNC: 0.4 MG/DL (ref 0.2–1.2)
BILIRUBIN URINE: 0 MG/DL
BILIRUBIN URINE: NEGATIVE
BLOOD, URINE: ABNORMAL
BLOOD, URINE: POSITIVE
BUN BLDV-MCNC: 18 MG/DL (ref 6–20)
BUN BLDV-MCNC: 20 MG/DL (ref 6–20)
BUN BLDV-MCNC: 20 MG/DL (ref 6–20)
BUPRENORPHINE URINE: NEGATIVE
CALCIUM SERPL-MCNC: 9 MG/DL (ref 8.6–10)
CALCIUM SERPL-MCNC: 9.4 MG/DL (ref 8.6–10)
CALCIUM SERPL-MCNC: 9.5 MG/DL (ref 8.6–10)
CASTS URINE, POC: ABNORMAL
CHLORIDE BLD-SCNC: 100 MMOL/L (ref 98–111)
CHLORIDE BLD-SCNC: 102 MMOL/L (ref 98–111)
CHLORIDE BLD-SCNC: 104 MMOL/L (ref 98–111)
CHOLESTEROL, TOTAL: 177 MG/DL (ref 160–199)
CHOLESTEROL, TOTAL: 199 MG/DL (ref 160–199)
CLARITY: CLEAR
CO2: 28 MMOL/L (ref 22–29)
CO2: 32 MMOL/L (ref 22–29)
CO2: 33 MMOL/L (ref 22–29)
COCAINE METABOLITE SCREEN URINE: NEGATIVE
COLOR: YELLOW
CREAT SERPL-MCNC: 1.1 MG/DL (ref 0.5–1.2)
CREAT SERPL-MCNC: 1.1 MG/DL (ref 0.5–1.2)
CREAT SERPL-MCNC: 1.2 MG/DL (ref 0.5–1.2)
CRYSTALS URINE, POC: ABNORMAL
EKG P AXIS: 23 DEGREES
EKG P AXIS: 35 DEGREES
EKG P AXIS: 54 DEGREES
EKG P-R INTERVAL: 164 MS
EKG P-R INTERVAL: 166 MS
EKG P-R INTERVAL: 172 MS
EKG Q-T INTERVAL: 386 MS
EKG Q-T INTERVAL: 388 MS
EKG Q-T INTERVAL: 400 MS
EKG QRS DURATION: 124 MS
EKG QRS DURATION: 126 MS
EKG QRS DURATION: 128 MS
EKG QTC CALCULATION (BAZETT): 424 MS
EKG QTC CALCULATION (BAZETT): 431 MS
EKG QTC CALCULATION (BAZETT): 433 MS
EKG T AXIS: 127 DEGREES
EKG T AXIS: 129 DEGREES
EKG T AXIS: 129 DEGREES
EOSINOPHILS ABSOLUTE: 0.3 K/UL (ref 0–0.6)
EOSINOPHILS RELATIVE PERCENT: 3.1 % (ref 0–5)
EPI CELLS URINE, POC: ABNORMAL
FENTANYL SCREEN, URINE: NORMAL
GABAPENTIN SCREEN, URINE: NORMAL
GFR AFRICAN AMERICAN: >59
GFR NON-AFRICAN AMERICAN: >60
GLUCOSE BLD-MCNC: 175 MG/DL (ref 70–99)
GLUCOSE BLD-MCNC: 225 MG/DL (ref 74–109)
GLUCOSE BLD-MCNC: 228 MG/DL (ref 74–109)
GLUCOSE BLD-MCNC: 246 MG/DL (ref 74–109)
GLUCOSE URINE: 500 MG/DL
GLUCOSE URINE: ABNORMAL
HBA1C MFR BLD: 9.3 %
HBA1C MFR BLD: 9.4 % (ref 4–6)
HCT VFR BLD CALC: 44.9 % (ref 42–52)
HCT VFR BLD CALC: 47.3 % (ref 42–52)
HCT VFR BLD CALC: 47.7 % (ref 42–52)
HDLC SERPL-MCNC: 21 MG/DL (ref 55–121)
HDLC SERPL-MCNC: 24 MG/DL (ref 55–121)
HEMOGLOBIN: 14.1 G/DL (ref 14–18)
HEMOGLOBIN: 14.9 G/DL (ref 14–18)
HEMOGLOBIN: 15.1 G/DL (ref 14–18)
IMMATURE GRANULOCYTES #: 0.1 K/UL
KETONES, URINE: NEGATIVE
KETONES, URINE: NEGATIVE MG/DL
LDL CHOLESTEROL CALCULATED: 119 MG/DL
LDL CHOLESTEROL CALCULATED: 130 MG/DL
LEUKOCYTE EST, POC: ABNORMAL
LEUKOCYTE ESTERASE, URINE: NEGATIVE
LV EF: 25 %
LV EF: 28 %
LVEF MODALITY: NORMAL
LVEF MODALITY: NORMAL
LYMPHOCYTES ABSOLUTE: 2 K/UL (ref 1.1–4.5)
LYMPHOCYTES RELATIVE PERCENT: 18.2 % (ref 20–40)
MCH RBC QN AUTO: 29.7 PG (ref 27–31)
MCH RBC QN AUTO: 29.7 PG (ref 27–31)
MCH RBC QN AUTO: 29.8 PG (ref 27–31)
MCHC RBC AUTO-ENTMCNC: 31.4 G/DL (ref 33–37)
MCHC RBC AUTO-ENTMCNC: 31.5 G/DL (ref 33–37)
MCHC RBC AUTO-ENTMCNC: 31.7 G/DL (ref 33–37)
MCV RBC AUTO: 94.3 FL (ref 80–94)
MCV RBC AUTO: 94.4 FL (ref 80–94)
MCV RBC AUTO: 94.5 FL (ref 80–94)
MDMA URINE: NEGATIVE
METHADONE SCREEN, URINE: NEGATIVE
METHAMPHETAMINE, URINE: NEGATIVE
MONOCYTES ABSOLUTE: 1.3 K/UL (ref 0–0.9)
MONOCYTES RELATIVE PERCENT: 11.9 % (ref 0–10)
NEUTROPHILS ABSOLUTE: 7.1 K/UL (ref 1.5–7.5)
NEUTROPHILS RELATIVE PERCENT: 65.5 % (ref 50–65)
NITRITE, URINE: NEGATIVE
OPIATE SCREEN URINE: NEGATIVE
OXYCODONE SCREEN URINE: NEGATIVE
PDW BLD-RTO: 13.7 % (ref 11.5–14.5)
PDW BLD-RTO: 13.8 % (ref 11.5–14.5)
PDW BLD-RTO: 13.8 % (ref 11.5–14.5)
PERFORMED ON: ABNORMAL
PH UA: 5 (ref 4.5–8)
PH UA: 5.5 (ref 4.5–8)
PH UA: 6 (ref 4.5–8)
PH UA: 6 (ref 5–8)
PHENCYCLIDINE SCREEN URINE: NEGATIVE
PLATELET # BLD: 255 K/UL (ref 130–400)
PLATELET # BLD: 259 K/UL (ref 130–400)
PLATELET # BLD: 273 K/UL (ref 130–400)
PMV BLD AUTO: 11.8 FL (ref 9.4–12.4)
PMV BLD AUTO: 11.8 FL (ref 9.4–12.4)
PMV BLD AUTO: 12.5 FL (ref 9.4–12.4)
POC ACT LR: 377 SEC
POTASSIUM REFLEX MAGNESIUM: 4.4 MMOL/L (ref 3.5–5)
POTASSIUM REFLEX MAGNESIUM: 4.5 MMOL/L (ref 3.5–5)
POTASSIUM SERPL-SCNC: 4.3 MMOL/L (ref 3.5–5)
PRO-BNP: 2397 PG/ML (ref 0–900)
PROPOXYPHENE SCREEN, URINE: NORMAL
PROTEIN UA: >=300 MG/DL
PROTEIN UA: NEGATIVE
PROTEIN UA: POSITIVE
PROTEIN UA: POSITIVE
RBC # BLD: 4.75 M/UL (ref 4.7–6.1)
RBC # BLD: 5.01 M/UL (ref 4.7–6.1)
RBC # BLD: 5.06 M/UL (ref 4.7–6.1)
RBC URINE, POC: 2
RBC URINE, POC: ABNORMAL
RBC URINE, POC: ABNORMAL
SARS-COV-2, NAAT: NOT DETECTED
SODIUM BLD-SCNC: 138 MMOL/L (ref 136–145)
SODIUM BLD-SCNC: 140 MMOL/L (ref 136–145)
SODIUM BLD-SCNC: 141 MMOL/L (ref 136–145)
SPECIFIC GRAVITY UA: 1.02 (ref 1–1.03)
SPECIFIC GRAVITY UA: 1.03 (ref 1–1.03)
SYNTHETIC CANNABINOIDS(K2) SCREEN, URINE: NORMAL
THC SCREEN, URINE: NEGATIVE
TOTAL PROTEIN: 6.4 G/DL (ref 6.6–8.7)
TRAMADOL SCREEN URINE: NORMAL
TRICYCLIC ANTIDEPRESSANTS, UR: NORMAL
TRIGL SERPL-MCNC: 114 MG/DL (ref 0–149)
TRIGL SERPL-MCNC: 296 MG/DL (ref 0–149)
TROPONIN: 0.61 NG/ML (ref 0–0.03)
TROPONIN: 0.61 NG/ML (ref 0–0.03)
TROPONIN: 0.66 NG/ML (ref 0–0.03)
TROPONIN: 0.69 NG/ML (ref 0–0.03)
URINE TYPE: ABNORMAL
UROBILINOGEN, URINE: 0.2 E.U./DL
UROBILINOGEN, URINE: NORMAL
WBC # BLD: 10.5 K/UL (ref 4.8–10.8)
WBC # BLD: 10.8 K/UL (ref 4.8–10.8)
WBC # BLD: 9.6 K/UL (ref 4.8–10.8)
WBC URINE, POC: ABNORMAL
YEAST URINE, POC: ABNORMAL

## 2021-01-01 PROCEDURE — 6370000000 HC RX 637 (ALT 250 FOR IP): Performed by: HOSPITALIST

## 2021-01-01 PROCEDURE — 85730 THROMBOPLASTIN TIME PARTIAL: CPT

## 2021-01-01 PROCEDURE — 93010 ELECTROCARDIOGRAM REPORT: CPT | Performed by: INTERNAL MEDICINE

## 2021-01-01 PROCEDURE — 85025 COMPLETE CBC W/AUTO DIFF WBC: CPT

## 2021-01-01 PROCEDURE — 4A023N7 MEASUREMENT OF CARDIAC SAMPLING AND PRESSURE, LEFT HEART, PERCUTANEOUS APPROACH: ICD-10-PCS | Performed by: INTERNAL MEDICINE

## 2021-01-01 PROCEDURE — B2181ZZ FLUOROSCOPY OF LEFT INTERNAL MAMMARY BYPASS GRAFT USING LOW OSMOLAR CONTRAST: ICD-10-PCS | Performed by: INTERNAL MEDICINE

## 2021-01-01 PROCEDURE — C1894 INTRO/SHEATH, NON-LASER: HCPCS

## 2021-01-01 PROCEDURE — 96375 TX/PRO/DX INJ NEW DRUG ADDON: CPT

## 2021-01-01 PROCEDURE — 85027 COMPLETE CBC AUTOMATED: CPT

## 2021-01-01 PROCEDURE — 6360000002 HC RX W HCPCS

## 2021-01-01 PROCEDURE — 6360000004 HC RX CONTRAST MEDICATION: Performed by: HOSPITALIST

## 2021-01-01 PROCEDURE — 6360000002 HC RX W HCPCS: Performed by: EMERGENCY MEDICINE

## 2021-01-01 PROCEDURE — 74176 CT ABD & PELVIS W/O CONTRAST: CPT

## 2021-01-01 PROCEDURE — 84484 ASSAY OF TROPONIN QUANT: CPT

## 2021-01-01 PROCEDURE — 81000 URINALYSIS NONAUTO W/SCOPE: CPT | Performed by: UROLOGY

## 2021-01-01 PROCEDURE — B2111ZZ FLUOROSCOPY OF MULTIPLE CORONARY ARTERIES USING LOW OSMOLAR CONTRAST: ICD-10-PCS | Performed by: INTERNAL MEDICINE

## 2021-01-01 PROCEDURE — 99214 OFFICE O/P EST MOD 30 MIN: CPT | Performed by: FAMILY MEDICINE

## 2021-01-01 PROCEDURE — 027135Z DILATION OF CORONARY ARTERY, TWO ARTERIES WITH TWO DRUG-ELUTING INTRALUMINAL DEVICES, PERCUTANEOUS APPROACH: ICD-10-PCS | Performed by: INTERNAL MEDICINE

## 2021-01-01 PROCEDURE — 96374 THER/PROPH/DIAG INJ IV PUSH: CPT

## 2021-01-01 PROCEDURE — 83036 HEMOGLOBIN GLYCOSYLATED A1C: CPT | Performed by: FAMILY MEDICINE

## 2021-01-01 PROCEDURE — 54235 NJX CORPORA CAVERNOSA RX AGT: CPT | Performed by: NURSE PRACTITIONER

## 2021-01-01 PROCEDURE — 36415 COLL VENOUS BLD VENIPUNCTURE: CPT

## 2021-01-01 PROCEDURE — 80048 BASIC METABOLIC PNL TOTAL CA: CPT

## 2021-01-01 PROCEDURE — C1760 CLOSURE DEV, VASC: HCPCS

## 2021-01-01 PROCEDURE — C1874 STENT, COATED/COV W/DEL SYS: HCPCS

## 2021-01-01 PROCEDURE — 80305 DRUG TEST PRSMV DIR OPT OBS: CPT | Performed by: FAMILY MEDICINE

## 2021-01-01 PROCEDURE — 80061 LIPID PANEL: CPT

## 2021-01-01 PROCEDURE — 93005 ELECTROCARDIOGRAM TRACING: CPT | Performed by: EMERGENCY MEDICINE

## 2021-01-01 PROCEDURE — C1769 GUIDE WIRE: HCPCS

## 2021-01-01 PROCEDURE — C1887 CATHETER, GUIDING: HCPCS

## 2021-01-01 PROCEDURE — 99255 IP/OBS CONSLTJ NEW/EST HI 80: CPT | Performed by: INTERNAL MEDICINE

## 2021-01-01 PROCEDURE — 92928 PRQ TCAT PLMT NTRAC ST 1 LES: CPT | Performed by: INTERNAL MEDICINE

## 2021-01-01 PROCEDURE — 2580000003 HC RX 258: Performed by: HOSPITALIST

## 2021-01-01 PROCEDURE — 99214 OFFICE O/P EST MOD 30 MIN: CPT | Performed by: NURSE PRACTITIONER

## 2021-01-01 PROCEDURE — 81001 URINALYSIS AUTO W/SCOPE: CPT | Performed by: NURSE PRACTITIONER

## 2021-01-01 PROCEDURE — 2140000000 HC CCU INTERMEDIATE R&B

## 2021-01-01 PROCEDURE — 2580000003 HC RX 258: Performed by: INTERNAL MEDICINE

## 2021-01-01 PROCEDURE — 99215 OFFICE O/P EST HI 40 MIN: CPT | Performed by: FAMILY MEDICINE

## 2021-01-01 PROCEDURE — 71046 X-RAY EXAM CHEST 2 VIEWS: CPT

## 2021-01-01 PROCEDURE — 93459 L HRT ART/GRFT ANGIO: CPT | Performed by: INTERNAL MEDICINE

## 2021-01-01 PROCEDURE — 99204 OFFICE O/P NEW MOD 45 MIN: CPT | Performed by: NURSE PRACTITIONER

## 2021-01-01 PROCEDURE — 80053 COMPREHEN METABOLIC PANEL: CPT

## 2021-01-01 PROCEDURE — 99152 MOD SED SAME PHYS/QHP 5/>YRS: CPT | Performed by: INTERNAL MEDICINE

## 2021-01-01 PROCEDURE — B2151ZZ FLUOROSCOPY OF LEFT HEART USING LOW OSMOLAR CONTRAST: ICD-10-PCS | Performed by: INTERNAL MEDICINE

## 2021-01-01 PROCEDURE — 93005 ELECTROCARDIOGRAM TRACING: CPT | Performed by: INTERNAL MEDICINE

## 2021-01-01 PROCEDURE — C1725 CATH, TRANSLUMIN NON-LASER: HCPCS

## 2021-01-01 PROCEDURE — 87635 SARS-COV-2 COVID-19 AMP PRB: CPT

## 2021-01-01 PROCEDURE — 6370000000 HC RX 637 (ALT 250 FOR IP): Performed by: INTERNAL MEDICINE

## 2021-01-01 PROCEDURE — 2709999900 HC NON-CHARGEABLE SUPPLY

## 2021-01-01 PROCEDURE — 82947 ASSAY GLUCOSE BLOOD QUANT: CPT

## 2021-01-01 PROCEDURE — 83036 HEMOGLOBIN GLYCOSYLATED A1C: CPT

## 2021-01-01 PROCEDURE — 93000 ELECTROCARDIOGRAM COMPLETE: CPT | Performed by: FAMILY MEDICINE

## 2021-01-01 PROCEDURE — 71045 X-RAY EXAM CHEST 1 VIEW: CPT

## 2021-01-01 PROCEDURE — 99204 OFFICE O/P NEW MOD 45 MIN: CPT | Performed by: UROLOGY

## 2021-01-01 PROCEDURE — 99284 EMERGENCY DEPT VISIT MOD MDM: CPT

## 2021-01-01 PROCEDURE — 72072 X-RAY EXAM THORAC SPINE 3VWS: CPT

## 2021-01-01 PROCEDURE — 93459 L HRT ART/GRFT ANGIO: CPT

## 2021-01-01 PROCEDURE — 93005 ELECTROCARDIOGRAM TRACING: CPT | Performed by: HOSPITALIST

## 2021-01-01 PROCEDURE — 99213 OFFICE O/P EST LOW 20 MIN: CPT | Performed by: NURSE PRACTITIONER

## 2021-01-01 PROCEDURE — 99232 SBSQ HOSP IP/OBS MODERATE 35: CPT | Performed by: INTERNAL MEDICINE

## 2021-01-01 PROCEDURE — 92943 PRQ TRLUML REVSC CH OCC ANT: CPT

## 2021-01-01 PROCEDURE — 6360000002 HC RX W HCPCS: Performed by: INTERNAL MEDICINE

## 2021-01-01 PROCEDURE — 83880 ASSAY OF NATRIURETIC PEPTIDE: CPT

## 2021-01-01 PROCEDURE — 87804 INFLUENZA ASSAY W/OPTIC: CPT | Performed by: NURSE PRACTITIONER

## 2021-01-01 PROCEDURE — 6370000000 HC RX 637 (ALT 250 FOR IP)

## 2021-01-01 PROCEDURE — B2131ZZ FLUOROSCOPY OF MULTIPLE CORONARY ARTERY BYPASS GRAFTS USING LOW OSMOLAR CONTRAST: ICD-10-PCS | Performed by: INTERNAL MEDICINE

## 2021-01-01 PROCEDURE — 85347 COAGULATION TIME ACTIVATED: CPT

## 2021-01-01 PROCEDURE — 99152 MOD SED SAME PHYS/QHP 5/>YRS: CPT

## 2021-01-01 PROCEDURE — 99153 MOD SED SAME PHYS/QHP EA: CPT

## 2021-01-01 PROCEDURE — 2500000003 HC RX 250 WO HCPCS

## 2021-01-01 PROCEDURE — 72100 X-RAY EXAM L-S SPINE 2/3 VWS: CPT

## 2021-01-01 PROCEDURE — 6360000004 HC RX CONTRAST MEDICATION: Performed by: INTERNAL MEDICINE

## 2021-01-01 PROCEDURE — C8929 TTE W OR WO FOL WCON,DOPPLER: HCPCS

## 2021-01-01 RX ORDER — INSULIN LISPRO 100 [IU]/ML
10 INJECTION, SOLUTION INTRAVENOUS; SUBCUTANEOUS
Qty: 1 PEN | Refills: 5 | Status: SHIPPED | OUTPATIENT
Start: 2021-01-01

## 2021-01-01 RX ORDER — INSULIN GLARGINE 100 [IU]/ML
20 INJECTION, SOLUTION SUBCUTANEOUS NIGHTLY
Status: DISCONTINUED | OUTPATIENT
Start: 2021-01-01 | End: 2021-01-01 | Stop reason: HOSPADM

## 2021-01-01 RX ORDER — BUPROPION HYDROCHLORIDE 150 MG/1
150 TABLET ORAL 2 TIMES DAILY
Qty: 180 TABLET | Refills: 1 | Status: SHIPPED | OUTPATIENT
Start: 2021-01-01

## 2021-01-01 RX ORDER — FUROSEMIDE 40 MG/1
40 TABLET ORAL 2 TIMES DAILY
COMMUNITY
End: 2021-01-01 | Stop reason: SDUPTHER

## 2021-01-01 RX ORDER — FUROSEMIDE 40 MG/1
40 TABLET ORAL 2 TIMES DAILY
Status: DISCONTINUED | OUTPATIENT
Start: 2021-01-01 | End: 2021-01-01 | Stop reason: HOSPADM

## 2021-01-01 RX ORDER — DOXYCYCLINE HYCLATE 100 MG
TABLET ORAL
Qty: 60 TABLET | Refills: 0 | Status: SHIPPED | OUTPATIENT
Start: 2021-01-01 | End: 2021-01-01

## 2021-01-01 RX ORDER — SODIUM CHLORIDE 9 MG/ML
25 INJECTION, SOLUTION INTRAVENOUS PRN
Status: DISCONTINUED | OUTPATIENT
Start: 2021-01-01 | End: 2021-01-01 | Stop reason: HOSPADM

## 2021-01-01 RX ORDER — ACETAMINOPHEN 325 MG/1
650 TABLET ORAL EVERY 6 HOURS PRN
Status: DISCONTINUED | OUTPATIENT
Start: 2021-01-01 | End: 2021-01-01 | Stop reason: HOSPADM

## 2021-01-01 RX ORDER — ONDANSETRON 2 MG/ML
4 INJECTION INTRAMUSCULAR; INTRAVENOUS EVERY 6 HOURS PRN
Status: DISCONTINUED | OUTPATIENT
Start: 2021-01-01 | End: 2021-01-01 | Stop reason: HOSPADM

## 2021-01-01 RX ORDER — ALBUTEROL SULFATE 90 UG/1
1 AEROSOL, METERED RESPIRATORY (INHALATION) EVERY 6 HOURS PRN
Qty: 6.7 INHALER | Refills: 0 | Status: SHIPPED | OUTPATIENT
Start: 2021-01-01 | End: 2021-01-01

## 2021-01-01 RX ORDER — ASPIRIN 81 MG/1
324 TABLET, CHEWABLE ORAL ONCE
Status: DISCONTINUED | OUTPATIENT
Start: 2021-01-01 | End: 2021-01-01

## 2021-01-01 RX ORDER — ERGOCALCIFEROL 1.25 MG/1
50000 CAPSULE ORAL
Qty: 32 CAPSULE | Refills: 3 | Status: SHIPPED | OUTPATIENT
Start: 2021-01-01

## 2021-01-01 RX ORDER — DOXYCYCLINE HYCLATE 100 MG/1
100 CAPSULE ORAL 2 TIMES DAILY
Qty: 20 CAPSULE | Refills: 0 | Status: SHIPPED | OUTPATIENT
Start: 2021-01-01 | End: 2021-01-01 | Stop reason: SDUPTHER

## 2021-01-01 RX ORDER — CARVEDILOL 25 MG/1
25 TABLET ORAL 2 TIMES DAILY WITH MEALS
Qty: 60 TABLET | Refills: 3 | Status: SHIPPED | OUTPATIENT
Start: 2021-01-01

## 2021-01-01 RX ORDER — MONTELUKAST SODIUM 10 MG/1
TABLET ORAL
Qty: 90 TABLET | Refills: 1 | Status: SHIPPED | OUTPATIENT
Start: 2021-01-01 | End: 2021-01-01 | Stop reason: SDUPTHER

## 2021-01-01 RX ORDER — BLOOD-GLUCOSE TRANSMITTER
1 EACH MISCELLANEOUS CONTINUOUS
Qty: 1 EACH | Refills: 5 | Status: SHIPPED | OUTPATIENT
Start: 2021-01-01 | End: 2022-01-01 | Stop reason: SDUPTHER

## 2021-01-01 RX ORDER — ASPIRIN 81 MG/1
81 TABLET, CHEWABLE ORAL DAILY
Status: DISCONTINUED | OUTPATIENT
Start: 2021-01-01 | End: 2021-01-01 | Stop reason: ALTCHOICE

## 2021-01-01 RX ORDER — FUROSEMIDE 40 MG/1
40 TABLET ORAL 2 TIMES DAILY
Qty: 60 TABLET | Refills: 3 | Status: SHIPPED | OUTPATIENT
Start: 2021-01-01 | End: 2021-01-01

## 2021-01-01 RX ORDER — HEPARIN SODIUM 10000 [USP'U]/100ML
5-30 INJECTION, SOLUTION INTRAVENOUS CONTINUOUS
Status: DISCONTINUED | OUTPATIENT
Start: 2021-01-01 | End: 2021-01-01 | Stop reason: ALTCHOICE

## 2021-01-01 RX ORDER — POTASSIUM CHLORIDE 7.45 MG/ML
10 INJECTION INTRAVENOUS PRN
Status: DISCONTINUED | OUTPATIENT
Start: 2021-01-01 | End: 2021-01-01 | Stop reason: HOSPADM

## 2021-01-01 RX ORDER — BLOOD-GLUCOSE TRANSMITTER
1 EACH MISCELLANEOUS CONTINUOUS
Qty: 1 EACH | Refills: 5 | Status: SHIPPED | OUTPATIENT
Start: 2021-01-01 | End: 2021-01-01 | Stop reason: SDUPTHER

## 2021-01-01 RX ORDER — BUPROPION HYDROCHLORIDE 150 MG/1
150 TABLET ORAL 2 TIMES DAILY
Qty: 60 TABLET | Refills: 3 | Status: SHIPPED | OUTPATIENT
Start: 2021-01-01 | End: 2021-01-01

## 2021-01-01 RX ORDER — TIZANIDINE 4 MG/1
4 TABLET ORAL EVERY 6 HOURS PRN
COMMUNITY

## 2021-01-01 RX ORDER — IPRATROPIUM BROMIDE AND ALBUTEROL SULFATE 2.5; .5 MG/3ML; MG/3ML
1 SOLUTION RESPIRATORY (INHALATION) EVERY 6 HOURS PRN
Status: DISCONTINUED | OUTPATIENT
Start: 2021-01-01 | End: 2021-01-01 | Stop reason: HOSPADM

## 2021-01-01 RX ORDER — MAGNESIUM SULFATE IN WATER 40 MG/ML
2000 INJECTION, SOLUTION INTRAVENOUS PRN
Status: DISCONTINUED | OUTPATIENT
Start: 2021-01-01 | End: 2021-01-01 | Stop reason: HOSPADM

## 2021-01-01 RX ORDER — SODIUM CHLORIDE 0.9 % (FLUSH) 0.9 %
5-40 SYRINGE (ML) INJECTION PRN
Status: DISCONTINUED | OUTPATIENT
Start: 2021-01-01 | End: 2021-01-01 | Stop reason: HOSPADM

## 2021-01-01 RX ORDER — OXYCODONE AND ACETAMINOPHEN 10; 325 MG/1; MG/1
1 TABLET ORAL EVERY 4 HOURS PRN
Qty: 60 TABLET | Refills: 0 | Status: SHIPPED | OUTPATIENT
Start: 2021-01-01 | End: 2021-01-01

## 2021-01-01 RX ORDER — POTASSIUM CHLORIDE 20 MEQ/1
40 TABLET, EXTENDED RELEASE ORAL PRN
Status: DISCONTINUED | OUTPATIENT
Start: 2021-01-01 | End: 2021-01-01 | Stop reason: HOSPADM

## 2021-01-01 RX ORDER — ALPRAZOLAM 0.5 MG/1
0.5 TABLET ORAL 2 TIMES DAILY
Qty: 60 TABLET | Refills: 0 | Status: SHIPPED | OUTPATIENT
Start: 2021-01-01 | End: 2021-01-01

## 2021-01-01 RX ORDER — ONDANSETRON 2 MG/ML
4 INJECTION INTRAMUSCULAR; INTRAVENOUS EVERY 6 HOURS PRN
Status: DISCONTINUED | OUTPATIENT
Start: 2021-01-01 | End: 2021-01-01 | Stop reason: SDUPTHER

## 2021-01-01 RX ORDER — DOXYCYCLINE HYCLATE 100 MG
TABLET ORAL
Qty: 60 TABLET | Refills: 0 | Status: ON HOLD | OUTPATIENT
Start: 2021-01-01 | End: 2021-01-01 | Stop reason: HOSPADM

## 2021-01-01 RX ORDER — ALBUTEROL SULFATE 2.5 MG/3ML
2.5 SOLUTION RESPIRATORY (INHALATION) EVERY 6 HOURS PRN
Status: DISCONTINUED | OUTPATIENT
Start: 2021-01-01 | End: 2021-01-01 | Stop reason: HOSPADM

## 2021-01-01 RX ORDER — DOXYCYCLINE HYCLATE 100 MG/1
100 CAPSULE ORAL 2 TIMES DAILY
Qty: 20 CAPSULE | Refills: 0 | Status: SHIPPED | OUTPATIENT
Start: 2021-01-01 | End: 2021-01-01

## 2021-01-01 RX ORDER — TESTOSTERONE CYPIONATE 200 MG/ML
INJECTION INTRAMUSCULAR
Qty: 1 ML | Refills: 5 | Status: SHIPPED | OUTPATIENT
Start: 2021-01-01 | End: 2021-01-01

## 2021-01-01 RX ORDER — ACETAMINOPHEN 650 MG/1
650 SUPPOSITORY RECTAL EVERY 6 HOURS PRN
Status: DISCONTINUED | OUTPATIENT
Start: 2021-01-01 | End: 2021-01-01 | Stop reason: HOSPADM

## 2021-01-01 RX ORDER — NICOTINE POLACRILEX 4 MG
15 LOZENGE BUCCAL PRN
Status: DISCONTINUED | OUTPATIENT
Start: 2021-01-01 | End: 2021-01-01 | Stop reason: HOSPADM

## 2021-01-01 RX ORDER — TIZANIDINE 4 MG/1
TABLET ORAL
Qty: 270 TABLET | Refills: 1 | Status: SHIPPED | OUTPATIENT
Start: 2021-01-01 | End: 2021-01-01 | Stop reason: ALTCHOICE

## 2021-01-01 RX ORDER — ZINC GLUCONATE 50 MG
50 TABLET ORAL DAILY
COMMUNITY

## 2021-01-01 RX ORDER — FUROSEMIDE 40 MG/1
TABLET ORAL
Qty: 180 TABLET | Refills: 1 | Status: SHIPPED | OUTPATIENT
Start: 2021-01-01

## 2021-01-01 RX ORDER — HYDRALAZINE HYDROCHLORIDE 20 MG/ML
10 INJECTION INTRAMUSCULAR; INTRAVENOUS EVERY 10 MIN PRN
Status: DISCONTINUED | OUTPATIENT
Start: 2021-01-01 | End: 2021-01-01 | Stop reason: HOSPADM

## 2021-01-01 RX ORDER — MONTELUKAST SODIUM 10 MG/1
10 TABLET ORAL DAILY
Status: DISCONTINUED | OUTPATIENT
Start: 2021-01-01 | End: 2021-01-01 | Stop reason: HOSPADM

## 2021-01-01 RX ORDER — MONTELUKAST SODIUM 10 MG/1
10 TABLET ORAL DAILY
Qty: 30 TABLET | Refills: 3 | Status: SHIPPED | OUTPATIENT
Start: 2021-01-01 | End: 2021-01-01

## 2021-01-01 RX ORDER — DEXTROSE MONOHYDRATE 50 MG/ML
100 INJECTION, SOLUTION INTRAVENOUS PRN
Status: DISCONTINUED | OUTPATIENT
Start: 2021-01-01 | End: 2021-01-01 | Stop reason: HOSPADM

## 2021-01-01 RX ORDER — GABAPENTIN 300 MG/1
300 CAPSULE ORAL 2 TIMES DAILY
Qty: 60 CAPSULE | Refills: 1 | Status: SHIPPED | OUTPATIENT
Start: 2021-01-01 | End: 2022-01-01

## 2021-01-01 RX ORDER — ASPIRIN 81 MG/1
81 TABLET ORAL DAILY
Qty: 30 TABLET | Refills: 3 | Status: SHIPPED | OUTPATIENT
Start: 2021-01-01

## 2021-01-01 RX ORDER — HEPARIN SODIUM 1000 [USP'U]/ML
4000 INJECTION, SOLUTION INTRAVENOUS; SUBCUTANEOUS ONCE
Status: COMPLETED | OUTPATIENT
Start: 2021-01-01 | End: 2021-01-01

## 2021-01-01 RX ORDER — TAMSULOSIN HYDROCHLORIDE 0.4 MG/1
0.4 CAPSULE ORAL DAILY
Status: DISCONTINUED | OUTPATIENT
Start: 2021-01-01 | End: 2021-01-01 | Stop reason: HOSPADM

## 2021-01-01 RX ORDER — PANTOPRAZOLE SODIUM 40 MG/1
TABLET, DELAYED RELEASE ORAL
Qty: 30 TABLET | Refills: 0 | Status: SHIPPED | OUTPATIENT
Start: 2021-01-01 | End: 2021-01-01

## 2021-01-01 RX ORDER — TESTOSTERONE CYPIONATE 200 MG/ML
INJECTION INTRAMUSCULAR
Qty: 3 ML | Refills: 2 | Status: SHIPPED | OUTPATIENT
Start: 2021-01-01 | End: 2022-01-01

## 2021-01-01 RX ORDER — MELOXICAM 7.5 MG/1
15 TABLET ORAL DAILY
Status: DISCONTINUED | OUTPATIENT
Start: 2021-01-01 | End: 2021-01-01 | Stop reason: HOSPADM

## 2021-01-01 RX ORDER — HEPARIN SODIUM 1000 [USP'U]/ML
4000 INJECTION, SOLUTION INTRAVENOUS; SUBCUTANEOUS PRN
Status: DISCONTINUED | OUTPATIENT
Start: 2021-01-01 | End: 2021-01-01

## 2021-01-01 RX ORDER — CEFDINIR 300 MG/1
300 CAPSULE ORAL 2 TIMES DAILY
Qty: 20 CAPSULE | Refills: 0 | Status: SHIPPED | OUTPATIENT
Start: 2021-01-01 | End: 2021-01-01

## 2021-01-01 RX ORDER — SODIUM CHLORIDE 9 MG/ML
1000 INJECTION, SOLUTION INTRAVENOUS CONTINUOUS
Status: DISCONTINUED | OUTPATIENT
Start: 2021-01-01 | End: 2021-01-01

## 2021-01-01 RX ORDER — CARVEDILOL 25 MG/1
25 TABLET ORAL 2 TIMES DAILY WITH MEALS
Status: DISCONTINUED | OUTPATIENT
Start: 2021-01-01 | End: 2021-01-01 | Stop reason: HOSPADM

## 2021-01-01 RX ORDER — HEPARIN SODIUM 1000 [USP'U]/ML
2000 INJECTION, SOLUTION INTRAVENOUS; SUBCUTANEOUS PRN
Status: DISCONTINUED | OUTPATIENT
Start: 2021-01-01 | End: 2021-01-01

## 2021-01-01 RX ORDER — CEFDINIR 300 MG/1
300 CAPSULE ORAL 2 TIMES DAILY
Qty: 20 CAPSULE | Refills: 0 | Status: ON HOLD | OUTPATIENT
Start: 2021-01-01 | End: 2021-01-01 | Stop reason: HOSPADM

## 2021-01-01 RX ORDER — ZINC SULFATE 50(220)MG
50 CAPSULE ORAL DAILY
Status: DISCONTINUED | OUTPATIENT
Start: 2021-01-01 | End: 2021-01-01 | Stop reason: HOSPADM

## 2021-01-01 RX ORDER — PRASUGREL 10 MG/1
10 TABLET, FILM COATED ORAL DAILY
Qty: 30 TABLET | Refills: 3 | Status: SHIPPED | OUTPATIENT
Start: 2021-01-01 | End: 2021-01-01 | Stop reason: HOSPADM

## 2021-01-01 RX ORDER — METAXALONE 800 MG/1
800 TABLET ORAL 3 TIMES DAILY
Status: ON HOLD | COMMUNITY
End: 2021-01-01

## 2021-01-01 RX ORDER — CLOPIDOGREL BISULFATE 75 MG/1
75 TABLET ORAL DAILY
Qty: 30 TABLET | Refills: 3 | Status: SHIPPED | OUTPATIENT
Start: 2021-01-01 | End: 2021-01-01 | Stop reason: SDUPTHER

## 2021-01-01 RX ORDER — METAXALONE 800 MG/1
800 TABLET ORAL 3 TIMES DAILY
Qty: 30 TABLET | Refills: 0 | Status: SHIPPED | OUTPATIENT
Start: 2021-01-01 | End: 2021-01-01

## 2021-01-01 RX ORDER — NITROGLYCERIN 20 MG/100ML
5-200 INJECTION INTRAVENOUS CONTINUOUS
Status: DISCONTINUED | OUTPATIENT
Start: 2021-01-01 | End: 2021-01-01 | Stop reason: ALTCHOICE

## 2021-01-01 RX ORDER — IBUPROFEN 800 MG/1
800 TABLET ORAL EVERY 6 HOURS PRN
Status: ON HOLD | COMMUNITY
End: 2021-01-01

## 2021-01-01 RX ORDER — ATORVASTATIN CALCIUM 80 MG/1
80 TABLET, FILM COATED ORAL NIGHTLY
Status: DISCONTINUED | OUTPATIENT
Start: 2021-01-01 | End: 2021-01-01

## 2021-01-01 RX ORDER — MELOXICAM 15 MG/1
15 TABLET ORAL DAILY
Qty: 90 TABLET | Refills: 3 | Status: SHIPPED | OUTPATIENT
Start: 2021-01-01

## 2021-01-01 RX ORDER — NITROGLYCERIN 20 MG/100ML
INJECTION INTRAVENOUS
Status: COMPLETED
Start: 2021-01-01 | End: 2021-01-01

## 2021-01-01 RX ORDER — TESTOSTERONE CYPIONATE 200 MG/ML
INJECTION INTRAMUSCULAR
Qty: 1 ML | Refills: 5 | OUTPATIENT
Start: 2021-01-01

## 2021-01-01 RX ORDER — FUROSEMIDE 10 MG/ML
80 INJECTION INTRAMUSCULAR; INTRAVENOUS ONCE
Status: COMPLETED | OUTPATIENT
Start: 2021-01-01 | End: 2021-01-01

## 2021-01-01 RX ORDER — MONTELUKAST SODIUM 10 MG/1
TABLET ORAL
Qty: 90 TABLET | Refills: 3 | Status: SHIPPED | OUTPATIENT
Start: 2021-01-01

## 2021-01-01 RX ORDER — BLOOD-GLUCOSE SENSOR
1 EACH MISCELLANEOUS CONTINUOUS
Qty: 6 EACH | Refills: 5 | Status: SHIPPED | OUTPATIENT
Start: 2021-01-01 | End: 2021-01-01 | Stop reason: SDUPTHER

## 2021-01-01 RX ORDER — ATORVASTATIN CALCIUM 40 MG/1
40 TABLET, FILM COATED ORAL NIGHTLY
Status: DISCONTINUED | OUTPATIENT
Start: 2021-01-01 | End: 2021-01-01 | Stop reason: HOSPADM

## 2021-01-01 RX ORDER — SODIUM CHLORIDE 9 MG/ML
INJECTION, SOLUTION INTRAVENOUS CONTINUOUS
Status: DISCONTINUED | OUTPATIENT
Start: 2021-01-01 | End: 2021-01-01

## 2021-01-01 RX ORDER — MULTIVIT WITH MINERALS/LUTEIN
500 TABLET ORAL DAILY
COMMUNITY

## 2021-01-01 RX ORDER — CLOPIDOGREL BISULFATE 75 MG/1
75 TABLET ORAL DAILY
Status: DISCONTINUED | OUTPATIENT
Start: 2021-01-01 | End: 2021-01-01 | Stop reason: HOSPADM

## 2021-01-01 RX ORDER — BLOOD-GLUCOSE SENSOR
1 EACH MISCELLANEOUS CONTINUOUS
Qty: 6 EACH | Refills: 5 | Status: SHIPPED | OUTPATIENT
Start: 2021-01-01 | End: 2022-01-01 | Stop reason: SDUPTHER

## 2021-01-01 RX ORDER — MORPHINE SULFATE 4 MG/ML
2 INJECTION, SOLUTION INTRAMUSCULAR; INTRAVENOUS ONCE
Status: COMPLETED | OUTPATIENT
Start: 2021-01-01 | End: 2021-01-01

## 2021-01-01 RX ORDER — ATORVASTATIN CALCIUM 40 MG/1
40 TABLET, FILM COATED ORAL NIGHTLY
Qty: 30 TABLET | Refills: 3 | Status: SHIPPED | OUTPATIENT
Start: 2021-01-01 | End: 2021-01-01 | Stop reason: SDUPTHER

## 2021-01-01 RX ORDER — ONDANSETRON 4 MG/1
4 TABLET, ORALLY DISINTEGRATING ORAL EVERY 8 HOURS PRN
Status: DISCONTINUED | OUTPATIENT
Start: 2021-01-01 | End: 2021-01-01 | Stop reason: HOSPADM

## 2021-01-01 RX ORDER — NITROGLYCERIN 0.4 MG/1
0.4 TABLET SUBLINGUAL EVERY 5 MIN PRN
Status: DISCONTINUED | OUTPATIENT
Start: 2021-01-01 | End: 2021-01-01 | Stop reason: HOSPADM

## 2021-01-01 RX ORDER — ROSUVASTATIN CALCIUM 10 MG/1
10 TABLET, COATED ORAL DAILY
Status: DISCONTINUED | OUTPATIENT
Start: 2021-01-01 | End: 2021-01-01

## 2021-01-01 RX ORDER — FUROSEMIDE 40 MG/1
40 TABLET ORAL 2 TIMES DAILY
Qty: 180 TABLET | Refills: 1 | Status: SHIPPED | OUTPATIENT
Start: 2021-01-01 | End: 2021-01-01

## 2021-01-01 RX ORDER — ASPIRIN 81 MG/1
81 TABLET ORAL DAILY
Status: DISCONTINUED | OUTPATIENT
Start: 2021-01-01 | End: 2021-01-01

## 2021-01-01 RX ORDER — PANTOPRAZOLE SODIUM 40 MG/1
40 TABLET, DELAYED RELEASE ORAL
Status: DISCONTINUED | OUTPATIENT
Start: 2021-01-01 | End: 2021-01-01 | Stop reason: HOSPADM

## 2021-01-01 RX ORDER — ASPIRIN 81 MG/1
81 TABLET ORAL DAILY
Status: DISCONTINUED | OUTPATIENT
Start: 2021-01-01 | End: 2021-01-01 | Stop reason: HOSPADM

## 2021-01-01 RX ORDER — BUSPIRONE HYDROCHLORIDE 15 MG/1
TABLET ORAL
Qty: 180 TABLET | Refills: 3 | Status: SHIPPED | OUTPATIENT
Start: 2021-01-01

## 2021-01-01 RX ORDER — ZINC GLUCONATE 50 MG
50 TABLET ORAL DAILY
Status: DISCONTINUED | OUTPATIENT
Start: 2021-01-01 | End: 2021-01-01 | Stop reason: CLARIF

## 2021-01-01 RX ORDER — INSULIN GLARGINE 100 [IU]/ML
40 INJECTION, SOLUTION SUBCUTANEOUS DAILY
Qty: 5 PEN | Refills: 6 | Status: CANCELLED | OUTPATIENT
Start: 2021-01-01

## 2021-01-01 RX ORDER — SODIUM CHLORIDE 0.9 % (FLUSH) 0.9 %
5-40 SYRINGE (ML) INJECTION EVERY 12 HOURS SCHEDULED
Status: DISCONTINUED | OUTPATIENT
Start: 2021-01-01 | End: 2021-01-01 | Stop reason: HOSPADM

## 2021-01-01 RX ORDER — POLYETHYLENE GLYCOL 3350 17 G/17G
17 POWDER, FOR SOLUTION ORAL DAILY PRN
Status: DISCONTINUED | OUTPATIENT
Start: 2021-01-01 | End: 2021-01-01 | Stop reason: HOSPADM

## 2021-01-01 RX ORDER — CLOPIDOGREL BISULFATE 75 MG/1
75 TABLET ORAL DAILY
Qty: 90 TABLET | Refills: 3 | Status: SHIPPED | OUTPATIENT
Start: 2021-01-01

## 2021-01-01 RX ORDER — PHENTERMINE HYDROCHLORIDE 30 MG/1
30 CAPSULE ORAL EVERY MORNING
Qty: 60 CAPSULE | Refills: 0 | Status: SHIPPED | OUTPATIENT
Start: 2021-01-01 | End: 2021-01-01

## 2021-01-01 RX ORDER — ATORVASTATIN CALCIUM 40 MG/1
40 TABLET, FILM COATED ORAL NIGHTLY
Qty: 90 TABLET | Refills: 3 | Status: SHIPPED | OUTPATIENT
Start: 2021-01-01

## 2021-01-01 RX ORDER — DEXTROSE MONOHYDRATE 25 G/50ML
12.5 INJECTION, SOLUTION INTRAVENOUS PRN
Status: DISCONTINUED | OUTPATIENT
Start: 2021-01-01 | End: 2021-01-01 | Stop reason: HOSPADM

## 2021-01-01 RX ORDER — ACETAMINOPHEN 325 MG/1
650 TABLET ORAL EVERY 4 HOURS PRN
Status: DISCONTINUED | OUTPATIENT
Start: 2021-01-01 | End: 2021-01-01 | Stop reason: HOSPADM

## 2021-01-01 RX ORDER — PRASUGREL 10 MG/1
10 TABLET, FILM COATED ORAL DAILY
Status: DISCONTINUED | OUTPATIENT
Start: 2021-01-01 | End: 2021-01-01

## 2021-01-01 RX ORDER — ALBUTEROL SULFATE 90 UG/1
1 AEROSOL, METERED RESPIRATORY (INHALATION) EVERY 6 HOURS PRN
Status: DISCONTINUED | OUTPATIENT
Start: 2021-01-01 | End: 2021-01-01 | Stop reason: CLARIF

## 2021-01-01 RX ORDER — ALBUTEROL SULFATE 90 UG/1
1 AEROSOL, METERED RESPIRATORY (INHALATION) EVERY 6 HOURS PRN
Qty: 6.7 INHALER | Refills: 0 | Status: SHIPPED | OUTPATIENT
Start: 2021-01-01

## 2021-01-01 RX ORDER — BUPROPION HYDROCHLORIDE 150 MG/1
150 TABLET ORAL 2 TIMES DAILY
Status: DISCONTINUED | OUTPATIENT
Start: 2021-01-01 | End: 2021-01-01 | Stop reason: HOSPADM

## 2021-01-01 RX ORDER — LISINOPRIL 10 MG/1
10 TABLET ORAL 2 TIMES DAILY
Status: DISCONTINUED | OUTPATIENT
Start: 2021-01-01 | End: 2021-01-01 | Stop reason: HOSPADM

## 2021-01-01 RX ORDER — NITROGLYCERIN 0.4 MG/1
0.4 TABLET SUBLINGUAL EVERY 5 MIN PRN
Qty: 25 TABLET | Refills: 3 | Status: SHIPPED | OUTPATIENT
Start: 2021-01-01

## 2021-01-01 RX ORDER — GABAPENTIN 300 MG/1
300 CAPSULE ORAL 2 TIMES DAILY
Status: DISCONTINUED | OUTPATIENT
Start: 2021-01-01 | End: 2021-01-01 | Stop reason: HOSPADM

## 2021-01-01 RX ADMIN — NITROGLYCERIN 0.4 MG: 0.4 TABLET, ORALLY DISINTEGRATING SUBLINGUAL at 01:25

## 2021-01-01 RX ADMIN — MONTELUKAST 10 MG: 10 TABLET, FILM COATED ORAL at 10:20

## 2021-01-01 RX ADMIN — PANTOPRAZOLE SODIUM 40 MG: 40 TABLET, DELAYED RELEASE ORAL at 05:40

## 2021-01-01 RX ADMIN — CARVEDILOL 25 MG: 25 TABLET, FILM COATED ORAL at 08:30

## 2021-01-01 RX ADMIN — SODIUM CHLORIDE 1000 ML: 9 INJECTION, SOLUTION INTRAVENOUS at 10:25

## 2021-01-01 RX ADMIN — SODIUM CHLORIDE: 9 INJECTION, SOLUTION INTRAVENOUS at 05:00

## 2021-01-01 RX ADMIN — MORPHINE SULFATE 2 MG: 4 INJECTION, SOLUTION INTRAMUSCULAR; INTRAVENOUS at 02:03

## 2021-01-01 RX ADMIN — FUROSEMIDE 40 MG: 40 TABLET ORAL at 10:20

## 2021-01-01 RX ADMIN — GABAPENTIN 300 MG: 300 CAPSULE ORAL at 18:15

## 2021-01-01 RX ADMIN — SODIUM CHLORIDE, PRESERVATIVE FREE 10 ML: 5 INJECTION INTRAVENOUS at 20:15

## 2021-01-01 RX ADMIN — BUPROPION HYDROCHLORIDE 150 MG: 150 TABLET, EXTENDED RELEASE ORAL at 19:48

## 2021-01-01 RX ADMIN — MELOXICAM 15 MG: 7.5 TABLET ORAL at 08:25

## 2021-01-01 RX ADMIN — SODIUM CHLORIDE, PRESERVATIVE FREE 10 ML: 5 INJECTION INTRAVENOUS at 19:49

## 2021-01-01 RX ADMIN — HEPARIN SODIUM 12.5 UNITS/KG/HR: 10000 INJECTION, SOLUTION INTRAVENOUS at 02:30

## 2021-01-01 RX ADMIN — FUROSEMIDE 40 MG: 40 TABLET ORAL at 08:25

## 2021-01-01 RX ADMIN — LISINOPRIL 10 MG: 10 TABLET ORAL at 08:25

## 2021-01-01 RX ADMIN — PANTOPRAZOLE SODIUM 40 MG: 40 TABLET, DELAYED RELEASE ORAL at 06:43

## 2021-01-01 RX ADMIN — FUROSEMIDE 40 MG: 40 TABLET ORAL at 19:55

## 2021-01-01 RX ADMIN — BUPROPION HYDROCHLORIDE 150 MG: 150 TABLET, EXTENDED RELEASE ORAL at 10:20

## 2021-01-01 RX ADMIN — INSULIN GLARGINE 20 UNITS: 100 INJECTION, SOLUTION SUBCUTANEOUS at 19:47

## 2021-01-01 RX ADMIN — INSULIN LISPRO 5 UNITS: 100 INJECTION, SOLUTION INTRAVENOUS; SUBCUTANEOUS at 08:30

## 2021-01-01 RX ADMIN — ASPIRIN 81 MG: 81 TABLET, COATED ORAL at 08:24

## 2021-01-01 RX ADMIN — LISINOPRIL 10 MG: 10 TABLET ORAL at 10:20

## 2021-01-01 RX ADMIN — LISINOPRIL 10 MG: 10 TABLET ORAL at 19:48

## 2021-01-01 RX ADMIN — NITROGLYCERIN 5 MCG/MIN: 20 INJECTION INTRAVENOUS at 02:44

## 2021-01-01 RX ADMIN — BUPROPION HYDROCHLORIDE 150 MG: 150 TABLET, EXTENDED RELEASE ORAL at 20:14

## 2021-01-01 RX ADMIN — PRASUGREL 10 MG: 10 TABLET, FILM COATED ORAL at 08:25

## 2021-01-01 RX ADMIN — NITROGLYCERIN 0.4 MG: 0.4 TABLET, ORALLY DISINTEGRATING SUBLINGUAL at 01:20

## 2021-01-01 RX ADMIN — GABAPENTIN 300 MG: 300 CAPSULE ORAL at 08:25

## 2021-01-01 RX ADMIN — MELOXICAM 15 MG: 7.5 TABLET ORAL at 10:21

## 2021-01-01 RX ADMIN — BUPROPION HYDROCHLORIDE 150 MG: 150 TABLET, EXTENDED RELEASE ORAL at 08:25

## 2021-01-01 RX ADMIN — HEPARIN SODIUM 10.4 UNITS/KG/HR: 10000 INJECTION, SOLUTION INTRAVENOUS at 19:46

## 2021-01-01 RX ADMIN — Medication 50 MG: at 08:25

## 2021-01-01 RX ADMIN — MONTELUKAST 10 MG: 10 TABLET, FILM COATED ORAL at 08:24

## 2021-01-01 RX ADMIN — Medication 50 MG: at 10:22

## 2021-01-01 RX ADMIN — TAMSULOSIN HYDROCHLORIDE 0.4 MG: 0.4 CAPSULE ORAL at 10:22

## 2021-01-01 RX ADMIN — HEPARIN SODIUM 8.4 UNITS/KG/HR: 10000 INJECTION, SOLUTION INTRAVENOUS at 15:45

## 2021-01-01 RX ADMIN — SODIUM CHLORIDE, PRESERVATIVE FREE 10 ML: 5 INJECTION INTRAVENOUS at 10:22

## 2021-01-01 RX ADMIN — ROSUVASTATIN CALCIUM 10 MG: 10 TABLET, FILM COATED ORAL at 10:22

## 2021-01-01 RX ADMIN — HEPARIN SODIUM 4000 UNITS: 1000 INJECTION INTRAVENOUS; SUBCUTANEOUS at 19:45

## 2021-01-01 RX ADMIN — INSULIN LISPRO 5 UNITS: 100 INJECTION, SOLUTION INTRAVENOUS; SUBCUTANEOUS at 18:00

## 2021-01-01 RX ADMIN — INSULIN LISPRO 5 UNITS: 100 INJECTION, SOLUTION INTRAVENOUS; SUBCUTANEOUS at 12:22

## 2021-01-01 RX ADMIN — GABAPENTIN 300 MG: 300 CAPSULE ORAL at 10:22

## 2021-01-01 RX ADMIN — PERFLUTREN 1.65 MG: 6.52 INJECTION, SUSPENSION INTRAVENOUS at 09:00

## 2021-01-01 RX ADMIN — NITROGLYCERIN 0.4 MG: 0.4 TABLET, ORALLY DISINTEGRATING SUBLINGUAL at 01:15

## 2021-01-01 RX ADMIN — TAMSULOSIN HYDROCHLORIDE 0.4 MG: 0.4 CAPSULE ORAL at 08:24

## 2021-01-01 RX ADMIN — FUROSEMIDE 80 MG: 10 INJECTION, SOLUTION INTRAMUSCULAR; INTRAVENOUS at 13:26

## 2021-01-01 RX ADMIN — FUROSEMIDE 40 MG: 40 TABLET ORAL at 17:57

## 2021-01-01 RX ADMIN — IOPAMIDOL 475 ML: 612 INJECTION, SOLUTION INTRAVENOUS at 10:11

## 2021-01-01 RX ADMIN — HEPARIN SODIUM 4000 UNITS: 1000 INJECTION INTRAVENOUS; SUBCUTANEOUS at 13:26

## 2021-01-01 RX ADMIN — HEPARIN SODIUM 4000 UNITS: 1000 INJECTION INTRAVENOUS; SUBCUTANEOUS at 02:30

## 2021-01-01 RX ADMIN — SODIUM CHLORIDE, PRESERVATIVE FREE 10 ML: 5 INJECTION INTRAVENOUS at 08:26

## 2021-01-01 RX ADMIN — GABAPENTIN 300 MG: 300 CAPSULE ORAL at 19:55

## 2021-01-01 RX ADMIN — LISINOPRIL 10 MG: 10 TABLET ORAL at 20:14

## 2021-01-01 RX ADMIN — INSULIN GLARGINE 20 UNITS: 100 INJECTION, SOLUTION SUBCUTANEOUS at 20:18

## 2021-01-01 ASSESSMENT — ENCOUNTER SYMPTOMS
COUGH: 0
NAUSEA: 0
DIARRHEA: 0
BLOOD IN STOOL: 0
CONSTIPATION: 0
NAUSEA: 0
ABDOMINAL PAIN: 0
ABDOMINAL DISTENTION: 0
SHORTNESS OF BREATH: 0
EYES NEGATIVE: 1
SORE THROAT: 0
SORE THROAT: 0
COUGH: 0
VOMITING: 0
VOMITING: 0
DIARRHEA: 0
DIARRHEA: 0
BLOOD IN STOOL: 0
EYES NEGATIVE: 1
CONSTIPATION: 0
BLOOD IN STOOL: 0
ABDOMINAL PAIN: 0
EYES NEGATIVE: 1
VOMITING: 0
BACK PAIN: 0
RESPIRATORY NEGATIVE: 1
CHEST TIGHTNESS: 0
CHEST TIGHTNESS: 0
SHORTNESS OF BREATH: 0
GASTROINTESTINAL NEGATIVE: 1
WHEEZING: 0
VOMITING: 0
CHEST TIGHTNESS: 0
CONSTIPATION: 0
CONSTIPATION: 0
COUGH: 0
CHEST TIGHTNESS: 0
NAUSEA: 0
NAUSEA: 0
SORE THROAT: 0
EYES NEGATIVE: 1
GASTROINTESTINAL NEGATIVE: 1
WHEEZING: 0
WHEEZING: 0
EYES NEGATIVE: 1
DIARRHEA: 0
COUGH: 1
COUGH: 0
WHEEZING: 0
DIARRHEA: 0
NAUSEA: 0
WHEEZING: 0
EYES NEGATIVE: 1
BACK PAIN: 0
WHEEZING: 0
CONSTIPATION: 0
BLOOD IN STOOL: 0
NAUSEA: 0
WHEEZING: 0
SHORTNESS OF BREATH: 1
SHORTNESS OF BREATH: 0
CHEST TIGHTNESS: 0
EYE PAIN: 0
COUGH: 0
SHORTNESS OF BREATH: 0
VOMITING: 0
SHORTNESS OF BREATH: 0
EYES NEGATIVE: 1
SINUS PAIN: 1
CHEST TIGHTNESS: 0
WHEEZING: 0
SHORTNESS OF BREATH: 0
COUGH: 0
VOMITING: 0
COUGH: 0
ABDOMINAL PAIN: 0
SHORTNESS OF BREATH: 0
VOMITING: 0
NAUSEA: 0
ABDOMINAL DISTENTION: 0
COUGH: 0
SHORTNESS OF BREATH: 1
ABDOMINAL PAIN: 0
DIARRHEA: 0
WHEEZING: 0
NAUSEA: 0
BACK PAIN: 0
VOMITING: 0
CHEST TIGHTNESS: 0
BLOOD IN STOOL: 0
CHEST TIGHTNESS: 0
COUGH: 0
VOMITING: 0
SHORTNESS OF BREATH: 0
WHEEZING: 0
RHINORRHEA: 1
BACK PAIN: 1
NAUSEA: 0

## 2021-01-01 ASSESSMENT — PAIN SCALES - GENERAL
PAINLEVEL_OUTOF10: 0
PAINLEVEL_OUTOF10: 4
PAINLEVEL_OUTOF10: 5
PAINLEVEL_OUTOF10: 0
PAINLEVEL_OUTOF10: 0

## 2021-01-01 ASSESSMENT — PATIENT HEALTH QUESTIONNAIRE - PHQ9
1. LITTLE INTEREST OR PLEASURE IN DOING THINGS: 0
SUM OF ALL RESPONSES TO PHQ9 QUESTIONS 1 & 2: 0
SUM OF ALL RESPONSES TO PHQ QUESTIONS 1-9: 0
2. FEELING DOWN, DEPRESSED OR HOPELESS: 0
SUM OF ALL RESPONSES TO PHQ QUESTIONS 1-9: 0

## 2021-01-01 ASSESSMENT — PAIN DESCRIPTION - ORIENTATION: ORIENTATION: MID;UPPER

## 2021-01-01 ASSESSMENT — PAIN DESCRIPTION - FREQUENCY: FREQUENCY: INTERMITTENT

## 2021-01-01 ASSESSMENT — PAIN DESCRIPTION - LOCATION: LOCATION: BACK;CHEST

## 2021-01-01 ASSESSMENT — PAIN DESCRIPTION - PAIN TYPE: TYPE: ACUTE PAIN;CHRONIC PAIN

## 2021-01-01 ASSESSMENT — PAIN DESCRIPTION - DESCRIPTORS: DESCRIPTORS: HEAVINESS

## 2021-01-11 DIAGNOSIS — L98.9 SKIN LESIONS: ICD-10-CM

## 2021-01-11 RX ORDER — DOXYCYCLINE HYCLATE 100 MG
TABLET ORAL
Qty: 60 TABLET | Refills: 0 | Status: SHIPPED | OUTPATIENT
Start: 2021-01-11 | End: 2021-01-01

## 2021-01-26 RX ORDER — BLOOD-GLUCOSE SENSOR
1 EACH MISCELLANEOUS CONTINUOUS
Qty: 6 EACH | Refills: 5 | Status: SHIPPED | OUTPATIENT
Start: 2021-01-26 | End: 2021-01-01 | Stop reason: SDUPTHER

## 2021-01-26 RX ORDER — BLOOD-GLUCOSE TRANSMITTER
1 EACH MISCELLANEOUS CONTINUOUS
Qty: 1 EACH | Refills: 5 | Status: SHIPPED | OUTPATIENT
Start: 2021-01-26 | End: 2021-01-01 | Stop reason: SDUPTHER

## 2021-02-02 ENCOUNTER — TELEPHONE (OUTPATIENT)
Dept: FAMILY MEDICINE CLINIC | Age: 51
End: 2021-02-02

## 2021-02-02 DIAGNOSIS — K58.9 IRRITABLE BOWEL SYNDROME, UNSPECIFIED TYPE: ICD-10-CM

## 2021-02-02 DIAGNOSIS — F41.9 ANXIETY: ICD-10-CM

## 2021-02-02 RX ORDER — BUSPIRONE HYDROCHLORIDE 15 MG/1
TABLET ORAL
Qty: 180 TABLET | Refills: 3 | Status: ON HOLD | OUTPATIENT
Start: 2021-02-02 | End: 2021-01-01

## 2021-02-02 ASSESSMENT — PATIENT HEALTH QUESTIONNAIRE - PHQ9
1. LITTLE INTEREST OR PLEASURE IN DOING THINGS: 0
SUM OF ALL RESPONSES TO PHQ QUESTIONS 1-9: 0
SUM OF ALL RESPONSES TO PHQ QUESTIONS 1-9: 0

## 2021-02-04 NOTE — PATIENT INSTRUCTIONS
SYMPTOMATIC COVID SCREEN . You were screened for COVID today and swabbed. You will be called with the results and any indicated treatments. You were provided with written CDC isolation/quarantine guidelines. Negative POCT FLU testing today. [facility] will call with results.

## 2021-02-04 NOTE — PROGRESS NOTES
Wilfredo Wylie (:  1970) is a 44 y.o. male,Established patient, here for evaluation of the following chief complaint(s):  Generalized Body Aches (x1 day ), Headache (x1 day ), and Fatigue (x1 day )      ASSESSMENT/PLAN:  1. Special screening examination for viral disease  -     POCT Influenza A/B  -     XR CHEST STANDARD (2 VW); Future  2. Generalized body aches  -     POCT Influenza A/B  -     XR CHEST STANDARD (2 VW); Future  3. Bronchitis      Return if symptoms worsen or fail to improve. SYMPTOMATIC COVID SCREEN . You were screened for COVID today and swabbed. You will be called with the results and any indicated treatments. You were provided with written [facility] isolation/quarantine guidelines. Negative POCT FLU testing today. [address] [place] [address] will call with results. [address] [facility] [place] [address]  Dept: [phone]  Dept Fax: [phone]  Loc: [phone]    Wilfredo Wylie is a 44 y.o. male who presents today for his medical conditions/complaintsas noted below. Wilfredo Wylie is c/o of Generalized Body Aches (x1 day ), Headache (x1 day ), and Fatigue (x1 day )      HPI:   Patient complains of body aches, headache, fatigue for one day. Notes he was directed to come in per PCP, DR Mukesh Bryson. Noted that Dr Mukesh Bryson has sent in Doxycycline to begin today. He notes he has not had wheezing or shortness of breath and has not had to use his inhaler medications for this complaint. Notes generalized body aches, fever, chills. He notes his blood sugar was 190s this morning after 3 cups of coffee, which was not unusual for him. He does not have measured temp.      Generalized Body Aches This is a new problem. The current episode started yesterday. The problem occurs intermittently. The problem has been gradually worsening. Associated symptoms include arthralgias, chills, fatigue and myalgias. Pertinent negatives include no abdominal pain, chest pain, congestion, coughing, fever, rash or sore throat. The symptoms are aggravated by smoking. He has tried nothing for the symptoms. Fatigue  This is a new problem. The current episode started in the past 7 days. The problem occurs constantly. The problem has been gradually worsening. Associated symptoms include arthralgias, chills, fatigue and myalgias. Pertinent negatives include no abdominal pain, chest pain, congestion, coughing, fever, rash or sore throat. The symptoms are aggravated by smoking. He has tried nothing for the symptoms. The treatment provided no relief. Past Medical History:   Diagnosis Date    Arthritis     CAD (coronary artery disease)     Cigarette smoker 9/2/2014    Diabetes ([facility] [address]     History of nephrolithiasis     Hyperlipidemia     Hypertension     Obesity     Type II or unspecified type diabetes mellitus without mention of complication, not stated as uncontrolled      Past Surgical History:   Procedure Laterality Date    CARDIAC CATHETERIZATION  2014    CARDIAC CATHETERIZATION  3/14/12    CARDIAC CATHETERIZATION  9/2/14  JDT    stent to mid LAD.  EF 50%    COLONOSCOPY N/A 7/1/2020    Dr MANUEL Choudhury-increased tortuosity of the colon, piecemeal, AP x 1, HP x 2, 1 yr recall    COLONOSCOPY  7/1/2020    Dr Yg Choudhury-increased tortuosity of the colon, piecemeal, AP x 1, HP x 2, 1 yr recall    CORONARY ARTERY BYPASS GRAFT      CORONARY ARTERY BYPASS GRAFT  3/15/2012    3V CABG (LIMA-Diag, SVG-RCA, SVG-OM) JPO    DIAGNOSTIC CARDIAC CATH LAB PROCEDURE      EXPLORATION OF WOUND OF EXTREMITY N/A 1/28/2019  ipratropium-albuterol (DUONEB) 0.5-2.5 (3) MG/3ML SOLN nebulizer solution INHALE 3 MLS INTO THE LUNGS EVERY 6 HOURS AS NEEDED FOR SHORTNESS OF BREATH 360 mL 0    pantoprazole (PROTONIX) 40 MG tablet TAKE 1 TABLET BY MOUTH EVERY DAY BEFORE BREAKFAST 30 tablet 0    Nebulizers (COMPRESSOR/NEBULIZER) MISC 1 each by Does not apply route 4 times daily as needed (cough) 1 each 3    Continuous Blood Gluc  (DEXCOM G6 ) JOVANY 1 each by Does not apply route continuous DX E11.62, L97.509, Z79.4 1 Device 0    mometasone-formoterol (DULERA) 200-5 MCG/ACT inhaler Inhale 2 puffs into the lungs every 12 hours Hold script for now 2 Inhaler 5    blood glucose monitor strips 1 strip by Other route 3 times daily TID   Uses Accucheck  Machine. Dx E11.9, Z79.4 100 strip 3    baclofen (LIORESAL) 10 MG tablet TAKE 1 TABLET BY MOUTH TWICE A DAY 60 tablet 1    metoprolol succinate (TOPROL XL) 50 MG extended release tablet Take 1 tablet by mouth daily 90 tablet 3    furosemide (LASIX) 40 MG tablet TAKE 1 TABLET BY MOUTH EVERY DAY 90 tablet 1    lisinopril (PRINIVIL;ZESTRIL) 20 MG tablet Take 1 tablet by mouth 2 times daily 60 tablet 3    Needles & Syringes MISC 1 each by Does not apply route every 21 days Needs 3 cc syringes with 22 G  1/2 inch needle to give Testosterone and needs 18 Gauge to draw up med.  10 each 5    BASAGLAR KWIKPEN 100 UNIT/ML injection pen Inject 40 Units into the skin daily  5 pen 6    vitamin D (ERGOCALCIFEROL) 1.25 MG (14602 UT) CAPS capsule Take 1 capsule by mouth twice a week 32 capsule 3    clopidogrel (PLAVIX) 75 MG tablet Take 75 mg by mouth daily      meloxicam (MOBIC) 15 MG tablet Take 15 mg by mouth daily      glipiZIDE (GLUCOTROL) 10 MG tablet Take 10 mg by mouth 2 times daily (before meals)      testosterone cypionate (DEPOTESTOTERONE CYPIONATE) 200 MG/ML injection INJECT 1ML INTRAMUSCULARLY EVERY 21 DAYS 1 mL 5 Head: Normocephalic. Right Ear: External ear normal.      Left Ear: External ear normal.   Eyes:      Pupils: Pupils are equal, round, and reactive to light. Cardiovascular:      Rate and Rhythm: Normal rate and regular rhythm. Pulmonary:      Effort: Pulmonary effort is normal.      Breath sounds: Rhonchi and rales (faint rales right lower lobe clears with cough) present. No wheezing. Skin:     General: Skin is warm and dry. Neurological:      Mental Status: He is alert and oriented to person, place, and time. No results found for this visit on 02/04/21. Assessment:      Diagnosis Orders   1. Special screening examination for viral disease  POCT Influenza A/B    XR CHEST STANDARD (2 VW)   2. Generalized body aches  POCT Influenza A/B    XR CHEST STANDARD (2 VW)   3. Bronchitis         Plan:   Gigi Ewdard was seen today for generalized body aches, headache and fatigue. Diagnoses and all orders for this visit:    Special screening examination for viral disease  -     POCT Influenza A/B  -     XR CHEST STANDARD (2 VW); Future    Generalized body aches  -     POCT Influenza A/B  -     XR CHEST STANDARD (2 VW); Future    Bronchitis       Narrative   Examination. XR CHEST (2 VW) 2/4/2021 10:50 AM   History: Generalized body aches and pains. Frontal and lateral views of the chest are compared with the previous   study dated 9/28/2020. The lungs are poorly inflated. There are coarse interstitial changes in the lungs bilaterally which   are similar to the previous study. There is no pleural effusion, pulmonary congestion or pneumothorax. The heart size in the normal range. No bony abnormality.       Impression   No active cardiopulmonary disease.    Signed by Dr Kaylynn Bynum on 2/4/2021 12:38 PM Return if symptoms worsen or fail to improve. SYMPTOMATIC COVID SCREEN . You were screened for COVID today and swabbed. You will be called with the results and any indicated treatments. You were provided with written CDC isolation/quarantine guidelines. Negative POCT FLU testing today. [facility] will call with results. Will call with result of your chest xray. Dr Leelee Castaneda had already sent in Doxycycline, therefore I did not send duplicate, please take the medication they sent in for you. We will call as soon as possible with your covid test results. Patient given educational materials- see patient instructions. Discussed use, benefit, and side effects of prescribedmedications. All patient questions answered. Pt voiced understanding.      Electronically signed by MELITON Monique CNP on 2/4/2021 at 1:30 PM

## 2021-03-25 NOTE — LETTER
headaches. These risks may increase when these medications are combined with other stimulants, such as caffeine pills or energy drinks, certain weight loss supplements and oral decongestants. Dependence withdrawal symptoms may include depressed mood, loss of interest, suicidal thoughts, anxiety, fatigue, appetite changes and agitation. Testosterone replacement therapy:  Potential side effects include increased risk of stroke and heart attack, blood clots, increased blood pressure, increased cholesterol, enlarged prostate, sleep apnea, irritability/aggression and other mood disorders, and decreased fertility. I agree and understand that I and my prescriber have the following rights and responsibilities regarding my treatment plan:     1. MY RIGHTS:  To be informed of my treatment and medication plan. To be an active participant in my health and wellbeing. 2. MY RESPONSIBILITY AND UNDERSTANDING FOR USE OF MEDICATIONS   I will take medications at the dose and frequency as directed. For my safety, I will not increase or change how I take my medications without the recommendation of my healthcare provider.  I will actively participate in any program recommended by my provider which may improve function, including social, physical, psychological programs.  I will not take my medications with alcohol or other drugs not prescribed to me. I understand that drinking alcohol with my medications increases the chances of side effects, including reduced breathing rate and could lead to personal injury when operating machinery.  I understand that if I have a history of substance use disorders, including alcohol or other illicit drugs, that I may be at increased risk of addiction to my medications.  I agree to notify my provider immediately if I should become pregnant so that my treatment plan can be adjusted.    I agree and understand that I shall only receive controlled substance medications from the prescriber that signed this agreement unless there is written agreement among other prescribers of controlled substances outlining the responsibility of the medications being prescribed.  I understand that the if the controlled medication is not helping to achieve goals, the dosage may be tapered and no longer prescribed. 3. MY RESPONSIBILITY FOR COMMUNICATION / PRESCRIPTION RENEWALS   I agree that all controlled substance medications that I take will be prescribed only by my provider. If another healthcare provider prescribes me medication in an emergency, I will notify my provider within seventy-two (72) hours.  I will arrange for refills at the prescribed interval ONLY during regular office hours. I will not ask for refills earlier than agreed, after-hours, on holidays or weekends. Refills may take up to 72 hours for processing and prescriptions to reach the pharmacy.  I will inform my other health care providers that I am taking these medications and of the existence of this [id number]. In the event of an emergency, I will provide the same information to the emergency department prescribers.  I will keep my provider updated on the pharmacy I am using for controlled medication prescription filling. Initials:_______  4. MY RESPONSIBILITY FOR PROTECTING MEDICATIONS   I will protect my prescriptions and medications. I understand that lost or misplaced prescriptions will not be replaced.  I will keep medications only for my own use and will not share them with others. I will keep all medications away from children.  I agree that if my medications are adjusted or discontinued, I will properly dispose of any remaining medications. I understand that I will be required to dispose of any remaining controlled medications as, directed by my prescriber, prior to being provided with any prescriptions for other controlled medications.   Medication drop box locations can be found at: controlled substance medications, in their original bottles, to all of my scheduled appointments. In addition, my provider may ask me to come to the practice at any time for a random pill count. 8. TERMINATION OF THIS AGREEMENT  For my safety, my prescriber has the right to stop prescribing controlled substance medications and may end this agreement. Initials:_______   Conditions that may result in termination of this agreement:  a. I do not show any improvement in pain, or my activity has not improved. b. I develop rapid tolerance or loss of improvement, as described in my treatment plan.  c. I develop significant side effects from the medication. d. My behavior is not consistent with the responsibilities outlined above, thereby causing safety concerns to continue prescribing controlled substance medications. e. I fail to follow the terms of this agreement. f. Other:____________________________       UNDERSTANDING THIS MEDICATION AGREEMENT:    I have read the above and have had all my questions answered. For chronic disease management, I know that my symptoms can be managed with many types of treatments. A chronic medication trial may be part of my treatment, but I must be an active participant in my care. Medication therapy is only one part of my symptom management plan. In some cases, there may be limited scientific evidence to support the chronic use of certain medications to improve symptoms and daily function. Furthermore, in certain circumstances, there may be scientific information that suggests that the use of chronic controlled substances may worsen my symptoms and increase my risk of unintentional death directly related to this medication therapy. I know that if my provider feels my risk from controlled medications is greater than my benefit, I will have my controlled substance medication(s) compassionately lowered or removed altogether.      I further agree to allow this office to contact my HIPAA contact if there are concerns about my safety and use of the controlled medications. I have agreed to use the prescribed controlled substance medications to me as instructed by my provider and as stated in this Medication Agreement. My initial on each page and my signature below shows that I have read each page and I have had the opportunity to ask questions with answers provided by my provider.     Patient Name (Printed): _____________________________________  Patient Signature:  ______________________   Date: _____________    Prescriber Name (Printed): ___________________________________  Prescriber Signature: _____________________  Date: _____________

## 2021-03-25 NOTE — PROGRESS NOTES
.vir  Subjective:      Patient ID: Wilfredo Wylie is a 44 y.o. male. HPI  This patient is seen here intermittently by the undersigned for management of chronic disease states. Additionally, he is seen when new problems arise. Today he is complaining of pain in his back. He has 2 focal points. The first is around the T8-T9 level stating that he feels like he has a hot poker sticking there. He complains of some discomfort down a bit lower at L2 area that does radiate around the flanks bilaterally. He has had prior history of stone disease but he states that this feels different. Nevertheless we are checking a urine to see if there is any blood evident. He has been using Zanaflex 4 mg up to every 8 hours which is available for muscle spasm. This has not been helpful. Regrettably, the patient does have diabetes mellitus which precludes our utilization of corticosteroids. I did tell him that corticosteroids very likely would significantly adversely impact his sugar. Currently he is maintained on ibuprofen 800 mg up to 3 times daily with food. I am going to give him Skelaxin to use at 800 mg 3 times daily for muscle spasm as a trial. Additionally, I will provide for him Percocet 10/325 to use at every 8 hour intervals for the severe pain. We will be obtaining plain films of his thoracic and his lumbar spine. My expectation is that he will have some evidence of spondylosis in these areas. He does not wish surgical intervention as of this time. Hopefully we can manage things without benefit of having to refer to neurosurgeon. This the urine specimen does show a moderate amount of red cells, we are going to do renal protocol CT on him some morning before eating anything. He does have diabetes mellitus as noted above. Currently he does utilize a Dexcom G6 sensor and monitor system. Patient states that his sugars generally run around 200. Currently he is on Basaglar at 40 units subcu daily.  Additionally he takes glipizide at 10 mg p.o. twice daily. He does have ED. He does utilize testosterone cypionate at 200 mg/cc administered every 21 days. Patient states that despite this and utilizing anywhere from 2-3 Viagra 100 he is not able to get satisfactory erection. He has considered penile pump and may request referral when he finds out if his insurance will cover such. He does have symptoms of BPH as well and is on Flomax 0.41 p.o. daily    He does have anxiety. He has been on BuSpar 15 mg p.o. twice daily. He states that this is not helping at this time. He attributes this to the fact that his girlfriend of a couple of years has moved out deciding that she would want to be single instead. He has been quite anxious and has resumed smoking which he had quit previously. He has now been smoking once again for 2 months. Likely this is adversely affecting his blood pressure as it was elevated initially at 178/70. It did improve then subsequently to 168/68 and then the final reading was even better. He is maintained on Toprol-XL 50 at 1 p.o. daily and lisinopril 20 mg at 1 p.o. twice daily. He does have a history of heart disease. Currently he is maintained on Plavix at 75 mg p.o. daily. For vitamin D deficiency, he takes ergocalciferol 50,000 unit capsule once weekly. Review of Systems   Constitutional: Negative. HENT: Negative. Eyes: Negative. Respiratory: Negative for cough, chest tightness, shortness of breath and wheezing. Cardiovascular: Negative for chest pain, palpitations and leg swelling. Gastrointestinal: Negative for blood in stool, constipation, diarrhea, nausea and vomiting. Genitourinary: Negative for hematuria. Skin: Negative. Neurological: Negative. Psychiatric/Behavioral: Negative. Objective:   Physical Exam  Vitals signs and nursing note reviewed. Constitutional:       General: He is not in acute distress. Appearance: Normal appearance. He is well-developed.  He is not ill-appearing, toxic-appearing or diaphoretic. HENT:      Head: Normocephalic and atraumatic. Right Ear: Tympanic membrane, ear canal and external ear normal. There is no impacted cerumen. Left Ear: Tympanic membrane, ear canal and external ear normal. There is no impacted cerumen. Nose: Nose normal.      Mouth/Throat:      Lips: Pink. Mouth: Mucous membranes are moist.      Dentition: Normal dentition. Tongue: No lesions. Pharynx: Oropharynx is clear. Uvula midline. Tonsils: No tonsillar exudate or tonsillar abscesses. Eyes:      General: Lids are normal. No scleral icterus. Right eye: No discharge. Left eye: No discharge. Extraocular Movements:      Right eye: Normal extraocular motion. Left eye: Normal extraocular motion. Conjunctiva/sclera: Conjunctivae normal.      Right eye: Right conjunctiva is not injected. Left eye: Left conjunctiva is not injected. Pupils: Pupils are equal, round, and reactive to light. Neck:      Musculoskeletal: Normal range of motion and neck supple. Thyroid: No thyromegaly. Vascular: No carotid bruit or JVD. Cardiovascular:      Rate and Rhythm: Normal rate and regular rhythm. Pulses:           Carotid pulses are 2+ on the right side and 2+ on the left side. Radial pulses are 2+ on the right side and 2+ on the left side. Heart sounds: Normal heart sounds, S1 normal and S2 normal. No murmur. No friction rub. No gallop. Pulmonary:      Effort: Pulmonary effort is normal. No accessory muscle usage or respiratory distress. Breath sounds: Normal breath sounds. No stridor. No wheezing, rhonchi or rales. Chest:      Chest wall: No tenderness. Abdominal:      General: Bowel sounds are normal. There is no distension or abdominal bruit. Palpations: Abdomen is soft. There is no mass. Tenderness: There is no abdominal tenderness.  There is no right CVA tenderness, left CVA tenderness, guarding or rebound. Hernia: No hernia is present. Musculoskeletal: Normal range of motion. General: No swelling, tenderness or deformity. Right lower leg: No edema. Left lower leg: No edema. Lymphadenopathy:      Cervical:      Right cervical: No superficial cervical adenopathy. Left cervical: No superficial cervical adenopathy. Skin:     General: Skin is warm and dry. Nails: There is no clubbing. Neurological:      General: No focal deficit present. Mental Status: He is alert and oriented to person, place, and time. Mental status is at baseline. Cranial Nerves: No facial asymmetry. Motor: No weakness or tremor. Coordination: Coordination normal.      Gait: Gait normal.      Deep Tendon Reflexes: Reflexes are normal and symmetric. Psychiatric:         Attention and Perception: Attention normal.         Mood and Affect: Mood normal.         Speech: Speech normal.         Behavior: Behavior normal.         Thought Content: Thought content normal.         Cognition and Memory: Memory normal.         Judgment: Judgment normal.         BP (!) 160/78   Pulse 89   Temp 96.7 °F (35.9 °C) (Infrared)   Resp 16   Ht 6' 3\" (1.905 m)   Wt 259 lb 3.2 oz (117.6 kg)   SpO2 95%   BMI 32.40 kg/m²   Assessment:         Diagnosis Orders   1. Vitamin D deficiency  vitamin D (ERGOCALCIFEROL) 1.25 MG (95980 UT) CAPS capsule   2. Right flank pain  POCT Rapid Drug Screen    Urinalysis    CT ABDOMEN PELVIS WO CONTRAST Additional Contrast? None    oxyCODONE-acetaminophen (PERCOCET)  MG per tablet    ALPRAZolam (XANAX) 0.5 MG tablet   3. Type 2 diabetes mellitus with foot ulcer, with long-term current use of insulin (HCC)  Continuous Blood Gluc Sensor (DEXCOM G6 SENSOR) MISC   4. Acute midline low back pain, unspecified whether sciatica present  XR LUMBAR SPINE (MIN 4 VIEWS)   5. Mid back pain  XR THORACIC SPINE (3 VIEWS)   6.  Hematuria, unspecified type  CT ABDOMEN PELVIS WO CONTRAST Additional Contrast? None    oxyCODONE-acetaminophen (PERCOCET)  MG per tablet    ALPRAZolam (XANAX) 0.5 MG tablet           Plan:       I am having Mr. Asiya Cardona maintain his :   Outpatient Medications Marked as Taking for the 3/25/21 encounter (Office Visit) with Kimmie Márquez MD   Medication Sig Dispense Refill    doxycycline hyclate (VIBRA-TABS) 100 MG tablet TAKE 1 TABLET BY MOUTH TWICE A DAY 60 tablet 0    ibuprofen (ADVIL;MOTRIN) 800 MG tablet Take 800 mg by mouth every 6 hours as needed for Pain      Continuous Blood Gluc Sensor (DEXCOM G6 SENSOR) MISC 1 each by Does not apply route continuous E11.62, L97.509, Z79.4 6 each 5    vitamin D (ERGOCALCIFEROL) 1.25 MG (96525 UT) CAPS capsule Take 1 capsule by mouth twice a week 32 capsule 3    oxyCODONE-acetaminophen (PERCOCET)  MG per tablet Take 1 tablet by mouth every 4 hours as needed for Pain for up to 30 days. 60 tablet 0    ALPRAZolam (XANAX) 0.5 MG tablet Take 1 tablet by mouth 2 times daily for 30 days. 60 tablet 0    albuterol sulfate  (90 Base) MCG/ACT inhaler INHALE 1 PUFF INTO THE LUNGS EVERY 6 HOURS AS NEEDED FOR WHEEZING OR SHORTNESS OF BREATH.  6.7 Inhaler 0    tiZANidine (ZANAFLEX) 4 MG tablet TAKE 1 TABLET BY MOUTH EVERY 8 HOURS AS NEEDED (BACK PAIN) 270 tablet 1    busPIRone (BUSPAR) 15 MG tablet TAKE 1 TABLET BY MOUTH TWICE A  tablet 3    Continuous Blood Gluc Transmit (DEXCOM G6 TRANSMITTER) MISC 1 each by Does not apply route continuous E11.62, L97.509, Z79.4 1 each 5    rosuvastatin (CRESTOR) 10 MG tablet TAKE 1 TABLET BY MOUTH EVERY DAY 90 tablet 1    ipratropium-albuterol (DUONEB) 0.5-2.5 (3) MG/3ML SOLN nebulizer solution INHALE 3 MLS INTO THE LUNGS EVERY 6 HOURS AS NEEDED FOR SHORTNESS OF BREATH 360 mL 0    pantoprazole (PROTONIX) 40 MG tablet TAKE 1 TABLET BY MOUTH EVERY DAY BEFORE BREAKFAST 30 tablet 0    Nebulizers (COMPRESSOR/NEBULIZER) MISC 1 each by Does not apply route 4 times daily as needed (cough) 1 each 3    Continuous Blood Gluc  (DEXCOM G6 ) JOVANY 1 each by Does not apply route continuous DX E11.62, L97.509, Z79.4 1 Device 0    testosterone cypionate (DEPOTESTOTERONE CYPIONATE) 200 MG/ML injection INJECT 1ML INTRAMUSCULARLY EVERY 21 DAYS 1 mL 5    tamsulosin (FLOMAX) 0.4 MG capsule Take 0.4 mg by mouth daily      blood glucose monitor strips 1 strip by Other route 3 times daily TID   Uses Accucheck  Machine. Dx E11.9, Z79.4 100 strip 3    metoprolol succinate (TOPROL XL) 50 MG extended release tablet Take 1 tablet by mouth daily 90 tablet 3    lisinopril (PRINIVIL;ZESTRIL) 20 MG tablet Take 1 tablet by mouth 2 times daily 60 tablet 3    Needles & Syringes MISC 1 each by Does not apply route every 21 days Needs 3 cc syringes with 22 G  1/2 inch needle to give Testosterone and needs 18 Gauge to draw up med.  10 each 5    BASAGLAR KWIKPEN 100 UNIT/ML injection pen Inject 40 Units into the skin daily  5 pen 6    clopidogrel (PLAVIX) 75 MG tablet Take 75 mg by mouth daily      Multiple Vitamins-Minerals (DIABETES HEALTH PO) Take 1 tablet by mouth daily      meloxicam (MOBIC) 15 MG tablet Take 15 mg by mouth daily      glipiZIDE (GLUCOTROL) 10 MG tablet Take 10 mg by mouth 2 times daily (before meals)     ,  Medications Discontinued During This Encounter   Medication Reason    baclofen (LIORESAL) 10 MG tablet Therapy completed    furosemide (LASIX) 40 MG tablet Therapy completed    mometasone-formoterol (DULERA) 200-5 MCG/ACT inhaler Therapy completed    vitamin D (ERGOCALCIFEROL) 1.25 MG (35939 UT) CAPS capsule REORDER    Continuous Blood Gluc Sensor (DEXCOM G6 SENSOR) MIS REORDER     Requested Prescriptions     Signed Prescriptions Disp Refills    Continuous Blood Gluc Sensor (DEXCOM G6 SENSOR) MISC 6 each 5     Si each by Does not apply route continuous E11.62, L97.509, Z79.4    vitamin D (ERGOCALCIFEROL) 1.25 MG (13476 UT) CAPS capsule 32 capsule 3     Sig: Take 1 capsule by mouth twice a week    oxyCODONE-acetaminophen (PERCOCET)  MG per tablet 60 tablet 0     Sig: Take 1 tablet by mouth every 4 hours as needed for Pain for up to 30 days.  ALPRAZolam (XANAX) 0.5 MG tablet 60 tablet 0     Sig: Take 1 tablet by mouth 2 times daily for 30 days. .       Orders Placed This Encounter   Procedures    XR LUMBAR SPINE (MIN 4 VIEWS)     Standing Status:   Future     Standing Expiration Date:   3/25/2022    XR THORACIC SPINE (3 VIEWS)     Standing Status:   Future     Standing Expiration Date:   3/25/2022    CT ABDOMEN PELVIS WO CONTRAST Additional Contrast? None     Standing Status:   Future     Standing Expiration Date:   3/25/2022     Order Specific Question:   Additional Contrast?     Answer:   None    Urinalysis     Standing Status:   Future     Number of Occurrences:   1     Standing Expiration Date:   3/25/2022    POCT Rapid Drug Screen     Orders Placed This Encounter   Medications    Continuous Blood Gluc Sensor (DEXCOM G6 SENSOR) MISC     Si each by Does not apply route continuous E11.62, L97.509, Z79.4     Dispense:  6 each     Refill:  5    vitamin D (ERGOCALCIFEROL) 1.25 MG (11768 UT) CAPS capsule     Sig: Take 1 capsule by mouth twice a week     Dispense:  32 capsule     Refill:  3    oxyCODONE-acetaminophen (PERCOCET)  MG per tablet     Sig: Take 1 tablet by mouth every 4 hours as needed for Pain for up to 30 days. Dispense:  60 tablet     Refill:  0     Reduce doses taken as pain becomes manageable    ALPRAZolam (XANAX) 0.5 MG tablet     Sig: Take 1 tablet by mouth 2 times daily for 30 days.      Dispense:  60 tablet     Refill:  0             Gonzalo Smith MD

## 2021-04-07 NOTE — TELEPHONE ENCOUNTER
Patient notified of xray results.   [facility] has appointment on 04/14/2021 with urology

## 2021-04-13 NOTE — TELEPHONE ENCOUNTER
Last office visit 03/25/2021  Last lab 07/27/2020---553 testosterone                                     79.3----free testosterone

## 2021-04-14 NOTE — PROGRESS NOTES
Rafael Soria is a 44 y.o., male, New patient who presents today   Chief Complaint   Patient presents with   [facility] Patient     E.D. Erectile Dysfunction  This is a chronic problem. The current episode started more than 1 year ago. The problem has been gradually worsening since onset. The nature of his difficulty is achieving erection and maintaining erection. Non-physiologic factors contributing to erectile dysfunction are a decreased libido. Irritative symptoms do not include frequency or urgency. Obstructive symptoms include a slower stream and a weak stream. Obstructive symptoms do not include dribbling, incomplete emptying, an intermittent stream or straining. Associated symptoms include hesitancy. Pertinent negatives include no chills, dysuria, genital pain, hematuria or inability to urinate. Past treatments include sildenafil (Up to 300 mg at a time). The treatment provided mild relief. He has had no adverse reactions caused by medications. Risk factors include diabetes mellitus, hypertension, BPH, tobacco use and chronic cardiac disease. REVIEW OF SYSTEMS:  Review of Systems   Constitutional: Negative for chills and fever. Gastrointestinal: Negative for abdominal distention, abdominal pain, nausea and vomiting. Genitourinary: Positive for decreased libido and hesitancy. Negative for difficulty urinating, dysuria, flank pain, frequency, hematuria, incomplete emptying and urgency. Musculoskeletal: Negative for back pain and gait problem. Psychiatric/Behavioral: Negative for agitation and confusion. PHYSICAL EXAM:  Temp 98.8 °F (37.1 °C) (Temporal)   Ht 6' 3\" (1.905 m)   Wt 258 lb (117 kg)   BMI 32.25 kg/m²   Physical Exam  Vitals signs and nursing note reviewed. Constitutional:       General: He is not in acute distress. Appearance: Normal appearance. He is not ill-appearing. Pulmonary:      Effort: Pulmonary effort is normal. No respiratory distress.    Abdominal: General: There is no distension. Tenderness: There is no abdominal tenderness. There is no right CVA tenderness or left CVA tenderness. Neurological:      Mental Status: He is alert and oriented to person, place, and time. Mental status is at baseline. Psychiatric:         Mood and Affect: Mood normal.         Behavior: Behavior normal.         DATA:  Results for orders placed or performed in visit on 04/14/21   POCT Urinalysis Dipstick w/ Micro (Auto)   Result Value Ref Range    Color, UA Yellow     Clarity, UA Clear Clear    Glucose, Ur 100mg/dl     Bilirubin Urine 0 mg/dL    Ketones, Urine Negative     Specific Gravity, UA 1.030 1.005 - 1.030    Blood, Urine Positive     pH, UA 6.0 4.5 - 8.0    Protein, UA Positive (A) Negative    Nitrite, Urine Negative     Leukocytes, UA NEG     Urobilinogen, Urine Normal     rbc urine, poc 2 (A)     wbc urine, poc      bacteria urine, poc      yeast urine, poc      casts urine, poc      epi cells urine, poc      crystals urine, poc       ASSESSMENT/PLAN  1. Erectile dysfunction, unspecified erectile dysfunction type  Patient presents for evaluation of erectile dysfunction. He has been on Viagra in the past without much success. He reports he is interested in a penile implant. I will go ahead and set him up with Dr. Opal Merchant to further discuss this. I have suggested he may try a less invasive option while awaiting this appointment. Patient is interested in penile injections. We will have him return for an office test dose. - POCT Urinalysis Dipstick w/ Micro (Auto)      Orders Placed This Encounter   Procedures    POCT Urinalysis Dipstick w/ Micro (Auto)        Return in about 1 week (around 4/21/2021) for penile injection test dose procedure slot. An electronic signature was used to authenticate this note.     DEMARCUS MELENDEZ, MELITON - CNP    All information inputted into the note by the MA to include chief complaint, past medical history, past surgical history,

## 2021-04-21 NOTE — PROGRESS NOTES
instructed to always alternate sides of the penis each time the medication is injected. It was reiterated not to inject on the top or bottom of the penis but at the 10:00 and 2:00 positions at a 45 degree angle. It was reiterated not to inject the medication directly into the penile vein as this may increase bleeding and cause hematoma. It was reiterated to inject the medication at a 30 to 45 degree angle. It was reiterated to hold pressure on the injection site for 2 to 3 minutes, longer if on anticoagulants or ASA. We again discussed the rating scale. The patient was instructed to not change any medication doses prior to speaking with me. The patient was instructed to utilize the medication no more than 3 times weekly and at least 24 hours apart. We discussed the risk of the injections such as infection, bleeding, hematoma to the site of injection, irritation to the site of injection, priapism. In the case of priapism, an erection lasting 4 or more hours, the patient was instructed to utilize 2 tablets of Sudafed (not the long acting 12hr formulation) to see if this may alleviate his symptoms. If he experiences no relief 30 minutes to 1 hour after taking the Sudafed, he should present to the emergency room immediately for further evaluation and treatment. Instances of priapism should also be reported to our office so we may adjust his dosage. I have sent in a new prescription with the patient's dosage. He may call in if he wishes to increase his dosage. This can be done without an appointment.

## 2021-05-04 NOTE — PROGRESS NOTES
Subjective:      Patient ID: Danni Whalen is a 46 y.o. male. HPI  This patient is seen here intermittently by the undersigned for management of chronic disease states. Additionally, he is seen when new problems arise. Today he is complaining of problems with his right foot as well as some other issues. His right foot had an issue regarding the nail. Apparently he stubbed his toe and then it showed evidence of damage to the nail so he did remove the nail. Now he has a scab on the distal aspect of his right second toe. The patient has already had circulatory issues involving the third toe and had to have these amputated bilaterally. He does have diabetes mellitus and the patient continues to smoke. We have encouraged him regarding smoking cessation. He does have some depression so we are going to give him Wellbutrin at 150 p.o. twice daily in the hope that not only will is help his depression but also perhaps help contributing to smoking cessation. The patient has heart disease, diabetes and also smokes so he definitely needs to do so. He is already lost 2 digits and that third toes bilaterally have been amputated. Regarding his problem with his right second toe, we will be sending him to Dr. Maine Seymour for further evaluation and management. He has complained of some shortness of breath. He is able to work though he huffs and puffs some. The patient does have a history of COPD. Also being a smoker and the fact that he is gaining weight certainly do not contribute to this. He was asking for some help in trying to lose weight so we are going to give him some phentermine. It was noted that pressure was up somewhat initially but very likely a very minimal weight loss will have a significant impact on reduction in weight to the point where that will lessen blood pressure as well. We are going to put him on phentermine at 30 mg use twice daily.   I will see him back in 1 month's time to further evaluate.     He does have diabetes mellitus as noted above. Currently he does utilize a Dexcom G6 sensor and monitor system. Patient states that his sugars generally run around 200. Currently he is on Basaglar at 40 units subcu daily. Additionally he takes glipizide at 10 mg p.o. twice daily.     He does have ED. He does utilize testosterone cypionate at 200 mg/cc administered every 21 days. Patient states that despite this and utilizing anywhere from 2-3 Viagra 100 he is not able to get satisfactory erection. He has considered penile pump and may request referral when he finds out if his insurance will cover such. He does have symptoms of BPH as well and is on Flomax 0.4 at 1 p.o. daily. Did send him to urology because of ED. He was given injectable medication and has found this extremely helpful thus far.     He does have anxiety. He has been on BuSpar 15 mg p.o. twice daily. He states that this is not helping at this time. He attributes this to the fact that his girlfriend of a couple of years has moved out deciding that she would want to be single instead. He has been quite anxious and has resumed smoking which he had quit previously. He has now been smoking once again for 2 months. Likely this is adversely affecting his blood pressure as it was elevated initially finally did improve to 148/70. He is maintained on Toprol-XL 50 at 1 p.o. daily and lisinopril 20 mg at 1 p.o. twice daily. He does have a history of heart disease. Currently he is maintained on Plavix at 75 mg p.o. daily.     For vitamin D deficiency, he takes ergocalciferol 50,000 unit capsule once weekly.        Review of Systems   Constitutional: Negative. HENT: Negative. Eyes: Negative. Respiratory: Negative for cough, chest tightness, shortness of breath and wheezing. Cardiovascular: Negative for chest pain, palpitations and leg swelling. Gastrointestinal: Negative for blood in stool, constipation, diarrhea, nausea and vomiting. Genitourinary: Negative for hematuria. Musculoskeletal: Positive for arthralgias, back pain and joint swelling. Skin: Negative. Neurological: Negative. Psychiatric/Behavioral: Negative. Objective:   Physical Exam  Vitals signs and nursing note reviewed. Constitutional:       General: He is not in acute distress. Appearance: Normal appearance. He is well-developed. He is not ill-appearing, toxic-appearing or diaphoretic. HENT:      Head: Normocephalic and atraumatic. Right Ear: Tympanic membrane, ear canal and external ear normal. There is no impacted cerumen. Left Ear: Tympanic membrane, ear canal and external ear normal. There is no impacted cerumen. Nose: Nose normal.      Mouth/Throat:      Lips: Pink. Mouth: Mucous membranes are moist.      Dentition: Normal dentition. Tongue: No lesions. Pharynx: Oropharynx is clear. Uvula midline. Tonsils: No tonsillar exudate or tonsillar abscesses. Eyes:      General: Lids are normal. No scleral icterus. Right eye: No discharge. Left eye: No discharge. Extraocular Movements: Extraocular movements intact. Right eye: Normal extraocular motion. Left eye: Normal extraocular motion. Conjunctiva/sclera: Conjunctivae normal.      Right eye: Right conjunctiva is not injected. Left eye: Left conjunctiva is not injected. Pupils: Pupils are equal, round, and reactive to light. Neck:      Musculoskeletal: Normal range of motion and neck supple. No neck rigidity or muscular tenderness. Thyroid: No thyromegaly. Vascular: No carotid bruit or JVD. Cardiovascular:      Rate and Rhythm: Normal rate and regular rhythm. Pulses:           Carotid pulses are 2+ on the right side and 2+ on the left side. Radial pulses are 2+ on the right side and 2+ on the left side. Heart sounds: Normal heart sounds, S1 normal and S2 normal. No murmur. No friction rub.  No gallop. Pulmonary:      Effort: Pulmonary effort is normal. No accessory muscle usage or respiratory distress. Breath sounds: Normal breath sounds. No stridor. No wheezing, rhonchi or rales. Chest:      Chest wall: No tenderness. Abdominal:      General: Bowel sounds are normal. There is no distension or abdominal bruit. Palpations: Abdomen is soft. There is no mass. Tenderness: There is no abdominal tenderness. There is no right CVA tenderness, left CVA tenderness, guarding or rebound. Hernia: No hernia is present. Musculoskeletal: Normal range of motion. General: Deformity present. No swelling, tenderness or signs of injury. Right lower leg: No edema. Left lower leg: No edema. Comments: Patient had issues with his right second toenail over the weekend. He did remove the nail. He now has a scab formation distal aspect right second toe. The patient states he has had some drainage though not continuously. It was not foul-smelling according to the patient. Nevertheless because of his issues with diabetes and circulation issues as well as being a smoker, I am going to cover him with Omnicef 300 p.o. twice daily for 10 days. He is also being sent to podiatry to see Dr. Lavinia Luis. Lymphadenopathy:      Cervical: No cervical adenopathy. Right cervical: No superficial cervical adenopathy. Left cervical: No superficial cervical adenopathy. Skin:     General: Skin is warm and dry. Findings: No rash. Nails: There is no clubbing. Neurological:      General: No focal deficit present. Mental Status: He is alert and oriented to person, place, and time. Mental status is at baseline. Cranial Nerves: No facial asymmetry. Motor: No weakness or tremor. Coordination: Coordination normal.      Gait: Gait normal.      Deep Tendon Reflexes: Reflexes are normal and symmetric.    Psychiatric:         Attention and Perception: Attention normal.         Mood and Affect: Mood normal.         Speech: Speech normal.         Behavior: Behavior normal.         Thought Content: Thought content normal.         Cognition and Memory: Memory normal.         Judgment: Judgment normal.          BP (!) 146/70   Pulse 91   Temp 96.6 °F (35.9 °C) (Infrared)   Resp 16   Ht 6' 4\" (1.93 m)   Wt 264 lb 6.4 oz (119.9 kg)   SpO2 93%   BMI 32.18 kg/m²     Assessment:         Diagnosis Orders   1. SOB (shortness of breath)     2. Injury  External Referral To Podiatry   3. Wound infection      right second toe     4. Weight gain  phentermine 30 MG capsule   5. Coronary artery disease involving native coronary artery of native heart without angina pectoris     6. Essential hypertension     7. Type 2 diabetes mellitus with foot ulcer, with long-term current use of insulin ([address]     8. Mixed hyperlipidemia     9. Cigarette smoker             Plan:       I am having Mr. Quirino Serrato maintain his :   Outpatient Medications Marked as Taking for the 5/4/21 encounter (Office Visit) with Jeramy Toth MD   Medication Sig Dispense Refill    buPROPion (WELLBUTRIN XL) 150 MG extended release tablet Take 1 tablet by mouth 2 times daily 60 tablet 3    phentermine 30 MG capsule Take 1 capsule by mouth every morning for 30 days.  60 capsule 0    furosemide (LASIX) 40 MG tablet Take 1 tablet by mouth 2 times daily 60 tablet 3    cefdinir (OMNICEF) 300 MG capsule Take 1 capsule by mouth 2 times daily for 10 days 20 capsule 0    doxycycline hyclate (VIBRA-TABS) 100 MG tablet TAKE 1 TABLET BY MOUTH TWICE A DAY 60 tablet 0    zinc 50 MG TABS tablet Take 50 mg by mouth daily      Ascorbic Acid (VITAMIN C) 1000 MG tablet Take 1,000 mg by mouth daily      metaxalone (SKELAXIN) 800 MG tablet Take 800 mg by mouth 3 times daily      testosterone cypionate (DEPOTESTOTERONE CYPIONATE) 200 MG/ML injection INJECT 1ML INTRAMUSCULARLY EVERY 21 DAYS 1 mL 5    ibuprofen pen 6    clopidogrel (PLAVIX) 75 MG tablet Take 75 mg by mouth daily      meloxicam (MOBIC) 15 MG tablet Take 15 mg by mouth daily      glipiZIDE (GLUCOTROL) 10 MG tablet Take 10 mg by mouth 2 times daily (before meals)       Medications Discontinued During This Encounter   Medication Reason    furosemide (LASIX) 40 MG tablet REORDER     Requested Prescriptions     Signed Prescriptions Disp Refills    buPROPion (WELLBUTRIN XL) 150 MG extended release tablet 60 tablet 3     Sig: Take 1 tablet by mouth 2 times daily    phentermine 30 MG capsule 60 capsule 0     Sig: Take 1 capsule by mouth every morning for 30 days.  furosemide (LASIX) 40 MG tablet 60 tablet 3     Sig: Take 1 tablet by mouth 2 times daily    cefdinir (OMNICEF) 300 MG capsule 20 capsule 0     Sig: Take 1 capsule by mouth 2 times daily for 10 days   . Orders Placed This Encounter   Procedures    External Referral To Podiatry     Referral Priority:   Routine     Referral Type:   Eval and Treat     Referral Reason:   Specialty Services Required     Requested Specialty:   Podiatry     Number of Visits Requested:   1     Orders Placed This Encounter   Medications    buPROPion (WELLBUTRIN XL) 150 MG extended release tablet     Sig: Take 1 tablet by mouth 2 times daily     Dispense:  60 tablet     Refill:  3    phentermine 30 MG capsule     Sig: Take 1 capsule by mouth every morning for 30 days.      Dispense:  60 capsule     Refill:  0    furosemide (LASIX) 40 MG tablet     Sig: Take 1 tablet by mouth 2 times daily     Dispense:  60 tablet     Refill:  3    cefdinir (OMNICEF) 300 MG capsule     Sig: Take 1 capsule by mouth 2 times daily for 10 days     Dispense:  20 capsule     Refill:  0             Yamileth Cabral MD

## 2021-05-10 NOTE — TELEPHONE ENCOUNTER
Patient called he checking to see if he needs get PSA before his appointment on 5-17-21 please call the patient.

## 2021-06-21 NOTE — TELEPHONE ENCOUNTER
Pt would like a call from [facility] he would not say why his call back number is [phone]

## 2021-06-22 NOTE — TELEPHONE ENCOUNTER
Patient stated his ED injection is no longer working and only causing pain. Scheduled patient for VV with Sandy to discuss.

## 2021-06-24 NOTE — PROGRESS NOTES
Cydni Wilburn is a 46 y.o., male, Established patient who presents today   Chief Complaint   Patient presents with    Follow-up     I am here today because my ED meds aren't working        Erectile Dysfunction  This is a chronic problem. The current episode started more than 1 year ago. The problem has been rapidly worsening since onset. The nature of his difficulty is achieving erection and maintaining erection. Non-physiologic factors contributing to erectile dysfunction are a decreased libido. Irritative symptoms do not include frequency or urgency. Obstructive symptoms include a slower stream and a weak stream. Obstructive symptoms do not include dribbling, incomplete emptying, an intermittent stream or straining. Pertinent negatives include no chills, dysuria, hematuria, hesitancy or inability to urinate. Past treatments include sildenafil (using up to 300mg of Viagra at a time in the past. Now using penile injections). The treatment provided moderate relief. He has been using treatment for 1 to 6 months. He has had penile pain (Patient reports the injections worked well for the first 4-5 times, then no longer ilicited erections and only caused pain for the last couple of doses) caused by medications. Risk factors include diabetes mellitus, hypertension, BPH, chronic cardiac disease and tobacco use. REVIEW OF SYSTEMS:  Review of Systems   Constitutional: Negative for chills and fever. Gastrointestinal: Negative for abdominal distention, abdominal pain, nausea and vomiting. Genitourinary: Positive for decreased libido. Negative for difficulty urinating, dysuria, flank pain, frequency, hematuria, hesitancy, incomplete emptying and urgency. Musculoskeletal: Negative for back pain and gait problem. Psychiatric/Behavioral: Negative for agitation and confusion.      PHYSICAL EXAM:  Temp 98 °F (36.7 °C) (Temporal)   Ht 6' 3\" (1.905 m)   Wt 247 lb 6.4 oz (112.2 kg)   BMI 30.92 kg/m²   Physical Exam Vitals and nursing note reviewed. Constitutional:       General: He is not in acute distress. Appearance: Normal appearance. He is not ill-appearing. Pulmonary:      Effort: Pulmonary effort is normal. No respiratory distress. Abdominal:      General: There is no distension. Tenderness: There is no abdominal tenderness. There is no right CVA tenderness or left CVA tenderness. Genitourinary:     Comments: Small ecchymosis noted in areas of previous penile injections (near 5-6 oclock on the left side)  Neurological:      Mental Status: He is alert and oriented to person, place, and time. Mental status is at baseline. Psychiatric:         Mood and Affect: Mood normal.         Behavior: Behavior normal.       DATA:  Results for orders placed or performed in visit on 06/24/21   POCT Urinalysis Dipstick w/ Micro (Auto)   Result Value Ref Range    Color, UA Yellow     Clarity, UA Clear Clear    Glucose, Ur 250 mg./dL     Bilirubin Urine 0 mg/dL    Ketones, Urine Negative     Specific Gravity, UA 1.020 1.005 - 1.030    Blood, Urine Positive     pH, UA 5.0 4.5 - 8.0    Protein, UA Positive (A) Negative    Nitrite, Urine Negative     Leukocytes, UA NEG     Urobilinogen, Urine Normal     rbc urine, poc      wbc urine, poc      bacteria urine, poc      yeast urine, poc      casts urine, poc      epi cells urine, poc      crystals urine, poc         ASSESSMENT/PLAN  1. Erectile dysfunction, unspecified erectile dysfunction type  Patient presents for evaluation of penile injection effectiveness. He has been utilizing injections for the last couple of months now and up until the last 2 or 3 injections, he was having a very good response. The test dose in the office went very well and he achieved full tumescence with 0.3 mL.   Patient called earlier this week explaining that with his last 2 injections, he was only experiencing he now pain which radiated down to his feet and he also was no longer achieving erection with the injections. This visit was to assess the patient's technique for injections to see if it may be flawed. Upon asking the patient to redemonstrate his injection technique, I noticed that he was twisting his penis to the right or left therefore rotating the injection site too far down causing him to miss the cavernosum and injected near the nerves. I redemonstrated proper injection technique to he and his partner who accompanied him to his appointment today. Patient is interested and continue injections, however, he reports he is weary about utilizing the medication again. He is interested in possibly pursuing a penile implant. I will set him up for discussion with Dr. Shima Acevedo to see if he would be a good candidate. In the meantime, he will continue to utilize injections. - POCT Urinalysis Dipstick w/ Micro (Auto)      Orders Placed This Encounter   Procedures    POCT Urinalysis Dipstick w/ Micro (Auto)        Return for with Dr. Shima Acevedo, to discuss possible penile implant. At least 35   minutes were spent on the day of the visit reviewing the patient's past medical records/imaging, speaking face to face with the patient, and charting in the post visit period. An electronic signature was used to authenticate this note. DEMARCUS MELENDEZ, MELITON - CNP    All information inputted into the note by the MA to include chief complaint, past medical history, past surgical history, medications, allergies, social and family history and review of systems has been reviewed and updated as needed by me. EMR Dragon/transcription disclaimer: Much of this document is electronic transcription/translation of spoken language to printed text. The electronic translation of spoken language may be erroneous or, at times, nonsensical words or phrases may be inadvertently transcribed.  Although I have reviewed the document for such errors, some may still exist.

## 2021-06-28 NOTE — PROGRESS NOTES
6/28/2021    TELEHEALTH EVALUATION -- Audio/Visual (During MPIEM-43 public health emergency)  Patient is being consulted today by way of telehealth through doxy. me a video conferencing. Telehealth is utilized due to the current pandemic in [place] caused by coronavirus. The patient has previously been advised that this services billed to his insurance provider by [facility] ([facility]). HPI:  This patient is seen here intermittently by the undersigned for management of chronic disease states. Additionally, he is seen when new problems arise. Today he called for consultation regarding several issues. He states that around Wednesday of last week he moved some objects upstairs where he lives. Shortly thereafter he developed some mild \"URI type symptoms. \"  He stated that it hurt in his left chest when he took a deep breath there was no radiation of pain either up into the neck or down the arms. He stated he did have some coughing when he went down. This pain was definitely not like cardiac pain that he has experienced before when he has had angina. He has not had a pulmonary embolism but that was not the type of pain that he experienced either. He had no fever and no chills. He did take a couple of doxycycline that he had remaining from previous issues. I am going to put him on Omnicef 300 p.o. twice daily as he likely has bronchitis and allergies. I will also put him on Singulair at 10 mg p.o. daily. Because he is having increasing pain in his lower extremities particularly at night, I am going to put him on Neurontin 300 p.o. twice daily. He undoubtedly has peripheral neuropathy. The patient does have diabetes mellitus. 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 Did recommend to Ashlie Chan that he take the first pill around 4 or 5 in the afternoon after he gets off work and then take the last one right before bedtime. He did voice understanding.       He has complained of some shortness of breath. He is able to work though he huffs and puffs some. The patient does have a history of COPD. Also being a smoker and the fact that he is gaining weight certainly do not contribute to this. He was asking for some help in trying to lose weight so we did give him  some phentermine. Generally told me that he did not tolerate that well. 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 Happened on 2 days back to back and when he left his office Thursday he did not have any issues. He has had no further problems.      He does have diabetes mellitus as noted above. Currently he does utilize a Dexcom G6 sensor and monitor system. Patient states that his sugars generally run around 200. Currently he is on Basaglar at 40 units subcu daily. Additionally he takes glipizide at 10 mg p.o. twice daily.     He does have ED. He does utilize testosterone cypionate at 200 mg/cc administered every 21 days. Patient states that despite this and utilizing anywhere from 2-3 Viagra 100 he is not able to get satisfactory erection. He has considered penile pump and may request referral when he finds out if his insurance will cover such. He does have symptoms of BPH as well and is on Flomax 0.4 at 1 p.o. daily. Did send him to urology because of ED. He was given injectable medication and has found this extremely helpful thus far.     He does have anxiety. He has been on BuSpar 15 mg p.o. twice daily. He states that this is not helping at this time. He attributes this to the fact that his girlfriend of a couple of years has moved out deciding that she would want to be single instead. He has been quite anxious and has resumed smoking which he had quit previously. He has now been smoking once again for 2 months.  Likely this is adversely affecting his blood pressure as it was metaxalone (SKELAXIN) 800 MG tablet Take 800 mg by mouth 3 times daily  Historical Provider, MD   testosterone cypionate (DEPOTESTOTERONE CYPIONATE) 200 MG/ML injection INJECT 1ML INTRAMUSCULARLY EVERY 21 DAYS  Blanquita Joseph MD   ibuprofen (ADVIL;MOTRIN) 800 MG tablet Take 800 mg by mouth every 6 hours as needed for Pain  Historical Provider, MD   vitamin D (ERGOCALCIFEROL) 1.25 MG (10046 UT) CAPS capsule Take 1 capsule by mouth twice a week  Blanquita Joseph MD   busPIRone (BUSPAR) 15 MG tablet TAKE 1 TABLET BY MOUTH TWICE A DAY  Blanquita Joseph MD   rosuvastatin (CRESTOR) 10 MG tablet TAKE 1 TABLET BY MOUTH EVERY DAY  MELITON Mike - CECILIA   ipratropium-albuterol (DUONEB) 0.5-2.5 (3) MG/3ML SOLN nebulizer solution INHALE 3 MLS INTO THE LUNGS EVERY 6 HOURS AS NEEDED FOR SHORTNESS OF BREATH  Blanquita Joseph MD   pantoprazole (PROTONIX) 40 MG tablet TAKE 1 TABLET BY MOUTH EVERY DAY BEFORE BREAKFAST  Blanquita Joseph MD   Nebulizers (COMPRESSOR/NEBULIZER) MISC 1 each by Does not apply route 4 times daily as needed (cough)  Blanquita Joseph MD   Continuous Blood Gluc  ([address]) [address] 1 each by Does not apply route continuous DX E11.62, L97.509, Z79.4  Blanquita Joseph MD   tamsulosin (FLOMAX) 0.4 MG capsule Take 0.4 mg by mouth daily  Historical Provider, MD   blood glucose monitor strips 1 strip by Other route 3 times daily TID   Uses Accucheck  Machine. Dx E11.9, Z79.4  Blanquita Joseph MD   lisinopril (PRINIVIL;ZESTRIL) 20 MG tablet Take 1 tablet by mouth 2 times daily  Patient taking differently: Take 10 mg by mouth 2 times daily   MELITON Beckford   Needles & Syringes MISC 1 each by Does not apply route every 21 days Needs 3 cc syringes with 22 G  1/2 inch needle to give Testosterone and needs 18 Gauge to draw up med.   Blanquita Joseph MD   BASAGLAR KWIKPEN 100 UNIT/ML injection pen Inject 40 Units into the skin daily   Blanquita Joseph MD   clopidogrel (PLAVIX) 75 MG tablet Take 75 mg by mouth daily  Historical Provider, MD   meloxicam (MOBIC) 15 MG tablet Take 15 mg by mouth daily  Historical Provider, MD   glipiZIDE (GLUCOTROL) 10 MG tablet Take 10 mg by mouth 2 times daily (before meals)  Historical Provider, MD       Social History     Tobacco Use    Smoking status: Current Every Day Smoker     Packs/day: 1.00     Years: 30.00     Pack years: 30.00     Types: Cigarettes     Last attempt to quit: 2019     Years since quittin.7    Smokeless tobacco: Never Used   Vaping Use    Vaping Use: Never used   Substance Use Topics    Alcohol use: No     Comment: Rarely    Drug use: No        No Known Allergies,   Past Medical History:   Diagnosis Date    Arthritis     CAD (coronary artery disease)     Cigarette smoker 2014    Diabetes ([facility]     Erectile dysfunction     History of nephrolithiasis     Hyperlipidemia     Hypertension     Obesity     Type II or unspecified type diabetes mellitus without mention of complication, not stated as uncontrolled    ,   Past Surgical History:   Procedure Laterality Date    CARDIAC CATHETERIZATION      CARDIAC CATHETERIZATION  3/14/12    CARDIAC CATHETERIZATION  14  JDT    stent to mid LAD.  EF 50%    COLONOSCOPY N/A 2020    Dr MANUEL Choudhury-increased tortuosity of the colon, piecemeal, AP x 1, HP x 2, 1 yr recall    COLONOSCOPY  2020    Dr Erika Choudhury-increased tortuosity of the colon, piecemeal, AP x 1, HP x 2, 1 yr recall    CORONARY ARTERY BYPASS GRAFT      CORONARY ARTERY BYPASS GRAFT  3/15/2012    3V CABG (LIMA-Diag, SVG-RCA, SVG-OM) JPO    DIAGNOSTIC CARDIAC CATH LAB PROCEDURE      EXPLORATION OF WOUND OF EXTREMITY N/A 2019    EXCISIONAL DEBRIDEMENT OF SKIN, SUBCUTANEOUS TISSUE, MUSCLE AND BONE 14CM X 3CM X 3CM DEEP performed by Cici Lamb MD at [facility] [address] Left 2019    EXPLORATION OF LEFT FOOT; EXCISIONAL DEBRIDEMENT OF SUBCUTANEOUS SKIN, TISSUE AND MUSCLE; AMPUTATION OF THIRD TOE AT METATARSAL PHALYNGEAL JOINT performed by Chris Hummel MD at [address]      TOE AMPUTATION  2017    TONSILLECTOMY     ,   Social History     Tobacco Use    Smoking status: Current Every Day Smoker     Packs/day: 1.00     Years: 30.00     Pack years: 30.00     Types: Cigarettes     Last attempt to quit: 2019     Years since quittin.7    Smokeless tobacco: Never Used   Vaping Use    Vaping Use: Never used   Substance Use Topics    Alcohol use: No     Comment: Rarely    Drug use: No   ,   Family History   Problem Relation Age of Onset    Heart Disease Other     High Blood Pressure Other     Diabetes Other    ,   There is no immunization history on file for this patient. PHYSICAL EXAMINATION:  [ INSTRUCTIONS:  \"[x]\" Indicates a positive item  \"[]\" Indicates a negative item  -- DELETE ALL ITEMS NOT EXAMINED]  Vital Signs: (As obtained by patient/caregiver or practitioner observation)    Blood pressure-  Heart rate-    Respiratory rate-    Temperature-  Pulse oximetry-     Constitutional: [x] Appears well-developed and well-nourished [x] No apparent distress      [] Abnormal-   Mental status  [x] Alert and awake  [x] Oriented to person/place/time [x]Able to follow commands      Eyes:  EOM    [x]  Normal  [] Abnormal-  Sclera  [x]  Normal  [] Abnormal -         Discharge [x]  None visible  [] Abnormal -    HENT:   [x] Normocephalic, atraumatic.   [] Abnormal   [x] Mouth/Throat: Mucous membranes are moist.     External Ears [x] Normal  [] Abnormal-     Neck: [x] No visualized mass     Pulmonary/Chest: [x] Respiratory effort normal.  [x] No visualized signs of difficulty breathing or respiratory distress        [] Abnormal-      Musculoskeletal:   [] Normal gait with no signs of ataxia         [] Normal range of motion of neck        [] Abnormal-       Neurological:        [x] No Facial Asymmetry (Cranial nerve 7 motor function) (limited exam to video visit)          [x] No gaze palsy        [] Abnormal-         Skin:        [x] No significant exanthematous lesions or discoloration noted on facial skin         [] Abnormal-            Psychiatric:       [x] Normal Affect [x] No Hallucinations        [] Abnormal-     Other pertinent observable physical exam findings-     ASSESSMENT/PLAN:   Diagnosis Orders   1. Bronchitis  cefdinir (OMNICEF) 300 MG capsule    montelukast (SINGULAIR) 10 MG tablet   2. Peripheral polyneuropathy  gabapentin (NEURONTIN) 300 MG capsule   3. Coronary artery disease involving native coronary artery of native heart without angina pectoris     4. Essential hypertension     5. Mixed hyperlipidemia     6. Cigarette smoker     7. Type 2 diabetes mellitus with foot ulcer, with long-term current use of insulin ([address]     8. Gastroesophageal reflux disease with esophagitis  pantoprazole (PROTONIX) 40 MG tablet   9. Seasonal allergies  montelukast (SINGULAIR) 10 MG tablet     I am having Mr. Funes Redhead maintain his :   Outpatient Medications Marked as Taking for the 6/28/21 encounter (Virtual Visit) with Yamileth Cabral MD   Medication Sig Dispense Refill    pantoprazole (PROTONIX) 40 MG tablet TAKE 1 TABLET BY MOUTH EVERY DAY BEFORE BREAKFAST 30 tablet 0    gabapentin (NEURONTIN) 300 MG capsule Take 1 capsule by mouth 2 times daily for 30 days. Patient is advised to take 1 afternoon around 4 or 5 and the other at bedtime. This is for peripheral neuropathy.  60 capsule 1    cefdinir (OMNICEF) 300 MG capsule Take 1 capsule by mouth 2 times daily for 10 days 20 capsule 0    montelukast (SINGULAIR) 10 MG tablet Take 1 tablet by mouth daily 30 tablet 3   ,Patient states they are no longer utilizing these medication:   Medications Discontinued During This Encounter   Medication Reason    pantoprazole (PROTONIX) 40 MG tablet REORDER    I have refilled the following medication today:   Requested Prescriptions     Signed

## 2021-07-02 PROBLEM — I21.4 NSTEMI (NON-ST ELEVATION MYOCARDIAL INFARCTION) (HCC): Status: ACTIVE | Noted: 2021-01-01

## 2021-07-02 NOTE — ED NOTES
Bed: 06  Expected date:   Expected time:   Means of arrival:   Comments:  EMS-Kaveh Brooke RN  07/02/21 1239

## 2021-07-02 NOTE — ED PROVIDER NOTES
[facility]  eMERGENCY dEPARTMENT eNCOUnter      Pt Name: Elva Rodriguez  MRN: [id number]  [place] 1970  Date of evaluation: 7/2/2021  Provider: Meme Escamilla MD    CHIEF COMPLAINT       Chief Complaint   Patient presents with    Chest Pain     since last night, jaw pain this am.          HISTORY OF PRESENT ILLNESS   (Location/Symptom, Timing/Onset,Context/Setting, Quality, Duration, Modifying Factors, Severity)  Note limiting factors. Elva Rodriguez is a 46 y.o. male who presents to the emergency department for evaluation of chest pain and left-sided jaw pain. Patient reports that yesterday evening he woke up at around 1 AM with some fairly significant shortness of breath. States he was having some chest tightness at this time but it resolved after he sat up. This morning when he got to work he describes feelings of fatigue and generalized weakness along with some associated pain in the left side of his face and jaw area. States that the symptoms have now resolved. He does report a prior history of coronary artery disease with prior CABG. In review of his records it appears he most recently underwent a cardiac catheterization in 2019 with stent placed in the circumflex. States that over about the past 3 to 4 days he is noticing stronger swelling in his lower extremities bilaterally. This is described as mild to moderate in severity. He has not had any exertional dyspnea although he does have some shortness of breath this morning when he began to have the pain in his jaw area. He denies any radiation of the pain down his left upper extremity. He is not had palpitations or syncopal events. He is not currently maintained on any diuretic medications. HPI    NursingNotes were reviewed. REVIEW OF SYSTEMS    (2-9 systems for level 4, 10 or more for level 5)     Review of Systems   Constitutional: Negative for chills and fever. HENT: Negative for congestion.     Respiratory: Positive for shortness of breath. Negative for cough and wheezing. Cardiovascular: Positive for chest pain. Negative for palpitations and leg swelling. Gastrointestinal: Negative for abdominal pain, diarrhea, nausea and vomiting. Neurological: Negative for dizziness and syncope. All other systems reviewed and are negative. PAST MEDICALHISTORY     Past Medical History:   Diagnosis Date    Arthritis     CAD (coronary artery disease)     Cigarette smoker 9/2/2014    Diabetes ([facility] [address]     Erectile dysfunction     History of nephrolithiasis     Hyperlipidemia     Hypertension     Obesity     Type II or unspecified type diabetes mellitus without mention of complication, not stated as uncontrolled          SURGICAL HISTORY       Past Surgical History:   Procedure Laterality Date    CARDIAC CATHETERIZATION  2014    CARDIAC CATHETERIZATION  3/14/12    CARDIAC CATHETERIZATION  9/2/14  JDT    stent to mid LAD.  EF 50%    COLONOSCOPY N/A 7/1/2020    Dr MANUEL Choudhury-increased tortuosity of the colon, piecemeal, AP x 1, HP x 2, 1 yr recall    COLONOSCOPY  7/1/2020    Dr Clarissa Choudhury-increased tortuosity of the colon, piecemeal, AP x 1, HP x 2, 1 yr recall    CORONARY ARTERY BYPASS GRAFT      CORONARY ARTERY BYPASS GRAFT  3/15/2012    3V CABG (LIMA-Diag, SVG-RCA, SVG-OM) JPO    DIAGNOSTIC CARDIAC CATH LAB PROCEDURE      EXPLORATION OF WOUND OF EXTREMITY N/A 1/28/2019    EXCISIONAL DEBRIDEMENT OF SKIN, SUBCUTANEOUS TISSUE, MUSCLE AND BONE 14CM X 3CM X 3CM DEEP performed by Margie Collado MD at [facility] 238 Left 1/25/2019    EXPLORATION OF LEFT FOOT; EXCISIONAL DEBRIDEMENT OF SUBCUTANEOUS SKIN, TISSUE AND MUSCLE; AMPUTATION OF THIRD TOE AT METATARSAL PHALYNGEAL JOINT performed by Margie Collado MD at [address] [place] [address]  2017    TONSILLECTOMY           CURRENT MEDICATIONS     Previous Medications    ALBUTEROL SULFATE  (90 BASE) MCG/ACT INHALER    INHALE 1 PUFF INTO THE LUNGS EVERY 6 HOURS AS NEEDED FOR WHEEZING OR SHORTNESS OF BREATH. ASCORBIC ACID (VITAMIN C) 1000 MG TABLET    Take 1,000 mg by mouth daily    BASAGLAR KWIKPEN 100 UNIT/ML INJECTION PEN    Inject 40 Units into the skin daily     BLOOD GLUCOSE MONITOR STRIPS    1 strip by Other route 3 times daily TID   Uses [employer]  Machine. Dx E11.9, Z79.4    BUPROPION (WELLBUTRIN XL) 150 MG EXTENDED RELEASE TABLET    Take 1 tablet by mouth 2 times daily    BUSPIRONE (BUSPAR) 15 MG TABLET    TAKE 1 TABLET BY MOUTH TWICE A DAY    CEFDINIR (OMNICEF) 300 MG CAPSULE    Take 1 capsule by mouth 2 times daily for 10 days    CLOPIDOGREL (PLAVIX) 75 MG TABLET    Take 75 mg by mouth daily    CONTINUOUS BLOOD GLUC  (DEXCOM G6 ) JOVANY    1 each by Does not apply route continuous DX E11.62, L97.509, Z79.4    CONTINUOUS BLOOD GLUC SENSOR (DEXCOM G6 SENSOR) MISC    1 each by Does not apply route continuous E11.62, L97.509, Z79.4    CONTINUOUS BLOOD GLUC TRANSMIT (DEXCOM G6 TRANSMITTER) MISC    1 each by Does not apply route continuous E11.62, L97.509, Z79.4    DOXYCYCLINE HYCLATE (VIBRA-TABS) 100 MG TABLET    TAKE 1 TABLET BY MOUTH TWICE A DAY    FUROSEMIDE (LASIX) 40 MG TABLET    Take 1 tablet by mouth 2 times daily    GABAPENTIN (NEURONTIN) 300 MG CAPSULE    Take 1 capsule by mouth 2 times daily for 30 days. Patient is advised to take 1 afternoon around 4 or 5 and the other at bedtime. This is for peripheral neuropathy.     GLIPIZIDE (GLUCOTROL) 10 MG TABLET    Take 10 mg by mouth 2 times daily (before meals)    IBUPROFEN (ADVIL;MOTRIN) 800 MG TABLET    Take 800 mg by mouth every 6 hours as needed for Pain    IPRATROPIUM-ALBUTEROL (DUONEB) 0.5-2.5 (3) MG/3ML SOLN NEBULIZER SOLUTION    INHALE 3 MLS INTO THE LUNGS EVERY 6 HOURS AS NEEDED FOR SHORTNESS OF BREATH    LISINOPRIL (PRINIVIL;ZESTRIL) 20 MG TABLET    Take 1 tablet by mouth 2 times daily    MELOXICAM (MOBIC) 15 MG TABLET    Take 15 mg by mouth daily    METAXALONE (SKELAXIN) 800 MG TABLET    Take 800 mg by mouth 3 times daily    MONTELUKAST (SINGULAIR) 10 MG TABLET    Take 1 tablet by mouth daily    NEBULIZERS (COMPRESSOR/NEBULIZER) MISC    1 each by Does not apply route 4 times daily as needed (cough)    NEEDLES & SYRINGES MISC    1 each by Does not apply route every 21 days Needs 3 cc syringes with 22 G  1/2 inch needle to give Testosterone and needs 18 Gauge to draw up med. NITROGLYCERIN (NITROSTAT) 0.4 MG SL TABLET    Place 1 tablet under the tongue every 5 minutes as needed for Chest pain up to max of 3 total doses. If no relief after 1 dose, call 911. PANTOPRAZOLE (PROTONIX) 40 MG TABLET    TAKE 1 TABLET BY MOUTH EVERY DAY BEFORE BREAKFAST    ROSUVASTATIN (CRESTOR) 10 MG TABLET    TAKE 1 TABLET BY MOUTH EVERY DAY    TAMSULOSIN (FLOMAX) 0.4 MG CAPSULE    Take 0.4 mg by mouth daily    TESTOSTERONE CYPIONATE (DEPOTESTOTERONE CYPIONATE) 200 MG/ML INJECTION    INJECT 1ML INTRAMUSCULARLY EVERY 21 DAYS    VITAMIN D (ERGOCALCIFEROL) 1.25 MG (91212 UT) CAPS CAPSULE    Take 1 capsule by mouth twice a week    ZINC 50 MG TABS TABLET    Take 50 mg by mouth daily       ALLERGIES     Patient has no known allergies.     FAMILY HISTORY       Family History   Problem Relation Age of Onset    Heart Disease Other     High Blood Pressure Other     Diabetes Other           SOCIAL HISTORY       Social History     Socioeconomic History    Marital status:      Spouse name: None    Number of children: None    Years of education: None    Highest education level: None   Occupational History    None   Tobacco Use    Smoking status: Current Every Day Smoker     Packs/day: 1.00     Years: 30.00     Pack years: 30.00     Types: Cigarettes     Last attempt to quit: 2019     Years since quittin.7    Smokeless tobacco: Never Used   Vaping Use    Vaping Use: Never used   Substance and Sexual Activity    Alcohol use: Yes     Comment: Rarely    Drug use: No    Sexual activity: Yes     Partners: Female   Other Topics Concern    None   Social History Narrative    ** Merged History Encounter **          Social Determinants of Health     Financial Resource Strain:     Difficulty of Paying Living Expenses:    Food Insecurity:     Worried About Running Out of Food in the Last Year:     Ran Out of Food in the Last Year:    Transportation Needs:     Lack of Transportation (Medical):  Lack of Transportation (Non-Medical):    Physical Activity:     Days of Exercise per Week:     Minutes of Exercise per Session:    Stress:     Feeling of Stress :    Social Connections:     Frequency of Communication with Friends and Family:     Frequency of Social Gatherings with Friends and Family:     Attends Scientologist Services:     Active Member of Clubs or Organizations:     Attends Club or Organization Meetings:     Marital Status:    Intimate Partner Violence:     Fear of Current or Ex-Partner:     Emotionally Abused:     Physically Abused:     Sexually Abused:        SCREENINGS             PHYSICAL EXAM    (up to 7 for level 4, 8 or more for level 5)     ED Triage Vitals [07/02/21 1232]   BP Temp Temp Source Pulse Resp SpO2 Height Weight   (!) 168/102 98 °F (36.7 °C) Oral 89 17 93 % 6' 3\" (1.905 m) 262 lb (118.8 kg)       Physical Exam  Vitals and nursing note reviewed. HENT:      Head: Atraumatic. Mouth/Throat:      Mouth: Mucous membranes are moist. Mucous membranes are not dry. Eyes:      General: No scleral icterus. Pupils: Pupils are equal, round, and reactive to light. Neck:      Trachea: No tracheal deviation. Cardiovascular:      Rate and Rhythm: Normal rate and regular rhythm. Pulses: Normal pulses. Heart sounds: Normal heart sounds. No murmur heard. Pulmonary:      Effort: Pulmonary effort is normal. No respiratory distress. Breath sounds: Normal breath sounds.  No 18.2 (*)     Monocytes % 11.9 (*)     Monocytes Absolute 1.30 (*)     All other components within normal limits   BRAIN NATRIURETIC PEPTIDE - Abnormal; Notable for the following components:    Pro-BNP 2,397 (*)     All other components within normal limits   TROPONIN - Abnormal; Notable for the following components:    Troponin 0.61 (*)     All other components within normal limits    Narrative:     CALL  [facility]  [facility] tel. ,  Chemistry results called to and read back by eric millard er, 07/02/2021 13:06, by  HOLLJ   COVID-19, RAPID   APTT   APTT   APTT       All other labs were within normal range or not returned as of this dictation. EMERGENCY DEPARTMENT COURSE and DIFFERENTIAL DIAGNOSIS/MDM:   Vitals:    Vitals:    07/02/21 1232 07/02/21 1300   BP: (!) 168/102 (!) 147/73   Pulse: 89 72   Resp: 17 14   Temp: 98 °F (36.7 °C)    TempSrc: Oral    SpO2: 93% 93%   Weight: 262 lb (118.8 kg)    Height: 6' 3\" (1.905 m)        MDM    Reassessment    Patient is pain-free at this time. No acute EKG changes when compared to previous. Patient's BNP and initial cardiac biomarker are elevated. Clinically he does look a little bit volume overloaded. I am given him some Lasix. He is remained pain-free if. We will start him on heparin infusion with plans for admission to the PCU.      CONSULTS:    Case was discussed with Dr. Ifrah Rose regarding cardiology consultation. We reviewed patient's laboratory studies and previous history and EKG findings. We will plan to see patient here in the emergency department. Case was reviewed with Dr. Daniel Chin regarding admission to the hospitalist service. PROCEDURES:  Unless otherwise noted below, none     Procedures     CRITICAL CARE TIME   Total Critical Care time was 42 minutes, excluding separately reportable procedures. There was a high probability of clinically significant/life threatening deterioration in the patient's condition which required my urgent intervention.   Multiple reexaminations were undertaken throughout ED course of care. I spent time reviewing his previous records including cardiac catheterization from 2019. Time was spent discussing case with cardiology consultant along with admitting hospitalist team.  Time is inclusive of  and documentation. FINAL IMPRESSION      1. NSTEMI (non-ST elevated myocardial infarction) ([facility] [address]    2. Acute congestive heart failure, unspecified heart failure type (HCC)    3. Chest pain, unspecified type    4.  Lung nodule          DISPOSITION/PLAN   DISPOSITION Admitted 07/02/2021 01:51:43 PM    (Please note that portions of this note were completed with a voice recognition program.  Efforts were made to edit thedictations but occasionally words are mis-transcribed.)    Gloria Mata MD (electronically signed)  Attending Emergency Physician         Gloria Mata MD  07/02/21 0917

## 2021-07-02 NOTE — ED NOTES
Pt bilat groins prepped for cath,radiotransparent pads placed, pt undresses, consents signed.       [address] [place] [address], RN  07/02/21 3739

## 2021-07-02 NOTE — PROGRESS NOTES
Subjective:      Patient ID: Kassie Mcdonald is a 46 y.o. male. HPI  This patient is seen here intermittently by the undersigned for management of chronic disease states. He is here today for an acute issue. The patient is a known heart patient. He did have coronary artery bypass graft surgery done in 2012 by Dr. Agrawal Officer. His cardiology at that intervention was Dr. Ailene Soulier. Approximately 2 or 3 years ago he had stent placement made and this was the second event that required stent placement. He believes that stent placement at that time the last time was by Dr. Ophelia uLna. Patient has some comorbid conditions which predispose him to further issues. He continues to be a smoker. He has had 3 cigarettes total today. He has been under lots of stress particularly at work over the last 2 weeks notably. Additionally, he is a known diabetic and currently is on Basaglar insulin at 40 units daily. Additionally he is on glipizide 10 mg twice daily. It has been a while since we did A1c on [employer] so we did point-of-care A1c today here in the office and it was noted to be 9.3. The patient does have a Dexcom monitoring system. When the patient came in today, he did report to us that he went to bed early last night at around 630. He became somewhat short of breath and had difficulty getting his air and set up on the side of the bed at about 1:00. He denied chest pain at that time. He did get somewhat easier and better able to breathe and did get up. The patient states that this morning he became short of breath somewhat again and at this episode he did experience some left jaw pain. There was no chest pain associated with it but once again the patient is diabetic and they have atypical situations despite the fact that they may be undergoing angina attack. The left jaw pain was somewhat transient and he is without jaw pain at this time. Examination was fairly unremarkable.   He did have EKG done here in the office today. We did compare this to the previous EKG that we have, that being 1/25/19. The patient does have slow R wave progression likely indicative of prior anteroseptal MI. He was in regular sinus rhythm. He does have some widening of the QRS likely indicative of left anterior hemiblock. Some nonspecific ST depression was noted. Because of his known history, his history of diabetes mellitus and history of smoking, I have elected to send him for evaluation by cardiology. I did call and try to get him in with Dr. Deanna Cruz who is presently on vacation. My understanding is that Dr. Mitch Carrera is on call at [facility].  Cardiology office personnel did report to us that very likely he would need to have biochemical markers done which certainly could be done urgently if going through the emergency room. I therefore am going to send the patient to [facility] emergency room by ambulance. The patient is here today in this office in his company truck. He has called his employer and they are going to send someone to this office to  the truck rather than have him chance driving it back to [facility] and then having someone take him to the hospital.  I told Venkatapatrick Casillas that very likely it would benefit him to be able to have oxygen administered as well as availability of IV access in the event of issue that may develop. I did review with him some of his medicines. It is noted that his nitroglycerin is outdated. We are sending that to [facility] pharmacy so that he can pick that up when he is able to do so. We did do chest x-ray. This did show evidence of some cardiomegaly. He does have some interstitial edema. There was some small effusions noted. The patient is already on Lasix at 40 mg p.o. taken twice daily. Additionally, he is on lisinopril 20 mg p.o. twice daily currently. Blood pressure here today was quite good at 118/64. Momentarily, he does not appear to be acutely short of breath.   No rhonchi, wheezes, nor rales were noted on auscultation. The patient is currently maintained on Plavix at 75 mg p.o. daily. For his hyperlipidemia, he is on rosuvastatin 10 mg p.o. daily. Certainly it would be very important to exclude myocardial injury or angina pectoris but other considerations would include pulmonary embolism as well. Patient does have a history of vitamin D deficiency and currently is on ergocalciferol 50,000 unit capsule taken twice weekly. For testosterone deficiency, he is on testosterone cypionate at 200 mg/cc. For symptoms of GERD he is on Protonix 40 mg p.o. daily when needed. For arthralgias, he does take meloxicam 15 mg p.o. daily. Neurontin 300 is used twice daily for neuropathic issues. He does have Skelaxin 800 available to be taken 3 times daily for muscle spasms. Seasonal allergies are managed with Singulair 10 mg p.o. daily. For depression, Jarred Purcell does take Wellbutrin at 150 mg XL extended release twice daily. Review of Systems   Constitutional: Negative. HENT: Negative. Eyes: Negative. Respiratory: Negative for cough, chest tightness, shortness of breath and wheezing. Cardiovascular: Negative for chest pain, palpitations and leg swelling. Gastrointestinal: Negative for blood in stool, constipation, diarrhea, nausea and vomiting. Genitourinary: Negative for hematuria. Skin: Negative. Neurological: Negative. Psychiatric/Behavioral: Negative. Objective:   Physical Exam  Vitals and nursing note reviewed. Constitutional:       General: He is not in acute distress. Appearance: Normal appearance. He is well-developed. He is not ill-appearing, toxic-appearing or diaphoretic. HENT:      Head: Normocephalic and atraumatic. Right Ear: Tympanic membrane, ear canal and external ear normal. There is no impacted cerumen. Left Ear: Tympanic membrane, ear canal and external ear normal. There is no impacted cerumen.       Nose: Nose normal. Mouth/Throat:      Lips: Pink. Mouth: Mucous membranes are moist.      Dentition: Normal dentition. Tongue: No lesions. Pharynx: Oropharynx is clear. Uvula midline. Tonsils: No tonsillar exudate or tonsillar abscesses. Eyes:      General: Lids are normal. No scleral icterus. Right eye: No discharge. Left eye: No discharge. Extraocular Movements:      Right eye: Normal extraocular motion. Left eye: Normal extraocular motion. Conjunctiva/sclera: Conjunctivae normal.      Right eye: Right conjunctiva is not injected. Left eye: Left conjunctiva is not injected. Pupils: Pupils are equal, round, and reactive to light. Neck:      Thyroid: No thyromegaly. Vascular: No carotid bruit or JVD. Cardiovascular:      Rate and Rhythm: Normal rate and regular rhythm. Pulses:           Carotid pulses are 2+ on the right side and 2+ on the left side. Radial pulses are 2+ on the right side and 2+ on the left side. Heart sounds: Normal heart sounds, S1 normal and S2 normal. No murmur heard. No friction rub. No gallop. Pulmonary:      Effort: Pulmonary effort is normal. No accessory muscle usage or respiratory distress. Breath sounds: Normal breath sounds. No stridor. No wheezing, rhonchi or rales. Chest:      Chest wall: No tenderness. Abdominal:      General: Bowel sounds are normal. There is no distension or abdominal bruit. Palpations: Abdomen is soft. There is no mass. Tenderness: There is no abdominal tenderness. There is no right CVA tenderness, left CVA tenderness, guarding or rebound. Hernia: No hernia is present. Musculoskeletal:         General: No swelling, tenderness, deformity or signs of injury. Normal range of motion. Cervical back: Normal range of motion and neck supple. No rigidity or tenderness. Right lower leg: No edema. Left lower leg: No edema.    Lymphadenopathy:      Cervical: No cervical adenopathy. Right cervical: No superficial cervical adenopathy. Left cervical: No superficial cervical adenopathy. Skin:     General: Skin is warm and dry. Nails: There is no clubbing. Neurological:      General: No focal deficit present. Mental Status: He is alert and oriented to person, place, and time. Mental status is at baseline. Cranial Nerves: No facial asymmetry. Motor: No weakness or tremor. Coordination: Coordination normal.      Gait: Gait normal.      Deep Tendon Reflexes: Reflexes are normal and symmetric. Psychiatric:         Attention and Perception: Attention normal.         Mood and Affect: Mood normal.         Speech: Speech normal.         Behavior: Behavior normal.         Thought Content: Thought content normal.         Cognition and Memory: Memory normal.         Judgment: Judgment normal.         /64 (Site: Left Upper Arm, Position: Sitting, Cuff Size: Large Adult)   Pulse 97   Temp 98.1 °F (36.7 °C) (Infrared)   Resp 18   Ht 6' 3\" (1.905 m)   Wt 262 lb (118.8 kg)   SpO2 97%   BMI 32.75 kg/m²   Assessment:         Diagnosis Orders   1. Angina pectoris ([address]      Suspected as manifested by left jaw pain and some shortness of breath in a patient who is known to have previous bypass and history of stent placements x2 p op. 2. Shortness of breath  EKG 12 Lead    XR CHEST STANDARD (2 VW)   3. Type 2 diabetes mellitus with foot ulcer, with long-term current use of insulin ([facility])  POCT glycosylated hemoglobin (Hb A1C)    Hemoglobin A1c by POCT today here in the office was 9.3.   4. Coronary artery disease involving native coronary artery of native heart without angina pectoris  nitroGLYCERIN (NITROSTAT) 0.4 MG SL tablet   5. Smoker     6. Essential hypertension     7. Status post aorto-coronary artery bypass graft      2012 by Dr. Jony Parker, Dr. Nat Chiu cardiology.    8. Status post coronary artery stent placement      Patient states most recent was about 3 years ago by Dr. Jeannine Hart. 9. Cardiomegaly      Regular meds include Lasix 40 mg p.o. twice daily. 10. Acute systolic congestive heart failure (HCC)      Versus volume overload on 7/2/2021 x-ray interpreted by Dr. Vicky Lyles. Plan:      I am having Mr. Yesi Lopez maintain his :   Outpatient Medications Marked as Taking for the 7/2/21 encounter (Office Visit) with Anita Bañuelos MD   Medication Sig Dispense Refill    nitroGLYCERIN (NITROSTAT) 0.4 MG SL tablet Place 1 tablet under the tongue every 5 minutes as needed for Chest pain up to max of 3 total doses. If no relief after 1 dose, call 911. 25 tablet 3    pantoprazole (PROTONIX) 40 MG tablet TAKE 1 TABLET BY MOUTH EVERY DAY BEFORE BREAKFAST 30 tablet 0    gabapentin (NEURONTIN) 300 MG capsule Take 1 capsule by mouth 2 times daily for 30 days. Patient is advised to take 1 afternoon around 4 or 5 and the other at bedtime. This is for peripheral neuropathy. 60 capsule 1    cefdinir (OMNICEF) 300 MG capsule Take 1 capsule by mouth 2 times daily for 10 days 20 capsule 0    montelukast (SINGULAIR) 10 MG tablet Take 1 tablet by mouth daily 30 tablet 3    albuterol sulfate  (90 Base) MCG/ACT inhaler INHALE 1 PUFF INTO THE LUNGS EVERY 6 HOURS AS NEEDED FOR WHEEZING OR SHORTNESS OF BREATH.  6.7 Inhaler 0    doxycycline hyclate (VIBRA-TABS) 100 MG tablet TAKE 1 TABLET BY MOUTH TWICE A DAY 60 tablet 0    Continuous Blood Gluc Sensor (DEXCOM G6 SENSOR) MISC 1 each by Does not apply route continuous E11.62, L97.509, Z79.4 6 each 5    Continuous Blood Gluc Transmit (DEXCOM G6 TRANSMITTER) MISC 1 each by Does not apply route continuous E11.62, L97.509, Z79.4 1 each 5    buPROPion (WELLBUTRIN XL) 150 MG extended release tablet Take 1 tablet by mouth 2 times daily 60 tablet 3    furosemide (LASIX) 40 MG tablet Take 1 tablet by mouth 2 times daily 60 tablet 3    zinc 50 MG TABS tablet Take 50 mg by mouth Requested Prescriptions     Signed Prescriptions Disp Refills    nitroGLYCERIN (NITROSTAT) 0.4 MG SL tablet 25 tablet 3     Sig: Place 1 tablet under the tongue every 5 minutes as needed for Chest pain up to max of 3 total doses. If no relief after 1 dose, call 911. .     Orders Placed This Encounter   Procedures    XR CHEST STANDARD (2 VW)     Standing Status:   Future     Number of Occurrences:   1     Standing Expiration Date:   7/2/2022     Order Specific Question:   Reason for exam:     Answer:   shortness of breath    POCT glycosylated hemoglobin (Hb A1C)    EKG 12 Lead     Order Specific Question:   Reason for Exam?     Answer:   Shortness of Breath     Orders Placed This Encounter   Medications    nitroGLYCERIN (NITROSTAT) 0.4 MG SL tablet     Sig: Place 1 tablet under the tongue every 5 minutes as needed for Chest pain up to max of 3 total doses. If no relief after 1 dose, call 911.      Dispense:  25 tablet     Refill:  3             Susie Ventura MD

## 2021-07-02 NOTE — H&P
HISTORY AND PHYSICAL             Date: 7/2/2021        Patient Name: Sudhir Pittman     YOB: 1970      Age:  46 y.o. Chief Complaint     Shortness of breath with left-sided jaw pain. History Obtained From   patient    History of Present Illness     68-year-old male with a history of coronary artery disease status post CABG, tobacco use disorder, type 2 diabetes, erectile dysfunction, hyperlipidemia, hypertension, who presents to the hospital with concerns of shortness of breath. Patient stated he was at his usual state of health up until yesterday evening about 630pm was having some dyspnea, went to bed early and woke up about 1:30 in the morning. Thought to be feeling little bit better however symptoms of dyspnea has not really changed. He stayed up for a while and still experiencing some left-sided jaw pain as well as some bilateral back and shoulder pains. As the day progresses, he could barely ambulate without getting worsening dyspnea, quadricep his office and was told to come in for EKG and chest x-ray. Patient denied any chest pain, denied any palpitations, nausea or emesis. Denied any syncope, went to work this morning and still not feeling well presented to the emergency room for further evaluation. In the ED patient was noted to have elevated blood pressure 160s over greater than 541 diastolic. Elevated troponin of 0.61, BNP of 2397, A1c of 9.3, initially called by EMS was concerning for STEMI aborted after evaluation in the ED EKG does not consistent with STEMI. And patient without any evidence of chest pain. Patient was initiated on heparin drip, was given IV diuretics, seen by cardiology and admitted for cardiac cath.       Past Medical History     Past Medical History:   Diagnosis Date    Arthritis     CAD (coronary artery disease)     Cigarette smoker 9/2/2014    Diabetes ([facility] [address]     Erectile dysfunction     History of nephrolithiasis     Hyperlipidemia     Hypertension     Obesity     Type II or unspecified type diabetes mellitus without mention of complication, not stated as uncontrolled         Past Surgical History     Past Surgical History:   Procedure Laterality Date    CARDIAC CATHETERIZATION  2014    CARDIAC CATHETERIZATION  3/14/12    CARDIAC CATHETERIZATION  9/2/14  JDT    stent to mid LAD. EF 50%    COLONOSCOPY N/A 7/1/2020    Dr MANUEL Choudhury-increased tortuosity of the colon, piecemeal, AP x 1, HP x 2, 1 yr recall    COLONOSCOPY  7/1/2020    Dr Luba Choudhury-increased tortuosity of the colon, piecemeal, AP x 1, HP x 2, 1 yr recall    CORONARY ARTERY BYPASS GRAFT      CORONARY ARTERY BYPASS GRAFT  3/15/2012    3V CABG (LIMA-Diag, SVG-RCA, SVG-OM) JPO    DIAGNOSTIC CARDIAC CATH LAB PROCEDURE      EXPLORATION OF WOUND OF EXTREMITY N/A 1/28/2019    EXCISIONAL DEBRIDEMENT OF SKIN, SUBCUTANEOUS TISSUE, MUSCLE AND BONE 14CM X 3CM X 3CM DEEP performed by Hang Whipple MD at [facility] 238 Left 1/25/2019    EXPLORATION OF LEFT FOOT; EXCISIONAL DEBRIDEMENT OF SUBCUTANEOUS SKIN, TISSUE AND MUSCLE; AMPUTATION OF THIRD TOE AT METATARSAL PHALYNGEAL JOINT performed by Hang Whipple MD at [facility]      TOE AMPUTATION  2017    TONSILLECTOMY          Medications Prior to Admission     Prior to Admission medications    Medication Sig Start Date End Date Taking?  Authorizing Provider   pantoprazole (PROTONIX) 40 MG tablet TAKE 1 TABLET BY MOUTH EVERY DAY BEFORE BREAKFAST 6/28/21  Yes Dorina Espitia MD   cefdinir (OMNICEF) 300 MG capsule Take 1 capsule by mouth 2 times daily for 10 days 6/28/21 7/8/21 Yes Dorina Espitia MD   albuterol sulfate  (90 Base) MCG/ACT inhaler INHALE 1 PUFF INTO THE LUNGS EVERY 6 HOURS AS NEEDED FOR WHEEZING OR SHORTNESS OF BREATH. 6/1/21  Yes Dorina Espitia MD   doxycycline hyclate (VIBRA-TABS) 100 MG tablet TAKE 1 TABLET BY MOUTH TWICE A DAY 5/12/21  Yes Gustavo Le MD   Continuous Blood Gluc Sensor (DEXCOM G6 SENSOR) MISC 1 each by Does not apply route continuous E11.62, L97.509, Z79.4 5/12/21  Yes Gustavo eL MD   Continuous Blood Gluc Transmit (DEXCOM G6 TRANSMITTER) MISC 1 each by Does not apply route continuous E11.62, L97.509, Z79.4 5/12/21  Yes Gustavo Le MD   buPROPion (WELLBUTRIN XL) 150 MG extended release tablet Take 1 tablet by mouth 2 times daily 5/4/21  Yes Gustavo Le MD   furosemide (LASIX) 40 MG tablet Take 1 tablet by mouth 2 times daily 5/4/21  Yes Gustavo Le MD   zinc 50 MG TABS tablet Take 50 mg by mouth daily   Yes Historical Provider, MD   Ascorbic Acid (VITAMIN C) 1000 MG tablet Take 500 mg by mouth daily    Yes Historical Provider, MD   vitamin D (ERGOCALCIFEROL) 1.25 MG (84533 UT) CAPS capsule Take 1 capsule by mouth twice a week 3/25/21  Yes Gustavo Le MD   rosuvastatin (CRESTOR) 10 MG tablet TAKE 1 TABLET BY MOUTH EVERY DAY 12/17/20  Yes MELITON Mckenzie - CECILIA   ipratropium-albuterol (DUONEB) 0.5-2.5 (3) MG/3ML SOLN nebulizer solution INHALE 3 MLS INTO THE LUNGS EVERY 6 HOURS AS NEEDED FOR SHORTNESS OF BREATH 10/21/20  Yes Gustavo Le MD   Nebulizers (COMPRESSOR/NEBULIZER) MISC 1 each by Does not apply route 4 times daily as needed (cough) 9/28/20  Yes Gustavo Le MD   Continuous Blood Gluc  ([address]) [address] 1 each by Does not apply route continuous DX E11.62, L97.509, Z79.4 9/28/20  Yes Gustavo Le MD   tamsulosin (FLOMAX) 0.4 MG capsule Take 0.4 mg by mouth daily   Yes Historical Provider, MD   blood glucose monitor strips 1 strip by Other route 3 times daily TID   Uses Accucheck  Machine.   Dx E11.9, Z79.4 9/14/20  Yes Gustavo Le MD   lisinopril (PRINIVIL;ZESTRIL) 20 MG tablet Take 1 tablet by mouth 2 times daily  Patient taking differently: Take 10 mg by mouth 2 times daily  6/3/20  Yes MELITON Kent   [address] 1 each by Does not apply route every 21 days Needs 3 cc syringes with 22 G  1/2 inch needle to give Testosterone and needs 18 Gauge to draw up med. 3/9/20  Yes Yuval Winston MD   [facility] 100 UNIT/ML injection pen Inject 40 Units into the skin daily  2/20/20  Yes Yuval Winston MD   meloxicam (MOBIC) 15 MG tablet Take 15 mg by mouth daily   Yes Historical Provider, MD   glipiZIDE (GLUCOTROL) 10 MG tablet Take 10 mg by mouth 2 times daily (before meals)   Yes Historical Provider, MD   nitroGLYCERIN (NITROSTAT) 0.4 MG SL tablet Place 1 tablet under the tongue every 5 minutes as needed for Chest pain up to max of 3 total doses. If no relief after 1 dose, call 911. 7/2/21   Yuval Winston MD   gabapentin (NEURONTIN) 300 MG capsule Take 1 capsule by mouth 2 times daily for 30 days. Patient is advised to take 1 afternoon around 4 or 5 and the other at bedtime. This is for peripheral neuropathy. 6/28/21 7/28/21  Yuval Winston MD   montelukast (SINGULAIR) 10 MG tablet Take 1 tablet by mouth daily 6/28/21   Yuval Winston MD   testosterone cypionate (DEPOTESTOTERONE CYPIONATE) 200 MG/ML injection INJECT 1ML INTRAMUSCULARLY EVERY 21 DAYS  Patient taking differently: as needed. 4/13/21 7/13/21  Yuval Winston MD        Allergies   Patient has no known allergies.     Social History     Social History     Tobacco History     Smoking Status  Current Every Day Smoker Last attempt to quit  9/30/2019 Smoking Frequency  1 pack/day for 30 years (30 pk yrs) Smoking Tobacco Type  Cigarettes    Smokeless Tobacco Use  Never Used          Alcohol History     Alcohol Use Status  Yes Comment  Rarely          Drug Use     Drug Use Status  No          Sexual Activity     Sexually Active  Yes Partners  Female                Family History     Family History   Problem Relation Age of Onset    Heart Disease Other     High Blood Pressure Other     Diabetes Other        Review of Systems   Review of Systems: 16 point system reviewed and negative except listed above.      Physical Exam   BP (!) 155/102 Comment: AbiolaieRN aware  Pulse 88   Temp 97.9 °F (36.6 °C) (Temporal)   Resp 20   Ht 6' 3\" (1.905 m)   Wt 247 lb 6 oz (112.2 kg)   SpO2 93%   BMI 30.92 kg/m²       Physical Exam  General: Alert, well-developed, no acute distress lying comfortably in bed. HEENT: Atraumatic normocephalic, range of motion normal, no JVD, no tracheal deviation noted. Cardiac: Normal S1-S2 no murmurs rub or gallop. Respiratory: Effort normal, breath sounds normal, clear To auscultation bilaterally, no rhonchi or rales, no wheezing  Abdomen: Soft, positive bowel sounds in all quadrants, no distention, nontender to palpation, no organomegaly noted, no rebound noted, no CVA tenderness. MSK/extremities: no tenderness, no edema, no deformity, moves all extremities  Skin: Warm, no erythema noted, no bruising and no lesions noted. Psych: Affect normal and good eye contact, behavioral normal, thought content normal and judgment normal  Neurological: No focal deficits, alert and conversant, no formal disorientation noted. No sensory deficits, no abnormal coordination.         Labs      Recent Results (from the past 24 hour(s))   POCT glycosylated hemoglobin (Hb A1C)    Collection Time: 07/02/21 12:00 AM   Result Value Ref Range    Hemoglobin A1C 9.3 %   Comprehensive Metabolic Panel    Collection Time: 07/02/21 12:36 PM   Result Value Ref Range    Sodium 138 136 - 145 mmol/L    Potassium 4.3 3.5 - 5.0 mmol/L    Chloride 100 98 - 111 mmol/L    CO2 33 (H) 22 - 29 mmol/L    Anion Gap 5 (L) 7 - 19 mmol/L    Glucose 246 (H) 74 - 109 mg/dL    BUN 18 6 - 20 mg/dL    CREATININE 1.1 0.5 - 1.2 mg/dL    GFR Non-African American >60 >60    GFR African American >59 >59    Calcium 9.4 8.6 - 10.0 mg/dL    Total Protein 6.4 (L) 6.6 - 8.7 g/dL    Albumin 2.9 (L) 3.5 - 5.2 g/dL    Total Bilirubin 0.4 0.2 - 1.2 mg/dL    Alkaline Phosphatase 99 40 - 130 U/L    ALT 18 5 - 41 U/L    AST 13 5 - 40 U/L   CBC Auto Differential    Collection Time: 07/02/21 12:36 PM   Result Value Ref Range    WBC 10.8 4.8 - 10.8 K/uL    RBC 5.06 4.70 - 6.10 M/uL    Hemoglobin 15.1 14.0 - 18.0 g/dL    Hematocrit 47.7 42.0 - 52.0 %    MCV 94.3 (H) 80.0 - 94.0 fL    MCH 29.8 27.0 - 31.0 pg    MCHC 31.7 (L) 33.0 - 37.0 g/dL    RDW 13.8 11.5 - 14.5 %    Platelets 213 594 - 052 K/uL    MPV 11.8 9.4 - 12.4 fL    Neutrophils % 65.5 (H) 50.0 - 65.0 %    Lymphocytes % 18.2 (L) 20.0 - 40.0 %    Monocytes % 11.9 (H) 0.0 - 10.0 %    Eosinophils % 3.1 0.0 - 5.0 %    Basophils % 0.8 0.0 - 1.0 %    Neutrophils Absolute 7.1 1.5 - 7.5 K/uL    Immature Granulocytes # 0.1 K/uL    Lymphocytes Absolute 2.0 1.1 - 4.5 K/uL    Monocytes Absolute 1.30 (H) 0.00 - 0.90 K/uL    Eosinophils Absolute 0.30 0.00 - 0.60 K/uL    Basophils Absolute 0.10 0.00 - 0.20 K/uL   Brain Natriuretic Peptide    Collection Time: 07/02/21 12:36 PM   Result Value Ref Range    Pro-BNP 2,397 (H) 0 - 900 pg/mL   Troponin    Collection Time: 07/02/21 12:36 PM   Result Value Ref Range    Troponin 0.61 (HH) 0.00 - 0.03 ng/mL   APTT    Collection Time: 07/02/21 12:36 PM   Result Value Ref Range    aPTT 31.4 26.0 - 36.2 sec   COVID-19, Rapid    Collection Time: 07/02/21  1:57 PM    Specimen: Nasopharyngeal Swab   Result Value Ref Range    SARS-CoV-2, NAAT Not Detected Not Detected        Imaging/Diagnostics Last 24 Hours   XR CHEST (2 VW)    Result Date: 7/2/2021  EXAMINATION:  XR CHEST (2 VW)  7/2/2021 12:10 PM HISTORY: Shortness of breath. COMPARISON: 2/4/2021. TECHNIQUE: 2 views were obtained. FINDINGS:  There is cardiomegaly. There is interstitial edema. There are small effusions. There has been prior median sternotomy. Congestive heart failure versus volume overloading due to renal disease. Infectious/inflammatory changes less likely. Signed by Dr Barry Nuno    XR CHEST PORTABLE    Result Date: 7/2/2021  Examination. XR CHEST PORTABLE 7/2/2021 12:41 PM History: Chest pain.  A single, frontal, portable, upright view of the chest is compared with the previous study dated 7/2/2021. There is a persistent right lower lobe infiltrate. The previously seen pulmonary congestion has improved. There is no pleural effusion. There is no pneumothorax. There is moderate cardiomegaly. There is evidence of previous cardiac surgery. No acute bony abnormality. Right lower lobe infiltrate persists. No change. Moderate improvement in pulmonary congestion since the previous study obtained one hour ago. Signed by Dr Jazmin Layne    NSTEMI (non-ST elevation myocardial infarction) ([facility] [address] 7/2/2021 Yes          Plan       NSTEMI  Troponin negative x3  Continue monitoring telemetry  Continue aspirin, lisinopril, statin. A1c noted to be 9.3. Echo ordered  Seen in house by cardiology plan for cardiac cath in the morning. N.p.o. after midnight. Type 2 diabetes  Glargine 20 units at night  Lispro 5 units mealtime coverage. Hypoglycemia protocol. Hyperlipidemia: Continue statin    Hypertension: Continue lisinopril. Tobacco use disorder counseled on cessation.       Code: Full  DVT prophylaxis: Heparin drip  Diet: Cardiac/n.p.o. after midnight  Disposition: Pending completion of above work-up      Consultations Ordered:  None    Electronically signed by Maris Carrizales MD on 7/2/21 at 4:50 PM CDT

## 2021-07-02 NOTE — LETTER
[facility]  [address] [facility]  Phone: [phone]        July 4, 2021     Patient: Linda Contreras   YOB: 1970   Date of Visit: 7/2/2021       To Whom It May Concern:     Ashton Pain was admitted on 7/2/21 and released on 7/4/21. If you have any questions or concerns, please don't hesitate to call.     Sincerely,

## 2021-07-02 NOTE — H&P
[facility]  History and Physical    Patient:  Lubamira November  MRN: [id number]    CHIEF COMPLAINT:    Chief Complaint   Patient presents with    Chest Pain     since last night, jaw pain this am.         History Obtained From: Patient    PCP: Ruby Carreon MD    HISTORY OF PRESENT ILLNESS:   46 y.o. male who presents with increased shortness of breath onset last evening about 10 minutes of jaw pain this morning no chest discomfort at any time denies typical exertional angina or limiting dyspnea does not take nitroglycerin at home. Troponin elevated this morning 0.61. Transported by EMS actually called in as a possible STEMI alert but ECG not consistent with STEMI pain-free at the present time. proBNP 2397. REVIEW OF SYSTEMS:  Review of Systems   Constitutional: Negative. Negative for chills, fever and unexpected weight change. HENT: Negative. Eyes: Negative. Respiratory: Negative. Negative for shortness of breath. Cardiovascular: Negative. Negative for chest pain. Gastrointestinal: Negative. Negative for diarrhea, nausea and vomiting. Endocrine: Negative. Genitourinary: Negative. Musculoskeletal: Negative. Skin: Negative. Neurological: Negative. All other systems reviewed and are negative.       Cardiac Specific Data:   Specialty Problems        Cardiology Problems    Coronary artery disease involving native coronary artery of native heart without angina pectoris        Essential hypertension        Mixed hyperlipidemia        Pre-syncope        Chest pain due to myocardial ischemia        NSTEMI (non-ST elevation myocardial infarction) [facility])              Past Medical History:      Diagnosis Date    Arthritis     CAD (coronary artery disease)     Cigarette smoker 9/2/2014    Diabetes ([facility] [address]     Erectile dysfunction     History of nephrolithiasis     Hyperlipidemia     Hypertension     Obesity     Type II or unspecified type diabetes mellitus without mention of complication, not stated as uncontrolled        Past Surgical History:      Procedure Laterality Date    CARDIAC CATHETERIZATION  2014    CARDIAC CATHETERIZATION  3/14/12    CARDIAC CATHETERIZATION  9/2/14  JDT    stent to mid LAD. EF 50%    COLONOSCOPY N/A 7/1/2020    Dr MANUEL Choudhury-increased tortuosity of the colon, piecemeal, AP x 1, HP x 2, 1 yr recall    COLONOSCOPY  7/1/2020    Dr Dixie Choudhury-increased tortuosity of the colon, piecemeal, AP x 1, HP x 2, 1 yr recall    CORONARY ARTERY BYPASS GRAFT      CORONARY ARTERY BYPASS GRAFT  3/15/2012    3V CABG (LIMA-Diag, SVG-RCA, SVG-OM) JPO    DIAGNOSTIC CARDIAC CATH LAB PROCEDURE      EXPLORATION OF WOUND OF EXTREMITY N/A 1/28/2019    EXCISIONAL DEBRIDEMENT OF SKIN, SUBCUTANEOUS TISSUE, MUSCLE AND BONE 14CM X 3CM X 3CM DEEP performed by Domingo Guerra MD at [facility] 238 Left 1/25/2019    EXPLORATION OF LEFT FOOT; EXCISIONAL DEBRIDEMENT OF SUBCUTANEOUS SKIN, TISSUE AND MUSCLE; AMPUTATION OF THIRD TOE AT METATARSAL PHALYNGEAL JOINT performed by Domingo Guerra MD at [facility]      TOE AMPUTATION  2017    TONSILLECTOMY         Medications Prior to Admission:    Prior to Admission medications    Medication Sig Start Date End Date Taking? Authorizing Provider   nitroGLYCERIN (NITROSTAT) 0.4 MG SL tablet Place 1 tablet under the tongue every 5 minutes as needed for Chest pain up to max of 3 total doses. If no relief after 1 dose, call 911. 7/2/21   Gloria Villela MD   pantoprazole (PROTONIX) 40 MG tablet TAKE 1 TABLET BY MOUTH EVERY DAY BEFORE BREAKFAST 6/28/21   Gloria Villela MD   gabapentin (NEURONTIN) 300 MG capsule Take 1 capsule by mouth 2 times daily for 30 days. Patient is advised to take 1 afternoon around 4 or 5 and the other at bedtime. This is for peripheral neuropathy.  6/28/21 7/28/21  Gloria Villela MD   cefdinir (OMNICEF) 300 MG capsule Take 1 capsule by mouth 2 times daily for 10 days 6/28/21 7/8/21  Evelyne Taylor MD   montelukast (SINGULAIR) 10 MG tablet Take 1 tablet by mouth daily 6/28/21   Evelyne Taylor MD   albuterol sulfate  (90 Base) MCG/ACT inhaler INHALE 1 PUFF INTO THE LUNGS EVERY 6 HOURS AS NEEDED FOR WHEEZING OR SHORTNESS OF BREATH. 6/1/21   Evelyne Taylor MD   doxycycline hyclate (VIBRA-TABS) 100 MG tablet TAKE 1 TABLET BY MOUTH TWICE A DAY 5/12/21   Evelyne Taylor MD   Continuous Blood Gluc Sensor (DEXCOM G6 SENSOR) MISC 1 each by Does not apply route continuous E11.62, L97.509, Z79.4 5/12/21   Evelyne Taylor MD   Continuous Blood Gluc Transmit (DEXCOM G6 TRANSMITTER) MISC 1 each by Does not apply route continuous E11.62, L97.509, Z79.4 5/12/21   Evelyne Taylor MD   buPROPion (WELLBUTRIN XL) 150 MG extended release tablet Take 1 tablet by mouth 2 times daily 5/4/21   Evelyne Taylor MD   furosemide (LASIX) 40 MG tablet Take 1 tablet by mouth 2 times daily 5/4/21   Evelyne Taylor MD   zinc 50 MG TABS tablet Take 50 mg by mouth daily    Historical Provider, MD   Ascorbic Acid (VITAMIN C) 1000 MG tablet Take 1,000 mg by mouth daily    Historical Provider, MD   metaxalone (SKELAXIN) 800 MG tablet Take 800 mg by mouth 3 times daily    Historical Provider, MD   testosterone cypionate (DEPOTESTOTERONE CYPIONATE) 200 MG/ML injection INJECT 1ML INTRAMUSCULARLY EVERY 21 DAYS 4/13/21 7/13/21  Evelyne Taylor MD   ibuprofen (ADVIL;MOTRIN) 800 MG tablet Take 800 mg by mouth every 6 hours as needed for Pain    Historical Provider, MD   vitamin D (ERGOCALCIFEROL) 1.25 MG (12755 UT) CAPS capsule Take 1 capsule by mouth twice a week 3/25/21   Evelyne Taylor MD   busPIRone (BUSPAR) 15 MG tablet TAKE 1 TABLET BY MOUTH TWICE A DAY 2/2/21   Evelyne Taylor MD   rosuvastatin (CRESTOR) 10 MG tablet TAKE 1 TABLET BY MOUTH EVERY DAY 12/17/20   MELITON Mckenzie - CNP   ipratropium-albuterol (DUONEB) 0.5-2.5 (3) MG/3ML SOLN nebulizer solution INHALE 3 MLS INTO THE LUNGS EVERY 6 HOURS AS NEEDED FOR SHORTNESS OF BREATH 10/21/20   Anita Bañuelos MD   Nebulizers (COMPRESSOR/NEBULIZER) MISC 1 each by Does not apply route 4 times daily as needed (cough) 20   Anita Bañuelos MD   Continuous Blood Gluc  ([address]) [address] 1 each by Does not apply route continuous DX E11.62, L97.509, Z79.4 20   Anita Bañuelos MD   tamsulosin (FLOMAX) 0.4 MG capsule Take 0.4 mg by mouth daily    Historical Provider, MD   blood glucose monitor strips 1 strip by Other route 3 times daily TID   Uses Accucheck  Machine. Dx E11.9, Z79.4 20   Anita Bañuelos MD   lisinopril (PRINIVIL;ZESTRIL) 20 MG tablet Take 1 tablet by mouth 2 times daily  Patient taking differently: Take 10 mg by mouth 2 times daily  6/3/20   Yulissa MedalMELITON   Needles & Syringes MISC 1 each by Does not apply route every 21 days Needs 3 cc syringes with 22 G  1/2 inch needle to give Testosterone and needs 18 Gauge to draw up med. 3/9/20   Anita Bañuelos MD   [facility] 100 UNIT/ML injection pen Inject 40 Units into the skin daily  20   Anita Bañuelos MD   clopidogrel (PLAVIX) 75 MG tablet Take 75 mg by mouth daily    Historical Provider, MD   meloxicam (MOBIC) 15 MG tablet Take 15 mg by mouth daily    Historical Provider, MD   glipiZIDE (GLUCOTROL) 10 MG tablet Take 10 mg by mouth 2 times daily (before meals)    Historical Provider, MD       Allergies:  Patient has no known allergies.     Past Social History:  Social History     Socioeconomic History    Marital status:      Spouse name: Not on file    Number of children: Not on file    Years of education: Not on file    Highest education level: Not on file   Occupational History    Not on file   Tobacco Use    Smoking status: Current Every Day Smoker     Packs/day: 1.00     Years: 30.00     Pack years: 30.00     Types: Cigarettes     Last attempt to quit: 2019     Years since quittin.7    Smokeless tobacco: Never Used   Vaping Use    Vaping Use: Never used   Substance and Sexual Activity    Alcohol use: Yes     Comment: Rarely    Drug use: No    Sexual activity: Yes     Partners: Female   Other Topics Concern    Not on file   Social History Narrative    ** Merged History Encounter **          Social Determinants of Health     Financial Resource Strain:     Difficulty of Paying Living Expenses:    Food Insecurity:     Worried About Running Out of Food in the Last Year:     Ran Out of Food in the Last Year:    Transportation Needs:     Lack of Transportation (Medical):  Lack of Transportation (Non-Medical):    Physical Activity:     Days of Exercise per Week:     Minutes of Exercise per Session:    Stress:     Feeling of Stress :    Social Connections:     Frequency of Communication with Friends and Family:     Frequency of Social Gatherings with Friends and Family:     Attends Anabaptist Services:     Active Member of Clubs or Organizations:     Attends Club or Organization Meetings:     Marital Status:    Intimate Partner Violence:     Fear of Current or Ex-Partner:     Emotionally Abused:     Physically Abused:     Sexually Abused:        Family History:       Problem Relation Age of Onset    Heart Disease Other     High Blood Pressure Other     Diabetes Other            Physical Exam:    Vitals: BP (!) 147/73   Pulse 72   Temp 98 °F (36.7 °C) (Oral)   Resp 14   Ht 6' 3\" (1.905 m)   Wt 262 lb (118.8 kg)   SpO2 93%   BMI 32.75 kg/m²   24HR INTAKE/OUTPUT:  No intake or output data in the 24 hours ending 07/02/21 1358    Physical Exam  Vitals reviewed. Constitutional:       General: He is not in acute distress. Appearance: He is well-developed. He is obese. He is not ill-appearing, toxic-appearing or diaphoretic. Neck:      Vascular: No carotid bruit or JVD. Cardiovascular:      Rate and Rhythm: Normal rate and regular rhythm. Heart sounds: Normal heart sounds.  No murmur heard.   No friction rub. No gallop. Pulmonary:      Effort: Pulmonary effort is normal. No respiratory distress. Breath sounds: Normal breath sounds. No wheezing or rales. Abdominal:      General: There is no distension. Tenderness: There is no abdominal tenderness. Musculoskeletal:         General: No swelling, tenderness, deformity or signs of injury. Cervical back: Normal range of motion and neck supple. No rigidity or tenderness. Right lower leg: No edema. Left lower leg: No edema. Lymphadenopathy:      Cervical: No cervical adenopathy. Skin:     General: Skin is warm and dry. Neurological:      General: No focal deficit present. Mental Status: He is alert and oriented to person, place, and time. Mental status is at baseline. Cranial Nerves: No cranial nerve deficit. Motor: No weakness. LAB DATA:  CBC:   Recent Labs     07/02/21  1236   WBC 10.8   HGB 15.1        BMP:    Recent Labs     07/02/21  1236      K 4.3      CO2 33*   BUN 18   CREATININE 1.1   GLUCOSE 246*     Hepatic:   Recent Labs     07/02/21  1236   AST 13   ALT 18   BILITOT 0.4   ALKPHOS 99     CK, CKMB, Troponin: @LABRCNT (CKTOTAL:3, CKMB:3, TROPONINI:3)@  Pro-BNP: No results for input(s): BNP in the last 72 hours. Lipids: No results for input(s): CHOL, HDL in the last 72 hours. Invalid input(s): LDL  ABGs: No results for input(s): PHART, ZES1BXQ, PO2ART, DYM6WJL, BEART, HGBAE, T8GYOKYB, CARBOXHGBART, 02THERAPY in the last 72 hours. INR: No results for input(s): INR in the last 72 hours. A1c:Invalid input(s): HEMOGLOBIN A1C  URINALYSIS:   -----------------------------------------------------------------  IMAGING:    XR CHEST (2 VW)    Result Date: 7/2/2021  EXAMINATION:  XR CHEST (2 VW)  7/2/2021 12:10 PM HISTORY: Shortness of breath. COMPARISON: 2/4/2021. TECHNIQUE: 2 views were obtained. FINDINGS:  There is cardiomegaly. There is interstitial edema.  There are small effusions. There has been prior median sternotomy. Congestive heart failure versus volume overloading due to renal disease. Infectious/inflammatory changes less likely. Signed by Dr Alba Abebe    XR CHEST PORTABLE    Result Date: 7/2/2021  Examination. XR CHEST PORTABLE 7/2/2021 12:41 PM History: Chest pain. A single, frontal, portable, upright view of the chest is compared with the previous study dated 7/2/2021. There is a persistent right lower lobe infiltrate. The previously seen pulmonary congestion has improved. There is no pleural effusion. There is no pneumothorax. There is moderate cardiomegaly. There is evidence of previous cardiac surgery. No acute bony abnormality. Right lower lobe infiltrate persists. No change. Moderate improvement in pulmonary congestion since the previous study obtained one hour ago. Signed by Dr Isabelle Hatfield:    1. Increased shortness of breath and jaw pain with elevated troponin level suggestive of non-STEMI  2. Coronary artery disease  3. History of several coronary stents previously placed  4. Status post CABG 2012  5. Tobacco abuse ongoing  6. Hypertension  7. Hyperlipidemia  8. Diabetes mellitus type 2  9. No typical angina  10. CT abdomen pelvis 4/5/2021 bilateral perinephric peripelvic and periureteral fat infiltration may represent chronic inflammatory process moderate thick wall incompletely distended urinary bladder enlarged prostate stable left adrenal nodule  11. Cardiac catheterization 613 1900% native right coronary artery diffuse plaque left main 90% third marginal patent stent proximal LAD patent LIMA graft to first diagonal patent vein graft to right PDA patent vein graft to second circumflex marginal but diffuse disease proximal 50 to 60% narrowing status post PCI 2.5 x 15 stent placed third marginal    Recommendations:    1.  Recommend diagnostic cardiac catheterization advise indication alternatives benefits and risk patient agreeable plan today has not eaten today. I have discussed with the patient regarding indications for the proposed procedure LEFT HEART CATHETERIZATION AND POSSIBLE PERCUTANEOUS INTERVENTION  along with possible alternatives benefits and risks including but not limited to risks of death, myocardial infarction, stroke, contrast induced nephropathy which in some cases may lead to acute kidney failure requiring dialysis, allergic reactions, bleeding requiring blood transfusion,  cardiac arrhthymias, respiratory failure which may require placing the patient on respiratory support such as a ventilator or breathing machine,risk of complications which may require vascular surgery, and if coronary intervention is performed emergency CABG may be required in less than 1% of cases. The patient is awake and alert and understands the issues involved and indicates willingness to proceed as ordered. The patient does not have any contraindications to dual antiplatelet therapy. The patient does not have any known  pending surgical procedures in the next 12 months at this time. The patient is  a reasonable candidate for moderate conscious sedation.     ASA score:  ASA 3 - Patient with moderate systemic disease with functional limitations    Mallampati: I (soft palate, uvula, fauces, tonsillar pillars visible)    Preferred vascular access site will be: right Right common femoral artery          Sailaja Capone MD, MD 7/2/2021 @ [facility]@

## 2021-07-02 NOTE — ED NOTES
4 baby ASA and 1 nitro PTA, pt pain free, nitro for bp     [facility]  07/02/21 1232

## 2021-07-02 NOTE — PROGRESS NOTES
Riky Acosta arrived to room # [id number]. Presented with: NSTEMI  Mental Status: Patient is oriented, alert, coherent, logical, thought processes intact and able to concentrate and follow conversation. Vitals:    07/02/21 1459   BP: (!) 155/102   Pulse: 88   Resp: 20   Temp: 97.9 °F (36.6 °C)   SpO2: 93%     Patient safety contract and falls prevention contract reviewed with patient Yes. Oriented Patient to room. Call light within reach. Yes.   Needs, issues or concerns expressed at this time: no.      Electronically signed by Romana Flavors, RN on 7/2/2021 at 4:19 PM

## 2021-07-03 NOTE — PROGRESS NOTES
Cardiac catheterization note preliminary right common femoral artery retrograde access left ventricular function at least moderately impaired LIMA graft to LAD patent vein graft to right patent vein graft OM occluded third OM stent occluded severe disease proximal mid LAD as noted before left main unremarkable ramus mild disease PCI subsequently performed able to advance a wire across the third OM stent without undue difficulty balloon angioplasty all the way back to the mid circumflex with a 2.0 balloon followed by 2.5 x 23 drug-eluting stent placed across the previous stent and a 2.5 x 18 mm drug-eluting stent placed proximal to this all dilated to 14 rosy good angiographic results of the stents distal vessel beyond the stent fairly small good runoff at the moment diffuse disease present tolerated well

## 2021-07-03 NOTE — PROGRESS NOTES
Patient complaining of shortness of breath. Oxygen on per NC at 2L sat 93. Also having some chest pressure \"not pain\" EKG done. b/p 165/101, resp 38 shallow, pulse 61 nitro given SL times 3 and and nitro infusion started at 5 mcg/min. Patient sitting on the side of the bed states he feels better sitting up. This is what I do at home and it makes me feel better. Appears anxious and to be hyperventilating. Now complaining of upper back pain \"I have this all the time\". Morphine 2 mg given, heparin infusing and nitro @ 5 mcg/min. Patient resting quietly after a few minutes. Call light within reach instructed to call nurse if needed.

## 2021-07-03 NOTE — PROGRESS NOTES
Educated patient on dietary restrictions and smoking cessation.      Electronically signed by Ollie Turner on 7/3/2021 at 6:21 PM

## 2021-07-03 NOTE — PROGRESS NOTES
Progress Note  Date:7/3/2021       Room:0722/722-02  Patient Zheng Bracket     YOB: 1970     Age:51 y.o. Subjective    Subjective: 42-year-old male with a history of coronary artery disease status post CABG, tobacco use disorder, type 2 diabetes, erectile dysfunction, hyperlipidemia, hypertension, who presents to the hospital with concerns of shortness of breath. In the ED patient was noted to have elevated blood pressure 160s over greater than 362 diastolic. Elevated troponin of 0.61, BNP of 2397, A1c of 9.3, initially called by EMS was concerning for STEMI however after evaluation in the ED EKG not consistent with STEMI. And patient without any evidence of chest pain. Patient was initiated on heparin drip, was given IV diuretics, seen by cardiology s/p cardiac cath. PCI with 2.5 x 23 drug-eluting stent placed across the previous stent and a 2.5 x 18 mm drug-eluting stent placed proximal to this all dilated to 14 rosy good angiographic results. Patient noted to have decreased EF of 25 to 30% on echocardiogram with anterior septal and apical hypokinesis, IVC dilated. To be monitored in-house today will likely need some adjustments to cardiac medication prior to discharge from the hospital.    Patient was seen in house today with sister present, was feeling down secondary to his decreased EF function of 25% and stated he was told that he has a maximum of 7 years to live. Overall stated he is feeling slightly better, denied any chest pain still with mild shortness of breath. Stated at this time he really needs to quit smoking, and trying to optimize his cardiac status as much as possible. Review of Systems: 12 point system reviewed and negative except as stated above.     Objective         Vitals Last 24 Hours:  TEMPERATURE:  Temp  Av.7 °F (36.5 °C)  Min: 96.6 °F (35.9 °C)  Max: 98.4 °F (36.9 °C)  RESPIRATIONS RANGE: Resp  Av.9  Min: 16  Max: 38  PULSE OXIMETRY RANGE: SpO2  Av.4 %  Min: 92 %  Max: 99 %  PULSE RANGE: Pulse  Av.4  Min: 60  Max: 95  BLOOD PRESSURE RANGE: Systolic (16UDB), EQA:671 , Min:109 , ZDF:906   ; Diastolic (23LCS), WXO:59, Min:62, Max:102    I/O (24Hr): Intake/Output Summary (Last 24 hours) at 7/3/2021 1457  Last data filed at 7/3/2021 1433  Gross per 24 hour   Intake 368.76 ml   Output 1200 ml   Net -831.24 ml       Physical Exam  General: Alert, well-developed, no acute distress lying comfortably in bed. HEENT: Atraumatic normocephalic, range of motion normal, no JVD, no tracheal deviation noted. Cardiac: Normal S1-S2 no murmurs rub or gallop. Respiratory: Effort normal, breath sounds normal, clear To auscultation bilaterally, no rhonchi or rales, no wheezing  Abdomen: Soft, positive bowel sounds in all quadrants, no distention, nontender to palpation. MSK/extremities: no tenderness, no edema, no deformity, moves all extremities  Skin: Warm, no erythema noted, no bruising and no lesions noted. Psych: Affect normal and good eye contact, behavioral normal, thought content normal and judgment normal  Neurological: No focal deficits, alert and conversant, no formal disorientation noted. No sensory deficits, no abnormal coordination. Labs/Imaging/Diagnostics    Labs:  CBC:  Recent Labs     21  1236 21  0137   WBC 10.8 10.5   RBC 5.06 5.01   HGB 15.1 14.9   HCT 47.7 47.3   MCV 94.3* 94.4*   RDW 13.8 13.8    273     CHEMISTRIES:  Recent Labs     21  1236 21  0137    140   K 4.3 4.4    102   CO2 33* 32*   BUN 18 20   CREATININE 1.1 1.2   GLUCOSE 246* 225*     PT/INR:No results for input(s): PROTIME, INR in the last 72 hours.   APTT:  Recent Labs     21  1739 21  0138 21  0708   APTT 30.8 32.7 34.9     LIVER PROFILE:  Recent Labs     21  1236   AST 13   ALT 18   BILITOT 0.4   ALKPHOS 99       Imaging Last 24 Hours:  ECHO Complete 2D W Doppler W Color    Result Date: 7/3/2021  Transthoracic Echocardiography Report (TTE)  Demographics   Patient Name  Sung Arndt Date of Study         07/02/2021   MRN           [id number]           Gender                Male   Date of Birth 1970       Room Number           Zohra Nascimento   Age           46 year(s)   Height:       75 inches        Referring Physician   Grace Metz MD   Weight:       248 pounds       Sonographer           Lou Kim [facility]   BSA:          2.4 m^2          Interpreting          Grace Mtez MD                                 Physician   BMI:          31 kg/m^2  Procedure Type of Study   TTE procedure:ECHO 2D W/DOPPLER/COLOR/CONTRAST. Study Location: Portable Technical Quality: Adequate visualization Patient Status: Inpatient Contrast Medium: Definity. Amount - 8 ml Rhythm: Within normal limits Indications:Abnormal ECG and Elevated Troponin. Conclusions   Summary  Mitral valve leaflets are mildly thickened with preserved leaflet  mobility. Mild to moderate mitral regurgitation is present. Mildly thickened aortic valve leaflets with preserved leaflet mobility. Tricuspid valve is structurally normal.  Mild tricuspid regurgitation with estimated RVSP of 30 mm Hg. Mildly dilated left atrium. Left ventricular ejection fraction is visually estimated at 25-30%. Anterior septal and apical hypokinesis  Restrictive filling pattern. LV thrombus suggested  Left ventricular size is moderately increased . IVC dilated. Signature   ----------------------------------------------------------------  Electronically signed by Grace Metz MD(Interpreting  physician) on 07/03/2021 08:23 AM  ----------------------------------------------------------------   Findings   Mitral Valve  Mitral valve leaflets are mildly thickened with preserved leaflet  mobility. Mild to moderate mitral regurgitation is present. Aortic Valve  Mildly thickened aortic valve leaflets with preserved leaflet mobility.    Tricuspid Valve Tricuspid valve is structurally normal.  Mild tricuspid regurgitation with estimated RVSP of 30 mm Hg. Pulmonic Valve  The pulmonic valve was not well visualized . Left Atrium  Mildly dilated left atrium. Left Ventricle  Left ventricular ejection fraction is visually estimated at 25-30%. Anterior septal and apical hypokinesis  Restrictive filling pattern. LV thrombus suggested  Left ventricular size is moderately increased . Right Atrium  Normal right atrial dimension with no evidence of thrombus or mass noted. Right Ventricle  Normal right ventricular size with preserved RV function. Pericardial Effusion  No evidence of significant pericardial effusion is noted. Pleural Effusion  No evidence of pleural effusion. Miscellaneous  IVC dilated. Allergies   - No known allergies. M-Mode Measurements (cm)   LVIDd: 5.97 cm                         LVIDs: 5.11 cm  IVSd: 1.57 cm  LVPWd: 1.69 cm                         AO Root Dimension: 2.6 cm  % Ejection Fraction: 30.3 %            LA: 4.8 cm                                         LVOT: 2.1 cm  Doppler Measurements:   AV Peak Velocity:104 cm/s           MV Peak E-Wave: 120 cm/s  AV Peak Gradient: 4.33 mmHg         MV Peak A-Wave: 38.6 cm/s                                      MV E/A Ratio: 3.11 %  TR Velocity:252 cm/s                MV Peak Gradient: 5.76 mmHg  TR Gradient:25.4 mmHg  Estimated RAP:5 mmHg  RVSP:30 mmHg      XR CHEST (2 VW)    Result Date: 7/2/2021  EXAMINATION:  XR CHEST (2 VW)  7/2/2021 12:10 PM HISTORY: Shortness of breath. COMPARISON: 2/4/2021. TECHNIQUE: 2 views were obtained. FINDINGS:  There is cardiomegaly. There is interstitial edema. There are small effusions. There has been prior median sternotomy. Congestive heart failure versus volume overloading due to renal disease. Infectious/inflammatory changes less likely. Signed by Dr Slater Kehr    XR CHEST PORTABLE    Result Date: 7/2/2021  Examination.  XR CHEST PORTABLE 7/2/2021 12:41 PM History: Chest pain. A single, frontal, portable, upright view of the chest is compared with the previous study dated 7/2/2021. There is a persistent right lower lobe infiltrate. The previously seen pulmonary congestion has improved. There is no pleural effusion. There is no pneumothorax. There is moderate cardiomegaly. There is evidence of previous cardiac surgery. No acute bony abnormality. Right lower lobe infiltrate persists. No change. Moderate improvement in pulmonary congestion since the previous study obtained one hour ago. Signed by Dr Smita Willett    NSTEMI (non-ST elevation myocardial infarction) ([facility] [address] 7/2/2021 Yes        Assessment & Plan      NSTEMI  Troponin negative x3  Continue monitoring telemetry  Continue aspirin, lisinopril, statin. A1c noted to be 9.3. Echo EF of 25 to 30% on echocardiogram with anterior septal and apical hypokinesis, IVC dilated  Seen in house by cardiology s/p cardiac cath with PCI with 2.5 x 23 drug-eluting stent placed across the previous stent and a 2.5 x 18 mm drug-eluting stent placed proximal to this all dilated to 14 rosy good angiographic results  Initiated on prasugrel        Type 2 diabetes  Glargine 20 units at night  Lispro 5 units mealtime coverage. Hypoglycemia protocol.     Hyperlipidemia: Continue statin     Hypertension: Continue lisinopril.     Tobacco use disorder: counseled on cessation.        Code: Full  Diet: Cardiac  Disposition: Home tomorrow      Electronically signed by   Ion Leonardo MD   Internal Medicine Hospitalist  On 7/3/2021  At 3:14 PM    EMR Dragon/Transcription disclaimer:   Much of this encounter note is an electronic transcription/translation of spoken language to printed text.  The electronic translation of spoken language may permit erroneous, or at times, nonsensical words or phrases to be inadvertently transcribed; although attempts have made to review the note for such errors, some may still exist.

## 2021-07-04 NOTE — PROGRESS NOTES
Blood glucose 200 per patient's Dexcom G6.   Electronically signed by Libby Andrade RN on 7/4/2021 at 7:14 AM

## 2021-07-04 NOTE — PROGRESS NOTES
Patient is educated regarding dc instructions, follow up appointments, and new medications. Patient is provided pamphlet for mynx and stent card given. Patient verbalized understanding.

## 2021-07-04 NOTE — PROGRESS NOTES
Cardiology Daily Note Jazlyn Calle MD      Patient:  Seda Bloodgood  [id number]    Patient Active Problem List    Diagnosis Date Noted    Abnormal stress echocardiogram     Pre-syncope     Cigarette smoker 09/02/2014    Coronary artery disease involving native coronary artery of native heart without angina pectoris     Essential hypertension     Mixed hyperlipidemia     NSTEMI (non-ST elevation myocardial infarction) ([facility] [address] 07/02/2021    Foot ulcer with fat layer exposed, left ([address] 01/25/2019    Cellulitis of left foot     Non-pressure chronic ulcer of other part of left foot with fat layer exposed ([address] 12/17/2018    Chest pain due to myocardial ischemia 03/21/2018    Skin ulcer of small toe of right foot with fat layer exposed ([address] 08/16/2017    Skin ulcer of toe of left foot with fat layer exposed ([address] 08/03/2017    Type 2 diabetes mellitus with foot ulcer, with long-term current use of insulin ([address] 08/03/2017       Admit Date:  7/2/2021    Admission Problem List: Present on Admission:   NSTEMI (non-ST elevation myocardial infarction) [facility])      Cardiac Specific Data:  Specialty Problems        Cardiology Problems    Coronary artery disease involving native coronary artery of native heart without angina pectoris        Essential hypertension        Mixed hyperlipidemia        Pre-syncope        Chest pain due to myocardial ischemia        NSTEMI (non-ST elevation myocardial infarction) [facility])              Subjective:  Mr. Mary Smart seen today underwent catheterization yesterday with occlusion third marginal underwent successful PCI with 2 additional stents placed. Today feels improved denies chest pain or dyspnea blood pressure 147/90 heart 69. Creatinine 1.1 hemoglobin 14.1. Peak troponin 0 0.69.     Objective:   BP (!) 147/90   Pulse 69   Temp 97.9 °F (36.6 °C) (Temporal)   Resp 16   Ht 6' 3\" (1.905 m)   Wt 251 lb (113.9 kg)   SpO2 98%   BMI 31.37 kg/m²       Intake/Output Summary (Last 24 hours) at 7/4/2021 1140  Last data filed at 7/4/2021 3205  Gross per 24 hour   Intake 720 ml   Output 1425 ml   Net -705 ml       Prior to Admission medications    Medication Sig Start Date End Date Taking?  Authorizing Provider   aspirin 81 MG EC tablet Take 1 tablet by mouth daily 7/5/21  Yes Cipriano Plata MD   atorvastatin (LIPITOR) 40 MG tablet Take 1 tablet by mouth nightly 7/4/21  Yes Cipriano Plata MD   carvedilol (COREG) 25 MG tablet Take 1 tablet by mouth 2 times daily (with meals) 7/4/21  Yes Cipriano Plata MD   prasugrel (EFFIENT) 10 MG TABS Take 1 tablet by mouth daily 7/5/21  Yes Cipriano Plata MD   pantoprazole (PROTONIX) 40 MG tablet TAKE 1 TABLET BY MOUTH EVERY DAY BEFORE BREAKFAST 6/28/21  Yes Xena Galvan MD   albuterol sulfate  (90 Base) MCG/ACT inhaler INHALE 1 PUFF INTO THE LUNGS EVERY 6 HOURS AS NEEDED FOR WHEEZING OR SHORTNESS OF BREATH. 6/1/21  Yes Xena Galvan MD   Continuous Blood Gluc Sensor (DEXCOM G6 SENSOR) MISC 1 each by Does not apply route continuous E11.62, L97.509, Z79.4 5/12/21  Yes Xena Galvan MD   Continuous Blood Gluc Transmit (DEXCOM G6 TRANSMITTER) MISC 1 each by Does not apply route continuous E11.62, L97.509, Z79.4 5/12/21  Yes Xena Galvan MD   buPROPion (WELLBUTRIN XL) 150 MG extended release tablet Take 1 tablet by mouth 2 times daily 5/4/21  Yes Xena Galvan MD   furosemide (LASIX) 40 MG tablet Take 1 tablet by mouth 2 times daily 5/4/21  Yes Xena Galvan MD   zinc 50 MG TABS tablet Take 50 mg by mouth daily   Yes Historical Provider, MD   Ascorbic Acid (VITAMIN C) 1000 MG tablet Take 500 mg by mouth daily    Yes Historical Provider, MD   vitamin D (ERGOCALCIFEROL) 1.25 MG (73956 UT) CAPS capsule Take 1 capsule by mouth twice a week 3/25/21  Yes Xena Galvan MD   ipratropium-albuterol (DUONEB) 0.5-2.5 (3) MG/3ML SOLN nebulizer solution INHALE 3 MLS INTO THE LUNGS EVERY 6 HOURS AS NEEDED FOR SHORTNESS OF BREATH 10/21/20  Yes Beti Rasmussen Kevon Chilel MD   Nebulizers (COMPRESSOR/NEBULIZER) MISC 1 each by Does not apply route 4 times daily as needed (cough) 9/28/20  Yes Camille Gray MD   Continuous Blood Gluc  ([address]) [address] 1 each by Does not apply route continuous DX E11.62, L97.509, Z79.4 9/28/20  Yes Camille Gray MD   tamsulosin (FLOMAX) 0.4 MG capsule Take 0.4 mg by mouth daily   Yes Historical Provider, MD   lisinopril (PRINIVIL;ZESTRIL) 20 MG tablet Take 1 tablet by mouth 2 times daily  Patient taking differently: Take 10 mg by mouth 2 times daily  6/3/20  Yes Herman Crawford, APRN   [address] 1 each by Does not apply route every 21 days Needs 3 cc syringes with 22 G  1/2 inch needle to give Testosterone and needs 18 Gauge to draw up med. 3/9/20  Yes Camille Gray MD   [facility] 100 UNIT/ML injection pen Inject 40 Units into the skin daily  2/20/20  Yes Camille Gray MD   meloxicam (MOBIC) 15 MG tablet Take 15 mg by mouth daily   Yes Historical Provider, MD   glipiZIDE (GLUCOTROL) 10 MG tablet Take 10 mg by mouth 2 times daily (before meals)   Yes Historical Provider, MD   nitroGLYCERIN (NITROSTAT) 0.4 MG SL tablet Place 1 tablet under the tongue every 5 minutes as needed for Chest pain up to max of 3 total doses. If no relief after 1 dose, call 911. 7/2/21   Camille Gray MD   gabapentin (NEURONTIN) 300 MG capsule Take 1 capsule by mouth 2 times daily for 30 days. Patient is advised to take 1 afternoon around 4 or 5 and the other at bedtime. This is for peripheral neuropathy. 6/28/21 7/28/21  Camille Gray MD   montelukast (SINGULAIR) 10 MG tablet Take 1 tablet by mouth daily 6/28/21   Camille Gray MD   testosterone cypionate (DEPOTESTOTERONE CYPIONATE) 200 MG/ML injection INJECT 1ML INTRAMUSCULARLY EVERY 21 DAYS  Patient taking differently: as needed.   4/13/21 7/13/21  Camille Gray MD        atorvastatin  40 mg Oral Nightly    carvedilol  25 mg Oral BID WC    sodium chloride flush  5-40 mL Intravenous 2 times per day    aspirin  81 mg Oral Daily    prasugrel  10 mg Oral Daily    montelukast  10 mg Oral Daily    insulin glargine  20 Units Subcutaneous Nightly    buPROPion  150 mg Oral BID    furosemide  40 mg Oral BID    gabapentin  300 mg Oral BID    lisinopril  10 mg Oral BID    meloxicam  15 mg Oral Daily    pantoprazole  40 mg Oral QAM AC    tamsulosin  0.4 mg Oral Daily    insulin lispro  5 Units Subcutaneous TID WC    sodium chloride flush  5-40 mL Intravenous 2 times per day    zinc sulfate  50 mg Oral Daily       TELEMETRY: Sinus     Physical Exam:      Physical Exam      General:  Awake, alert, NAD  Skin:  Warm and dry  Neck:  no jvd , no carotid bruits  Chest:  Clear to auscultation, no wheezing or rales  Cardiovascular:  RRR Z5G3 no murmurs, clicks, gallups, or rubs  Abdomen:  Soft nontender, nondistended, bowel sounds present  Extremities:  Edema: none      Lab Data:  CBC:   Recent Labs     07/02/21  1236 07/03/21  0137 07/04/21  0159   WBC 10.8 10.5 9.6   HGB 15.1 14.9 14.1   HCT 47.7 47.3 44.9   MCV 94.3* 94.4* 94.5*    273 255     BMP:   Recent Labs     07/02/21  1236 07/03/21  0137 07/04/21  0159    140 141   K 4.3 4.4 4.5    102 104   CO2 33* 32* 28   BUN 18 20 20   CREATININE 1.1 1.2 1.1     LIVER PROFILE:   Recent Labs     07/02/21  1236   AST 13   ALT 18   BILITOT 0.4   ALKPHOS 99     PT/INR: No results for input(s): PROTIME, INR in the last 72 hours. APTT:   Recent Labs     07/02/21  1739 07/03/21  0138 07/03/21  0708   APTT 30.8 32.7 34.9     BNP:  No results for input(s): BNP in the last 72 hours.   CK, CKMB, Troponin: @LABRCNT (CKTOTAL:3, CKMB:3, TROPONINI:3)@    IMAGING:  ECHO Complete 2D W Doppler W Color    Result Date: 7/3/2021  Transthoracic Echocardiography Report (TTE)  Demographics   Patient Name  [facility] Date of Study         07/02/2021   MRN           [id number]           Gender                Male   Date of Birth 1970 ventricular ejection fraction is visually estimated at 25-30%. Anterior septal and apical hypokinesis  Restrictive filling pattern. LV thrombus suggested  Left ventricular size is moderately increased . Right Atrium  Normal right atrial dimension with no evidence of thrombus or mass noted. Right Ventricle  Normal right ventricular size with preserved RV function. Pericardial Effusion  No evidence of significant pericardial effusion is noted. Pleural Effusion  No evidence of pleural effusion. Miscellaneous  IVC dilated. Allergies   - No known allergies. M-Mode Measurements (cm)   LVIDd: 5.97 cm                         LVIDs: 5.11 cm  IVSd: 1.57 cm  LVPWd: 1.69 cm                         AO Root Dimension: 2.6 cm  % Ejection Fraction: 30.3 %            LA: 4.8 cm                                         LVOT: 2.1 cm  Doppler Measurements:   AV Peak Velocity:104 cm/s           MV Peak E-Wave: 120 cm/s  AV Peak Gradient: 4.33 mmHg         MV Peak A-Wave: 38.6 cm/s                                      MV E/A Ratio: 3.11 %  TR Velocity:252 cm/s                MV Peak Gradient: 5.76 mmHg  TR Gradient:25.4 mmHg  Estimated RAP:5 mmHg  RVSP:30 mmHg      XR CHEST (2 VW)    Result Date: 7/2/2021  EXAMINATION:  XR CHEST (2 VW)  7/2/2021 12:10 PM HISTORY: Shortness of breath. COMPARISON: 2/4/2021. TECHNIQUE: 2 views were obtained. FINDINGS:  There is cardiomegaly. There is interstitial edema. There are small effusions. There has been prior median sternotomy. Congestive heart failure versus volume overloading due to renal disease. Infectious/inflammatory changes less likely. Signed by Dr Vicky Lyles    XR CHEST PORTABLE    Result Date: 7/2/2021  Examination. XR CHEST PORTABLE 7/2/2021 12:41 PM History: Chest pain. A single, frontal, portable, upright view of the chest is compared with the previous study dated 7/2/2021. There is a persistent right lower lobe infiltrate.  The previously seen pulmonary congestion has improved. There is no pleural effusion. There is no pneumothorax. There is moderate cardiomegaly. There is evidence of previous cardiac surgery. No acute bony abnormality. Right lower lobe infiltrate persists. No change. Moderate improvement in pulmonary congestion since the previous study obtained one hour ago. Signed by Dr Domenico Mcmullen:  1. Increased shortness of breath and jaw pain with elevated troponin level suggestive of non-STEMI  2. Coronary artery disease  3. History of several coronary stents previously placed  4. Status post CABG 2012  5. Tobacco abuse ongoing  6. Hypertension  7. Hyperlipidemia  8. Diabetes mellitus type 2  9. No typical angina  10. CT abdomen pelvis 4/5/2021 bilateral perinephric peripelvic and periureteral fat infiltration may represent chronic inflammatory process moderate thick wall incompletely distended urinary bladder enlarged prostate stable left adrenal nodule  11. Cardiac catheterization 613 1900% native right coronary artery diffuse plaque left main 90% third marginal patent stent proximal LAD patent LIMA graft to first diagonal patent vein graft to right PDA patent vein graft to second circumflex marginal but diffuse disease proximal 50 to 60% narrowing status post PCI 2.5 x 15 stent placed third marginal  12. Cardiac catheterization yesterday 7 3 with occlusion of third marginal status post PCI with 2 additional stents placed successfully       Plan:  1.  Stable from a cardiac standpoint can be discharged follow-up in the office in 1 month    Rafael Singleton MD, MD 7/4/2021 11:40 AM

## 2021-07-04 NOTE — DISCHARGE SUMMARY
Discharge Summary      Date:7/4/2021        Patient Viviano Lesches     YOB: 1970     Age:51 y.o. Admit Date:7/2/2021   Admission Condition:fair   Discharged Condition:stable  Discharge Date: 07/04/21       Discharge Diagnoses   Active Problems:    NSTEMI (non-ST elevation myocardial infarction) [facility])  Resolved Problems:    * No resolved hospital problems. [facility] Stay   Narrative of Hospital Course:     78-year-old male with a history of coronary artery disease status post CABG, tobacco use disorder, type 2 diabetes, erectile dysfunction, hyperlipidemia, hypertension, who presents to the hospital with concerns of shortness of breath. In the ED patient was noted to have elevated blood pressure 160s over greater than 754 diastolic. Elevated troponin of 0.61, BNP of 2397, A1c of 9.3, initially called by EMS was concerning for STEMI however after evaluation in the ED EKG not consistent with STEMI.  And patient without any evidence of chest pain. Patient was initiated on heparin drip, was given IV diuretics, seen by cardiology s/p cardiac cath. PCI with 2.5 x 23 drug-eluting stent placed across the previous stent and a 2.5 x 18 mm drug-eluting stent placed proximal to this all dilated to 14 mm good angiographic results. Patient noted to have decreased EF of 25 to 30% on echocardiogram with anterior septal and apical hypokinesis, IVC dilated. Patient was initiated on Effient by cardiology, rosuvastatin was switched to atorvastatin, was also initiated on Coreg 25 mg twice daily. To follow-up outpatient with PCP as well as cardiology for concerns management of chronic medical problems. Tobacco cessation was emphasized again prior to discharge from the hospital.        Physical Exam  General: Alert, well-developed, no acute distress lying comfortably in bed. HEENT: Atraumatic normocephalic, range of motion normal, no JVD, no tracheal deviation noted.   Cardiac: Normal S1-S2 no murmurs rub or gallop. Respiratory: Effort normal, breath sounds normal, clear To auscultation bilaterally, no rhonchi or rales, no wheezing  Abdomen: Soft, positive bowel sounds in all quadrants, no distention, nontender to palpation. MSK/extremities: no tenderness, no edema, no deformity, moves all extremities  Skin: Warm, no erythema noted, no bruising and no lesions noted. Psych: Affect normal and good eye contact, behavioral normal, thought content normal and judgment normal  Neurological: No focal deficits, alert and conversant, no formal disorientation noted.  No sensory deficits, no abnormal coordination. Consultants:   None    Time Spent on Discharge:  45 minutes were spent in patient examination, evaluation, counseling as well as medication reconciliation, prescriptions for required medications, discharge plan and follow up. Surgeries/Procedures Performed:  NONE      Significant Diagnostic Studies:   Recent Labs:  CBC:   Lab Results   Component Value Date    WBC 9.6 07/04/2021    RBC 4.75 07/04/2021    HGB 14.1 07/04/2021    HCT 44.9 07/04/2021    HCT 31.2 03/27/2012    MCV 94.5 07/04/2021    MCH 29.7 07/04/2021    MCHC 31.4 07/04/2021    RDW 13.7 07/04/2021     07/04/2021     03/27/2012     BMP:    Lab Results   Component Value Date    GLUCOSE 228 07/04/2021     07/04/2021     03/27/2012    K 4.5 07/04/2021    K 3.8 03/27/2012     07/04/2021     03/27/2012    CO2 28 07/04/2021    ANIONGAP 9 07/04/2021    BUN 20 07/04/2021    CREATININE 1.1 07/04/2021    CREATININE 0.8 03/27/2012    CALCIUM 9.0 07/04/2021    LABGLOM >60 07/04/2021    GFRAA >59 07/04/2021       Radiology Last 7 Days:  XR CHEST (2 VW)    Result Date: 7/2/2021  Congestive heart failure versus volume overloading due to renal disease. Infectious/inflammatory changes less likely. Signed by Dr Da Haywood    XR CHEST PORTABLE    Result Date: 7/2/2021  Right lower lobe infiltrate persists. No change. Moderate improvement in pulmonary congestion since the previous study obtained one hour ago. Signed by Dr Janet Tolentino      Discharge Plan   Disposition: Home    Provider Follow-Up:   MELITON Saeed - NP  [address] [facility]  [phone]    On 7/29/2021  8:30 am     Maddy Costello MD  [address] [place] [address]  [phone]    Schedule an appointment as soon as possible for a visit in 1 week  Hospital follow-up. Patient Instructions   Diet: cardiac diet and diabetic diet    Activity: activity as tolerated      Discharge Medications         Medication List      START taking these medications    aspirin 81 MG EC tablet  Take 1 tablet by mouth daily  Start taking on: July 5, 2021     atorvastatin 40 MG tablet  Commonly known as: LIPITOR  Take 1 tablet by mouth nightly     carvedilol 25 MG tablet  Commonly known as: COREG  Take 1 tablet by mouth 2 times daily (with meals)     clopidogrel 75 MG tablet  Commonly known as: PLAVIX  Take 1 tablet by mouth daily        CHANGE how you take these medications    lisinopril 20 MG tablet  Commonly known as: PRINIVIL;ZESTRIL  Take 1 tablet by mouth 2 times daily  What changed: how much to take     testosterone cypionate 200 MG/ML injection  Commonly known as: DEPOTESTOTERONE CYPIONATE  INJECT 1ML INTRAMUSCULARLY EVERY 21 DAYS  What changed: See the new instructions. CONTINUE taking these medications    albuterol sulfate  (90 Base) MCG/ACT inhaler  INHALE 1 PUFF INTO THE LUNGS EVERY 6 HOURS AS NEEDED FOR WHEEZING OR SHORTNESS OF BREATH. Basaglar KwikPen 100 UNIT/ML injection pen  Generic drug: insulin glargine     buPROPion 150 MG extended release tablet  Commonly known as:  Wellbutrin XL  Take 1 tablet by mouth 2 times daily     Compressor/Nebulizer Misc  1 each by Does not apply route 4 times daily as needed (cough)     Dexcom G6  Randine Jani  1 each by Does not apply route continuous DX E11.62, L97.509, Z79.4     Dexcom G6 Sensor Misc  1 each by Does not apply route continuous E11.62, L97.509, Z79.4     Dexcom G6 Transmitter Misc  1 each by Does not apply route continuous E11.62, L97.509, Z79.4     furosemide 40 MG tablet  Commonly known as: LASIX  Take 1 tablet by mouth 2 times daily     gabapentin 300 MG capsule  Commonly known as: Neurontin  Take 1 capsule by mouth 2 times daily for 30 days. Patient is advised to take 1 afternoon around 4 or 5 and the other at bedtime. This is for peripheral neuropathy. glipiZIDE 10 MG tablet  Commonly known as: GLUCOTROL     ipratropium-albuterol 0.5-2.5 (3) MG/3ML Soln nebulizer solution  Commonly known as: DUONEB  INHALE 3 MLS INTO THE LUNGS EVERY 6 HOURS AS NEEDED FOR SHORTNESS OF BREATH     meloxicam 15 MG tablet  Commonly known as: MOBIC     montelukast 10 MG tablet  Commonly known as: SINGULAIR  Take 1 tablet by mouth daily     Needles & Syringes Misc  1 each by Does not apply route every 21 days Needs 3 cc syringes with 22 G  1/2 inch needle to give Testosterone and needs 18 Gauge to draw up med. nitroGLYCERIN 0.4 MG SL tablet  Commonly known as: Nitrostat  Place 1 tablet under the tongue every 5 minutes as needed for Chest pain up to max of 3 total doses. If no relief after 1 dose, call 911.      pantoprazole 40 MG tablet  Commonly known as: PROTONIX  TAKE 1 TABLET BY MOUTH EVERY DAY BEFORE BREAKFAST     tamsulosin 0.4 MG capsule  Commonly known as: FLOMAX     vitamin C 1000 MG tablet     vitamin D 1.25 MG (96554 UT) Caps capsule  Commonly known as: ERGOCALCIFEROL  Take 1 capsule by mouth twice a week     zinc 50 MG Tabs tablet        STOP taking these medications    blood glucose test strips     cefdinir 300 MG capsule  Commonly known as: OMNICEF     doxycycline hyclate 100 MG tablet  Commonly known as: VIBRA-TABS     rosuvastatin 10 MG tablet  Commonly known as: CRESTOR           Where to Get Your Medications      These medications were sent to [facility]/pharmacy [address] [facility], [address] [place] [address], [address] [place] [address]    Phone: [phone]   · aspirin 81 MG EC tablet  · atorvastatin 40 MG tablet  · carvedilol 25 MG tablet  · clopidogrel 75 MG tablet         Electronically signed by Emmett Sandy MD on 7/4/21 at 11:32 AM CDT

## 2021-07-06 NOTE — PROGRESS NOTES
7/6/2021    TELEHEALTH EVALUATION -- Audio/Visual (During EYTLP-19 public health emergency)  Patient is being consulted today by way of telehealth through [employer]. Telehealth is implemented due to the current pandemic in [place] caused by coronavirus. Previously, the patient has been advised that this service is billed to his insurance by [facility] ([facility]). HPI:  This patient is consulted today for purpose of a TCM evaluation. Izaiah Maxwell was admitted to [facility] after first of being evaluated in this office. He was admitted on 7/2/2021 where he remained until date of dismissal on 7/4/2021. We did see him on Friday and were evaluating him because he was having some shortness of breath and some discomfort in his left jaw. Patient was sent urgently by ambulance to [facility] where he was evaluated and recommended to be admitted. He was admitted and evaluated there by Dr. Jersey Samuel. The patient was found to have myocardial infarction. He did have coronary cath done and did have stent placement at that time. The patient was known to have prior history of coronary artery disease and was status post CABG. He remains a smoker despite admonitions to stop repeatedly by various providers. He does have type 2 diabetes mellitus. He also has history of hypertension as well as hyperlipidemia. It was noted on his evaluation in the emergency room that troponin was elevated at 0.61 with BNP of 2397. A1c was noted to be 9.3. Patient was felt to have non-ST elevated myocardial infarction. His course was atypical in that he presented with left jaw pain that was intermittent. He was managed with heparin drip and was given IV diuretics. Drug-eluting stent was placed across the previous stent and a 2.5 x 18 mm drug-eluting stent was placed proximal to this all were dilated to 14 mm with good angiographic results. It was noted that the patient had decreased ejection fraction 25 to 30%.   This was on echocardiogram with anterior septal and apical hypokinesis. Inferior vena cava was said to be dilated. The patient was initiated on Effient by cardiology, rosuvastatin was changed to atorvastatin, and he was also placed on Coreg at 25 mg p.o. twice daily. Once again tobacco cessation was strongly emphasized. Kelly Prather is having significant issues in sleeping. He states that he is unable to lay back. He cannot even use the recliner. He has to sit up on the edge of the bed put a pillow on a dresser which is close to the bed and lean forward. We are going to have him [facility] to [facility] and  overnight pulse oximetry kit. We will monitor that. Very likely the patient will need to have sleep study done at some point in time. We did review his medicines. He has available furosemide 40 mg to be used twice daily but he has only been using it once daily. Very likely that is contributing to his shortness of breath as he obviously has some congestive heart failure with BNP being elevated to 2397 and x-ray findings compatible with that as well. Ejection fraction was noted to be 25 to 30%. He is taking them other medicines as regulated by cardiology. He was placed on Effient at 75 mg p.o. daily. I did tell him to call to schedule virtual visit again in 1 week's time. Patient does have a history of vitamin D deficiency and currently is on ergocalciferol 50,000 unit capsule taken twice weekly.     For testosterone deficiency, he is on testosterone cypionate at 200 mg/cc.     For symptoms of GERD he is on Protonix 40 mg p.o. daily when needed.     For arthralgias, he does take meloxicam 15 mg p.o. daily. Neurontin 300 is used twice daily for neuropathic issues. He does have Skelaxin 800 available to be taken 3 times daily for muscle spasms.     Seasonal allergies are managed with Singulair 10 mg p.o. daily.     For depression, Kelly Prather does take Wellbutrin at 150 mg XL extended release twice daily.       Joyce Chan Zabrina (:  1970) has requested an audio/video evaluation for the following concern(s):        Review of Systems   Constitutional: Negative. HENT: Negative. Eyes: Negative. Respiratory: Positive for shortness of breath. Negative for cough, chest tightness and wheezing. Cardiovascular: Positive for leg swelling. Negative for chest pain and palpitations. Patient did not have chest pain even with the onset of the issue last weekend but complained of pain in his left jaw. He also did have some shortness of breath. Very likely this was secondary to his congestive heart failure and non-ST elevated myocardial infarction which was identified at cath. He had hypokinesis of the anterior and inferior walls. He did have stent placement of drug-eluting stent x2 in the artery which previously had been stented earlier about 3 years ago. Gastrointestinal: Negative. Genitourinary: Negative for hematuria. Skin: Negative. Neurological: Negative. Psychiatric/Behavioral: Negative. Prior to Visit Medications    Medication Sig Taking? Authorizing Provider   aspirin 81 MG EC tablet Take 1 tablet by mouth daily  Jodee Otero MD   atorvastatin (LIPITOR) 40 MG tablet Take 1 tablet by mouth nightly  Jodee Otero MD   carvedilol (COREG) 25 MG tablet Take 1 tablet by mouth 2 times daily (with meals)  Jodee Otero MD   clopidogrel (PLAVIX) 75 MG tablet Take 1 tablet by mouth daily  Jodee Otero MD   nitroGLYCERIN (NITROSTAT) 0.4 MG SL tablet Place 1 tablet under the tongue every 5 minutes as needed for Chest pain up to max of 3 total doses. If no relief after 1 dose, call 911. Moon Sanchez MD   pantoprazole (PROTONIX) 40 MG tablet TAKE 1 TABLET BY MOUTH EVERY DAY BEFORE BREAKFAST  Moon Sanchez MD   gabapentin (NEURONTIN) 300 MG capsule Take 1 capsule by mouth 2 times daily for 30 days. Patient is advised to take 1 afternoon around 4 or 5 and the other at bedtime.   This is for peripheral neuropathy. Cyndi Núñez MD   montelukast (SINGULAIR) 10 MG tablet Take 1 tablet by mouth daily  Cyndi Núñez MD   albuterol sulfate  (90 Base) MCG/ACT inhaler INHALE 1 PUFF INTO THE LUNGS EVERY 6 HOURS AS NEEDED FOR WHEEZING OR SHORTNESS OF BREATH. Cyndi Núñez MD   Continuous Blood Gluc Sensor (DEXCOM G6 SENSOR) MISC 1 each by Does not apply route continuous E11.62, L97.509, Z79.4  Cyndi Núñez MD   Continuous Blood Gluc Transmit (DEXCOM G6 TRANSMITTER) MISC 1 each by Does not apply route continuous E11.62, L97.509, Z79.4  Cyndi Núñez MD   buPROPion (WELLBUTRIN XL) 150 MG extended release tablet Take 1 tablet by mouth 2 times daily  Cyndi Núñez MD   furosemide (LASIX) 40 MG tablet Take 1 tablet by mouth 2 times daily  Cyndi Núñez MD   zinc 50 MG TABS tablet Take 50 mg by mouth daily  Historical Provider, MD   Ascorbic Acid (VITAMIN C) 1000 MG tablet Take 500 mg by mouth daily   Historical Provider, MD   testosterone cypionate (DEPOTESTOTERONE CYPIONATE) 200 MG/ML injection INJECT 1ML INTRAMUSCULARLY EVERY 21 DAYS  Patient taking differently: as needed.    Cyndi Núñez MD   vitamin D (ERGOCALCIFEROL) 1.25 MG (33148 UT) CAPS capsule Take 1 capsule by mouth twice a week  Cyndi Núñez MD   ipratropium-albuterol (DUONEB) 0.5-2.5 (3) MG/3ML SOLN nebulizer solution INHALE 3 MLS INTO THE LUNGS EVERY 6 HOURS AS NEEDED FOR SHORTNESS OF BREATH  Cyndi Núñez MD   Nebulizers (COMPRESSOR/NEBULIZER) MISC 1 each by Does not apply route 4 times daily as needed (cough)  Cyndi Núñez MD   Continuous Blood Gluc  ([address]) [address] 1 each by Does not apply route continuous DX E11.62, L97.509, Z79.4  Cyndi Núñez MD   tamsulosin (FLOMAX) 0.4 MG capsule Take 0.4 mg by mouth daily  Historical Provider, MD   lisinopril (PRINIVIL;ZESTRIL) 20 MG tablet Take 1 tablet by mouth 2 times daily  Patient taking differently: Take 10 mg by mouth 2 times daily   Ever Arriaga, APRN Needles & Syringes MISC 1 each by Does not apply route every 21 days Needs 3 cc syringes with 22 G  1/2 inch needle to give Testosterone and needs 18 Gauge to draw up med. Eva Luna MD   BASAGLAR KWIKPEN 100 UNIT/ML injection pen Inject 40 Units into the skin daily   Eva Luna MD   meloxicam (MOBIC) 15 MG tablet Take 15 mg by mouth daily  Historical Provider, MD   glipiZIDE (GLUCOTROL) 10 MG tablet Take 10 mg by mouth 2 times daily (before meals)  Historical Provider, MD       Social History     Tobacco Use    Smoking status: Current Every Day Smoker     Packs/day: 1.00     Years: 30.00     Pack years: 30.00     Types: Cigarettes     Last attempt to quit: 2019     Years since quittin.7    Smokeless tobacco: Never Used   Vaping Use    Vaping Use: Never used   Substance Use Topics    Alcohol use: Yes     Comment: Rarely    Drug use: No        No Known Allergies,   Past Medical History:   Diagnosis Date    Arthritis     CAD (coronary artery disease)     Cigarette smoker 2014    Diabetes ([address]     Erectile dysfunction     History of nephrolithiasis     Hyperlipidemia     Hypertension     Obesity     Type II or unspecified type diabetes mellitus without mention of complication, not stated as uncontrolled    ,   Past Surgical History:   Procedure Laterality Date    CARDIAC CATHETERIZATION      CARDIAC CATHETERIZATION  3/14/12    CARDIAC CATHETERIZATION  14  JDT    stent to mid LAD.  EF 50%    COLONOSCOPY N/A 2020    Dr MANUEL Choudhury-increased tortuosity of the colon, piecemeal, AP x 1, HP x 2, 1 yr recall    COLONOSCOPY  2020    Dr Reinaldo Choudhury-increased tortuosity of the colon, piecemeal, AP x 1, HP x 2, 1 yr recall    CORONARY ARTERY BYPASS GRAFT      CORONARY ARTERY BYPASS GRAFT  3/15/2012    3V CABG (LIMA-Diag, SVG-RCA, SVG-OM) JPO    DIAGNOSTIC CARDIAC CATH LAB PROCEDURE      EXPLORATION OF WOUND OF EXTREMITY N/A 2019    EXCISIONAL DEBRIDEMENT OF SKIN, SUBCUTANEOUS TISSUE, MUSCLE AND BONE 14CM X 3CM X 3CM DEEP performed by Karma Han MD at [address] Left 2019    EXPLORATION OF LEFT FOOT; EXCISIONAL DEBRIDEMENT OF SUBCUTANEOUS SKIN, TISSUE AND MUSCLE; AMPUTATION OF THIRD TOE AT METATARSAL PHALYNGEAL JOINT performed by Karma Han MD at [address]      TOE AMPUTATION  2017    TONSILLECTOMY     ,   Social History     Tobacco Use    Smoking status: Current Every Day Smoker     Packs/day: 1.00     Years: 30.00     Pack years: 30.00     Types: Cigarettes     Last attempt to quit: 2019     Years since quittin.7    Smokeless tobacco: Never Used   Vaping Use    Vaping Use: Never used   Substance Use Topics    Alcohol use: Yes     Comment: Rarely    Drug use: No   ,   Family History   Problem Relation Age of Onset    Heart Disease Other     High Blood Pressure Other     Diabetes Other    ,   There is no immunization history on file for this patient. PHYSICAL EXAMINATION:  [ INSTRUCTIONS:  \"[x]\" Indicates a positive item  \"[]\" Indicates a negative item  -- DELETE ALL ITEMS NOT EXAMINED]  Vital Signs: (As obtained by patient/caregiver or practitioner observation)    Blood pressure-  Heart rate-    Respiratory rate-    Temperature-  Pulse oximetry-     Constitutional: [x] Appears well-developed and well-nourished [x] No apparent distress      [] Abnormal-   Mental status  [x] Alert and awake  [x] Oriented to person/place/time [x]Able to follow commands      Eyes:  EOM    [x]  Normal  [] Abnormal-  Sclera  [x]  Normal  [] Abnormal -         Discharge [x]  None visible  [] Abnormal -    HENT:   [x] Normocephalic, atraumatic.   [] Abnormal   [x] Mouth/Throat: Mucous membranes are moist.     External Ears [x] Normal  [] Abnormal-     Neck: [x] No visualized mass     Pulmonary/Chest: [x] Respiratory effort normal.  [x] No visualized signs of difficulty breathing or respiratory distress        [] Abnormal-      Musculoskeletal:   [] Normal gait with no signs of ataxia         [] Normal range of motion of neck        [] Abnormal-       Neurological:        [x] No Facial Asymmetry (Cranial nerve 7 motor function) (limited exam to video visit)          [x] No gaze palsy        [] Abnormal-         Skin:        [x] No significant exanthematous lesions or discoloration noted on facial skin         [] Abnormal-            Psychiatric:       [x] Normal Affect [x] No Hallucinations        [] Abnormal-     Other pertinent observable physical exam findings-     ASSESSMENT/PLAN:   Diagnosis Orders   1. NSTEMI (non-ST elevation myocardial infarction) [facility]     2. Coronary artery disease involving native coronary artery of native heart without angina pectoris     3. Systolic congestive heart failure, unspecified HF chronicity ([facility]     4. Shortness of breath  Pulse oximetry, overnight   5. Essential hypertension     6. Type 2 diabetes mellitus with foot ulcer, with long-term current use of insulin [facility]     7. Mixed hyperlipidemia       I am having Mr. Luba Pruitt maintain his :   No outpatient medications have been marked as taking for the 7/6/21 encounter (Appointment) with Ruby Carreon MD.   ,Patient states they are no longer utilizing these medication: There are no discontinued medications. I have refilled the following medication today:     Luba Pruitt, was evaluated through a synchronous (real-time) audio-video encounter. The patient (or guardian if applicable) is aware that this is a billable service. Verbal consent to proceed has been obtained within the past 12 months. The visit was conducted pursuant to the emergency declaration under the [address] [place] [address] authority and the [employer] and [employer] General Act.   Patient identification was verified, and a caregiver was present when appropriate. The patient was located in a state where the provider was credentialed to provide care. Total time spent on this encounter: Not billed by time    --Mike Collins MD on 7/6/2021 at 11:34 AM    An electronic signature was used to authenticate this note.

## 2021-07-06 NOTE — CARE COORDINATION
[facility] Transitions Initial Follow Up Call    Call within 2 business days of discharge: Yes    Patient: Chema Deluna Patient : 1970   MRN: [id number]  Reason for Admission:   Discharge Date: 21 RARS: Readmission Risk Score: 10      Last Discharge [address]       Complaint Diagnosis Description Type Department Provider    21 Chest Pain NSTEMI (non-ST elevated myocardial infarction) ([facility] [address] . .. ED to Hosp-Admission (Discharged) (ADMITTED) L LAURA Leonardo MD; Cy Mackay. .. Spoke with: Chema Deluna    Transitions of Care Initial Call      Challenges to be reviewed by the provider   Additional needs identified to be addressed with provider: No  none             Method of communication with provider : none      Advance Care Planning:   Does patient have an Advance Directive:  not on file. Was this a readmission? No  Patient stated reason for admission: NSTEMI  Patients top risk factors for readmission: functional physical ability, medical condition-NSTEMI and medication management    Care Transition Nurse (CTN) contacted the patient by telephone to perform post hospital discharge assessment. Verified name and  with patient as identifiers. Provided introduction to self, and explanation of the CTN role. CTN reviewed discharge instructions, medical action plan and red flags with patient who verbalized understanding. Patient given an opportunity to ask questions and does not have any further questions or concerns at this time. Were discharge instructions available to patient? Yes. Reviewed appropriate site of care based on symptoms and resources available to patient including: PCP and Specialist. The patient agrees to contact the PCP office for questions related to their healthcare. Medication reconciliation was performed with patient, who verbalizes understanding of administration of home medications.  Advised obtaining a 90-day supply of all daily and as-needed medications. Covid Risk Education     Educated patient about risk for severe COVID-19 due to risk factors according to CDC guidelines. CTN reviewed discharge instructions, medical action plan and red flag symptoms with the patient who verbalized understanding. Discussed COVID vaccination status: No. Education provided on COVID-19 vaccination as appropriate. Discussed exposure protocols and quarantine with CDC Guidelines. Patient was given an opportunity to verbalize any questions and concerns and agrees to contact CTN or health care provider for questions related to their healthcare. Reviewed and educated patient on any new and changed medications related to discharge diagnosis. Was patient discharged with a pulse oximeter? No Discussed and confirmed pulse oximeter discharge instructions and when to notify provider or seek emergency care. CTN provided contact information. Plan for follow-up call in 5-7 days based on severity of symptoms and risk factors. Care Transitions 24 Hour Call    Do you have any ongoing symptoms?: No  Do you have a copy of your discharge instructions?: Yes  Do you have all of your prescriptions and are they filled?: Yes  Have you been contacted by a [facility] [address]?: No  Have you scheduled your follow up appointment?: Yes  How are you going to get to your appointment?: Car - family or friend to transport  Were you discharged with any Home Care or Post Acute Services: No  Do you feel like you have everything you need to keep you well at home?: Yes  Care Transitions Interventions         Follow Up : Spoke with patient today for initial CT call after discharge from [facility]. He has had HFU with Dr. Brisa Nieto this morning prior to CT call. He says he is having some issues with laying down or reclining to sleep. Dr. Brisa Nieto is ordering a 24 hour overnight pulse ox for him, possible sleep study at a lat er time. HE says he has quit smoking.  CTN counseled on usage

## 2021-07-07 NOTE — CONSULTS
Cardiac Rehab MI/PTCA/Stent education packet was sent to the patient's address on record. Handouts included were titled; \"Home Instructions Following a Cardiac Event\", \"Cardiac Home Exercise Program - Phase I\", \"Risk Factors for Heart Disease and Stroke\" and \"Cardiac Diet/Low Cholesterol\". Patient was instructed to contact [facility] or the hospital nearest their residence for the opportunity to enroll in Phase II Outpatient Cardiac Rehab.

## 2021-07-07 NOTE — TELEPHONE ENCOUNTER
[facility] Transitions Initial Follow Up Call    Outreach made within 2 business days of discharge: Yes    Patient: Eli Willams Patient : 1970   MRN: [id number]  Reason for Admission: There are no discharge diagnoses documented for the most recent discharge. Discharge Date: 21       Spoke with: patient    Discharge department/facility: [address] [place] [address] Interactive Patient Contact:  Was patient able to fill all prescriptions: Yes  Was patient instructed to bring all medications to the follow-up visit: Yes  Is patient taking all medications as directed in the discharge summary?  Yes  Does patient understand their discharge instructions: Yes  Does patient have questions or concerns that need addressed prior to 7-14 day follow up office visit: no    Scheduled appointment with PCP within 7-14 days    Follow Up  Future Appointments   Date Time Provider Mayr [place]   2021  8:30 AM MELITON Ojeda NP Cardio [facility]   2021  3:45 PM Yaya Hubbard MD N [facility]   Patient had a Virtual Visit with Dr Chris Nelson on 21    Scot Dance
normal...

## 2021-07-12 NOTE — CARE COORDINATION
[facility] Transitions Follow Up Call    2021    Patient: Karen Gamble  Patient : 1970   MRN: <[id number]>  Reason for Admission: NSTEMI  Discharge Date: 21 RARS: Readmission Risk Score: 10    Spoke with: urdy    [facility] attempted outreach. Left a HIPPA compliant message and contact information for call back. Lakeshia Berrios LPN  Care Coordinator    Care Transitions Subsequent and Final Call    Subsequent and Final Calls  Care Transitions Interventions  Other Interventions:            Follow Up  Future Appointments   Date Time Provider Mary Orlando   2021  8:30 AM MELITON Reese NP Cardio [facility]   2021  3:45 PM MD PEYTON Napier [facility]       Lakeshia Berrios LPN

## 2021-07-14 NOTE — PROGRESS NOTES
Physician Progress Note      PATIENT:               Jax Jimenez  CSN #:                  [id number]  :                       1970  ADMIT DATE:       2021 12:30 PM  Joshua Harris DATE:        2021 2:08 PM  RESPONDING  PROVIDER #:        Anita Medina MD          QUERY TEXT:    Pt admitted with NSTEMI  Pt noted to have Chf noted on cxr . If possible,   please document in progress notes and discharge summary if you are evaluating   and/or treating any of the following: The medical record reflects the following:  Risk Factors: sob and chest tightness, increased swelling in lower extremities   per ED  Clinical Indicators: BNP 2397, CXR interstitial edema, sm effusion, chf vs   volume overload, Echo:  EF of 25 to 30% with hypokinesis,  Treatment: Telemetry, Lasix 80mg IV x 1, Lasix po bid, started on BB coreg  Thanks, JAIRON Barker  Options provided:  -- Acute on Chronic Systolic CHF/HFrEF  -- Acute on Chronic Diastolic CHF/HFpEF  -- Acute on Chronic Systolic and Diastolic CHF  -- Acute Systolic CHF/HFrEF  -- Acute Diastolic CHF/HFpEF  -- Acute Systolic and Diastolic CHF  -- Chronic Systolic CHF/HFrEF  -- Chronic Diastolic CHF/HFpEF  -- Chronic Systolic and Diastolic CHF  -- Other - I will add my own diagnosis  -- Disagree - Not applicable / Not valid  -- Disagree - Clinically unable to determine / Unknown  -- Refer to Clinical Documentation Reviewer    PROVIDER RESPONSE TEXT:    This patient is in acute systolic CHF/HFrEF.     Query created by: Lauren Dockery on 2021 2:29 PM      Electronically signed by:  Anita Medina MD 2021 7:42 AM

## 2021-07-14 NOTE — CARE COORDINATION
[facility] Transitions Follow Up Call    2021    Patient: Karen Gamble  Patient : 1970   MRN: <[id number]>  Reason for Admission: NSTEMI  Discharge Date: 21 RARS: Readmission Risk Score: 10         Spoke with: JOESPH    Second and final attempt to reach patient via phone for transition call. VM left stating purpose of call along with my contact information requesting a return call. Episode ended due to unsuccessful contact. [address]  [phone]      Care Transitions Subsequent and Final Call    Subsequent and Final Calls  Care Transitions Interventions  Other Interventions:            Follow Up  Future Appointments   Date Time Provider Mary Orlando   2021  8:30 AM MELITON Reese NP N [facility]io [facility]   2021  3:45 PM MD PEYTON Napier [facility]       Pippa Daen LPN

## 2021-07-16 NOTE — PROGRESS NOTES
Physician Progress Note      PATIENT:               Isma Coleman  CSN #:                  [id number]  :                       1970  ADMIT DATE:       2021 12:30 PM  [address] [place] DATE:        2021 2:08 PM  RESPONDING  PROVIDER #:        Luis SILVA MD          QUERY TEXT:    Pt admitted with NSTEMI. Pt noted to have documentation of multiple previous   coronary stents. If possible, please document in progress notes and discharge   summary the relationship, if any, between NSTEMI and .occluded stent. The medical record reflects the following:  Risk Factors: Hx of several coronary stents previously placed  Clinical Indicators: NSTEMI, occluded OM stent found on cath  Treatment: Cardiac cath with PCI/stent x 2  Thanks, Amber Mendiola, BSN  [phone]  Options provided:  -- NSTEMI due to stent occlusion  -- NSTEMI unrelated to occluded stent: This patient has NSTEMI, unrelated to   occluded stent. -- Other - I will add my own diagnosis  -- Disagree - Not applicable / Not valid  -- Disagree - Clinically unable to determine / Unknown  -- Refer to Clinical Documentation Reviewer    PROVIDER RESPONSE TEXT:    This patient has NSTEMI due to occluded Stent.     Query created by: Evelyn Horne on 2021 2:11 PM      Electronically signed by:  Tracey Sims MD 2021 11:34 AM

## 2021-07-29 NOTE — PROGRESS NOTES
[facility] Cardiology   Established Patient Office Visit   [facility]. [address] [facility]  [phone]        OFFICE VISIT:  2021    Bonita Beckett - : 1970    Reason For Visit:  Rebecca Warren is a 46 y.o. male who is here for Follow-up    1. Coronary artery disease involving native coronary artery of native heart without angina pectoris    2. Essential hypertension    3. Hyperlipidemia, unspecified hyperlipidemia type        Patient with a history of coronary artery disease/CABG, tobacco use, diabetes, hyperlipidemia, and hypertension. He is a patient of Dr. Yancey Goltz. Patient presented to the ER on 2021 with complaints of shortness of breath. Patient had elevated troponin. Patient underwent cardiac catheterization with Left ventricular function moderately impaired LVEF 25% posterobasilar and   inferior hypokinesis and inferolateral hypokinesis   100% proximal RCA. Patent SVG to R-PDA   Patent LIMA graft to LAD. Occluded SVG to 3rd OM   Severe 95% stenosis mid circumflex into OM   Successful PCI PTCA 2.0 mm OM to mid circumflex then LUIS DANIEL 2.5 x 23 mm within prior occluded stent then LUIS DANIEL 2.5 x 18 mm proximal to this stent at 14 rosy excellent angiographic results note small vessel distal to stent but adequate runoff diffuse disease present. Patient presents to clinic today for hospital follow-up. Patient denies any complaints of chest pain, pressure or tightness. There is no shortness of breath, orthopnea or PND. Patient denies any lightheadedness, dizziness or syncope. Patient is back to work with no difficulties. Patient stopped smoking a month ago.                Subjective    Bonita Beckett is a 46 y.o. male with the following history as recorded in [facility]:    Patient Active Problem List    Diagnosis Date Noted    Abnormal stress echocardiogram     Pre-syncope     Cigarette smoker 2014    Coronary artery disease involving native coronary artery of native heart without angina pectoris     Essential hypertension     Mixed hyperlipidemia     NSTEMI (non-ST elevation myocardial infarction) ([facility] 07/02/2021    Foot ulcer with fat layer exposed, left ([facility] 01/25/2019    Cellulitis of left foot     Non-pressure chronic ulcer of other part of left foot with fat layer exposed ([facility] 12/17/2018    Chest pain due to myocardial ischemia 03/21/2018    Skin ulcer of small toe of right foot with fat layer exposed ([facility] 08/16/2017    Skin ulcer of toe of left foot with fat layer exposed ([facility] 08/03/2017    Type 2 diabetes mellitus with foot ulcer, with long-term current use of insulin ([facility]) 08/03/2017     Current Outpatient Medications   Medication Sig Dispense Refill    furosemide (LASIX) 40 MG tablet TAKE 1 TABLET BY MOUTH 2 TIMES DAILY 180 tablet 1    albuterol sulfate  (90 Base) MCG/ACT inhaler INHALE 1 PUFF INTO THE LUNGS EVERY 6 HOURS AS NEEDED FOR WHEEZING OR SHORTNESS OF BREATH. 6.7 Inhaler 0    aspirin 81 MG EC tablet Take 1 tablet by mouth daily 30 tablet 3    atorvastatin (LIPITOR) 40 MG tablet Take 1 tablet by mouth nightly 30 tablet 3    carvedilol (COREG) 25 MG tablet Take 1 tablet by mouth 2 times daily (with meals) 60 tablet 3    clopidogrel (PLAVIX) 75 MG tablet Take 1 tablet by mouth daily 30 tablet 3    nitroGLYCERIN (NITROSTAT) 0.4 MG SL tablet Place 1 tablet under the tongue every 5 minutes as needed for Chest pain up to max of 3 total doses. If no relief after 1 dose, call 911. 25 tablet 3    pantoprazole (PROTONIX) 40 MG tablet TAKE 1 TABLET BY MOUTH EVERY DAY BEFORE BREAKFAST 30 tablet 0    gabapentin (NEURONTIN) 300 MG capsule Take 1 capsule by mouth 2 times daily for 30 days. Patient is advised to take 1 afternoon around 4 or 5 and the other at bedtime. This is for peripheral neuropathy.  60 capsule 1    montelukast (SINGULAIR) 10 MG tablet Take 1 tablet by mouth daily 30 tablet 3    Continuous Blood Gluc Sensor (DEXCOM G6 SENSOR) MISC 1 each by Does not apply route continuous E11.62, L97.509, Z79.4 6 each 5    Continuous Blood Gluc Transmit (DEXCOM G6 TRANSMITTER) MISC 1 each by Does not apply route continuous E11.62, L97.509, Z79.4 1 each 5    buPROPion (WELLBUTRIN XL) 150 MG extended release tablet Take 1 tablet by mouth 2 times daily 60 tablet 3    zinc 50 MG TABS tablet Take 50 mg by mouth daily      Ascorbic Acid (VITAMIN C) 1000 MG tablet Take 500 mg by mouth daily       testosterone cypionate (DEPOTESTOTERONE CYPIONATE) 200 MG/ML injection INJECT 1ML INTRAMUSCULARLY EVERY 21 DAYS (Patient taking differently: as needed. ) 1 mL 5    vitamin D (ERGOCALCIFEROL) 1.25 MG (34625 UT) CAPS capsule Take 1 capsule by mouth twice a week 32 capsule 3    ipratropium-albuterol (DUONEB) 0.5-2.5 (3) MG/3ML SOLN nebulizer solution INHALE 3 MLS INTO THE LUNGS EVERY 6 HOURS AS NEEDED FOR SHORTNESS OF BREATH 360 mL 0    Nebulizers (COMPRESSOR/NEBULIZER) MISC 1 each by Does not apply route 4 times daily as needed (cough) 1 each 3    Continuous Blood Gluc  (DEXCOM G6 ) JOVANY 1 each by Does not apply route continuous DX E11.62, L97.509, Z79.4 1 Device 0    tamsulosin (FLOMAX) 0.4 MG capsule Take 0.4 mg by mouth daily      lisinopril (PRINIVIL;ZESTRIL) 20 MG tablet Take 1 tablet by mouth 2 times daily (Patient taking differently: Take 10 mg by mouth 2 times daily ) 60 tablet 3    Needles & Syringes MISC 1 each by Does not apply route every 21 days Needs 3 cc syringes with 22 G  1/2 inch needle to give Testosterone and needs 18 Gauge to draw up med. 10 each 5    BASAGLAR KWIKPEN 100 UNIT/ML injection pen Inject 40 Units into the skin daily  5 pen 6    meloxicam (MOBIC) 15 MG tablet Take 15 mg by mouth daily      glipiZIDE (GLUCOTROL) 10 MG tablet Take 10 mg by mouth 2 times daily (before meals)       No current facility-administered medications for this visit. Allergies: Patient has no known allergies.   Past Medical History:   Diagnosis Date    Arthritis     CAD (coronary artery disease)     Cigarette smoker 2014    Diabetes [facility]     Erectile dysfunction     History of nephrolithiasis     Hyperlipidemia     Hypertension     Obesity     Type II or unspecified type diabetes mellitus without mention of complication, not stated as uncontrolled      Past Surgical History:   Procedure Laterality Date    CARDIAC CATHETERIZATION      CARDIAC CATHETERIZATION  3/14/12    CARDIAC CATHETERIZATION  14  JDT    stent to mid LAD.  EF 50%    COLONOSCOPY N/A 2020    Dr MANUEL Choudhury-increased tortuosity of the colon, piecemeal, AP x 1, HP x 2, 1 yr recall    COLONOSCOPY  2020    Dr Ezequiel Choudhury-increased tortuosity of the colon, piecemeal, AP x 1, HP x 2, 1 yr recall    CORONARY ARTERY BYPASS GRAFT      CORONARY ARTERY BYPASS GRAFT  3/15/2012    3V CABG (LIMA-Diag, SVG-RCA, SVG-OM) JPO    DIAGNOSTIC CARDIAC CATH LAB PROCEDURE      EXPLORATION OF WOUND OF EXTREMITY N/A 2019    EXCISIONAL DEBRIDEMENT OF SKIN, SUBCUTANEOUS TISSUE, MUSCLE AND BONE 14CM X 3CM X 3CM DEEP performed by John Hickman MD at [facility] [address] Left 2019    EXPLORATION OF LEFT FOOT; EXCISIONAL DEBRIDEMENT OF SUBCUTANEOUS SKIN, TISSUE AND MUSCLE; AMPUTATION OF THIRD TOE AT METATARSAL PHALYNGEAL JOINT performed by John Hickman MD at [address] [place] [address]      TOE AMPUTATION  2017    TONSILLECTOMY       Family History   Problem Relation Age of Onset    Heart Disease Other     High Blood Pressure Other     Diabetes Other      Social History     Tobacco Use    Smoking status: Current Every Day Smoker     Packs/day: 1.00     Years: 30.00     Pack years: 30.00     Types: Cigarettes     Last attempt to quit: 2019     Years since quittin.8    Smokeless tobacco: Never Used   Substance Use Topics    Alcohol use: Yes     Comment: Rarely          Review records:    No chest pain. Patient is on aspirin, Lipitor, Plavix and Coreg. No Continue current medications:     Yes           2. Hypertension  Well Controlled   Blood pressure in the office today 116/72. O2 sat 94%. Patient is on lisinopril 10 mg twice daily. No Continue current medications:    Yes           3. Hyperlipidemia Stable  patient is on Lipitor 40 mg nightly. No Continue current medications: Yes                     No orders of the defined types were placed in this encounter. No orders of the defined types were placed in this encounter. Discussed with patient. Return in about 6 months (around 1/29/2022) for Dr Arnold Mace. I greatly appreciate the opportunity to meet Sdea Bloodgoaileen and your confidence in allowing me to participate in his cardiovascular care. MELITON Pena NP  7/29/2021 8:36 AM CDT                    This dictation was generated by voice recognition computer software. Although all attempts are made to edit dictation for accuracy, there may be errors in the transcription that are not intended.

## 2021-08-16 NOTE — PROGRESS NOTES
Allison Rodriguez is a 46 y.o. male who presents today   Chief Complaint   Patient presents with    Follow-up     I am here to discuss the penile implant today for E.D. Erectile Dysfunction:  Patient is here today for erectile dysfunction which started a couple year(s) ago. Recently his ED symptoms: are worsening  Currently sexually active? Yes  Sex drive/libido: normal  Current medical Rx for ED: Patient tried Viagra with no benefit. He also tried Trimix intracorporeal injections initially had a reasonable response but he said the last 2 times he is try to inject he had pain down into his feet. He comes in today to discuss penile prosthesis. He denies any curvature of the penis or feeling any plaques or fibrosis. Past Medical History:   Diagnosis Date    Arthritis     CAD (coronary artery disease)     Cigarette smoker 9/2/2014    Diabetes ([facility] [address]     Erectile dysfunction     History of nephrolithiasis     Hyperlipidemia     Hypertension     Obesity     Type II or unspecified type diabetes mellitus without mention of complication, not stated as uncontrolled        Past Surgical History:   Procedure Laterality Date    CARDIAC CATHETERIZATION  2014    CARDIAC CATHETERIZATION  3/14/12    CARDIAC CATHETERIZATION  9/2/14  JDT    stent to mid LAD.  EF 50%    COLONOSCOPY N/A 7/1/2020    Dr MANUEL Choudhury-increased tortuosity of the colon, piecemeal, AP x 1, HP x 2, 1 yr recall    COLONOSCOPY  7/1/2020    Dr Camila Choudhury-increased tortuosity of the colon, piecemeal, AP x 1, HP x 2, 1 yr recall    CORONARY ARTERY BYPASS GRAFT      CORONARY ARTERY BYPASS GRAFT  3/15/2012    3V CABG (LIMA-Diag, SVG-RCA, SVG-OM) JPO    DIAGNOSTIC CARDIAC CATH LAB PROCEDURE      EXPLORATION OF WOUND OF EXTREMITY N/A 1/28/2019    EXCISIONAL DEBRIDEMENT OF SKIN, SUBCUTANEOUS TISSUE, MUSCLE AND BONE 14CM X 3CM X 3CM DEEP performed by Candi Phalen, MD at [place] [id number] DRAINAGE Left 1/25/2019    EXPLORATION OF LEFT FOOT; EXCISIONAL DEBRIDEMENT OF SUBCUTANEOUS SKIN, TISSUE AND MUSCLE; AMPUTATION OF THIRD TOE AT METATARSAL PHALYNGEAL JOINT performed by Haroon Lugo MD at [address]      TOE AMPUTATION  2017    TONSILLECTOMY         Current Outpatient Medications   Medication Sig Dispense Refill    tiZANidine (ZANAFLEX) 4 MG tablet Take 4 mg by mouth every 6 hours as needed      Misc Natural Products (APPLE CIDER VINEGAR DIET PO) Take by mouth      albuterol sulfate  (90 Base) MCG/ACT inhaler INHALE 1 PUFF INTO THE LUNGS EVERY 6 HOURS AS NEEDED FOR WHEEZING OR SHORTNESS OF BREATH. 6.7 Inhaler 0    furosemide (LASIX) 40 MG tablet TAKE 1 TABLET BY MOUTH 2 TIMES DAILY 180 tablet 1    aspirin 81 MG EC tablet Take 1 tablet by mouth daily 30 tablet 3    atorvastatin (LIPITOR) 40 MG tablet Take 1 tablet by mouth nightly 30 tablet 3    carvedilol (COREG) 25 MG tablet Take 1 tablet by mouth 2 times daily (with meals) 60 tablet 3    clopidogrel (PLAVIX) 75 MG tablet Take 1 tablet by mouth daily 30 tablet 3    gabapentin (NEURONTIN) 300 MG capsule Take 1 capsule by mouth 2 times daily for 30 days. Patient is advised to take 1 afternoon around 4 or 5 and the other at bedtime. This is for peripheral neuropathy.  60 capsule 1    montelukast (SINGULAIR) 10 MG tablet Take 1 tablet by mouth daily 30 tablet 3    Continuous Blood Gluc Sensor (DEXCOM G6 SENSOR) MISC 1 each by Does not apply route continuous E11.62, L97.509, Z79.4 6 each 5    Continuous Blood Gluc Transmit (DEXCOM G6 TRANSMITTER) MISC 1 each by Does not apply route continuous E11.62, L97.509, Z79.4 1 each 5    buPROPion (WELLBUTRIN XL) 150 MG extended release tablet Take 1 tablet by mouth 2 times daily 60 tablet 3    zinc 50 MG TABS tablet Take 50 mg by mouth daily      Ascorbic Acid (VITAMIN C) 1000 MG tablet Take 500 mg by mouth daily       testosterone cypionate (Hildegard  CYPIONATE) 200 MG/ML injection INJECT 1ML INTRAMUSCULARLY EVERY 21 DAYS (Patient taking differently: as needed. ) 1 mL 5    vitamin D (ERGOCALCIFEROL) 1.25 MG (93242 UT) CAPS capsule Take 1 capsule by mouth twice a week 32 capsule 3    Nebulizers (COMPRESSOR/NEBULIZER) MISC 1 each by Does not apply route 4 times daily as needed (cough) 1 each 3    Continuous Blood Gluc  (DEXCOM G6 ) JOVANY 1 each by Does not apply route continuous DX E11.62, L97.509, Z79.4 1 Device 0    tamsulosin (FLOMAX) 0.4 MG capsule Take 0.4 mg by mouth daily      lisinopril (PRINIVIL;ZESTRIL) 20 MG tablet Take 1 tablet by mouth 2 times daily (Patient taking differently: Take 10 mg by mouth 2 times daily ) 60 tablet 3    Needles & Syringes MISC 1 each by Does not apply route every 21 days Needs 3 cc syringes with 22 G  1/2 inch needle to give Testosterone and needs 18 Gauge to draw up med. 10 each 5    BASAGLAR KWIKPEN 100 UNIT/ML injection pen Inject 40 Units into the skin daily  5 pen 6    meloxicam (MOBIC) 15 MG tablet Take 15 mg by mouth daily      glipiZIDE (GLUCOTROL) 10 MG tablet Take 10 mg by mouth 2 times daily (before meals)      nitroGLYCERIN (NITROSTAT) 0.4 MG SL tablet Place 1 tablet under the tongue every 5 minutes as needed for Chest pain up to max of 3 total doses. If no relief after 1 dose, call 911. (Patient not taking: Reported on 8/16/2021) 25 tablet 3    ipratropium-albuterol (DUONEB) 0.5-2.5 (3) MG/3ML SOLN nebulizer solution INHALE 3 MLS INTO THE LUNGS EVERY 6 HOURS AS NEEDED FOR SHORTNESS OF BREATH 360 mL 0     No current facility-administered medications for this visit.        No Known Allergies    Social History     Socioeconomic History    Marital status:      Spouse name: None    Number of children: None    Years of education: None    Highest education level: None   Occupational History    None   Tobacco Use    Smoking status: Former Smoker     Packs/day: 1.00     Years: 30.00 Pack years: 30.00     Types: Cigarettes     Quit date: 2019     Years since quittin.8    Smokeless tobacco: Never Used   Vaping Use    Vaping Use: Never used   Substance and Sexual Activity    Alcohol use: Yes     Comment: Rarely    Drug use: No    Sexual activity: Yes     Partners: Female   Other Topics Concern    None   Social History Narrative    ** Merged History Encounter **          Social Determinants of Health     Financial Resource Strain:     Difficulty of Paying Living Expenses:    Food Insecurity:     Worried About Running Out of Food in the Last Year:     Ran Out of Food in the Last Year:    Transportation Needs:     Lack of Transportation (Medical):  Lack of Transportation (Non-Medical):    Physical Activity:     Days of Exercise per Week:     Minutes of Exercise per Session:    Stress:     Feeling of Stress :    Social Connections:     Frequency of Communication with Friends and Family:     Frequency of Social Gatherings with Friends and Family:     Attends Rastafari Services:     Active Member of Clubs or Organizations:     Attends Club or Organization Meetings:     Marital Status:    Intimate Partner Violence:     Fear of Current or Ex-Partner:     Emotionally Abused:     Physically Abused:     Sexually Abused:        Family History   Problem Relation Age of Onset    Heart Disease Other     High Blood Pressure Other     Diabetes Other        REVIEW OF SYSTEMS:  Review of Systems   Constitutional: Negative for chills and fever. HENT: Negative for congestion and sore throat. Eyes: Negative for pain and visual disturbance. Respiratory: Negative for cough and wheezing. Cardiovascular: Negative for chest pain and palpitations. Gastrointestinal: Negative for nausea and vomiting. Endocrine: Negative for polyphagia and polyuria.    Genitourinary: Negative for decreased urine volume, difficulty urinating, discharge, dysuria, enuresis, flank pain, frequency, genital sores, hematuria, penile pain, penile swelling, scrotal swelling, testicular pain and urgency. Musculoskeletal: Negative for back pain and neck pain. Skin: Negative for rash and wound. Allergic/Immunologic: Negative for environmental allergies and food allergies. Neurological: Negative for dizziness and headaches. Hematological: Negative for adenopathy. Does not bruise/bleed easily. Psychiatric/Behavioral: Negative for confusion and hallucinations. All other systems reviewed and are negative. PHYSICAL EXAM:  There were no vitals taken for this visit. Physical Exam  Constitutional:       General: He is not in acute distress. Appearance: Normal appearance. He is well-developed. HENT:      Head: Normocephalic and atraumatic. Nose: Nose normal.   Eyes:      General: No scleral icterus. Conjunctiva/sclera: Conjunctivae normal.      Pupils: Pupils are equal, round, and reactive to light. Neck:      Trachea: No tracheal deviation. Cardiovascular:      Rate and Rhythm: Normal rate and regular rhythm. Pulmonary:      Effort: Pulmonary effort is normal. No respiratory distress. Breath sounds: No stridor. Abdominal:      General: There is no distension. Palpations: Abdomen is soft. There is no mass. Tenderness: There is no abdominal tenderness. Genitourinary:     Penis: Normal and circumcised. Testes: Normal.      Comments: He does have some fibrosis may be a Peyronie's plaque in the proximal one third shaft of the penis. He denies any pain on palpation or curvature here  Musculoskeletal:         General: No tenderness. Normal range of motion. Cervical back: Normal range of motion and neck supple. Lymphadenopathy:      Cervical: No cervical adenopathy. Skin:     General: Skin is warm and dry. Findings: No erythema. Neurological:      Mental Status: He is alert and oriented to person, place, and time.    Psychiatric: Behavior: Behavior normal.         Judgment: Judgment normal.             DATA:    Results for orders placed or performed in visit on 08/16/21   POC URINE with Microscopic   Result Value Ref Range    Color, UA Yellow     Clarity, UA Clear Clear    Glucose, Ur 1000mg/dl (A)     Bilirubin Urine 0 mg/dL    Ketones, Urine Negative     Specific Gravity, UA 1.020 1.005 - 1.030    Blood, Urine Positive (A)     pH, UA 5.5 4.5 - 8.0    Protein, UA Negative Negative    Nitrite, Urine Negative     Leukocytes, UA neg     Urobilinogen, Urine Normal     rbc urine, poc (NEG)     wbc urine, poc      bacteria urine, poc      yeast urine, poc      casts urine, poc      epi cells urine, poc      crystals urine, poc       Lab Results   Component Value Date    PSA 1.0 02/17/2020     Lab Results   Component Value Date    PSAFREEPCT 30 02/17/2020     No RBCs seen on the microscope on his UA he does not meet the criteria for hematuria          1. Erectile dysfunction, unspecified erectile dysfunction type  He is failed medical treatment including oral medications and intracorporeal injections of Trimix. He desires penile prosthesis unfortunately he has had a non-STEMI and a drug-eluting stent in July since being seen last by us in June therefore he will be unable to stop dual antiplatelet therapy for 1 year. I gave him some information on the prosthesis and he will have to come back to see us in 1 year  - POC URINE with Microscopic      Orders Placed This Encounter   Procedures    POC URINE with Microscopic        Return in about 1 year (around 8/16/2022). All information inputted into the note by the MA to include chief complaint, past medical history, past surgical history, medications, allergies, social and family history and review of systems has been reviewed and updated as needed by me. EMR Dragon/transcription disclaimer: Much of this documentt is electronic  transcription/translation of spoken language to printed text. The  electronic translation of spoken language may be erroneous, or at times,  nonsensical words or phrases may be inadvertently transcribed.  Although I  have reviewed the document for such errors, some may still exist.

## 2021-10-04 NOTE — PROGRESS NOTES
Subjective:      Patient ID: Blake Myrick is a 46 y.o. male. HPI this patient is seen here intermittently by the undersigned for management of chronic disease states. At the last time that I was evaluating him in the office on 7/2/2021 he was having an acute non-ST elevated myocardial infarction. He was transferred from this facility to [facility] for further evaluation and management. He states that with regard to his heart he is doing well at this time though he is having some other issues. Did receive stent placement and currently is on Plavix 75 mg p.o. daily which she is reportedly directed to take for 1 years duration. He does have available nitroglycerin 0.4 to use sublingually as needed. Additionally, he is on an 81 mg aspirin daily. He does have diabetes mellitus. He has been having extremely poor control of his diabetes. He has been using only 40 units of Basaglar insulin but he has occasionally increase that to 60 units and states that it does not appear to make much difference. He states that oftentimes his sugar runs between 3 and 400. We did do A1c on him today since it had been back in January since it was last done. A1c here today was in excess of 13. I therefore I am going to have him increase his Basaglar up to 50 units but I will have him take it twice daily. Also, I am going to add Humalog with each meal 3 times daily at 10 units to help improve postprandial sugars. I did tell him that I wanted to follow him up in 1 month's time. The main issue for which García Moss came in today was a wound to his right foot. He is not sure how this happened but it appears that it may have been a puncture wound. He now has developed an ulceration with some callus formation around the periphery of the ulcer. Wound appears fairly clean at this time. Due to the nature of his disease process and the fact that he has had previous amputations required, I am going to send him to podiatry.   He has seen Dr. Prieto Lal in the past and I choose to send him back there and was receptive to this plan. I am going to have Dorian Garland, RN clean the wound here today with Hibiclens. After this she will apply Silvadene cream.  He does have some Silvadene at home and I did ask him to apply that at least twice daily to the affected area until we can have the wound seen by Dr. Prieto Lal. If she sees fit to have him come to wound care there at [place] then certainly that will be at her discretion. He also did report to me that at times he has significant deformity of his great nails and perhaps she could investigate that for him as well. At this time, the patient has a small ulceration, approximately 5 mm in size on the plantar aspect of his right foot proximal to the third metatarsal forefoot area. He is noted to be status post amputation of his right fourth and fifth digits for peripheral vascular disease a number of years ago. The patient has significant neuropathic issues as a result of his poorly controlled diabetes. I told him that it is imperative that we get his sugar under control to help facilitate wound care and healing. Andres Estrada has quit smoking since he had his heart attack. He states it was extremely difficult. This undoubtedly will help not only his cardiovascular status but also should improve his ability to heal wounds as well. Andres Estrada does have significant peripheral neuropathy affecting bilateral lower extremities most notably at the feet. 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 The significant neuropathy that he has likely has not contributed to the fact that he does not know the nature of the wound affecting his right foot. Certainly it could be even a puncture wound from a nail.   I am going to put him on doxycycline at 100 mg p.o. twice daily despite the fact fifth digits for peripheral vascular disease a number of years ago. The patient has significant neuropathic issues as a result of his poorly controlled diabetes. Neurological: Positive for numbness. Peripheral neuropathy affecting bilateral lower extremities most notably at the feet. 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 Psychiatric/Behavioral: Negative. Objective:   Physical Exam  Vitals reviewed. Constitutional:       General: He is not in acute distress. Appearance: Normal appearance. He is well-developed. He is not ill-appearing, toxic-appearing or diaphoretic. HENT:      Head: Normocephalic and atraumatic. Right Ear: Tympanic membrane, ear canal and external ear normal. There is no impacted cerumen. Left Ear: Tympanic membrane, ear canal and external ear normal. There is no impacted cerumen. Nose: Nose normal.      Mouth/Throat:      Lips: Pink. Mouth: Mucous membranes are moist.      Dentition: Normal dentition. Tongue: No lesions. Pharynx: Oropharynx is clear. Uvula midline. Tonsils: No tonsillar exudate or tonsillar abscesses. Eyes:      General: Lids are normal. No scleral icterus. Right eye: No discharge. Left eye: No discharge. Extraocular Movements:      Right eye: Normal extraocular motion. Left eye: Normal extraocular motion. Conjunctiva/sclera: Conjunctivae normal.      Right eye: Right conjunctiva is not injected. Left eye: Left conjunctiva is not injected. Pupils: Pupils are equal, round, and reactive to light. Neck:      Thyroid: No thyromegaly. Vascular: No carotid bruit or JVD. Cardiovascular:      Rate and Rhythm: Normal rate and regular rhythm. Pulses:           Carotid pulses are 2+ on the right side and 2+ on the left side.        Radial pulses are 2+ on the right side and 2+ on the left side. Heart sounds: Normal heart sounds, S1 normal and S2 normal. No murmur heard. No friction rub. No gallop. Pulmonary:      Effort: Pulmonary effort is normal. No accessory muscle usage or respiratory distress. Breath sounds: Normal breath sounds. No stridor. No wheezing, rhonchi or rales. Chest:      Chest wall: No tenderness. Abdominal:      General: Bowel sounds are normal. There is no distension or abdominal bruit. Palpations: Abdomen is soft. There is no mass. Tenderness: There is no abdominal tenderness. There is no right CVA tenderness, left CVA tenderness, guarding or rebound. Hernia: No hernia is present. Musculoskeletal:         General: Normal range of motion. Cervical back: Normal range of motion and neck supple. No rigidity or tenderness. Right lower leg: No edema. Left lower leg: No edema. Lymphadenopathy:      Cervical: No cervical adenopathy. Right cervical: No superficial cervical adenopathy. Left cervical: No superficial cervical adenopathy. Skin:     General: Skin is warm and dry. Coloration: Skin is not jaundiced or pale. Findings: Lesion present. No bruising, erythema or rash. Nails: There is no clubbing. Comments: Oscar Ward has ulceration with callus formation around the periphery on the right foot at the base of the third metatarsal.  The patient has had prior amputation of fourth and fifth digits with metatarsals resected as well. This was secondary to peripheral vascular disease caused by his diabetes mellitus with poor circulation. Ulceration is approximately 5 mm in cross-sectional area. Slightly red though not grossly infected. I am going to cover with antibiotics due to the nature of him having diabetes and having significant issues previously. Neurological:      Mental Status: He is alert and oriented to person, place, and time.       Cranial Nerves: No facial asymmetry. Motor: No weakness or tremor. Coordination: Coordination normal.      Gait: Gait normal.      Deep Tendon Reflexes: Reflexes are normal and symmetric. Psychiatric:         Attention and Perception: Attention normal.         Mood and Affect: Mood normal.         Speech: Speech normal.         Behavior: Behavior normal.         Thought Content: Thought content normal.         Cognition and Memory: Memory normal.         Judgment: Judgment normal.         /60   Pulse 90   Temp 97.7 °F (36.5 °C) (Infrared)   Resp 16   Ht 6' 3\" (1.905 m)   Wt 255 lb 12.8 oz (116 kg)   SpO2 99%   BMI 31.97 kg/m²   Assessment:        Diagnosis Orders   1. Open wound of right foot, initial encounter      Uncertain of exact etiology but it could even be a puncture from a nail. 2. Diabetic ulcer of right midfoot associated with type 2 diabetes mellitus, unspecified ulcer stage [facility])  External Referral To Podiatry   3. Seasonal allergies     4. Type 2 diabetes mellitus with foot ulcer, with long-term current use of insulin ([facility])  insulin lispro, 1 Unit Dial, (HUMALOG KWIKPEN) 100 UNIT/ML SOPN    POCT glycosylated hemoglobin (Hb A1C)   5. Coronary artery disease involving native coronary artery of native heart without angina pectoris     6. Essential hypertension     7. NSTEMI (non-ST elevation myocardial infarction) ([facility] [address]     8. Mixed hyperlipidemia     9. Former cigarette smoker     10. 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   11. Status post coronary artery bypass graft      Three-vessel bypass done 2012 by Dr. David Martin. 12. History of coronary artery stent placement      2014, 2019, and again in July of 2021.          Plan:       I am having Mr. Grey Juárez maintain his :   Outpatient Medications Marked as Taking for the 10/4/21 encounter (Office Visit) with Olegario Brasher MD Medication Sig Dispense Refill    insulin lispro, 1 Unit Dial, (HUMALOG KWIKPEN) 100 UNIT/ML SOPN Inject 10 Units into the skin 3 times daily (before meals) 1 pen 5    doxycycline hyclate (VIBRAMYCIN) 100 MG capsule Take 1 capsule by mouth 2 times daily for 10 days 20 capsule 0    montelukast (SINGULAIR) 10 MG tablet TAKE 1 TABLET BY MOUTH EVERY DAY 90 tablet 1    Continuous Blood Gluc Sensor (DEXCOM G6 SENSOR) MISC 1 each by Does not apply route continuous E11.62, L97.509, Z79.4 6 each 5    Continuous Blood Gluc Transmit (DEXCOM G6 TRANSMITTER) MISC 1 each by Does not apply route continuous E11.62, L97.509, Z79.4 1 each 5    testosterone cypionate (DEPOTESTOTERONE CYPIONATE) 200 MG/ML injection INJECT 1 ML INTRAMUSCULARLY EVERY 21 DAYS 3 mL 2    tiZANidine (ZANAFLEX) 4 MG tablet Take 4 mg by mouth every 6 hours as needed      Misc Natural Products (APPLE CIDER VINEGAR DIET PO) Take by mouth      albuterol sulfate  (90 Base) MCG/ACT inhaler INHALE 1 PUFF INTO THE LUNGS EVERY 6 HOURS AS NEEDED FOR WHEEZING OR SHORTNESS OF BREATH. 6.7 Inhaler 0    furosemide (LASIX) 40 MG tablet TAKE 1 TABLET BY MOUTH 2 TIMES DAILY 180 tablet 1    aspirin 81 MG EC tablet Take 1 tablet by mouth daily 30 tablet 3    atorvastatin (LIPITOR) 40 MG tablet Take 1 tablet by mouth nightly 30 tablet 3    carvedilol (COREG) 25 MG tablet Take 1 tablet by mouth 2 times daily (with meals) 60 tablet 3    clopidogrel (PLAVIX) 75 MG tablet Take 1 tablet by mouth daily 30 tablet 3    nitroGLYCERIN (NITROSTAT) 0.4 MG SL tablet Place 1 tablet under the tongue every 5 minutes as needed for Chest pain up to max of 3 total doses. If no relief after 1 dose, call 911. 25 tablet 3    gabapentin (NEURONTIN) 300 MG capsule Take 1 capsule by mouth 2 times daily for 30 days. Patient is advised to take 1 afternoon around 4 or 5 and the other at bedtime. This is for peripheral neuropathy.  60 capsule 1    buPROPion (WELLBUTRIN XL) 150 MG extended release tablet Take 1 tablet by mouth 2 times daily 60 tablet 3    zinc 50 MG TABS tablet Take 50 mg by mouth daily      Ascorbic Acid (VITAMIN C) 1000 MG tablet Take 500 mg by mouth daily       vitamin D (ERGOCALCIFEROL) 1.25 MG (12527 UT) CAPS capsule Take 1 capsule by mouth twice a week 32 capsule 3    ipratropium-albuterol (DUONEB) 0.5-2.5 (3) MG/3ML SOLN nebulizer solution INHALE 3 MLS INTO THE LUNGS EVERY 6 HOURS AS NEEDED FOR SHORTNESS OF BREATH 360 mL 0    Nebulizers (COMPRESSOR/NEBULIZER) MISC 1 each by Does not apply route 4 times daily as needed (cough) 1 each 3    Continuous Blood Gluc  (DEXCOM G6 ) JOVANY 1 each by Does not apply route continuous DX E11.62, L97.509, Z79.4 1 Device 0    tamsulosin (FLOMAX) 0.4 MG capsule Take 0.4 mg by mouth daily      lisinopril (PRINIVIL;ZESTRIL) 20 MG tablet Take 1 tablet by mouth 2 times daily (Patient taking differently: Take 10 mg by mouth 2 times daily ) 60 tablet 3    Needles & Syringes MISC 1 each by Does not apply route every 21 days Needs 3 cc syringes with 22 G  1/2 inch needle to give Testosterone and needs 18 Gauge to draw up med. 10 each 5    BASAGLAR KWIKPEN 100 UNIT/ML injection pen Inject 50 Units into the skin 2 times daily  5 pen 6    meloxicam (MOBIC) 15 MG tablet Take 15 mg by mouth daily      glipiZIDE (GLUCOTROL) 10 MG tablet Take 10 mg by mouth 2 times daily (before meals)     ,Patient states they are no longer utilizing these medication: There are no discontinued medications. I have refilled the following medication today:   Requested Prescriptions     Signed Prescriptions Disp Refills    insulin lispro, 1 Unit Dial, (HUMALOG KWIKPEN) 100 UNIT/ML SOPN 1 pen 5     Sig: Inject 10 Units into the skin 3 times daily (before meals)    doxycycline hyclate (VIBRAMYCIN) 100 MG capsule 20 capsule 0     Sig: Take 1 capsule by mouth 2 times daily for 10 days   .        Orders Placed This Encounter   Procedures    External Referral To Podiatry     Referral Priority:   Routine     Referral Type:   Eval and Treat     Referral Reason:   Specialty Services Required     Requested Specialty:   Podiatry     Number of Visits Requested:   1    POCT glycosylated hemoglobin (Hb A1C)     Orders Placed This Encounter   Medications    insulin lispro, 1 Unit Dial, (HUMALOG KWIKPEN) 100 UNIT/ML SOPN     Sig: Inject 10 Units into the skin 3 times daily (before meals)     Dispense:  1 pen     Refill:  5    doxycycline hyclate (VIBRAMYCIN) 100 MG capsule     Sig: Take 1 capsule by mouth 2 times daily for 10 days     Dispense:  20 capsule     Refill:  0             Aric Au MD

## 2021-11-22 NOTE — TELEPHONE ENCOUNTER
Patient sent in insurance paperwork to be filled out by [employer] but he filled out portions of the physician statement and this can not be done so I called patient to send new paper where physician statement is not already filled out by him. He spelled [employer] name wrong and has wrong address on that portion. He voiced understanding and will send a new copy.

## 2022-01-01 ENCOUNTER — VIRTUAL VISIT (OUTPATIENT)
Dept: FAMILY MEDICINE CLINIC | Age: 52
End: 2022-01-01

## 2022-01-01 ENCOUNTER — APPOINTMENT (OUTPATIENT)
Dept: GENERAL RADIOLOGY | Age: 52
DRG: 283 | End: 2022-01-01
Payer: COMMERCIAL

## 2022-01-01 ENCOUNTER — TELEPHONE (OUTPATIENT)
Dept: CARDIOLOGY CLINIC | Age: 52
End: 2022-01-01

## 2022-01-01 ENCOUNTER — HOSPITAL ENCOUNTER (OUTPATIENT)
Dept: GENERAL RADIOLOGY | Age: 52
Discharge: HOME | DRG: 283 | End: 2022-01-27
Payer: COMMERCIAL

## 2022-01-01 ENCOUNTER — OFFICE VISIT (OUTPATIENT)
Dept: FAMILY MEDICINE CLINIC | Age: 52
End: 2022-01-01
Payer: COMMERCIAL

## 2022-01-01 ENCOUNTER — NURSE TRIAGE (OUTPATIENT)
Dept: OTHER | Facility: CLINIC | Age: 52
End: 2022-01-01

## 2022-01-01 ENCOUNTER — APPOINTMENT (OUTPATIENT)
Dept: ULTRASOUND IMAGING | Age: 52
DRG: 283 | End: 2022-01-01
Payer: COMMERCIAL

## 2022-01-01 ENCOUNTER — HOSPITAL ENCOUNTER (INPATIENT)
Age: 52
LOS: 8 days | DRG: 283 | End: 2022-02-04
Attending: EMERGENCY MEDICINE | Admitting: STUDENT IN AN ORGANIZED HEALTH CARE EDUCATION/TRAINING PROGRAM
Payer: COMMERCIAL

## 2022-01-01 ENCOUNTER — APPOINTMENT (OUTPATIENT)
Dept: CT IMAGING | Age: 52
DRG: 283 | End: 2022-01-01
Payer: COMMERCIAL

## 2022-01-01 VITALS
HEIGHT: 75 IN | DIASTOLIC BLOOD PRESSURE: 70 MMHG | BODY MASS INDEX: 34.69 KG/M2 | WEIGHT: 279 LBS | OXYGEN SATURATION: 96 % | HEART RATE: 96 BPM | SYSTOLIC BLOOD PRESSURE: 138 MMHG | RESPIRATION RATE: 16 BRPM | TEMPERATURE: 97.9 F

## 2022-01-01 VITALS
WEIGHT: 275 LBS | HEART RATE: 53 BPM | RESPIRATION RATE: 18 BRPM | HEIGHT: 75 IN | BODY MASS INDEX: 34.19 KG/M2 | TEMPERATURE: 97.3 F | DIASTOLIC BLOOD PRESSURE: 73 MMHG | OXYGEN SATURATION: 100 % | SYSTOLIC BLOOD PRESSURE: 111 MMHG

## 2022-01-01 DIAGNOSIS — I21.4 NSTEMI (NON-ST ELEVATION MYOCARDIAL INFARCTION) (HCC): ICD-10-CM

## 2022-01-01 DIAGNOSIS — E11.621 TYPE 2 DIABETES MELLITUS WITH FOOT ULCER, WITH LONG-TERM CURRENT USE OF INSULIN (HCC): ICD-10-CM

## 2022-01-01 DIAGNOSIS — Z79.4 TYPE 2 DIABETES MELLITUS WITH FOOT ULCER, WITH LONG-TERM CURRENT USE OF INSULIN (HCC): Chronic | ICD-10-CM

## 2022-01-01 DIAGNOSIS — E11.621 TYPE 2 DIABETES MELLITUS WITH FOOT ULCER, WITH LONG-TERM CURRENT USE OF INSULIN (HCC): Chronic | ICD-10-CM

## 2022-01-01 DIAGNOSIS — E78.2 MIXED HYPERLIPIDEMIA: Chronic | ICD-10-CM

## 2022-01-01 DIAGNOSIS — R60.9 EDEMA, UNSPECIFIED TYPE: ICD-10-CM

## 2022-01-01 DIAGNOSIS — L97.509 TYPE 2 DIABETES MELLITUS WITH FOOT ULCER, WITH LONG-TERM CURRENT USE OF INSULIN (HCC): Chronic | ICD-10-CM

## 2022-01-01 DIAGNOSIS — R10.11 RIGHT UPPER QUADRANT PAIN: ICD-10-CM

## 2022-01-01 DIAGNOSIS — R06.02 SHORTNESS OF BREATH: ICD-10-CM

## 2022-01-01 DIAGNOSIS — J90 BILATERAL PLEURAL EFFUSION: ICD-10-CM

## 2022-01-01 DIAGNOSIS — R07.9 ACUTE CHEST PAIN: Primary | ICD-10-CM

## 2022-01-01 DIAGNOSIS — I10 ESSENTIAL HYPERTENSION: Chronic | ICD-10-CM

## 2022-01-01 DIAGNOSIS — Z87.891 FORMER CIGARETTE SMOKER: ICD-10-CM

## 2022-01-01 DIAGNOSIS — E34.9 TESTOSTERONE DEFICIENCY: ICD-10-CM

## 2022-01-01 DIAGNOSIS — I25.10 CORONARY ARTERY DISEASE INVOLVING NATIVE CORONARY ARTERY OF NATIVE HEART WITHOUT ANGINA PECTORIS: Chronic | ICD-10-CM

## 2022-01-01 DIAGNOSIS — Z79.4 TYPE 2 DIABETES MELLITUS WITH FOOT ULCER, WITH LONG-TERM CURRENT USE OF INSULIN (HCC): ICD-10-CM

## 2022-01-01 DIAGNOSIS — L97.509 TYPE 2 DIABETES MELLITUS WITH FOOT ULCER, WITH LONG-TERM CURRENT USE OF INSULIN (HCC): ICD-10-CM

## 2022-01-01 DIAGNOSIS — R10.13 EPIGASTRIC PAIN: ICD-10-CM

## 2022-01-01 DIAGNOSIS — F32.A DEPRESSION, UNSPECIFIED DEPRESSION TYPE: ICD-10-CM

## 2022-01-01 DIAGNOSIS — I50.9 CONGESTIVE HEART FAILURE, UNSPECIFIED HF CHRONICITY, UNSPECIFIED HEART FAILURE TYPE (HCC): ICD-10-CM

## 2022-01-01 DIAGNOSIS — R06.02 SOB (SHORTNESS OF BREATH): ICD-10-CM

## 2022-01-01 LAB
ALBUMIN SERPL-MCNC: 2.9 G/DL (ref 3.5–5.2)
ALBUMIN SERPL-MCNC: 3 G/DL (ref 3.5–5.2)
ALBUMIN SERPL-MCNC: 3.1 G/DL (ref 3.5–5.2)
ALBUMIN SERPL-MCNC: 3.2 G/DL (ref 3.5–5.2)
ALBUMIN SERPL-MCNC: 3.2 G/DL (ref 3.5–5.2)
ALBUMIN SERPL-MCNC: 3.4 G/DL (ref 3.5–5.2)
ALBUMIN SERPL-MCNC: 3.4 G/DL (ref 3.5–5.2)
ALBUMIN SERPL-MCNC: 3.6 G/DL (ref 3.5–5.2)
ALP BLD-CCNC: 102 U/L (ref 40–130)
ALP BLD-CCNC: 104 U/L (ref 40–130)
ALP BLD-CCNC: 106 U/L (ref 40–130)
ALP BLD-CCNC: 111 U/L (ref 40–130)
ALP BLD-CCNC: 111 U/L (ref 40–130)
ALP BLD-CCNC: 94 U/L (ref 40–130)
ALP BLD-CCNC: 95 U/L (ref 40–130)
ALP BLD-CCNC: 96 U/L (ref 40–130)
ALT SERPL-CCNC: 16 U/L (ref 5–41)
ALT SERPL-CCNC: 17 U/L (ref 5–41)
ALT SERPL-CCNC: 18 U/L (ref 5–41)
ALT SERPL-CCNC: 20 U/L (ref 5–41)
ALT SERPL-CCNC: 21 U/L (ref 5–41)
ALT SERPL-CCNC: 21 U/L (ref 5–41)
ALT SERPL-CCNC: 22 U/L (ref 5–41)
ALT SERPL-CCNC: 22 U/L (ref 5–41)
ANION GAP SERPL CALCULATED.3IONS-SCNC: 10 MMOL/L (ref 7–19)
ANION GAP SERPL CALCULATED.3IONS-SCNC: 10 MMOL/L (ref 7–19)
ANION GAP SERPL CALCULATED.3IONS-SCNC: 11 MMOL/L (ref 7–19)
ANION GAP SERPL CALCULATED.3IONS-SCNC: 12 MMOL/L (ref 7–19)
ANION GAP SERPL CALCULATED.3IONS-SCNC: 7 MMOL/L (ref 7–19)
ANION GAP SERPL CALCULATED.3IONS-SCNC: 8 MMOL/L (ref 7–19)
ANION GAP SERPL CALCULATED.3IONS-SCNC: 9 MMOL/L (ref 7–19)
ANION GAP SERPL CALCULATED.3IONS-SCNC: 9 MMOL/L (ref 7–19)
APTT: 162.1 SEC (ref 26–36.2)
APTT: 183.8 SEC (ref 26–36.2)
APTT: 30.9 SEC (ref 26–36.2)
APTT: 31.7 SEC (ref 26–36.2)
APTT: 31.8 SEC (ref 26–36.2)
APTT: 32.9 SEC (ref 26–36.2)
APTT: 33.3 SEC (ref 26–36.2)
APTT: 34.3 SEC (ref 26–36.2)
APTT: 36.4 SEC (ref 26–36.2)
APTT: 37 SEC (ref 26–36.2)
APTT: 37 SEC (ref 26–36.2)
APTT: 37.2 SEC (ref 26–36.2)
APTT: 37.8 SEC (ref 26–36.2)
APTT: 38.6 SEC (ref 26–36.2)
APTT: 45 SEC (ref 26–36.2)
APTT: 46.2 SEC (ref 26–36.2)
APTT: 46.8 SEC (ref 26–36.2)
APTT: 46.9 SEC (ref 26–36.2)
APTT: 47.3 SEC (ref 26–36.2)
APTT: 48.5 SEC (ref 26–36.2)
APTT: 50.9 SEC (ref 26–36.2)
APTT: 52.8 SEC (ref 26–36.2)
APTT: 53.5 SEC (ref 26–36.2)
APTT: 63.3 SEC (ref 26–36.2)
APTT: 73.4 SEC (ref 26–36.2)
APTT: 81.8 SEC (ref 26–36.2)
APTT: 86.4 SEC (ref 26–36.2)
APTT: 86.5 SEC (ref 26–36.2)
APTT: >200 SEC (ref 26–36.2)
AST SERPL-CCNC: 10 U/L (ref 5–40)
AST SERPL-CCNC: 11 U/L (ref 5–40)
AST SERPL-CCNC: 12 U/L (ref 5–40)
AST SERPL-CCNC: 12 U/L (ref 5–40)
AST SERPL-CCNC: 15 U/L (ref 5–40)
AST SERPL-CCNC: 8 U/L (ref 5–40)
AST SERPL-CCNC: 9 U/L (ref 5–40)
AST SERPL-CCNC: 9 U/L (ref 5–40)
BACTERIA: NEGATIVE /HPF
BASE EXCESS ARTERIAL: 4.3 MMOL/L (ref -2–2)
BASOPHILS ABSOLUTE: 0.1 K/UL (ref 0–0.2)
BASOPHILS RELATIVE PERCENT: 0.8 % (ref 0–1)
BILIRUB SERPL-MCNC: 0.3 MG/DL (ref 0.2–1.2)
BILIRUB SERPL-MCNC: 0.4 MG/DL (ref 0.2–1.2)
BILIRUB SERPL-MCNC: 0.5 MG/DL (ref 0.2–1.2)
BILIRUBIN URINE: NEGATIVE
BLOOD, URINE: ABNORMAL
BUN BLDV-MCNC: 19 MG/DL (ref 6–20)
BUN BLDV-MCNC: 22 MG/DL (ref 6–20)
BUN BLDV-MCNC: 31 MG/DL (ref 6–20)
BUN BLDV-MCNC: 33 MG/DL (ref 6–20)
BUN BLDV-MCNC: 36 MG/DL (ref 6–20)
BUN BLDV-MCNC: 37 MG/DL (ref 6–20)
BUN BLDV-MCNC: 38 MG/DL (ref 6–20)
BUN BLDV-MCNC: 42 MG/DL (ref 6–20)
CALCIUM SERPL-MCNC: 8.7 MG/DL (ref 8.6–10)
CALCIUM SERPL-MCNC: 8.9 MG/DL (ref 8.6–10)
CALCIUM SERPL-MCNC: 8.9 MG/DL (ref 8.6–10)
CALCIUM SERPL-MCNC: 9 MG/DL (ref 8.6–10)
CALCIUM SERPL-MCNC: 9.1 MG/DL (ref 8.6–10)
CALCIUM SERPL-MCNC: 9.2 MG/DL (ref 8.6–10)
CALCIUM SERPL-MCNC: 9.3 MG/DL (ref 8.6–10)
CALCIUM SERPL-MCNC: 9.5 MG/DL (ref 8.6–10)
CARBOXYHEMOGLOBIN ARTERIAL: 3 % (ref 0–5)
CHLORIDE BLD-SCNC: 100 MMOL/L (ref 98–111)
CHLORIDE BLD-SCNC: 101 MMOL/L (ref 98–111)
CHLORIDE BLD-SCNC: 99 MMOL/L (ref 98–111)
CHOLESTEROL, TOTAL: 155 MG/DL (ref 160–199)
CLARITY: CLEAR
CO2: 24 MMOL/L (ref 22–29)
CO2: 28 MMOL/L (ref 22–29)
CO2: 29 MMOL/L (ref 22–29)
CO2: 29 MMOL/L (ref 22–29)
CO2: 30 MMOL/L (ref 22–29)
CO2: 30 MMOL/L (ref 22–29)
COLOR: YELLOW
CREAT SERPL-MCNC: 1.1 MG/DL (ref 0.5–1.2)
CREAT SERPL-MCNC: 1.5 MG/DL (ref 0.5–1.2)
CREAT SERPL-MCNC: 1.6 MG/DL (ref 0.5–1.2)
CREAT SERPL-MCNC: 1.8 MG/DL (ref 0.5–1.2)
CREAT SERPL-MCNC: 1.8 MG/DL (ref 0.5–1.2)
CREAT SERPL-MCNC: 1.9 MG/DL (ref 0.5–1.2)
CREAT SERPL-MCNC: 2 MG/DL (ref 0.5–1.2)
CREAT SERPL-MCNC: 2 MG/DL (ref 0.5–1.2)
CRYSTALS, UA: ABNORMAL /HPF
EKG P AXIS: 26 DEGREES
EKG P AXIS: 45 DEGREES
EKG P AXIS: 46 DEGREES
EKG P AXIS: 53 DEGREES
EKG P AXIS: 58 DEGREES
EKG P AXIS: 67 DEGREES
EKG P-R INTERVAL: 170 MS
EKG P-R INTERVAL: 182 MS
EKG P-R INTERVAL: 186 MS
EKG P-R INTERVAL: 192 MS
EKG P-R INTERVAL: 192 MS
EKG P-R INTERVAL: 194 MS
EKG Q-T INTERVAL: 394 MS
EKG Q-T INTERVAL: 394 MS
EKG Q-T INTERVAL: 400 MS
EKG Q-T INTERVAL: 410 MS
EKG Q-T INTERVAL: 422 MS
EKG Q-T INTERVAL: 454 MS
EKG QRS DURATION: 128 MS
EKG QRS DURATION: 128 MS
EKG QRS DURATION: 130 MS
EKG QRS DURATION: 132 MS
EKG QRS DURATION: 140 MS
EKG QRS DURATION: 140 MS
EKG QTC CALCULATION (BAZETT): 416 MS
EKG QTC CALCULATION (BAZETT): 427 MS
EKG QTC CALCULATION (BAZETT): 429 MS
EKG QTC CALCULATION (BAZETT): 437 MS
EKG QTC CALCULATION (BAZETT): 438 MS
EKG QTC CALCULATION (BAZETT): 449 MS
EKG T AXIS: 134 DEGREES
EKG T AXIS: 138 DEGREES
EKG T AXIS: 142 DEGREES
EKG T AXIS: 150 DEGREES
EKG T AXIS: 152 DEGREES
EKG T AXIS: 159 DEGREES
EOSINOPHILS ABSOLUTE: 0.4 K/UL (ref 0–0.6)
EOSINOPHILS RELATIVE PERCENT: 4.7 % (ref 0–5)
EPITHELIAL CELLS, UA: 0 /HPF (ref 0–5)
GFR AFRICAN AMERICAN: 42
GFR AFRICAN AMERICAN: 42
GFR AFRICAN AMERICAN: 45
GFR AFRICAN AMERICAN: 48
GFR AFRICAN AMERICAN: 48
GFR AFRICAN AMERICAN: 55
GFR AFRICAN AMERICAN: >59
GFR AFRICAN AMERICAN: >59
GFR NON-AFRICAN AMERICAN: 35
GFR NON-AFRICAN AMERICAN: 35
GFR NON-AFRICAN AMERICAN: 37
GFR NON-AFRICAN AMERICAN: 40
GFR NON-AFRICAN AMERICAN: 40
GFR NON-AFRICAN AMERICAN: 45
GFR NON-AFRICAN AMERICAN: 49
GFR NON-AFRICAN AMERICAN: >60
GLUCOSE BLD-MCNC: 145 MG/DL (ref 70–99)
GLUCOSE BLD-MCNC: 154 MG/DL (ref 70–99)
GLUCOSE BLD-MCNC: 169 MG/DL (ref 70–99)
GLUCOSE BLD-MCNC: 170 MG/DL (ref 70–99)
GLUCOSE BLD-MCNC: 172 MG/DL (ref 70–99)
GLUCOSE BLD-MCNC: 173 MG/DL (ref 74–109)
GLUCOSE BLD-MCNC: 195 MG/DL (ref 70–99)
GLUCOSE BLD-MCNC: 201 MG/DL (ref 74–109)
GLUCOSE BLD-MCNC: 223 MG/DL (ref 70–99)
GLUCOSE BLD-MCNC: 227 MG/DL (ref 70–99)
GLUCOSE BLD-MCNC: 230 MG/DL (ref 70–99)
GLUCOSE BLD-MCNC: 232 MG/DL (ref 70–99)
GLUCOSE BLD-MCNC: 233 MG/DL (ref 70–99)
GLUCOSE BLD-MCNC: 238 MG/DL (ref 70–99)
GLUCOSE BLD-MCNC: 239 MG/DL (ref 70–99)
GLUCOSE BLD-MCNC: 240 MG/DL (ref 70–99)
GLUCOSE BLD-MCNC: 242 MG/DL (ref 74–109)
GLUCOSE BLD-MCNC: 245 MG/DL (ref 70–99)
GLUCOSE BLD-MCNC: 246 MG/DL (ref 70–99)
GLUCOSE BLD-MCNC: 246 MG/DL (ref 74–109)
GLUCOSE BLD-MCNC: 248 MG/DL (ref 70–99)
GLUCOSE BLD-MCNC: 266 MG/DL (ref 70–99)
GLUCOSE BLD-MCNC: 267 MG/DL (ref 74–109)
GLUCOSE BLD-MCNC: 269 MG/DL (ref 74–109)
GLUCOSE BLD-MCNC: 272 MG/DL (ref 70–99)
GLUCOSE BLD-MCNC: 283 MG/DL (ref 70–99)
GLUCOSE BLD-MCNC: 284 MG/DL (ref 70–99)
GLUCOSE BLD-MCNC: 288 MG/DL (ref 70–99)
GLUCOSE BLD-MCNC: 290 MG/DL (ref 70–99)
GLUCOSE BLD-MCNC: 290 MG/DL (ref 70–99)
GLUCOSE BLD-MCNC: 292 MG/DL (ref 70–99)
GLUCOSE BLD-MCNC: 299 MG/DL (ref 70–99)
GLUCOSE BLD-MCNC: 306 MG/DL (ref 70–99)
GLUCOSE BLD-MCNC: 333 MG/DL (ref 74–109)
GLUCOSE BLD-MCNC: 338 MG/DL (ref 74–109)
GLUCOSE URINE: 250 MG/DL
HBA1C MFR BLD: 12.4 % (ref 4–6)
HCO3 ARTERIAL: 30.8 MMOL/L (ref 22–26)
HCT VFR BLD CALC: 39.6 % (ref 42–52)
HCT VFR BLD CALC: 40.3 % (ref 42–52)
HCT VFR BLD CALC: 40.6 % (ref 42–52)
HCT VFR BLD CALC: 40.9 % (ref 42–52)
HCT VFR BLD CALC: 42 % (ref 42–52)
HCT VFR BLD CALC: 42.7 % (ref 42–52)
HCT VFR BLD CALC: 43.3 % (ref 42–52)
HCT VFR BLD CALC: 44.4 % (ref 42–52)
HCT VFR BLD CALC: 44.8 % (ref 42–52)
HDLC SERPL-MCNC: 31 MG/DL (ref 55–121)
HEMOGLOBIN, ART, EXTENDED: 14.8 G/DL (ref 14–18)
HEMOGLOBIN: 11.4 G/DL (ref 14–18)
HEMOGLOBIN: 11.8 G/DL (ref 14–18)
HEMOGLOBIN: 12 G/DL (ref 14–18)
HEMOGLOBIN: 12.2 G/DL (ref 14–18)
HEMOGLOBIN: 12.5 G/DL (ref 14–18)
HEMOGLOBIN: 12.8 G/DL (ref 14–18)
HEMOGLOBIN: 13 G/DL (ref 14–18)
HEMOGLOBIN: 13.4 G/DL (ref 14–18)
HYALINE CASTS: 8 /HPF (ref 0–8)
IMMATURE GRANULOCYTES #: 0.1 K/UL
INR BLD: 0.97 (ref 0.88–1.18)
KETONES, URINE: NEGATIVE MG/DL
LDL CHOLESTEROL CALCULATED: 97 MG/DL
LEUKOCYTE ESTERASE, URINE: NEGATIVE
LIPASE: 74 U/L (ref 13–60)
LV EF: 13 %
LVEF MODALITY: NORMAL
LYMPHOCYTES ABSOLUTE: 1.7 K/UL (ref 1.1–4.5)
LYMPHOCYTES RELATIVE PERCENT: 19.3 % (ref 20–40)
MCH RBC QN AUTO: 28.2 PG (ref 27–31)
MCH RBC QN AUTO: 28.4 PG (ref 27–31)
MCH RBC QN AUTO: 28.4 PG (ref 27–31)
MCH RBC QN AUTO: 28.5 PG (ref 27–31)
MCH RBC QN AUTO: 28.5 PG (ref 27–31)
MCH RBC QN AUTO: 28.7 PG (ref 27–31)
MCH RBC QN AUTO: 29.1 PG (ref 27–31)
MCH RBC QN AUTO: 29.5 PG (ref 27–31)
MCHC RBC AUTO-ENTMCNC: 28.8 G/DL (ref 33–37)
MCHC RBC AUTO-ENTMCNC: 28.8 G/DL (ref 33–37)
MCHC RBC AUTO-ENTMCNC: 29.1 G/DL (ref 33–37)
MCHC RBC AUTO-ENTMCNC: 29.3 G/DL (ref 33–37)
MCHC RBC AUTO-ENTMCNC: 29.3 G/DL (ref 33–37)
MCHC RBC AUTO-ENTMCNC: 29.9 G/DL (ref 33–37)
MCHC RBC AUTO-ENTMCNC: 30.3 G/DL (ref 33–37)
MCHC RBC AUTO-ENTMCNC: 31 G/DL (ref 33–37)
MCV RBC AUTO: 94.2 FL (ref 80–94)
MCV RBC AUTO: 95.3 FL (ref 80–94)
MCV RBC AUTO: 97.1 FL (ref 80–94)
MCV RBC AUTO: 97.1 FL (ref 80–94)
MCV RBC AUTO: 97.3 FL (ref 80–94)
MCV RBC AUTO: 98.2 FL (ref 80–94)
MCV RBC AUTO: 98.4 FL (ref 80–94)
MCV RBC AUTO: 98.8 FL (ref 80–94)
METHEMOGLOBIN ARTERIAL: 1 %
MONOCYTES ABSOLUTE: 1.1 K/UL (ref 0–0.9)
MONOCYTES RELATIVE PERCENT: 12.5 % (ref 0–10)
NEUTROPHILS ABSOLUTE: 5.3 K/UL (ref 1.5–7.5)
NEUTROPHILS RELATIVE PERCENT: 62.1 % (ref 50–65)
NITRITE, URINE: NEGATIVE
O2 CONTENT ARTERIAL: 17.7 ML/DL
O2 SAT, ARTERIAL: 85.5 %
O2 THERAPY: ABNORMAL
P2Y12 RESULT: 215 PRU (ref 194–418)
PCO2 ARTERIAL: 52 MMHG (ref 35–45)
PDW BLD-RTO: 13.3 % (ref 11.5–14.5)
PDW BLD-RTO: 13.4 % (ref 11.5–14.5)
PDW BLD-RTO: 13.5 % (ref 11.5–14.5)
PDW BLD-RTO: 13.7 % (ref 11.5–14.5)
PERFORMED ON: ABNORMAL
PH ARTERIAL: 7.38 (ref 7.35–7.45)
PH UA: 5.5 (ref 5–8)
PLATELET # BLD: 219 K/UL (ref 130–400)
PLATELET # BLD: 227 K/UL (ref 130–400)
PLATELET # BLD: 240 K/UL (ref 130–400)
PLATELET # BLD: 241 K/UL (ref 130–400)
PLATELET # BLD: 243 K/UL (ref 130–400)
PLATELET # BLD: 285 K/UL (ref 130–400)
PLATELET # BLD: 287 K/UL (ref 130–400)
PLATELET # BLD: 296 K/UL (ref 130–400)
PLATELET # BLD: 296 K/UL (ref 130–400)
PMV BLD AUTO: 11.2 FL (ref 9.4–12.4)
PMV BLD AUTO: 11.6 FL (ref 9.4–12.4)
PMV BLD AUTO: 11.7 FL (ref 9.4–12.4)
PMV BLD AUTO: 11.7 FL (ref 9.4–12.4)
PMV BLD AUTO: 11.9 FL (ref 9.4–12.4)
PMV BLD AUTO: 12 FL (ref 9.4–12.4)
PMV BLD AUTO: 12.3 FL (ref 9.4–12.4)
PMV BLD AUTO: 12.4 FL (ref 9.4–12.4)
PO2 ARTERIAL: 49 MMHG (ref 80–100)
POTASSIUM REFLEX MAGNESIUM: 4.7 MMOL/L (ref 3.5–5)
POTASSIUM REFLEX MAGNESIUM: 4.9 MMOL/L (ref 3.5–5)
POTASSIUM REFLEX MAGNESIUM: 5 MMOL/L (ref 3.5–5)
POTASSIUM REFLEX MAGNESIUM: 5.1 MMOL/L (ref 3.5–5)
POTASSIUM REFLEX MAGNESIUM: 5.2 MMOL/L (ref 3.5–5)
POTASSIUM REFLEX MAGNESIUM: 5.5 MMOL/L (ref 3.5–5)
POTASSIUM REFLEX MAGNESIUM: 6 MMOL/L (ref 3.5–5)
POTASSIUM SERPL-SCNC: 4.9 MMOL/L (ref 3.5–5)
POTASSIUM, WHOLE BLOOD: 4.7
PRO-BNP: 3485 PG/ML (ref 0–900)
PRO-BNP: 5196 PG/ML (ref 0–900)
PROTEIN UA: =>1000 MG/DL
PROTHROMBIN TIME: 13.1 SEC (ref 12–14.6)
RBC # BLD: 4.01 M/UL (ref 4.7–6.1)
RBC # BLD: 4.14 M/UL (ref 4.7–6.1)
RBC # BLD: 4.18 M/UL (ref 4.7–6.1)
RBC # BLD: 4.21 M/UL (ref 4.7–6.1)
RBC # BLD: 4.35 M/UL (ref 4.7–6.1)
RBC # BLD: 4.46 M/UL (ref 4.7–6.1)
RBC # BLD: 4.51 M/UL (ref 4.7–6.1)
RBC # BLD: 4.7 M/UL (ref 4.7–6.1)
RBC UA: 2 /HPF (ref 0–4)
REASON FOR REJECTION: NORMAL
REJECTED TEST: NORMAL
SARS-COV-2, NAAT: NOT DETECTED
SODIUM BLD-SCNC: 136 MMOL/L (ref 136–145)
SODIUM BLD-SCNC: 136 MMOL/L (ref 136–145)
SODIUM BLD-SCNC: 137 MMOL/L (ref 136–145)
SODIUM BLD-SCNC: 137 MMOL/L (ref 136–145)
SODIUM BLD-SCNC: 138 MMOL/L (ref 136–145)
SODIUM BLD-SCNC: 138 MMOL/L (ref 136–145)
SODIUM BLD-SCNC: 139 MMOL/L (ref 136–145)
SODIUM BLD-SCNC: 139 MMOL/L (ref 136–145)
SPECIFIC GRAVITY UA: 1.02 (ref 1–1.03)
TOTAL PROTEIN: 5.4 G/DL (ref 6.6–8.7)
TOTAL PROTEIN: 5.5 G/DL (ref 6.6–8.7)
TOTAL PROTEIN: 5.7 G/DL (ref 6.6–8.7)
TOTAL PROTEIN: 5.8 G/DL (ref 6.6–8.7)
TOTAL PROTEIN: 5.8 G/DL (ref 6.6–8.7)
TOTAL PROTEIN: 6 G/DL (ref 6.6–8.7)
TOTAL PROTEIN: 6.1 G/DL (ref 6.6–8.7)
TOTAL PROTEIN: 6.1 G/DL (ref 6.6–8.7)
TRIGL SERPL-MCNC: 134 MG/DL (ref 0–149)
TROPONIN: 0.3 NG/ML (ref 0–0.03)
TROPONIN: 0.31 NG/ML (ref 0–0.03)
TROPONIN: 0.32 NG/ML (ref 0–0.03)
TROPONIN: 0.35 NG/ML (ref 0–0.03)
UROBILINOGEN, URINE: 0.2 E.U./DL
WBC # BLD: 6.4 K/UL (ref 4.8–10.8)
WBC # BLD: 7.3 K/UL (ref 4.8–10.8)
WBC # BLD: 7.4 K/UL (ref 4.8–10.8)
WBC # BLD: 7.5 K/UL (ref 4.8–10.8)
WBC # BLD: 7.6 K/UL (ref 4.8–10.8)
WBC # BLD: 8.3 K/UL (ref 4.8–10.8)
WBC # BLD: 8.5 K/UL (ref 4.8–10.8)
WBC # BLD: 9 K/UL (ref 4.8–10.8)
WBC UA: 1 /HPF (ref 0–5)

## 2022-01-01 PROCEDURE — 6360000002 HC RX W HCPCS: Performed by: INTERNAL MEDICINE

## 2022-01-01 PROCEDURE — 80053 COMPREHEN METABOLIC PANEL: CPT

## 2022-01-01 PROCEDURE — 2140000000 HC CCU INTERMEDIATE R&B

## 2022-01-01 PROCEDURE — 6360000002 HC RX W HCPCS: Performed by: NURSE PRACTITIONER

## 2022-01-01 PROCEDURE — C1760 CLOSURE DEV, VASC: HCPCS

## 2022-01-01 PROCEDURE — 82947 ASSAY GLUCOSE BLOOD QUANT: CPT

## 2022-01-01 PROCEDURE — 71046 X-RAY EXAM CHEST 2 VIEWS: CPT

## 2022-01-01 PROCEDURE — 83036 HEMOGLOBIN GLYCOSYLATED A1C: CPT

## 2022-01-01 PROCEDURE — 6360000004 HC RX CONTRAST MEDICATION: Performed by: NURSE PRACTITIONER

## 2022-01-01 PROCEDURE — 85027 COMPLETE CBC AUTOMATED: CPT

## 2022-01-01 PROCEDURE — C1894 INTRO/SHEATH, NON-LASER: HCPCS

## 2022-01-01 PROCEDURE — 94375 RESPIRATORY FLOW VOLUME LOOP: CPT

## 2022-01-01 PROCEDURE — 2580000003 HC RX 258: Performed by: NURSE PRACTITIONER

## 2022-01-01 PROCEDURE — 85014 HEMATOCRIT: CPT

## 2022-01-01 PROCEDURE — 84484 ASSAY OF TROPONIN QUANT: CPT

## 2022-01-01 PROCEDURE — 85576 BLOOD PLATELET AGGREGATION: CPT

## 2022-01-01 PROCEDURE — 6370000000 HC RX 637 (ALT 250 FOR IP): Performed by: INTERNAL MEDICINE

## 2022-01-01 PROCEDURE — 71045 X-RAY EXAM CHEST 1 VIEW: CPT

## 2022-01-01 PROCEDURE — 2580000003 HC RX 258: Performed by: INTERNAL MEDICINE

## 2022-01-01 PROCEDURE — 85730 THROMBOPLASTIN TIME PARTIAL: CPT

## 2022-01-01 PROCEDURE — 2700000000 HC OXYGEN THERAPY PER DAY

## 2022-01-01 PROCEDURE — 2100000000 HC CCU R&B

## 2022-01-01 PROCEDURE — 6360000002 HC RX W HCPCS: Performed by: EMERGENCY MEDICINE

## 2022-01-01 PROCEDURE — 82803 BLOOD GASES ANY COMBINATION: CPT

## 2022-01-01 PROCEDURE — 4A023N7 MEASUREMENT OF CARDIAC SAMPLING AND PRESSURE, LEFT HEART, PERCUTANEOUS APPROACH: ICD-10-PCS | Performed by: INTERNAL MEDICINE

## 2022-01-01 PROCEDURE — 99231 SBSQ HOSP IP/OBS SF/LOW 25: CPT | Performed by: INTERNAL MEDICINE

## 2022-01-01 PROCEDURE — 6360000004 HC RX CONTRAST MEDICATION: Performed by: STUDENT IN AN ORGANIZED HEALTH CARE EDUCATION/TRAINING PROGRAM

## 2022-01-01 PROCEDURE — B2111ZZ FLUOROSCOPY OF MULTIPLE CORONARY ARTERIES USING LOW OSMOLAR CONTRAST: ICD-10-PCS | Performed by: INTERNAL MEDICINE

## 2022-01-01 PROCEDURE — 6360000004 HC RX CONTRAST MEDICATION: Performed by: EMERGENCY MEDICINE

## 2022-01-01 PROCEDURE — 6370000000 HC RX 637 (ALT 250 FOR IP): Performed by: NURSE PRACTITIONER

## 2022-01-01 PROCEDURE — 93005 ELECTROCARDIOGRAM TRACING: CPT | Performed by: EMERGENCY MEDICINE

## 2022-01-01 PROCEDURE — C1769 GUIDE WIRE: HCPCS

## 2022-01-01 PROCEDURE — 83880 ASSAY OF NATRIURETIC PEPTIDE: CPT

## 2022-01-01 PROCEDURE — 36415 COLL VENOUS BLD VENIPUNCTURE: CPT

## 2022-01-01 PROCEDURE — 94010 BREATHING CAPACITY TEST: CPT | Performed by: INTERNAL MEDICINE

## 2022-01-01 PROCEDURE — 93005 ELECTROCARDIOGRAM TRACING: CPT | Performed by: INTERNAL MEDICINE

## 2022-01-01 PROCEDURE — 93880 EXTRACRANIAL BILAT STUDY: CPT

## 2022-01-01 PROCEDURE — C8929 TTE W OR WO FOL WCON,DOPPLER: HCPCS

## 2022-01-01 PROCEDURE — 85025 COMPLETE CBC W/AUTO DIFF WBC: CPT

## 2022-01-01 PROCEDURE — 99232 SBSQ HOSP IP/OBS MODERATE 35: CPT | Performed by: INTERNAL MEDICINE

## 2022-01-01 PROCEDURE — 6370000000 HC RX 637 (ALT 250 FOR IP): Performed by: EMERGENCY MEDICINE

## 2022-01-01 PROCEDURE — 6370000000 HC RX 637 (ALT 250 FOR IP): Performed by: STUDENT IN AN ORGANIZED HEALTH CARE EDUCATION/TRAINING PROGRAM

## 2022-01-01 PROCEDURE — 87635 SARS-COV-2 COVID-19 AMP PRB: CPT

## 2022-01-01 PROCEDURE — 93970 EXTREMITY STUDY: CPT

## 2022-01-01 PROCEDURE — 99233 SBSQ HOSP IP/OBS HIGH 50: CPT | Performed by: SURGERY

## 2022-01-01 PROCEDURE — 81001 URINALYSIS AUTO W/SCOPE: CPT

## 2022-01-01 PROCEDURE — 99223 1ST HOSP IP/OBS HIGH 75: CPT | Performed by: INTERNAL MEDICINE

## 2022-01-01 PROCEDURE — 94640 AIRWAY INHALATION TREATMENT: CPT

## 2022-01-01 PROCEDURE — 93459 L HRT ART/GRFT ANGIO: CPT

## 2022-01-01 PROCEDURE — 6360000002 HC RX W HCPCS

## 2022-01-01 PROCEDURE — 96374 THER/PROPH/DIAG INJ IV PUSH: CPT

## 2022-01-01 PROCEDURE — 2709999900 HC NON-CHARGEABLE SUPPLY

## 2022-01-01 PROCEDURE — 99152 MOD SED SAME PHYS/QHP 5/>YRS: CPT

## 2022-01-01 PROCEDURE — 84132 ASSAY OF SERUM POTASSIUM: CPT

## 2022-01-01 PROCEDURE — 93459 L HRT ART/GRFT ANGIO: CPT | Performed by: INTERNAL MEDICINE

## 2022-01-01 PROCEDURE — 85049 AUTOMATED PLATELET COUNT: CPT

## 2022-01-01 PROCEDURE — 93010 ELECTROCARDIOGRAM REPORT: CPT | Performed by: INTERNAL MEDICINE

## 2022-01-01 PROCEDURE — 36600 WITHDRAWAL OF ARTERIAL BLOOD: CPT

## 2022-01-01 PROCEDURE — 6360000002 HC RX W HCPCS: Performed by: FAMILY MEDICINE

## 2022-01-01 PROCEDURE — B2151ZZ FLUOROSCOPY OF LEFT HEART USING LOW OSMOLAR CONTRAST: ICD-10-PCS | Performed by: INTERNAL MEDICINE

## 2022-01-01 PROCEDURE — 93000 ELECTROCARDIOGRAM COMPLETE: CPT | Performed by: FAMILY MEDICINE

## 2022-01-01 PROCEDURE — 2500000003 HC RX 250 WO HCPCS

## 2022-01-01 PROCEDURE — 99215 OFFICE O/P EST HI 40 MIN: CPT | Performed by: FAMILY MEDICINE

## 2022-01-01 PROCEDURE — 71275 CT ANGIOGRAPHY CHEST: CPT

## 2022-01-01 PROCEDURE — 99152 MOD SED SAME PHYS/QHP 5/>YRS: CPT | Performed by: INTERNAL MEDICINE

## 2022-01-01 PROCEDURE — 76705 ECHO EXAM OF ABDOMEN: CPT

## 2022-01-01 PROCEDURE — 99153 MOD SED SAME PHYS/QHP EA: CPT

## 2022-01-01 PROCEDURE — 83690 ASSAY OF LIPASE: CPT

## 2022-01-01 PROCEDURE — 31500 INSERT EMERGENCY AIRWAY: CPT

## 2022-01-01 PROCEDURE — 99285 EMERGENCY DEPT VISIT HI MDM: CPT

## 2022-01-01 PROCEDURE — 85610 PROTHROMBIN TIME: CPT

## 2022-01-01 PROCEDURE — 80061 LIPID PANEL: CPT

## 2022-01-01 PROCEDURE — B2121ZZ FLUOROSCOPY OF SINGLE CORONARY ARTERY BYPASS GRAFT USING LOW OSMOLAR CONTRAST: ICD-10-PCS | Performed by: INTERNAL MEDICINE

## 2022-01-01 PROCEDURE — 92950 HEART/LUNG RESUSCITATION CPR: CPT

## 2022-01-01 RX ORDER — PANTOPRAZOLE SODIUM 40 MG/1
40 TABLET, DELAYED RELEASE ORAL
Qty: 30 TABLET | Refills: 0 | Status: SHIPPED | OUTPATIENT
Start: 2022-01-01

## 2022-01-01 RX ORDER — BUSPIRONE HYDROCHLORIDE 15 MG/1
15 TABLET ORAL 2 TIMES DAILY
Status: DISCONTINUED | OUTPATIENT
Start: 2022-01-01 | End: 2022-01-01

## 2022-01-01 RX ORDER — EPINEPHRINE 0.1 MG/ML
SYRINGE (ML) INJECTION DAILY PRN
Status: COMPLETED | OUTPATIENT
Start: 2022-01-01 | End: 2022-01-01

## 2022-01-01 RX ORDER — HEPARIN SODIUM 1000 [USP'U]/ML
2000 INJECTION, SOLUTION INTRAVENOUS; SUBCUTANEOUS PRN
Status: DISCONTINUED | OUTPATIENT
Start: 2022-01-01 | End: 2022-01-01 | Stop reason: HOSPADM

## 2022-01-01 RX ORDER — OXYCODONE AND ACETAMINOPHEN 10; 325 MG/1; MG/1
1 TABLET ORAL EVERY 6 HOURS PRN
COMMUNITY

## 2022-01-01 RX ORDER — DEXTROSE MONOHYDRATE 50 MG/ML
100 INJECTION, SOLUTION INTRAVENOUS PRN
Status: DISCONTINUED | OUTPATIENT
Start: 2022-01-01 | End: 2022-01-01 | Stop reason: HOSPADM

## 2022-01-01 RX ORDER — OXYCODONE AND ACETAMINOPHEN 10; 325 MG/1; MG/1
1 TABLET ORAL 3 TIMES DAILY PRN
Status: DISCONTINUED | OUTPATIENT
Start: 2022-01-01 | End: 2022-01-01 | Stop reason: HOSPADM

## 2022-01-01 RX ORDER — ONDANSETRON 2 MG/ML
4 INJECTION INTRAMUSCULAR; INTRAVENOUS EVERY 6 HOURS PRN
Status: DISCONTINUED | OUTPATIENT
Start: 2022-01-01 | End: 2022-01-01 | Stop reason: HOSPADM

## 2022-01-01 RX ORDER — FUROSEMIDE 10 MG/ML
40 INJECTION INTRAMUSCULAR; INTRAVENOUS DAILY
Status: DISCONTINUED | OUTPATIENT
Start: 2022-01-01 | End: 2022-01-01 | Stop reason: HOSPADM

## 2022-01-01 RX ORDER — ACETAMINOPHEN 325 MG/1
650 TABLET ORAL EVERY 6 HOURS PRN
Status: DISCONTINUED | OUTPATIENT
Start: 2022-01-01 | End: 2022-01-01 | Stop reason: HOSPADM

## 2022-01-01 RX ORDER — BUPROPION HYDROCHLORIDE 150 MG/1
150 TABLET ORAL 2 TIMES DAILY
Status: DISCONTINUED | OUTPATIENT
Start: 2022-01-01 | End: 2022-01-01 | Stop reason: HOSPADM

## 2022-01-01 RX ORDER — NICOTINE POLACRILEX 4 MG
15 LOZENGE BUCCAL PRN
Status: DISCONTINUED | OUTPATIENT
Start: 2022-01-01 | End: 2022-01-01 | Stop reason: HOSPADM

## 2022-01-01 RX ORDER — ATORVASTATIN CALCIUM 40 MG/1
40 TABLET, FILM COATED ORAL NIGHTLY
Status: DISCONTINUED | OUTPATIENT
Start: 2022-01-01 | End: 2022-01-01 | Stop reason: HOSPADM

## 2022-01-01 RX ORDER — ONDANSETRON 4 MG/1
4 TABLET, ORALLY DISINTEGRATING ORAL EVERY 8 HOURS PRN
Status: DISCONTINUED | OUTPATIENT
Start: 2022-01-01 | End: 2022-01-01 | Stop reason: HOSPADM

## 2022-01-01 RX ORDER — SODIUM CHLORIDE 0.9 % (FLUSH) 0.9 %
5-40 SYRINGE (ML) INJECTION PRN
Status: DISCONTINUED | OUTPATIENT
Start: 2022-01-01 | End: 2022-01-01 | Stop reason: HOSPADM

## 2022-01-01 RX ORDER — INSULIN GLARGINE 100 [IU]/ML
15 INJECTION, SOLUTION SUBCUTANEOUS NIGHTLY
Status: DISCONTINUED | OUTPATIENT
Start: 2022-01-01 | End: 2022-01-01

## 2022-01-01 RX ORDER — CLOPIDOGREL BISULFATE 75 MG/1
75 TABLET ORAL DAILY
Status: DISCONTINUED | OUTPATIENT
Start: 2022-01-01 | End: 2022-01-01 | Stop reason: HOSPADM

## 2022-01-01 RX ORDER — ACETAMINOPHEN 650 MG/1
650 SUPPOSITORY RECTAL EVERY 6 HOURS PRN
Status: DISCONTINUED | OUTPATIENT
Start: 2022-01-01 | End: 2022-01-01 | Stop reason: HOSPADM

## 2022-01-01 RX ORDER — BLOOD-GLUCOSE SENSOR
1 EACH MISCELLANEOUS CONTINUOUS
Qty: 6 EACH | Refills: 5 | Status: SHIPPED | OUTPATIENT
Start: 2022-01-01

## 2022-01-01 RX ORDER — BLOOD-GLUCOSE TRANSMITTER
1 EACH MISCELLANEOUS CONTINUOUS
Qty: 1 EACH | Refills: 5 | Status: SHIPPED | OUTPATIENT
Start: 2022-01-01

## 2022-01-01 RX ORDER — BUSPIRONE HYDROCHLORIDE 15 MG/1
15 TABLET ORAL 3 TIMES DAILY
Status: DISCONTINUED | OUTPATIENT
Start: 2022-01-01 | End: 2022-01-01 | Stop reason: HOSPADM

## 2022-01-01 RX ORDER — ISOSORBIDE MONONITRATE 30 MG/1
30 TABLET, EXTENDED RELEASE ORAL DAILY
Status: DISCONTINUED | OUTPATIENT
Start: 2022-01-01 | End: 2022-01-01 | Stop reason: HOSPADM

## 2022-01-01 RX ORDER — FUROSEMIDE 10 MG/ML
40 INJECTION INTRAMUSCULAR; INTRAVENOUS ONCE
Status: COMPLETED | OUTPATIENT
Start: 2022-01-01 | End: 2022-01-01

## 2022-01-01 RX ORDER — FUROSEMIDE 40 MG/1
1 TABLET ORAL 2 TIMES DAILY
Status: CANCELLED | OUTPATIENT
Start: 2022-01-01

## 2022-01-01 RX ORDER — HEPARIN SODIUM 10000 [USP'U]/100ML
5-30 INJECTION, SOLUTION INTRAVENOUS CONTINUOUS
Status: DISCONTINUED | OUTPATIENT
Start: 2022-01-01 | End: 2022-01-01 | Stop reason: HOSPADM

## 2022-01-01 RX ORDER — PANTOPRAZOLE SODIUM 40 MG/1
40 TABLET, DELAYED RELEASE ORAL
Status: DISCONTINUED | OUTPATIENT
Start: 2022-01-01 | End: 2022-01-01 | Stop reason: HOSPADM

## 2022-01-01 RX ORDER — DEXTROSE MONOHYDRATE 25 G/50ML
12.5 INJECTION, SOLUTION INTRAVENOUS PRN
Status: DISCONTINUED | OUTPATIENT
Start: 2022-01-01 | End: 2022-01-01 | Stop reason: CLARIF

## 2022-01-01 RX ORDER — GABAPENTIN 300 MG/1
300 CAPSULE ORAL 2 TIMES DAILY
Status: DISCONTINUED | OUTPATIENT
Start: 2022-01-01 | End: 2022-01-01 | Stop reason: HOSPADM

## 2022-01-01 RX ORDER — CARVEDILOL 25 MG/1
25 TABLET ORAL 2 TIMES DAILY WITH MEALS
Status: DISCONTINUED | OUTPATIENT
Start: 2022-01-01 | End: 2022-01-01 | Stop reason: HOSPADM

## 2022-01-01 RX ORDER — POLYETHYLENE GLYCOL 3350 17 G/17G
17 POWDER, FOR SOLUTION ORAL DAILY PRN
Status: DISCONTINUED | OUTPATIENT
Start: 2022-01-01 | End: 2022-01-01 | Stop reason: HOSPADM

## 2022-01-01 RX ORDER — SODIUM CHLORIDE 9 MG/ML
25 INJECTION, SOLUTION INTRAVENOUS PRN
Status: DISCONTINUED | OUTPATIENT
Start: 2022-01-01 | End: 2022-01-01 | Stop reason: HOSPADM

## 2022-01-01 RX ORDER — MECOBALAMIN 5000 MCG
5 TABLET,DISINTEGRATING ORAL NIGHTLY
Status: DISCONTINUED | OUTPATIENT
Start: 2022-01-01 | End: 2022-01-01 | Stop reason: HOSPADM

## 2022-01-01 RX ORDER — NITROGLYCERIN 0.4 MG/1
0.4 TABLET SUBLINGUAL EVERY 5 MIN PRN
Status: DISCONTINUED | OUTPATIENT
Start: 2022-01-01 | End: 2022-01-01 | Stop reason: HOSPADM

## 2022-01-01 RX ORDER — HEPARIN SODIUM 1000 [USP'U]/ML
4000 INJECTION, SOLUTION INTRAVENOUS; SUBCUTANEOUS ONCE
Status: COMPLETED | OUTPATIENT
Start: 2022-01-01 | End: 2022-01-01

## 2022-01-01 RX ORDER — SODIUM CHLORIDE 0.9 % (FLUSH) 0.9 %
5-40 SYRINGE (ML) INJECTION EVERY 12 HOURS SCHEDULED
Status: DISCONTINUED | OUTPATIENT
Start: 2022-01-01 | End: 2022-01-01 | Stop reason: HOSPADM

## 2022-01-01 RX ORDER — HYDRALAZINE HYDROCHLORIDE 20 MG/ML
10 INJECTION INTRAMUSCULAR; INTRAVENOUS EVERY 10 MIN PRN
Status: DISCONTINUED | OUTPATIENT
Start: 2022-01-01 | End: 2022-01-01 | Stop reason: HOSPADM

## 2022-01-01 RX ORDER — ASPIRIN 81 MG/1
81 TABLET ORAL DAILY
Status: DISCONTINUED | OUTPATIENT
Start: 2022-01-01 | End: 2022-01-01 | Stop reason: HOSPADM

## 2022-01-01 RX ORDER — AMIODARONE HYDROCHLORIDE 50 MG/ML
INJECTION, SOLUTION INTRAVENOUS DAILY PRN
Status: COMPLETED | OUTPATIENT
Start: 2022-01-01 | End: 2022-01-01

## 2022-01-01 RX ORDER — ALBUTEROL SULFATE 90 UG/1
1 AEROSOL, METERED RESPIRATORY (INHALATION) EVERY 6 HOURS PRN
Status: DISCONTINUED | OUTPATIENT
Start: 2022-01-01 | End: 2022-01-01 | Stop reason: CLARIF

## 2022-01-01 RX ORDER — SODIUM CHLORIDE 9 MG/ML
INJECTION, SOLUTION INTRAVENOUS CONTINUOUS
Status: DISCONTINUED | OUTPATIENT
Start: 2022-01-01 | End: 2022-01-01

## 2022-01-01 RX ORDER — HEPARIN SODIUM 10000 [USP'U]/100ML
5-30 INJECTION, SOLUTION INTRAVENOUS CONTINUOUS
Status: DISCONTINUED | OUTPATIENT
Start: 2022-01-01 | End: 2022-01-01

## 2022-01-01 RX ORDER — HEPARIN SODIUM 1000 [USP'U]/ML
4000 INJECTION, SOLUTION INTRAVENOUS; SUBCUTANEOUS PRN
Status: DISCONTINUED | OUTPATIENT
Start: 2022-01-01 | End: 2022-01-01 | Stop reason: HOSPADM

## 2022-01-01 RX ORDER — INSULIN GLARGINE 100 [IU]/ML
20 INJECTION, SOLUTION SUBCUTANEOUS NIGHTLY
Status: DISCONTINUED | OUTPATIENT
Start: 2022-01-01 | End: 2022-01-01 | Stop reason: HOSPADM

## 2022-01-01 RX ORDER — ALBUTEROL SULFATE 2.5 MG/3ML
2.5 SOLUTION RESPIRATORY (INHALATION) EVERY 6 HOURS PRN
Status: DISCONTINUED | OUTPATIENT
Start: 2022-01-01 | End: 2022-01-01 | Stop reason: HOSPADM

## 2022-01-01 RX ORDER — INSULIN GLARGINE 100 [IU]/ML
10 INJECTION, SOLUTION SUBCUTANEOUS NIGHTLY
Status: DISCONTINUED | OUTPATIENT
Start: 2022-01-01 | End: 2022-01-01

## 2022-01-01 RX ORDER — TAMSULOSIN HYDROCHLORIDE 0.4 MG/1
0.4 CAPSULE ORAL DAILY
Status: DISCONTINUED | OUTPATIENT
Start: 2022-01-01 | End: 2022-01-01 | Stop reason: HOSPADM

## 2022-01-01 RX ORDER — SODIUM CHLORIDE 9 MG/ML
1000 INJECTION, SOLUTION INTRAVENOUS CONTINUOUS
Status: DISCONTINUED | OUTPATIENT
Start: 2022-01-01 | End: 2022-01-01

## 2022-01-01 RX ORDER — ASPIRIN 325 MG
325 TABLET ORAL ONCE
Status: COMPLETED | OUTPATIENT
Start: 2022-01-01 | End: 2022-01-01

## 2022-01-01 RX ADMIN — BUSPIRONE HYDROCHLORIDE 15 MG: 15 TABLET ORAL at 09:41

## 2022-01-01 RX ADMIN — INSULIN LISPRO 2 UNITS: 100 INJECTION, SOLUTION INTRAVENOUS; SUBCUTANEOUS at 09:34

## 2022-01-01 RX ADMIN — GABAPENTIN 300 MG: 300 CAPSULE ORAL at 09:54

## 2022-01-01 RX ADMIN — INSULIN LISPRO 2 UNITS: 100 INJECTION, SOLUTION INTRAVENOUS; SUBCUTANEOUS at 17:47

## 2022-01-01 RX ADMIN — AMIODARONE HYDROCHLORIDE 150 MG: 50 INJECTION, SOLUTION INTRAVENOUS at 23:56

## 2022-01-01 RX ADMIN — OXYCODONE AND ACETAMINOPHEN 1 TABLET: 10; 325 TABLET ORAL at 19:37

## 2022-01-01 RX ADMIN — TAMSULOSIN HYDROCHLORIDE 0.4 MG: 0.4 CAPSULE ORAL at 18:23

## 2022-01-01 RX ADMIN — BUSPIRONE HYDROCHLORIDE 15 MG: 15 TABLET ORAL at 09:28

## 2022-01-01 RX ADMIN — ATORVASTATIN CALCIUM 40 MG: 40 TABLET, FILM COATED ORAL at 20:45

## 2022-01-01 RX ADMIN — INSULIN LISPRO 5 UNITS: 100 INJECTION, SOLUTION INTRAVENOUS; SUBCUTANEOUS at 10:45

## 2022-01-01 RX ADMIN — PANTOPRAZOLE SODIUM 40 MG: 40 TABLET, DELAYED RELEASE ORAL at 06:07

## 2022-01-01 RX ADMIN — INSULIN LISPRO 2 UNITS: 100 INJECTION, SOLUTION INTRAVENOUS; SUBCUTANEOUS at 18:28

## 2022-01-01 RX ADMIN — ALBUTEROL SULFATE 2.5 MG: 2.5 SOLUTION RESPIRATORY (INHALATION) at 06:08

## 2022-01-01 RX ADMIN — ACETAMINOPHEN 650 MG: 325 TABLET ORAL at 22:05

## 2022-01-01 RX ADMIN — Medication 1 MG: at 23:50

## 2022-01-01 RX ADMIN — CARVEDILOL 25 MG: 25 TABLET, FILM COATED ORAL at 18:16

## 2022-01-01 RX ADMIN — OXYCODONE AND ACETAMINOPHEN 1 TABLET: 10; 325 TABLET ORAL at 15:22

## 2022-01-01 RX ADMIN — INSULIN LISPRO 3 UNITS: 100 INJECTION, SOLUTION INTRAVENOUS; SUBCUTANEOUS at 12:52

## 2022-01-01 RX ADMIN — CARVEDILOL 25 MG: 25 TABLET, FILM COATED ORAL at 18:23

## 2022-01-01 RX ADMIN — TAMSULOSIN HYDROCHLORIDE 0.4 MG: 0.4 CAPSULE ORAL at 09:16

## 2022-01-01 RX ADMIN — BUPROPION HYDROCHLORIDE 150 MG: 150 TABLET, EXTENDED RELEASE ORAL at 09:28

## 2022-01-01 RX ADMIN — INSULIN LISPRO 1 UNITS: 100 INJECTION, SOLUTION INTRAVENOUS; SUBCUTANEOUS at 17:27

## 2022-01-01 RX ADMIN — CARVEDILOL 25 MG: 25 TABLET, FILM COATED ORAL at 09:54

## 2022-01-01 RX ADMIN — GABAPENTIN 300 MG: 300 CAPSULE ORAL at 10:43

## 2022-01-01 RX ADMIN — Medication 1 MG: at 23:47

## 2022-01-01 RX ADMIN — GABAPENTIN 300 MG: 300 CAPSULE ORAL at 18:27

## 2022-01-01 RX ADMIN — ASPIRIN 81 MG: 81 TABLET, COATED ORAL at 09:54

## 2022-01-01 RX ADMIN — ATORVASTATIN CALCIUM 40 MG: 40 TABLET, FILM COATED ORAL at 19:37

## 2022-01-01 RX ADMIN — CLOPIDOGREL BISULFATE 75 MG: 75 TABLET ORAL at 18:23

## 2022-01-01 RX ADMIN — BUPROPION HYDROCHLORIDE 150 MG: 150 TABLET, EXTENDED RELEASE ORAL at 08:41

## 2022-01-01 RX ADMIN — OXYCODONE AND ACETAMINOPHEN 1 TABLET: 10; 325 TABLET ORAL at 12:52

## 2022-01-01 RX ADMIN — CARVEDILOL 25 MG: 25 TABLET, FILM COATED ORAL at 08:41

## 2022-01-01 RX ADMIN — BUPROPION HYDROCHLORIDE 150 MG: 150 TABLET, EXTENDED RELEASE ORAL at 20:10

## 2022-01-01 RX ADMIN — Medication 1 MG: at 23:34

## 2022-01-01 RX ADMIN — BUSPIRONE HYDROCHLORIDE 15 MG: 15 TABLET ORAL at 09:39

## 2022-01-01 RX ADMIN — INSULIN GLARGINE 10 UNITS: 100 INJECTION, SOLUTION SUBCUTANEOUS at 20:34

## 2022-01-01 RX ADMIN — TAMSULOSIN HYDROCHLORIDE 0.4 MG: 0.4 CAPSULE ORAL at 10:44

## 2022-01-01 RX ADMIN — Medication 15 UNITS/KG/HR: at 17:17

## 2022-01-01 RX ADMIN — HEPARIN SODIUM 4000 UNITS: 1000 INJECTION INTRAVENOUS; SUBCUTANEOUS at 23:21

## 2022-01-01 RX ADMIN — GABAPENTIN 300 MG: 300 CAPSULE ORAL at 16:45

## 2022-01-01 RX ADMIN — INSULIN LISPRO 2 UNITS: 100 INJECTION, SOLUTION INTRAVENOUS; SUBCUTANEOUS at 17:58

## 2022-01-01 RX ADMIN — FUROSEMIDE 40 MG: 10 INJECTION, SOLUTION INTRAMUSCULAR; INTRAVENOUS at 09:39

## 2022-01-01 RX ADMIN — SODIUM CHLORIDE, PRESERVATIVE FREE 10 ML: 5 INJECTION INTRAVENOUS at 20:51

## 2022-01-01 RX ADMIN — BUSPIRONE HYDROCHLORIDE 15 MG: 15 TABLET ORAL at 18:16

## 2022-01-01 RX ADMIN — Medication 15 UNITS/KG/HR: at 10:43

## 2022-01-01 RX ADMIN — OXYCODONE AND ACETAMINOPHEN 1 TABLET: 10; 325 TABLET ORAL at 20:34

## 2022-01-01 RX ADMIN — Medication 1 MG: at 23:28

## 2022-01-01 RX ADMIN — GABAPENTIN 300 MG: 300 CAPSULE ORAL at 09:28

## 2022-01-01 RX ADMIN — OXYCODONE AND ACETAMINOPHEN 1 TABLET: 10; 325 TABLET ORAL at 20:59

## 2022-01-01 RX ADMIN — GABAPENTIN 300 MG: 300 CAPSULE ORAL at 17:56

## 2022-01-01 RX ADMIN — BUSPIRONE HYDROCHLORIDE 15 MG: 15 TABLET ORAL at 20:45

## 2022-01-01 RX ADMIN — Medication 16 UNITS/KG/HR: at 13:11

## 2022-01-01 RX ADMIN — BUSPIRONE HYDROCHLORIDE 15 MG: 15 TABLET ORAL at 08:41

## 2022-01-01 RX ADMIN — ASPIRIN 81 MG: 81 TABLET, COATED ORAL at 10:44

## 2022-01-01 RX ADMIN — Medication 17 UNITS/KG/HR: at 19:21

## 2022-01-01 RX ADMIN — CARVEDILOL 25 MG: 25 TABLET, FILM COATED ORAL at 16:45

## 2022-01-01 RX ADMIN — OXYCODONE AND ACETAMINOPHEN 1 TABLET: 10; 325 TABLET ORAL at 20:48

## 2022-01-01 RX ADMIN — INSULIN LISPRO 2 UNITS: 100 INJECTION, SOLUTION INTRAVENOUS; SUBCUTANEOUS at 12:42

## 2022-01-01 RX ADMIN — SODIUM ZIRCONIUM CYCLOSILICATE 10 G: 10 POWDER, FOR SUSPENSION ORAL at 06:47

## 2022-01-01 RX ADMIN — INSULIN LISPRO 2 UNITS: 100 INJECTION, SOLUTION INTRAVENOUS; SUBCUTANEOUS at 20:34

## 2022-01-01 RX ADMIN — OXYCODONE AND ACETAMINOPHEN 1 TABLET: 10; 325 TABLET ORAL at 14:09

## 2022-01-01 RX ADMIN — PANTOPRAZOLE SODIUM 40 MG: 40 TABLET, DELAYED RELEASE ORAL at 05:42

## 2022-01-01 RX ADMIN — INSULIN GLARGINE 10 UNITS: 100 INJECTION, SOLUTION SUBCUTANEOUS at 20:44

## 2022-01-01 RX ADMIN — INSULIN LISPRO 2 UNITS: 100 INJECTION, SOLUTION INTRAVENOUS; SUBCUTANEOUS at 21:18

## 2022-01-01 RX ADMIN — CARVEDILOL 25 MG: 25 TABLET, FILM COATED ORAL at 16:30

## 2022-01-01 RX ADMIN — INSULIN LISPRO 2 UNITS: 100 INJECTION, SOLUTION INTRAVENOUS; SUBCUTANEOUS at 20:00

## 2022-01-01 RX ADMIN — BUPROPION HYDROCHLORIDE 150 MG: 150 TABLET, EXTENDED RELEASE ORAL at 10:55

## 2022-01-01 RX ADMIN — BUPROPION HYDROCHLORIDE 150 MG: 150 TABLET, EXTENDED RELEASE ORAL at 09:36

## 2022-01-01 RX ADMIN — TAMSULOSIN HYDROCHLORIDE 0.4 MG: 0.4 CAPSULE ORAL at 08:41

## 2022-01-01 RX ADMIN — FUROSEMIDE 40 MG: 10 INJECTION, SOLUTION INTRAMUSCULAR; INTRAVENOUS at 09:36

## 2022-01-01 RX ADMIN — Medication 13 UNITS/KG/HR: at 19:20

## 2022-01-01 RX ADMIN — PANTOPRAZOLE SODIUM 40 MG: 40 TABLET, DELAYED RELEASE ORAL at 06:09

## 2022-01-01 RX ADMIN — INSULIN LISPRO 2 UNITS: 100 INJECTION, SOLUTION INTRAVENOUS; SUBCUTANEOUS at 20:37

## 2022-01-01 RX ADMIN — SODIUM CHLORIDE, PRESERVATIVE FREE 10 ML: 5 INJECTION INTRAVENOUS at 20:39

## 2022-01-01 RX ADMIN — PANTOPRAZOLE SODIUM 40 MG: 40 TABLET, DELAYED RELEASE ORAL at 05:15

## 2022-01-01 RX ADMIN — ISOSORBIDE MONONITRATE 30 MG: 30 TABLET, EXTENDED RELEASE ORAL at 09:38

## 2022-01-01 RX ADMIN — CARVEDILOL 25 MG: 25 TABLET, FILM COATED ORAL at 18:27

## 2022-01-01 RX ADMIN — GABAPENTIN 300 MG: 300 CAPSULE ORAL at 09:16

## 2022-01-01 RX ADMIN — FUROSEMIDE 40 MG: 10 INJECTION, SOLUTION INTRAMUSCULAR; INTRAVENOUS at 09:21

## 2022-01-01 RX ADMIN — GABAPENTIN 300 MG: 300 CAPSULE ORAL at 09:36

## 2022-01-01 RX ADMIN — Medication 20 UNITS/KG/HR: at 06:31

## 2022-01-01 RX ADMIN — BUPROPION HYDROCHLORIDE 150 MG: 150 TABLET, EXTENDED RELEASE ORAL at 20:44

## 2022-01-01 RX ADMIN — INSULIN LISPRO 3 UNITS: 100 INJECTION, SOLUTION INTRAVENOUS; SUBCUTANEOUS at 12:15

## 2022-01-01 RX ADMIN — SODIUM CHLORIDE 1000 ML: 9 INJECTION, SOLUTION INTRAVENOUS at 10:02

## 2022-01-01 RX ADMIN — TAMSULOSIN HYDROCHLORIDE 0.4 MG: 0.4 CAPSULE ORAL at 09:54

## 2022-01-01 RX ADMIN — SODIUM CHLORIDE, PRESERVATIVE FREE 10 ML: 5 INJECTION INTRAVENOUS at 20:37

## 2022-01-01 RX ADMIN — Medication 14 UNITS/KG/HR: at 23:46

## 2022-01-01 RX ADMIN — INSULIN LISPRO 5 UNITS: 100 INJECTION, SOLUTION INTRAVENOUS; SUBCUTANEOUS at 12:42

## 2022-01-01 RX ADMIN — INSULIN LISPRO 5 UNITS: 100 INJECTION, SOLUTION INTRAVENOUS; SUBCUTANEOUS at 12:30

## 2022-01-01 RX ADMIN — INSULIN LISPRO 2 UNITS: 100 INJECTION, SOLUTION INTRAVENOUS; SUBCUTANEOUS at 08:42

## 2022-01-01 RX ADMIN — SODIUM CHLORIDE, PRESERVATIVE FREE 10 ML: 5 INJECTION INTRAVENOUS at 20:10

## 2022-01-01 RX ADMIN — ISOSORBIDE MONONITRATE 30 MG: 30 TABLET, EXTENDED RELEASE ORAL at 09:16

## 2022-01-01 RX ADMIN — GABAPENTIN 300 MG: 300 CAPSULE ORAL at 08:41

## 2022-01-01 RX ADMIN — Medication 1 MG: at 23:40

## 2022-01-01 RX ADMIN — SODIUM CHLORIDE, PRESERVATIVE FREE 10 ML: 5 INJECTION INTRAVENOUS at 09:41

## 2022-01-01 RX ADMIN — INSULIN LISPRO 1 UNITS: 100 INJECTION, SOLUTION INTRAVENOUS; SUBCUTANEOUS at 21:09

## 2022-01-01 RX ADMIN — BUPROPION HYDROCHLORIDE 150 MG: 150 TABLET, EXTENDED RELEASE ORAL at 09:16

## 2022-01-01 RX ADMIN — Medication 19 UNITS/KG/HR: at 20:15

## 2022-01-01 RX ADMIN — BUPROPION HYDROCHLORIDE 150 MG: 150 TABLET, EXTENDED RELEASE ORAL at 09:38

## 2022-01-01 RX ADMIN — HEPARIN SODIUM 4000 UNITS: 1000 INJECTION INTRAVENOUS; SUBCUTANEOUS at 05:42

## 2022-01-01 RX ADMIN — INSULIN LISPRO 3 UNITS: 100 INJECTION, SOLUTION INTRAVENOUS; SUBCUTANEOUS at 10:46

## 2022-01-01 RX ADMIN — FUROSEMIDE 40 MG: 10 INJECTION, SOLUTION INTRAMUSCULAR; INTRAVENOUS at 09:17

## 2022-01-01 RX ADMIN — Medication 18 UNITS/KG/HR: at 08:02

## 2022-01-01 RX ADMIN — PANTOPRAZOLE SODIUM 40 MG: 40 TABLET, DELAYED RELEASE ORAL at 06:14

## 2022-01-01 RX ADMIN — INSULIN LISPRO 1 UNITS: 100 INJECTION, SOLUTION INTRAVENOUS; SUBCUTANEOUS at 09:19

## 2022-01-01 RX ADMIN — INSULIN GLARGINE 10 UNITS: 100 INJECTION, SOLUTION SUBCUTANEOUS at 20:01

## 2022-01-01 RX ADMIN — FUROSEMIDE 40 MG: 10 INJECTION, SOLUTION INTRAMUSCULAR; INTRAVENOUS at 10:44

## 2022-01-01 RX ADMIN — INSULIN LISPRO 4 UNITS: 100 INJECTION, SOLUTION INTRAVENOUS; SUBCUTANEOUS at 09:44

## 2022-01-01 RX ADMIN — PERFLUTREN 1.5 ML: 6.52 INJECTION, SUSPENSION INTRAVENOUS at 07:50

## 2022-01-01 RX ADMIN — ACETAMINOPHEN 650 MG: 325 TABLET ORAL at 06:14

## 2022-01-01 RX ADMIN — HEPARIN SODIUM 4000 UNITS: 1000 INJECTION INTRAVENOUS; SUBCUTANEOUS at 15:44

## 2022-01-01 RX ADMIN — INSULIN LISPRO 1 UNITS: 100 INJECTION, SOLUTION INTRAVENOUS; SUBCUTANEOUS at 20:44

## 2022-01-01 RX ADMIN — OXYCODONE AND ACETAMINOPHEN 1 TABLET: 10; 325 TABLET ORAL at 05:18

## 2022-01-01 RX ADMIN — BUSPIRONE HYDROCHLORIDE 15 MG: 15 TABLET ORAL at 09:16

## 2022-01-01 RX ADMIN — CARVEDILOL 25 MG: 25 TABLET, FILM COATED ORAL at 17:25

## 2022-01-01 RX ADMIN — INSULIN GLARGINE 15 UNITS: 100 INJECTION, SOLUTION SUBCUTANEOUS at 20:36

## 2022-01-01 RX ADMIN — CARVEDILOL 25 MG: 25 TABLET, FILM COATED ORAL at 09:29

## 2022-01-01 RX ADMIN — INSULIN GLARGINE 10 UNITS: 100 INJECTION, SOLUTION SUBCUTANEOUS at 21:18

## 2022-01-01 RX ADMIN — INSULIN LISPRO 4 UNITS: 100 INJECTION, SOLUTION INTRAVENOUS; SUBCUTANEOUS at 16:46

## 2022-01-01 RX ADMIN — ASPIRIN 81 MG: 81 TABLET, COATED ORAL at 09:16

## 2022-01-01 RX ADMIN — Medication 7.89 UNITS/KG/HR: at 15:47

## 2022-01-01 RX ADMIN — ASPIRIN 81 MG: 81 TABLET, COATED ORAL at 09:36

## 2022-01-01 RX ADMIN — AMIODARONE HYDROCHLORIDE 300 MG: 50 INJECTION, SOLUTION INTRAVENOUS at 23:40

## 2022-01-01 RX ADMIN — SODIUM CHLORIDE, PRESERVATIVE FREE 10 ML: 5 INJECTION INTRAVENOUS at 09:55

## 2022-01-01 RX ADMIN — INSULIN LISPRO 3 UNITS: 100 INJECTION, SOLUTION INTRAVENOUS; SUBCUTANEOUS at 11:48

## 2022-01-01 RX ADMIN — BUSPIRONE HYDROCHLORIDE 15 MG: 15 TABLET ORAL at 19:37

## 2022-01-01 RX ADMIN — ACETAMINOPHEN 650 MG: 325 TABLET ORAL at 12:21

## 2022-01-01 RX ADMIN — SODIUM CHLORIDE, PRESERVATIVE FREE 10 ML: 5 INJECTION INTRAVENOUS at 20:45

## 2022-01-01 RX ADMIN — ATORVASTATIN CALCIUM 40 MG: 40 TABLET, FILM COATED ORAL at 20:37

## 2022-01-01 RX ADMIN — BUSPIRONE HYDROCHLORIDE 15 MG: 15 TABLET ORAL at 19:49

## 2022-01-01 RX ADMIN — GABAPENTIN 300 MG: 300 CAPSULE ORAL at 18:23

## 2022-01-01 RX ADMIN — INSULIN LISPRO 1 UNITS: 100 INJECTION, SOLUTION INTRAVENOUS; SUBCUTANEOUS at 18:14

## 2022-01-01 RX ADMIN — TAMSULOSIN HYDROCHLORIDE 0.4 MG: 0.4 CAPSULE ORAL at 09:28

## 2022-01-01 RX ADMIN — Medication 11 UNITS/KG/HR: at 16:07

## 2022-01-01 RX ADMIN — OXYCODONE AND ACETAMINOPHEN 1 TABLET: 10; 325 TABLET ORAL at 20:10

## 2022-01-01 RX ADMIN — FUROSEMIDE 40 MG: 10 INJECTION, SOLUTION INTRAMUSCULAR; INTRAVENOUS at 09:29

## 2022-01-01 RX ADMIN — OXYCODONE AND ACETAMINOPHEN 1 TABLET: 10; 325 TABLET ORAL at 06:05

## 2022-01-01 RX ADMIN — Medication 17 UNITS/KG/HR: at 18:21

## 2022-01-01 RX ADMIN — ATORVASTATIN CALCIUM 40 MG: 40 TABLET, FILM COATED ORAL at 20:34

## 2022-01-01 RX ADMIN — INSULIN LISPRO 5 UNITS: 100 INJECTION, SOLUTION INTRAVENOUS; SUBCUTANEOUS at 18:13

## 2022-01-01 RX ADMIN — SODIUM CHLORIDE, PRESERVATIVE FREE 10 ML: 5 INJECTION INTRAVENOUS at 09:16

## 2022-01-01 RX ADMIN — Medication 5 MG: at 20:36

## 2022-01-01 RX ADMIN — INSULIN LISPRO 3 UNITS: 100 INJECTION, SOLUTION INTRAVENOUS; SUBCUTANEOUS at 09:56

## 2022-01-01 RX ADMIN — BUSPIRONE HYDROCHLORIDE 15 MG: 15 TABLET ORAL at 20:48

## 2022-01-01 RX ADMIN — INSULIN LISPRO 2 UNITS: 100 INJECTION, SOLUTION INTRAVENOUS; SUBCUTANEOUS at 15:24

## 2022-01-01 RX ADMIN — HEPARIN SODIUM 2000 UNITS: 1000 INJECTION INTRAVENOUS; SUBCUTANEOUS at 02:23

## 2022-01-01 RX ADMIN — BUPROPION HYDROCHLORIDE 150 MG: 150 TABLET, EXTENDED RELEASE ORAL at 20:34

## 2022-01-01 RX ADMIN — CARVEDILOL 25 MG: 25 TABLET, FILM COATED ORAL at 09:16

## 2022-01-01 RX ADMIN — BUSPIRONE HYDROCHLORIDE 15 MG: 15 TABLET ORAL at 20:37

## 2022-01-01 RX ADMIN — Medication 1 MG: at 23:53

## 2022-01-01 RX ADMIN — INSULIN LISPRO 5 UNITS: 100 INJECTION, SOLUTION INTRAVENOUS; SUBCUTANEOUS at 09:22

## 2022-01-01 RX ADMIN — SODIUM CHLORIDE: 9 INJECTION, SOLUTION INTRAVENOUS at 06:09

## 2022-01-01 RX ADMIN — BUPROPION HYDROCHLORIDE 150 MG: 150 TABLET, EXTENDED RELEASE ORAL at 09:54

## 2022-01-01 RX ADMIN — INSULIN GLARGINE 10 UNITS: 100 INJECTION, SOLUTION SUBCUTANEOUS at 19:38

## 2022-01-01 RX ADMIN — HEPARIN SODIUM 4000 UNITS: 1000 INJECTION INTRAVENOUS; SUBCUTANEOUS at 18:20

## 2022-01-01 RX ADMIN — ISOSORBIDE MONONITRATE 30 MG: 30 TABLET, EXTENDED RELEASE ORAL at 08:41

## 2022-01-01 RX ADMIN — GABAPENTIN 300 MG: 300 CAPSULE ORAL at 15:22

## 2022-01-01 RX ADMIN — HEPARIN SODIUM 4000 UNITS: 1000 INJECTION INTRAVENOUS; SUBCUTANEOUS at 21:18

## 2022-01-01 RX ADMIN — ISOSORBIDE MONONITRATE 30 MG: 30 TABLET, EXTENDED RELEASE ORAL at 09:54

## 2022-01-01 RX ADMIN — BUPROPION HYDROCHLORIDE 150 MG: 150 TABLET, EXTENDED RELEASE ORAL at 19:55

## 2022-01-01 RX ADMIN — BUSPIRONE HYDROCHLORIDE 15 MG: 15 TABLET ORAL at 19:55

## 2022-01-01 RX ADMIN — INSULIN GLARGINE 20 UNITS: 100 INJECTION, SOLUTION SUBCUTANEOUS at 21:09

## 2022-01-01 RX ADMIN — BUSPIRONE HYDROCHLORIDE 15 MG: 15 TABLET ORAL at 09:54

## 2022-01-01 RX ADMIN — BUPROPION HYDROCHLORIDE 150 MG: 150 TABLET, EXTENDED RELEASE ORAL at 19:37

## 2022-01-01 RX ADMIN — OXYCODONE AND ACETAMINOPHEN 1 TABLET: 10; 325 TABLET ORAL at 06:11

## 2022-01-01 RX ADMIN — ATORVASTATIN CALCIUM 40 MG: 40 TABLET, FILM COATED ORAL at 19:55

## 2022-01-01 RX ADMIN — GABAPENTIN 300 MG: 300 CAPSULE ORAL at 16:30

## 2022-01-01 RX ADMIN — FUROSEMIDE 40 MG: 10 INJECTION, SOLUTION INTRAMUSCULAR; INTRAVENOUS at 12:37

## 2022-01-01 RX ADMIN — INSULIN LISPRO 1 UNITS: 100 INJECTION, SOLUTION INTRAVENOUS; SUBCUTANEOUS at 19:38

## 2022-01-01 RX ADMIN — INSULIN LISPRO 1 UNITS: 100 INJECTION, SOLUTION INTRAVENOUS; SUBCUTANEOUS at 20:39

## 2022-01-01 RX ADMIN — CARVEDILOL 25 MG: 25 TABLET, FILM COATED ORAL at 09:41

## 2022-01-01 RX ADMIN — INSULIN LISPRO 3 UNITS: 100 INJECTION, SOLUTION INTRAVENOUS; SUBCUTANEOUS at 18:27

## 2022-01-01 RX ADMIN — BUPROPION HYDROCHLORIDE 150 MG: 150 TABLET, EXTENDED RELEASE ORAL at 19:49

## 2022-01-01 RX ADMIN — ASPIRIN 81 MG: 81 TABLET, COATED ORAL at 09:28

## 2022-01-01 RX ADMIN — BUPROPION HYDROCHLORIDE 150 MG: 150 TABLET, EXTENDED RELEASE ORAL at 20:48

## 2022-01-01 RX ADMIN — CARVEDILOL 25 MG: 25 TABLET, FILM COATED ORAL at 17:39

## 2022-01-01 RX ADMIN — Medication 1 MG: at 23:37

## 2022-01-01 RX ADMIN — OXYCODONE AND ACETAMINOPHEN 1 TABLET: 10; 325 TABLET ORAL at 20:37

## 2022-01-01 RX ADMIN — ISOSORBIDE MONONITRATE 30 MG: 30 TABLET, EXTENDED RELEASE ORAL at 09:29

## 2022-01-01 RX ADMIN — BUPROPION HYDROCHLORIDE 150 MG: 150 TABLET, EXTENDED RELEASE ORAL at 20:36

## 2022-01-01 RX ADMIN — SODIUM CHLORIDE, PRESERVATIVE FREE 10 ML: 5 INJECTION INTRAVENOUS at 09:39

## 2022-01-01 RX ADMIN — GABAPENTIN 300 MG: 300 CAPSULE ORAL at 09:39

## 2022-01-01 RX ADMIN — OXYCODONE AND ACETAMINOPHEN 1 TABLET: 10; 325 TABLET ORAL at 18:11

## 2022-01-01 RX ADMIN — BUSPIRONE HYDROCHLORIDE 15 MG: 15 TABLET ORAL at 10:44

## 2022-01-01 RX ADMIN — GABAPENTIN 300 MG: 300 CAPSULE ORAL at 18:16

## 2022-01-01 RX ADMIN — ASPIRIN 81 MG: 81 TABLET, COATED ORAL at 09:38

## 2022-01-01 RX ADMIN — OXYCODONE AND ACETAMINOPHEN 1 TABLET: 10; 325 TABLET ORAL at 04:17

## 2022-01-01 RX ADMIN — HEPARIN SODIUM 4000 UNITS: 1000 INJECTION INTRAVENOUS; SUBCUTANEOUS at 12:29

## 2022-01-01 RX ADMIN — CARVEDILOL 25 MG: 25 TABLET, FILM COATED ORAL at 17:56

## 2022-01-01 RX ADMIN — HEPARIN SODIUM 2000 UNITS: 1000 INJECTION INTRAVENOUS; SUBCUTANEOUS at 13:09

## 2022-01-01 RX ADMIN — Medication 1 MG: at 23:31

## 2022-01-01 RX ADMIN — ATORVASTATIN CALCIUM 40 MG: 40 TABLET, FILM COATED ORAL at 20:49

## 2022-01-01 RX ADMIN — ATORVASTATIN CALCIUM 40 MG: 40 TABLET, FILM COATED ORAL at 20:10

## 2022-01-01 RX ADMIN — BUSPIRONE HYDROCHLORIDE 15 MG: 15 TABLET ORAL at 20:10

## 2022-01-01 RX ADMIN — ISOSORBIDE MONONITRATE 30 MG: 30 TABLET, EXTENDED RELEASE ORAL at 20:48

## 2022-01-01 RX ADMIN — INSULIN LISPRO 3 UNITS: 100 INJECTION, SOLUTION INTRAVENOUS; SUBCUTANEOUS at 17:40

## 2022-01-01 RX ADMIN — ISOSORBIDE MONONITRATE 30 MG: 30 TABLET, EXTENDED RELEASE ORAL at 10:43

## 2022-01-01 RX ADMIN — HEPARIN SODIUM 4000 UNITS: 1000 INJECTION INTRAVENOUS; SUBCUTANEOUS at 19:13

## 2022-01-01 RX ADMIN — SODIUM CHLORIDE, PRESERVATIVE FREE 10 ML: 5 INJECTION INTRAVENOUS at 19:55

## 2022-01-01 RX ADMIN — OXYCODONE AND ACETAMINOPHEN 1 TABLET: 10; 325 TABLET ORAL at 10:54

## 2022-01-01 RX ADMIN — HEPARIN SODIUM 2000 UNITS: 1000 INJECTION INTRAVENOUS; SUBCUTANEOUS at 20:34

## 2022-01-01 RX ADMIN — Medication 12 UNITS/KG/HR: at 21:17

## 2022-01-01 RX ADMIN — Medication 10 UNITS/KG/HR: at 12:19

## 2022-01-01 RX ADMIN — SODIUM CHLORIDE, PRESERVATIVE FREE 10 ML: 5 INJECTION INTRAVENOUS at 10:57

## 2022-01-01 RX ADMIN — ASPIRIN 325 MG: 325 TABLET ORAL at 12:37

## 2022-01-01 RX ADMIN — ASPIRIN 81 MG: 81 TABLET, COATED ORAL at 08:41

## 2022-01-01 RX ADMIN — SODIUM CHLORIDE, PRESERVATIVE FREE 10 ML: 5 INJECTION INTRAVENOUS at 09:36

## 2022-01-01 RX ADMIN — SODIUM ZIRCONIUM CYCLOSILICATE 10 G: 10 POWDER, FOR SUSPENSION ORAL at 09:52

## 2022-01-01 RX ADMIN — OXYCODONE AND ACETAMINOPHEN 1 TABLET: 10; 325 TABLET ORAL at 06:07

## 2022-01-01 RX ADMIN — GABAPENTIN 300 MG: 300 CAPSULE ORAL at 17:24

## 2022-01-01 RX ADMIN — INSULIN LISPRO 1 UNITS: 100 INJECTION, SOLUTION INTRAVENOUS; SUBCUTANEOUS at 12:31

## 2022-01-01 RX ADMIN — CARVEDILOL 25 MG: 25 TABLET, FILM COATED ORAL at 09:38

## 2022-01-01 RX ADMIN — INSULIN GLARGINE 20 UNITS: 100 INJECTION, SOLUTION SUBCUTANEOUS at 20:39

## 2022-01-01 RX ADMIN — TAMSULOSIN HYDROCHLORIDE 0.4 MG: 0.4 CAPSULE ORAL at 09:38

## 2022-01-01 RX ADMIN — BUSPIRONE HYDROCHLORIDE 15 MG: 15 TABLET ORAL at 20:33

## 2022-01-01 RX ADMIN — CARVEDILOL 25 MG: 25 TABLET, FILM COATED ORAL at 10:43

## 2022-01-01 RX ADMIN — PANTOPRAZOLE SODIUM 40 MG: 40 TABLET, DELAYED RELEASE ORAL at 06:31

## 2022-01-01 RX ADMIN — ATORVASTATIN CALCIUM 40 MG: 40 TABLET, FILM COATED ORAL at 19:49

## 2022-01-01 RX ADMIN — INSULIN LISPRO 5 UNITS: 100 INJECTION, SOLUTION INTRAVENOUS; SUBCUTANEOUS at 17:26

## 2022-01-01 RX ADMIN — HEPARIN SODIUM 4000 UNITS: 1000 INJECTION INTRAVENOUS; SUBCUTANEOUS at 01:43

## 2022-01-01 RX ADMIN — IOPAMIDOL 190 ML: 612 INJECTION, SOLUTION INTRAVENOUS at 08:43

## 2022-01-01 RX ADMIN — IOPAMIDOL 90 ML: 755 INJECTION, SOLUTION INTRAVENOUS at 13:15

## 2022-01-01 ASSESSMENT — ENCOUNTER SYMPTOMS
CONSTIPATION: 0
BLOOD IN STOOL: 0
WHEEZING: 0
BLOOD IN STOOL: 0
SHORTNESS OF BREATH: 1
VOMITING: 0
NAUSEA: 0
GASTROINTESTINAL NEGATIVE: 1
NAUSEA: 1
COLOR CHANGE: 0
NAUSEA: 0
SHORTNESS OF BREATH: 1
CHEST TIGHTNESS: 0
BLOOD IN STOOL: 0
BACK PAIN: 0
NAUSEA: 0
SHORTNESS OF BREATH: 1
COUGH: 0
DIARRHEA: 0
ABDOMINAL PAIN: 0
NAUSEA: 0
COLOR CHANGE: 0
SHORTNESS OF BREATH: 1
WHEEZING: 0
SHORTNESS OF BREATH: 0
SHORTNESS OF BREATH: 0
BLOOD IN STOOL: 0
ABDOMINAL PAIN: 0
ABDOMINAL PAIN: 0
ABDOMINAL PAIN: 1
EYES NEGATIVE: 1
CHEST TIGHTNESS: 1
BACK PAIN: 0
ABDOMINAL DISTENTION: 0
DIARRHEA: 0
CHEST TIGHTNESS: 1
COLOR CHANGE: 0
DIARRHEA: 0
DIARRHEA: 0
COUGH: 0
VOMITING: 0
BACK PAIN: 1
ABDOMINAL DISTENTION: 1
WHEEZING: 0
VOMITING: 0
BACK PAIN: 0
BLOOD IN STOOL: 0
COUGH: 0
VOMITING: 1
VOMITING: 0
BACK PAIN: 1
WHEEZING: 0
SHORTNESS OF BREATH: 1
ABDOMINAL PAIN: 0
CONSTIPATION: 0
ABDOMINAL DISTENTION: 0
SHORTNESS OF BREATH: 1
ABDOMINAL PAIN: 1
GASTROINTESTINAL NEGATIVE: 1
DIARRHEA: 0
WHEEZING: 0
RESPIRATORY NEGATIVE: 1
NAUSEA: 1
ABDOMINAL PAIN: 0
COUGH: 0
CONSTIPATION: 0
EYES NEGATIVE: 1
ABDOMINAL PAIN: 0
ABDOMINAL DISTENTION: 1
CHEST TIGHTNESS: 0
CONSTIPATION: 0
VOMITING: 0
SHORTNESS OF BREATH: 0
WHEEZING: 0
VOMITING: 0
COLOR CHANGE: 0
COUGH: 0
VOMITING: 0
COLOR CHANGE: 0
COLOR CHANGE: 0
CONSTIPATION: 0
WHEEZING: 0
WHEEZING: 0
ABDOMINAL DISTENTION: 1
ABDOMINAL PAIN: 0
NAUSEA: 0
DIARRHEA: 0
BACK PAIN: 0
COUGH: 0
WHEEZING: 0
SHORTNESS OF BREATH: 1
NAUSEA: 0
DIARRHEA: 0
CHEST TIGHTNESS: 1
SORE THROAT: 0
DIARRHEA: 0
EYES NEGATIVE: 1
RHINORRHEA: 0
COLOR CHANGE: 0
ABDOMINAL PAIN: 0
CHEST TIGHTNESS: 1
DIARRHEA: 0
COLOR CHANGE: 0
ABDOMINAL DISTENTION: 1
VOMITING: 0
ABDOMINAL DISTENTION: 0
BACK PAIN: 0
NAUSEA: 0
WHEEZING: 0
ABDOMINAL DISTENTION: 0
VOMITING: 0

## 2022-01-01 ASSESSMENT — PAIN SCALES - GENERAL
PAINLEVEL_OUTOF10: 0
PAINLEVEL_OUTOF10: 8
PAINLEVEL_OUTOF10: 0
PAINLEVEL_OUTOF10: 7
PAINLEVEL_OUTOF10: 0
PAINLEVEL_OUTOF10: 7
PAINLEVEL_OUTOF10: 0
PAINLEVEL_OUTOF10: 8
PAINLEVEL_OUTOF10: 6
PAINLEVEL_OUTOF10: 7
PAINLEVEL_OUTOF10: 8
PAINLEVEL_OUTOF10: 0
PAINLEVEL_OUTOF10: 7
PAINLEVEL_OUTOF10: 0
PAINLEVEL_OUTOF10: 3
PAINLEVEL_OUTOF10: 0
PAINLEVEL_OUTOF10: 7
PAINLEVEL_OUTOF10: 4
PAINLEVEL_OUTOF10: 0
PAINLEVEL_OUTOF10: 2
PAINLEVEL_OUTOF10: 8
PAINLEVEL_OUTOF10: 0
PAINLEVEL_OUTOF10: 7
PAINLEVEL_OUTOF10: 8
PAINLEVEL_OUTOF10: 7
PAINLEVEL_OUTOF10: 6
PAINLEVEL_OUTOF10: 0
PAINLEVEL_OUTOF10: 3
PAINLEVEL_OUTOF10: 0
PAINLEVEL_OUTOF10: 10
PAINLEVEL_OUTOF10: 1
PAINLEVEL_OUTOF10: 8
PAINLEVEL_OUTOF10: 7
PAINLEVEL_OUTOF10: 0

## 2022-01-01 ASSESSMENT — PAIN DESCRIPTION - LOCATION
LOCATION: BACK
LOCATION: ABDOMEN

## 2022-01-01 ASSESSMENT — PAIN DESCRIPTION - PAIN TYPE
TYPE: CHRONIC PAIN
TYPE: ACUTE PAIN;CHRONIC PAIN
TYPE: CHRONIC PAIN

## 2022-01-01 ASSESSMENT — PAIN DESCRIPTION - PROGRESSION
CLINICAL_PROGRESSION: GRADUALLY WORSENING

## 2022-01-01 ASSESSMENT — PAIN DESCRIPTION - DESCRIPTORS
DESCRIPTORS: ACHING;CRAMPING
DESCRIPTORS: PATIENT UNABLE TO DESCRIBE

## 2022-01-01 ASSESSMENT — PAIN - FUNCTIONAL ASSESSMENT: PAIN_FUNCTIONAL_ASSESSMENT: PREVENTS OR INTERFERES SOME ACTIVE ACTIVITIES AND ADLS

## 2022-01-25 NOTE — PROGRESS NOTES
Patient came to office to certify for O2. Patient has complained of shortness of breath. Patient O2 Sat at rest is 92 %. Patient dropped to 89 % after exercise with shortness of breath. O2 applied.     1548:  O2 Sat up to 99% on 2 L/M  At rest.

## 2022-01-25 NOTE — TELEPHONE ENCOUNTER
Received call from Shantanu Figueroa at [facility] with Red Flag Complaint. Subjective: Caller states \" a lot of due to overweight. Almost acts like maybe bloating. Pressure on stomach below xiphoid process. A lot of belching. Possible could be heart burn. Lie down difficult for me to breath and get air. Causes me to have to sit up and loose sleep. Dr. Mireille Daniel had me on oxygen at one time, I have a CPAP but I don't like it \"     Pt hx heart attack 07/2021, stent, , pt hx type II DM     Current Symptoms: pt reports feeling pretty good this morning. Little pressure on stomach ? Bloating. Pt denies any chest pains ,shortness of breath at rest, dizziness, severe headache, new pain radiating to jaw, arm back right now . Kristen High Does have shortness of breath with stairs and to mailbox certain distances. Pt reports more shortness of breath than baseline. Pt does not have a pulse oximetry at the house. Pt reports shorntess of breath when lying flat (is okay with CPAP on )  started Saturday which is resolved with sitting up. Pressure below xiphoid process is constant- and is not present      Onset: 1 week , worse last night     Associated Symptoms: fatigued and winded. Pain Severity: no pains     Temperature: no fever     What has been tried:     LMP: NA Pregnant: No    Recommended disposition: GO TO ED/UCC NOW (OR TO OFFICE WITH PCP APPROVAL):              2nd level. Care advice provided, patient verbalizes understanding; denies any other questions or concerns; instructed to call back for any new or worsening symptoms. Writer provided warm transfer to All Lopez  at [facility] for further assessment and resume of care. Attention Provider: Thank you for allowing me to participate in the care of your patient. The patient was connected to triage in response to information provided to the ECC/PSC.   Please do not respond through this encounter as the response is not directed to a shared pool.          Reason for Disposition   Patient sounds very sick or weak to the triager    Protocols used: BREATHING DIFFICULTY-ADULT-OH

## 2022-01-25 NOTE — PROGRESS NOTES
1/25/2022    TELEHEALTH EVALUATION -- Audio/Visual (During XLANU-61 public health emergency)  Patient is being consulted today by way of telehealth through do[employer]. me. Telehealth is implemented due to the current pandemic in [place] caused by coronavirus. Previously, the patient has been advised that this service is billed to his insurance by [facility] ([facility]). HPI:  Patient is seen here intermittently by the undersigned for management of chronic disease states. Additionally, I have seen him when new problems arise. Baljinder Yates tells me that he has gained weight recently. He did start on a diet to make an effort to try to lose some weight this past Saturday. He states that when he sits down he has pressure below the xiphoid process it causes him to have increased belching. He states he can take 2 or 3 bites of his meal and then feels full. After short time he can go back and eat some more. Last night he stated that he had some difficulty even breathing when he laid down but denied any chest pain. The patient has CPAP available to him at home but states that when he uses it he goes to having smothering spell at night and usually pulls the CPAP mask off because he does not like anything covering his face. Previously when he had issues with some shortness of breath we did give him oxygen and he states that it was helpful but he sent that back thinking that he did not need it any longer. He stated that he did benefit from it. He would like to get the oxygen back at this time. We told him that what he would have to do resting and exercise pulse ox. He is going to come in and do that and at the same time since he has not eaten we are going to do ultrasound of his gallbladder as well. Baljinder Yates was a smoker until the day he came in here having chest pain which was 7/2/21. We did send him to [facility] and he was diagnosed with acute myocardial infarction. He does have COPD and is on HandiHaler with albuterol HFA to use it 1 puff into the lungs up to every 6 hour intervals. He takes Singulair 10 mg p.o. daily for allergies. He has a nebulizer at home in which he utilizes DuoNeb solution up to every 6 hours treatments while he is at home. this patient is seen here intermittently by the undersigned for management of chronic disease states. At the last time that I was evaluating him in the office on 7/2/2021 he was having an acute non-ST elevated myocardial infarction. He was transferred from this facility to [facility] for further evaluation and management. He states that with regard to his heart he is doing well at this time though he is having some other issues. Did receive stent placement and currently is on Plavix 75 mg p.o. daily which she is reportedly directed to take for 1 years duration. He does have available nitroglycerin 0.4 to use sublingually as needed. Additionally, he is on an 81 mg aspirin daily.     He does have diabetes mellitus. He has been having extremely poor control of his diabetes. He has been using only 40 units of Basaglar insulin but he has occasionally increase that to 60 units and states that it does not appear to make much difference. He states that oftentimes his sugar runs between 3 and 400. We did do A1c on him today since it had been back in January since it was last done. A1c here today was in excess of 13. I therefore I am going to have him increase his Basaglar up to 50 units but I will have him take it twice daily. Also, I am going to add Humalog with each meal 3 times daily at 10 units to help improve postprandial sugars. I did tell him that I wanted to follow him up in 1 month's time.     The main issue for which Wesley Cohen came in today was a wound to his right foot. He is not sure how this happened but it appears that it may have been a puncture wound. He now has developed an ulceration with some callus formation around the periphery of the ulcer.   Wound appears fairly clean at this time. Due to the nature of his disease process and the fact that he has had previous amputations required, I am going to send him to podiatry. He has seen Dr. Marty Neves in the past and I choose to send him back there and was receptive to this plan. I am going to have Renae Browning RN clean the wound here today with Hibiclens. After this she will apply Silvadene cream.  He does have some Silvadene at home and I did ask him to apply that at least twice daily to the affected area until we can have the wound seen by Dr. Marty Neves. If she sees fit to have him come to wound care there at [place] then certainly that will be at her discretion. He also did report to me that at times he has significant deformity of his great nails and perhaps she could investigate that for him as well. At this time, the patient has a small ulceration, approximately 5 mm in size on the plantar aspect of his right foot proximal to the third metatarsal forefoot area. He is noted to be status post amputation of his right fourth and fifth digits for peripheral vascular disease a number of years ago. The patient has significant neuropathic issues as a result of his poorly controlled diabetes. I told him that it is imperative that we get his sugar under control to help facilitate wound care and healing. Quynh Amador has quit smoking since he had his heart attack. He states it was extremely difficult. This undoubtedly will help not only his cardiovascular status but also should improve his ability to heal wounds as well.     Quynh Amador does have significant peripheral neuropathy affecting bilateral lower extremities most notably at the feet. 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   The significant neuropathy that he has likely has not contributed to the fact that he does not know the nature of the wound affecting his right foot. Certainly it could be even a puncture wound from a nail. I am going to put him on doxycycline at 100 mg p.o. twice daily despite the fact that the wound does not appear to be draining at this time. There is minimal redness noted but I am going to try to address this early.     [address] does have a past history of essential hypertension. Currently he is on Coreg 25 mg p.o. twice daily. He also takes Lasix 40 mg p.o. twice daily. No peripheral edema is noted at this time. He remains on lisinopril 20 mg p.o. twice daily. Blood pressure here today was initially elevated slightly at 164/82 but did normalize considerably to 110/60.     For his hyperlipidemia, he takes Lipitor 40 mg p.o. nightly.     For his diabetes addition to the insulin regimen used above, he is on glipizide at 10 mg p.o. twice daily.     Does have vitamin D deficiency. He remains on ergocalciferol at 50,000 unit capsule taken twice weekly.     Skyler has depression and currently is taking Wellbutrin  extended release at 1 p.o. twice daily. Hopefully this is helping him quit smoking as well.     For testosterone deficiency, he is on testosterone cypionate at 200 mg/cc of which he uses 1 cc IM every 21 days.         Mihaela Quesada (:  1970) has requested an audio/video evaluation for the following concern(s):        Review of Systems   Constitutional: Negative. HENT: Negative. Eyes: Negative. Respiratory: Positive for shortness of breath. Negative for cough, chest tightness and wheezing. Cardiovascular: Negative for chest pain, palpitations and leg swelling. Gastrointestinal: Positive for abdominal pain and nausea. Negative for blood in stool, constipation, diarrhea and vomiting. Epig area     Genitourinary: Negative for hematuria. Skin: Negative. Neurological: Negative. Psychiatric/Behavioral: Negative. Prior to Visit Medications    Medication Sig Taking?  Authorizing Provider Continuous Blood Gluc Sensor (DEXCOM G6 SENSOR) MISC 1 each by Does not apply route continuous E11.62, L97.509, Z79.4  Greyson Calderon MD   Continuous Blood Gluc Transmit (DEXCOM G6 TRANSMITTER) MISC 1 each by Does not apply route continuous E11.62, L97.509, Z79.4  Greyson Calderon MD   buPROPion (WELLBUTRIN XL) 150 MG extended release tablet TAKE 1 TABLET BY MOUTH 2 TIMES DAILY  Greyson Calderon MD   furosemide (LASIX) 40 MG tablet TAKE 1 TABLET BY MOUTH TWICE A DAY  Greyson Calderon MD   busPIRone (BUSPAR) 15 MG tablet TAKE 1 TABLET BY MOUTH TWICE A DAY  Greyson Calderon MD   montelukast (SINGULAIR) 10 MG tablet TAKE 1 TABLET BY MOUTH EVERY DAY  Greyson Calderon MD   atorvastatin (LIPITOR) 40 MG tablet Take 1 tablet by mouth nightly  Greyson Calderon MD   clopidogrel (PLAVIX) 75 MG tablet Take 1 tablet by mouth daily  Greyson Calderon MD   meloxicam (MOBIC) 15 MG tablet Take 1 tablet by mouth daily  Greyson Calderon MD   Diabetic Shoe MISC by Does not apply route Dx E11.621, Z79.4  Greyson Calderon MD   insulin lispro, 1 Unit Dial, (HUMALOG KWIKPEN) 100 UNIT/ML SOPN Inject 10 Units into the skin 3 times daily (before meals)  Greyson Calderon MD   testosterone cypionate (DEPOTESTOTERONE CYPIONATE) 200 MG/ML injection INJECT 1 ML INTRAMUSCULARLY EVERY 21 DAYS  Greyson Calderon MD   tiZANidine (ZANAFLEX) 4 MG tablet Take 4 mg by mouth every 6 hours as needed  Historical Provider, MD   Misc Natural Products (APPLE CIDER VINEGAR DIET PO) Take by mouth  Historical Provider, MD   albuterol sulfate  (90 Base) MCG/ACT inhaler INHALE 1 PUFF INTO THE LUNGS EVERY 6 HOURS AS NEEDED FOR WHEEZING OR SHORTNESS OF BREATH.   Greyson Calderon MD   aspirin 81 MG EC tablet Take 1 tablet by mouth daily  Jakob Nelson MD   carvedilol (COREG) 25 MG tablet Take 1 tablet by mouth 2 times daily (with meals)  Jakob Nelson MD   nitroGLYCERIN (NITROSTAT) 0.4 MG SL tablet Place 1 tablet under the tongue every 5 minutes as needed for Chest pain up to max of 3 total doses. If no relief after 1 dose, call 911. Eduar Awad MD   gabapentin (NEURONTIN) 300 MG capsule Take 1 capsule by mouth 2 times daily for 30 days. Patient is advised to take 1 afternoon around 4 or 5 and the other at bedtime. This is for peripheral neuropathy. Eduar Awad MD   zinc 50 MG TABS tablet Take 50 mg by mouth daily  Historical Provider, MD   Ascorbic Acid (VITAMIN C) 1000 MG tablet Take 500 mg by mouth daily   Historical Provider, MD   vitamin D (ERGOCALCIFEROL) 1.25 MG (32921 UT) CAPS capsule Take 1 capsule by mouth twice a week  Eduar Awad MD   ipratropium-albuterol (DUONEB) 0.5-2.5 (3) MG/3ML SOLN nebulizer solution INHALE 3 MLS INTO THE LUNGS EVERY 6 HOURS AS NEEDED FOR SHORTNESS OF BREATH  Eduar Awad MD   Nebulizers (COMPRESSOR/NEBULIZER) MISC 1 each by Does not apply route 4 times daily as needed (cough)  Eduar Awad MD   Continuous Blood Gluc  ([address]) [address] 1 each by Does not apply route continuous DX E11.62, L97.509, Z79.4  Eduar Awad MD   tamsulosin (FLOMAX) 0.4 MG capsule Take 0.4 mg by mouth daily  Historical Provider, MD   lisinopril (PRINIVIL;ZESTRIL) 20 MG tablet Take 1 tablet by mouth 2 times daily  Patient taking differently: Take 10 mg by mouth 2 times daily   MELITON Pearl   Needles & Syringes MISC 1 each by Does not apply route every 21 days Needs 3 cc syringes with 22 G  1/2 inch needle to give Testosterone and needs 18 Gauge to draw up med.   Eduar Awad MD   BASAGLAR KWIKPEN 100 UNIT/ML injection pen Inject 50 Units into the skin 2 times daily   Eduar Awad MD   glipiZIDE (GLUCOTROL) 10 MG tablet Take 10 mg by mouth 2 times daily (before meals)  Historical Provider, MD       Social History     Tobacco Use    Smoking status: Former Smoker     Packs/day: 1.00     Years: 30.00     Pack years: 30.00     Types: Cigarettes     Quit date: 2019     Years since quittin.3    Smokeless tobacco: Never Used   Vaping Use    Vaping Use: Never used   Substance Use Topics    Alcohol use: Yes     Comment: Rarely    Drug use: No        No Known Allergies,   Past Medical History:   Diagnosis Date    Arthritis     CAD (coronary artery disease)     Cigarette smoker 2014    Diabetes ([address]     Erectile dysfunction     History of nephrolithiasis     Hyperlipidemia     Hypertension     Obesity     Type II or unspecified type diabetes mellitus without mention of complication, not stated as uncontrolled    ,   Past Surgical History:   Procedure Laterality Date    CARDIAC CATHETERIZATION      CARDIAC CATHETERIZATION  3/14/12    CARDIAC CATHETERIZATION  14  JDT    stent to mid LAD.  EF 50%    COLONOSCOPY N/A 2020    Dr MANUEL Choudhury-increased tortuosity of the colon, piecemeal, AP x 1, HP x 2, 1 yr recall    COLONOSCOPY  2020    Dr Patricio Cranker Jones-increased tortuosity of the colon, piecemeal, AP x 1, HP x 2, 1 yr recall    CORONARY ARTERY BYPASS GRAFT      CORONARY ARTERY BYPASS GRAFT  3/15/2012    3V CABG (LIMA-Diag, SVG-RCA, SVG-OM) JPO    DIAGNOSTIC CARDIAC CATH LAB PROCEDURE      EXPLORATION OF WOUND OF EXTREMITY N/A 2019    EXCISIONAL DEBRIDEMENT OF SKIN, SUBCUTANEOUS TISSUE, MUSCLE AND BONE 14CM X 3CM X 3CM DEEP performed by Stephy Lay MD at [facility] [address] Left 2019    EXPLORATION OF LEFT FOOT; EXCISIONAL DEBRIDEMENT OF SUBCUTANEOUS SKIN, TISSUE AND MUSCLE; AMPUTATION OF THIRD TOE AT METATARSAL PHALYNGEAL JOINT performed by Stephy Lay MD at [address] [place] [address]      TOE AMPUTATION  2017    TONSILLECTOMY     ,   Social History     Tobacco Use    Smoking status: Former Smoker     Packs/day: 1.00     Years: 30.00     Pack years: 30.00     Types: Cigarettes     Quit date: 2019     Years since quittin.3    Smokeless tobacco: Never Used   Vaping Use    Vaping Use: Never used   Substance Use Topics    Alcohol use: Yes     Comment: Rarely    Drug use: No   ,   Family History   Problem Relation Age of Onset    Heart Disease Other     High Blood Pressure Other     Diabetes Other    ,   There is no immunization history on file for this patient. PHYSICAL EXAMINATION:  [ INSTRUCTIONS:  \"[x]\" Indicates a positive item  \"[]\" Indicates a negative item  -- DELETE ALL ITEMS NOT EXAMINED]  Vital Signs: (As obtained by patient/caregiver or practitioner observation)    Blood pressure-  Heart rate-    Respiratory rate-    Temperature-  Pulse oximetry-     Constitutional: [x] Appears well-developed and well-nourished [x] No apparent distress      [] Abnormal-   Mental status  [x] Alert and awake  [x] Oriented to person/place/time [x]Able to follow commands      Eyes:  EOM    [x]  Normal  [] Abnormal-  Sclera  [x]  Normal  [] Abnormal -         Discharge [x]  None visible  [] Abnormal -    HENT:   [x] Normocephalic, atraumatic. [] Abnormal   [x] Mouth/Throat: Mucous membranes are moist.     External Ears [x] Normal  [] Abnormal-     Neck: [x] No visualized mass     Pulmonary/Chest: [x] Respiratory effort normal.  [x] No visualized signs of difficulty breathing or respiratory distress        [] Abnormal-      Musculoskeletal:   [] Normal gait with no signs of ataxia         [] Normal range of motion of neck        [] Abnormal-       Neurological:        [x] No Facial Asymmetry (Cranial nerve 7 motor function) (limited exam to video visit)          [x] No gaze palsy        [] Abnormal-         Skin:        [x] No significant exanthematous lesions or discoloration noted on facial skin         [] Abnormal-            Psychiatric:       [x] Normal Affect [x] No Hallucinations        [] Abnormal-     Other pertinent observable physical exam findings-     ASSESSMENT/PLAN:   Diagnosis Orders   1. Right upper quadrant pain  US GALLBLADDER RUQ   2. SOB (shortness of breath)     3.  Coronary artery disease involving native coronary artery of native heart without angina pectoris     4. Essential hypertension     5. NSTEMI (non-ST elevation myocardial infarction) ([facility]     6. Type 2 diabetes mellitus with foot ulcer, with long-term current use of insulin ([facility] [address] [facility]     7. Mixed hyperlipidemia     8. Former cigarette smoker     9. Testosterone deficiency     10. Depression, unspecified depression type       I am having Mr. Cyndi Wilburn maintain his :   Outpatient Medications Marked as Taking for the 1/25/22 encounter (Virtual Visit) with Radha Larson MD   Medication Sig Dispense Refill    pantoprazole (PROTONIX) 40 MG tablet Take 1 tablet by mouth every morning (before breakfast) 30 tablet 0   ,Patient states they are no longer utilizing these medication: There are no discontinued medications. I have refilled the following medication today:   Requested Prescriptions     Signed Prescriptions Disp Refills    pantoprazole (PROTONIX) 40 MG tablet 30 tablet 0     Sig: Take 1 tablet by mouth every morning (before breakfast)   . Cyndi Wilburn, was evaluated through a synchronous (real-time) audio-video encounter. The patient (or guardian if applicable) is aware that this is a billable service, which includes applicable co-pays. This Virtual Visit was conducted with patient's (and/or legal guardian's) consent. The visit was conducted pursuant to the emergency declaration under the [address] [place] [address], [address] [place] [address] authority and the [employer] and [employer] General Act. Patient identification was verified, and a caregiver was present when appropriate. The patient was located at home in a state where the provider was licensed to provide care. Total time spent on this encounter: Not billed by time    --Radha Larson MD on 1/25/2022 at 10:48 AM    An electronic signature was used to authenticate this note.

## 2022-01-27 PROBLEM — R10.13 EPIGASTRIC PAIN: Status: ACTIVE | Noted: 2022-01-01

## 2022-01-27 PROBLEM — I21.4 NSTEMI (NON-ST ELEVATED MYOCARDIAL INFARCTION) (HCC): Status: ACTIVE | Noted: 2022-01-01

## 2022-01-27 NOTE — PROGRESS NOTES
Called and spoke with Dr. Prosper Whipple about patient. He said to keep pt on heparin and plan for LHC first thing in the morning 1/28/2022. Pre-cath orders have been placed and the cath lab has been made aware of procedure for 1/28/2022.

## 2022-01-27 NOTE — ED PROVIDER NOTES
[facility]  eMERGENCY dEPARTMENT eNCOUnter      Pt Name: Cyndi Wilburn  MRN: [id number]  [place] 1970  Date of evaluation: 1/27/2022  Provider: Asher Rodgers MD    CHIEF COMPLAINT       Chief Complaint   Patient presents with    Abnormal Test Results     abnormal EKG, sent frm Dr Johanny Hudson office    Abdominal Pain     epigastric pain         HISTORY OF PRESENT ILLNESS   (Location/Symptom, Timing/Onset,Context/Setting, Quality, Duration, Modifying Factors, Severity)  Note limiting factors. Cyndi Wilburn is a 46 y.o. male who presents to the emergency department for epigastric discomfort. Patient states that for the past approximate 1 week he has had intermittent pressure and fullness in the epigastric region however this is not reproducible. Not exertional in this region but does admit that he has dyspnea on exertion. No chest pain. He states that often in the evenings around 6 or 7 PM he will notice some thoracic back pain however this also is not reproducible. No injuries. States usually by morning this seems to ease up as well. Pain in his epigastric region also worse when he lays back. Was supposed to have a gallbladder ultrasound tomorrow however he called Dr. Rolando Mata office last night and mention the discomfort and back pain and he was ultimately sent today from Dr. Rolando Mata office for further evaluation. He does have a extensive cardiac history and most recently had stents back in July by Dr. Beba Bhakta and also has a history of cardiac bypass. Tells me he has had some early satiety and has vomited twice within past week after eating. 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   I asked him to let me fully evaluate him here in emergency department and once we have work-up complete we can further discuss. HPI    NursingNotes were reviewed. REVIEW OF SYSTEMS    (2-9 systems for level 4, 10 or more for level 5)     Review of Systems   Constitutional: Negative for chills and fever. HENT: Negative for rhinorrhea and sore throat. Respiratory: Positive for shortness of breath. Negative for cough. Cardiovascular: Negative for chest pain and leg swelling. Gastrointestinal: Positive for nausea and vomiting. Negative for abdominal pain and diarrhea. Genitourinary: Negative for dysuria and frequency. Musculoskeletal: Positive for back pain. Negative for neck pain. Neurological: Negative for dizziness and headaches. All other systems reviewed and are negative. PAST MEDICALHISTORY     Past Medical History:   Diagnosis Date    Arthritis     CAD (coronary artery disease)     Cigarette smoker 9/2/2014    Diabetes ([facility] [address]     Erectile dysfunction     History of nephrolithiasis     Hyperlipidemia     Hypertension     Obesity     Type II or unspecified type diabetes mellitus without mention of complication, not stated as uncontrolled          SURGICAL HISTORY       Past Surgical History:   Procedure Laterality Date    CARDIAC CATHETERIZATION  2014    CARDIAC CATHETERIZATION  3/14/12    CARDIAC CATHETERIZATION  9/2/14  JDT    stent to mid LAD.  EF 50%    COLONOSCOPY N/A 7/1/2020    Dr MANUEL Choudhury-increased tortuosity of the colon, piecemeal, AP x 1, HP x 2, 1 yr recall    COLONOSCOPY  7/1/2020    Dr Lucio Choudhury-increased tortuosity of the colon, piecemeal, AP x 1, HP x 2, 1 yr recall    CORONARY ARTERY BYPASS GRAFT      CORONARY ARTERY BYPASS GRAFT  3/15/2012    3V CABG (LIMA-Diag, SVG-RCA, SVG-OM) JPO    DIAGNOSTIC CARDIAC CATH LAB PROCEDURE      EXPLORATION OF WOUND OF EXTREMITY N/A 1/28/2019    EXCISIONAL DEBRIDEMENT OF SKIN, SUBCUTANEOUS TISSUE, MUSCLE AND BONE 14CM X 3CM X 3CM DEEP performed by Yaya Bernard MD at [place] [address] SURGERY      HERNIA REPAIR      [facility]    INCISION AND DRAINAGE Left 1/25/2019    EXPLORATION OF LEFT FOOT; EXCISIONAL DEBRIDEMENT OF SUBCUTANEOUS SKIN, TISSUE AND MUSCLE; AMPUTATION OF THIRD TOE AT METATARSAL PHALYNGEAL JOINT performed by Zach Vasquez MD at [address] [facility] [address] [facility]      TOE AMPUTATION  2017    TONSILLECTOMY           CURRENT MEDICATIONS     Previous Medications    ALBUTEROL SULFATE  (90 BASE) MCG/ACT INHALER    INHALE 1 PUFF INTO THE LUNGS EVERY 6 HOURS AS NEEDED FOR WHEEZING OR SHORTNESS OF BREATH. ASCORBIC ACID (VITAMIN C) 1000 MG TABLET    Take 500 mg by mouth daily     ASPIRIN 81 MG EC TABLET    Take 1 tablet by mouth daily    ATORVASTATIN (LIPITOR) 40 MG TABLET    Take 1 tablet by mouth nightly    BASAGLAR KWIKPEN 100 UNIT/ML INJECTION PEN    Inject 50 Units into the skin 2 times daily     BUPROPION (WELLBUTRIN XL) 150 MG EXTENDED RELEASE TABLET    TAKE 1 TABLET BY MOUTH 2 TIMES DAILY    BUSPIRONE (BUSPAR) 15 MG TABLET    TAKE 1 TABLET BY MOUTH TWICE A DAY    CARVEDILOL (COREG) 25 MG TABLET    Take 1 tablet by mouth 2 times daily (with meals)    CLOPIDOGREL (PLAVIX) 75 MG TABLET    Take 1 tablet by mouth daily    CONTINUOUS BLOOD GLUC  (DEXCOM G6 ) JOVANY    1 each by Does not apply route continuous DX E11.62, L97.509, Z79.4    CONTINUOUS BLOOD GLUC SENSOR (DEXCOM G6 SENSOR) MISC    1 each by Does not apply route continuous E11.62, L97.509, Z79.4    CONTINUOUS BLOOD GLUC TRANSMIT (DEXCOM G6 TRANSMITTER) MISC    1 each by Does not apply route continuous E11.62, L97.509, Z79.4    DIABETIC SHOE MISC    by Does not apply route Dx W93.931, Z79.4    FUROSEMIDE (LASIX) 40 MG TABLET    TAKE 1 TABLET BY MOUTH TWICE A DAY    GABAPENTIN (NEURONTIN) 300 MG CAPSULE    Take 1 capsule by mouth 2 times daily for 30 days. Patient is advised to take 1 afternoon around 4 or 5 and the other at bedtime. This is for peripheral neuropathy.     GLIPIZIDE (GLUCOTROL) 10 MG TABLET Take 10 mg by mouth 2 times daily (before meals)    INSULIN LISPRO, 1 UNIT DIAL, (HUMALOG KWIKPEN) 100 UNIT/ML SOPN    Inject 10 Units into the skin 3 times daily (before meals)    IPRATROPIUM-ALBUTEROL (DUONEB) 0.5-2.5 (3) MG/3ML SOLN NEBULIZER SOLUTION    INHALE 3 MLS INTO THE LUNGS EVERY 6 HOURS AS NEEDED FOR SHORTNESS OF BREATH    LISINOPRIL (PRINIVIL;ZESTRIL) 20 MG TABLET    Take 1 tablet by mouth 2 times daily    MELOXICAM (MOBIC) 15 MG TABLET    Take 1 tablet by mouth daily    MISC NATURAL PRODUCTS (APPLE CIDER VINEGAR DIET PO)    Take by mouth    MONTELUKAST (SINGULAIR) 10 MG TABLET    TAKE 1 TABLET BY MOUTH EVERY DAY    NEBULIZERS (COMPRESSOR/NEBULIZER) MISC    1 each by Does not apply route 4 times daily as needed (cough)    NEEDLES & SYRINGES MISC    1 each by Does not apply route every 21 days Needs 3 cc syringes with 22 G  1/2 inch needle to give Testosterone and needs 18 Gauge to draw up med. NITROGLYCERIN (NITROSTAT) 0.4 MG SL TABLET    Place 1 tablet under the tongue every 5 minutes as needed for Chest pain up to max of 3 total doses. If no relief after 1 dose, call 911. PANTOPRAZOLE (PROTONIX) 40 MG TABLET    Take 1 tablet by mouth every morning (before breakfast)    TAMSULOSIN (FLOMAX) 0.4 MG CAPSULE    Take 0.4 mg by mouth daily    TESTOSTERONE CYPIONATE (DEPOTESTOTERONE CYPIONATE) 200 MG/ML INJECTION    INJECT 1 ML INTRAMUSCULARLY EVERY 21 DAYS    TIZANIDINE (ZANAFLEX) 4 MG TABLET    Take 4 mg by mouth every 6 hours as needed    VITAMIN D (ERGOCALCIFEROL) 1.25 MG (69392 UT) CAPS CAPSULE    Take 1 capsule by mouth twice a week    ZINC 50 MG TABS TABLET    Take 50 mg by mouth daily       ALLERGIES     Patient has no known allergies.     FAMILY HISTORY       Family History   Problem Relation Age of Onset    Heart Disease Other     High Blood Pressure Other     Diabetes Other           SOCIAL HISTORY       Social History     Socioeconomic History    Marital status:      Spouse name: None    Number of children: None    Years of education: None    Highest education level: None   Occupational History    None   Tobacco Use    Smoking status: Former Smoker     Packs/day: 1.00     Years: 30.00     Pack years: 30.00     Types: Cigarettes     Quit date: 2019     Years since quittin.3    Smokeless tobacco: Never Used   Vaping Use    Vaping Use: Never used   Substance and Sexual Activity    Alcohol use: Yes     Comment: Rarely    Drug use: No    Sexual activity: Yes     Partners: Female   Other Topics Concern    None   Social History Narrative    ** Merged History Encounter **          Social Determinants of Health     Financial Resource Strain:     Difficulty of Paying Living Expenses: Not on file   Food Insecurity:     Worried About Running Out of Food in the Last Year: Not on file    Mario of Food in the Last Year: Not on file   Transportation Needs:     Lack of Transportation (Medical): Not on file    Lack of Transportation (Non-Medical):  Not on file   Physical Activity:     Days of Exercise per Week: Not on file    Minutes of Exercise per Session: Not on file   Stress:     Feeling of Stress : Not on file   Social Connections:     Frequency of Communication with Friends and Family: Not on file    Frequency of Social Gatherings with Friends and Family: Not on file    Attends Restoration Services: Not on file    Active Member of [address] [place] [address] or Organizations: Not on file    Attends Club or Organization Meetings: Not on file    Marital Status: Not on file   Intimate Partner Violence:     Fear of Current or Ex-Partner: Not on file    Emotionally Abused: Not on file    Physically Abused: Not on file    Sexually Abused: Not on file   Housing Stability:     Unable to Pay for Housing in the Last Year: Not on file    Number of [place] in the Last Year: Not on file    Unstable Housing in the Last Year: Not on file       SCREENINGS    Marika Coma Scale  Eye Opening: Spontaneous  Best Verbal Response: Oriented  Best Motor Response: Obeys commands  [place] Coma Scale Score: 15        PHYSICAL EXAM    (up to 7 for level 4, 8 or more for level 5)     ED Triage Vitals [01/27/22 1138]   BP Temp Temp Source Pulse Resp SpO2 Height Weight   (!) 153/93 98.1 °F (36.7 °C) Oral 83 17 92 % -- --       Physical Exam  Vitals and nursing note reviewed. Constitutional:       Appearance: Normal appearance. He is well-developed. He is not ill-appearing or diaphoretic. HENT:      Head: Normocephalic and atraumatic. Eyes:      Conjunctiva/sclera: Conjunctivae normal.   Neck:      Trachea: No tracheal deviation. Cardiovascular:      Rate and Rhythm: Normal rate and regular rhythm. Pulses: Normal pulses. Heart sounds: Normal heart sounds. No murmur heard. Pulmonary:      Effort: Pulmonary effort is normal.      Breath sounds: Normal breath sounds. No wheezing or rales. Abdominal:      Palpations: Abdomen is soft. There is no mass. Tenderness: There is no abdominal tenderness. There is no right CVA tenderness or left CVA tenderness. Musculoskeletal:         General: Normal range of motion. Cervical back: Normal range of motion and neck supple. Back:       Right lower leg: No edema. Left lower leg: No edema. Comments: Reports pain in area depicted however this was not reproducible on exam   Skin:     General: Skin is warm and dry. Neurological:      Mental Status: He is alert and oriented to person, place, and time.          DIAGNOSTIC RESULTS     EKG: All EKG's areinterpreted by the Emergency Department Physician who either signs or Co-signs this chart in the absence of a cardiologist.    76 normal sinus rhythm T wave inversions in 1 aVL V6 and lead II no obvious elevations or depressions, nondiagnostic EKG, overall similar to July EKG comparisons    RADIOLOGY:  Non-plain film images such as CT, Ultrasound and MRI are read by the radiologist. Carlos Teran radiographic images are visualized and preliminarily interpreted bythe emergency physician with the below findings:      CTA PULMONARY W CONTRAST   Final Result   1. No evidence of pulmonary emboli. 2. No aortic aneurysm or dissection. Atheromatous changes of coronary   arteries. 3. Extensive groundglass infiltrate in the lungs bilaterally   suggesting an inflammatory/infectious process. The second possibility   may suggest pulmonary congestion/pulmonary edema. 4. Moderate to large bibasal pleural effusion. Signed by Dr Albertina Vidal   Final Result   1. The persistent pulmonary vascular congestion, small bibasilar   pleural effusion and cardiomegaly. 2. Discoid atelectatic changes in the lower lungs, more pronounced in   the left lower lung.    Signed by Dr Lydia Ramirez:  Aleah [place] - Abnormal; Notable for the following components:       Result Value    Glucose 242 (*)     Total Protein 6.1 (*)     Albumin 3.0 (*)     All other components within normal limits   CBC WITH AUTO DIFFERENTIAL - Abnormal; Notable for the following components:    Hemoglobin 13.4 (*)     MCV 95.3 (*)     MCHC 29.9 (*)     Lymphocytes % 19.3 (*)     Monocytes % 12.5 (*)     Monocytes Absolute 1.10 (*)     All other components within normal limits   LIPASE - Abnormal; Notable for the following components:    Lipase 74 (*)     All other components within normal limits   URINE RT REFLEX TO CULTURE - Abnormal; Notable for the following components:    Glucose, Ur 250 (*)     Blood, Urine SMALL (*)     All other components within normal limits   TROPONIN - Abnormal; Notable for the following components:    Troponin 0.30 (*)     All other components within normal limits    Narrative:     CALL  Santos  LAURA tel. ,  Chemistry results called to and read back by meredith kaye rn,  01/27/2022 12:22, by [address] - Abnormal; Notable for the following components:    Pro-BNP 3,485 (*)     All other components within normal limits   TROPONIN - Abnormal; Notable for the following components:    Troponin 0.31 (*)     All other components within normal limits    Narrative:     CALL  Santos  KLED tel. ,  Chemistry results called to and read back by meredith kaye,  01/27/2022 14:14, by [facility] - Abnormal; Notable for the following components:    Bacteria, UA NEGATIVE (*)     Crystals, UA NEG (*)     All other components within normal limits   COVID-19, RAPID   PROTIME-INR   APTT   APTT   APTT       All other labs were within normal range or not returned as of this dictation. EMERGENCY DEPARTMENT COURSE and DIFFERENTIAL DIAGNOSIS/MDM:   Vitals:    Vitals:    01/27/22 1138 01/27/22 1430 01/27/22 1548   BP: (!) 153/93 (!) 149/88    Pulse: 83 96 86   Resp: 17 18 17   Temp: 98.1 °F (36.7 °C)     TempSrc: Oral     SpO2: 92% 92% 97%       MDM  Number of Diagnoses or Management Options     Amount and/or Complexity of Data Reviewed  Clinical lab tests: ordered and reviewed  Tests in the radiology section of CPT®: ordered and reviewed  Independent visualization of images, tracings, or specimens: yes      ekg stable from last comparisons, atypical description of pain however trop elevated but lower than last admission, does have likely pulm edema and chf exacerbation which could be some of trop elevation, cta neg for PE, pt well appearing here, no abd pain etc seems atypical for GI etiology as well. D/w hospitalist for admission      CONSULTS:  [address]:  Unless otherwise noted below, none     Procedures    FINAL IMPRESSION      1. Acute chest pain    2. Congestive heart failure, unspecified HF chronicity, unspecified heart failure type [facility])          DISPOSITION/PLAN   DISPOSITION Admitted 01/27/2022 03:05:57 PM      PATIENT REFERRED TO:  No follow-up provider specified.     DISCHARGE MEDICATIONS:  New Prescriptions    No medications on file          (Please note that portions of this note were completed with a voice recognition program.  Efforts were made to edit thedictations but occasionally words are mis-transcribed.)    Vince Morataya MD (electronically signed)  Attending Emergency Physician        Jerome Ramos MD  01/27/22 0064

## 2022-01-27 NOTE — CARE COORDINATION
Date / Time of Evaluation: 1/27/2022 2:53 PM  Assessment Completed by: Anita Pradhan    Patient Admission Status:     [address]    [phone] (home)   Telephone Information:   Mobile [phone]       (Best Practice:  Have patient / caregiver verify above address and phone number by stating out loud their current address and reachable phone number.)  Is above information correct? yes      Current PCP:  Bushra Christopher MD  PCP verified? yes    Initial Assessment Completed at bedside with:  Patient     Emergency Contacts:  Extended Emergency Contact Information  Primary Emergency Contact: [facility]  Mobile Phone: [phone]  Relation: Brother/Sister    Advance Directives: Code Status:  Prior    Financial:  Payor: Renee Benitez 150 / Plan: Holly Pair / Product Type: *No Product type* /     Pre-Cert required for SNF: no     Have [address] [place] [address] Avenue:  no    Pharmacy:   [facility] [address] [place] [address], [address] [place] [address] [phone]  [address] [place] [address]  Phone: [phone] Fax: Nik Darby [address] - P [phone] [place] [address] Dr Siddhartha Darby [address] [place] [address]  Phone: [phone] Fax: [phone]      Potential assistance purchasing medications? yes    ADLS:  Support System:   independent     Current Home Environment:  Lives alone    Plans to RETURN to current housing: yes  Barriers to RETURNING to current housing:  no    Currently ACTIVE with Home Health CARE: yes  [address] [place] [address]:  [facility] YOAN MOSS    Has a pulse oximetry unit at home: no    Had [address] [facility] prior to admission:  yes  Lincoln Torres [address]:  At [facility] - only at night 2L  Informed of need to bring portable home O2 tank to hospital on day of DISCHARGE:  Has one in the truck  Name of person committed to bringing portable tank at discharge:       Active with HD/PD prior to admission: no  Nephrologist:  [address] for Discharge: his vehicle is here    Barriers to discharge: none at this time      Additional CM/SW Notes: SW met with pt at bedside. Pt states he lives alone. He is independent. He is current with [facility] services and would like it resumed at OR. States he has a diabetic wound, stepped on nail. He states he forgot to mention this to the physician, he may supplies to clean it here. Informed nurse. Ady Urrutia and/or his family were provided with choice of provider.         Aris   Care Management Department  Ph:  [phone]  Fax: [phone]

## 2022-01-27 NOTE — ED NOTES
Report called to [facility]. Pt to go to 7th floor.      Neli Lord RN  01/27/22 3078

## 2022-01-27 NOTE — H&P
[facility] - History & Physical      PCP: Genevieve Fountain MD    Date of Admission: 1/27/2022    Date of Service: 1/27/2022    Chief Complaint: ARRINGTON epigastric Pain     History Of Present Illness: The patient is a 46 y.o. male with past medical history of CAD with recent stents, HTN, HLD, diabetes, and arthritis who presented to [address] [place] [address] ED complaining of worsening dyspnea on exertion over the past week. Mr. Wang Angiuano reported epigastric pain and worsening dyspnea on exertion over the past week. Reports orthopnea. Also reported having nausea. Stated he was supposed to have gallbladder ultrasound tomorrow morning to rule out gallbladder disease. Stated he was started on 2 L O2 at nighttime and daytime naps yesterday. Reported cardiac stents in July 2021. Work-up in ED revealed troponin 0.30, with repeat 0.31, BNP 3485, Hgb 13.4, chest x-ray showed persistent pulmonary vascular congestion, small bibasilar pleural effusion and cardiomegaly. CTA negative for PE, extensive groundglass infiltrate in the lungs bilaterally suggesting inflammatory/infectious process, may suggest pulmonary congestion/pulmonary edema, moderate to large bibasal pleural effusion. Patient is being admitted to hospital medicine for NSTEMI with cardiology consultation. Heparin drip was started in ED. Past Medical History:        Diagnosis Date    Arthritis     CAD (coronary artery disease)     Cigarette smoker 9/2/2014    Diabetes ([address]     Erectile dysfunction     History of nephrolithiasis     Hyperlipidemia     Hypertension     Obesity     Type II or unspecified type diabetes mellitus without mention of complication, not stated as uncontrolled        Past Surgical History:        Procedure Laterality Date    CARDIAC CATHETERIZATION  2014    CARDIAC CATHETERIZATION  3/14/12    CARDIAC CATHETERIZATION  9/2/14  JDT    stent to mid LAD.  EF 50%    COLONOSCOPY N/A 7/1/2020    Dr Craig Jackson tortuosity of the colon, piecemeal, AP x 1, HP x 2, 1 yr recall    COLONOSCOPY  7/1/2020    Dr MANUEL Choudhury-increased tortuosity of the colon, piecemeal, AP x 1, HP x 2, 1 yr recall    CORONARY ARTERY BYPASS GRAFT      CORONARY ARTERY BYPASS GRAFT  3/15/2012    3V CABG (LIMA-Diag, SVG-RCA, SVG-OM) JPO    DIAGNOSTIC CARDIAC CATH LAB PROCEDURE      EXPLORATION OF WOUND OF EXTREMITY N/A 1/28/2019    EXCISIONAL DEBRIDEMENT OF SKIN, SUBCUTANEOUS TISSUE, MUSCLE AND BONE 14CM X 3CM X 3CM DEEP performed by Ladan Mullen MD at [facility] 238 Left 1/25/2019    EXPLORATION OF LEFT FOOT; EXCISIONAL DEBRIDEMENT OF SUBCUTANEOUS SKIN, TISSUE AND MUSCLE; AMPUTATION OF THIRD TOE AT METATARSAL PHALYNGEAL JOINT performed by Ladan Mullen MD at [address] [place] [address]      TOE AMPUTATION  2017    TONSILLECTOMY         Home Medications:  Prior to Admission medications    Medication Sig Start Date End Date Taking?  Authorizing Provider   pantoprazole (PROTONIX) 40 MG tablet Take 1 tablet by mouth every morning (before breakfast) 1/25/22   Genevieve Fountain MD   Continuous Blood Gluc Sensor (DEXCOM G6 SENSOR) MISC 1 each by Does not apply route continuous E11.62, L97.509, Z79.4 1/4/22   Genevieve Fountain MD   Continuous Blood Gluc Transmit (DEXCOM G6 TRANSMITTER) MISC 1 each by Does not apply route continuous E11.62, L97.509, Z79.4 1/4/22   Genevieve Fountain MD   buPROPion (WELLBUTRIN XL) 150 MG extended release tablet TAKE 1 TABLET BY MOUTH 2 TIMES DAILY 12/7/21   Genevieve Fountain MD   furosemide (LASIX) 40 MG tablet TAKE 1 TABLET BY MOUTH TWICE A DAY 12/7/21   Genevieve Fountain MD   busPIRone (BUSPAR) 15 MG tablet TAKE 1 TABLET BY MOUTH TWICE A DAY 12/7/21   Genevieve Fountain MD   montelukast (SINGULAIR) 10 MG tablet TAKE 1 TABLET BY MOUTH EVERY DAY 10/5/21   Genevieve Fountain MD   atorvastatin (LIPITOR) 40 MG tablet Take 1 tablet by mouth nightly 10/5/21   Genevieve Fountain MD clopidogrel (PLAVIX) 75 MG tablet Take 1 tablet by mouth daily 10/5/21   Hardeep Hayward MD   meloxicam [facility]) 15 MG tablet Take 1 tablet by mouth daily 10/5/21   Hardeep Hayward MD   Diabetic Shoe MISC by Does not apply route Dx E11.621, Z79.4 10/5/21   Hardeep Hayward MD   insulin lispro, 1 Unit Dial, ([facility]) 100 UNIT/ML SOPN Inject 10 Units into the skin 3 times daily (before meals) 10/4/21   Hardeep Hayward MD   testosterone cypionate (DEPOTESTOTERONE CYPIONATE) 200 MG/ML injection INJECT 1 ML INTRAMUSCULARLY EVERY 21 DAYS 8/20/21 1/27/22  Hardeep Hayward MD   tiZANidine (ZANAFLEX) 4 MG tablet Take 4 mg by mouth every 6 hours as needed    Historical Provider, MD   Misc Natural Products (APPLE CIDER VINEGAR DIET PO) Take by mouth    Historical Provider, MD   albuterol sulfate  (90 Base) MCG/ACT inhaler INHALE 1 PUFF INTO THE LUNGS EVERY 6 HOURS AS NEEDED FOR WHEEZING OR SHORTNESS OF BREATH. 8/2/21   Hardeep Hayward MD   aspirin 81 MG EC tablet Take 1 tablet by mouth daily 7/5/21   Josesito Aceves MD   carvedilol (COREG) 25 MG tablet Take 1 tablet by mouth 2 times daily (with meals) 7/4/21   Josesito Aceves MD   nitroGLYCERIN (NITROSTAT) 0.4 MG SL tablet Place 1 tablet under the tongue every 5 minutes as needed for Chest pain up to max of 3 total doses. If no relief after 1 dose, call 911. 7/2/21   Hardeep Hayward MD   gabapentin (NEURONTIN) 300 MG capsule Take 1 capsule by mouth 2 times daily for 30 days. Patient is advised to take 1 afternoon around 4 or 5 and the other at bedtime. This is for peripheral neuropathy.  6/28/21 1/27/22  Hardeep Hayward MD   zinc 50 MG TABS tablet Take 50 mg by mouth daily    Historical Provider, MD   Ascorbic Acid (VITAMIN C) 1000 MG tablet Take 500 mg by mouth daily     Historical Provider, MD   vitamin D (ERGOCALCIFEROL) 1.25 MG (63131 UT) CAPS capsule Take 1 capsule by mouth twice a week 3/25/21   Hardeep Hayward MD   ipratropium-albuterol (DUONEB) 0.5-2.5 (3) MG/3ML SOLN nebulizer solution INHALE 3 MLS INTO THE LUNGS EVERY 6 HOURS AS NEEDED FOR SHORTNESS OF BREATH 10/21/20   Brandy Friedman MD   Nebulizers (COMPRESSOR/NEBULIZER) MISC 1 each by Does not apply route 4 times daily as needed (cough) 9/28/20   Brandy Friedman MD   Continuous Blood Gluc  ([address]) [address] 1 each by Does not apply route continuous DX E11.62, L97.509, Z79.4 9/28/20   Brandy Friedman MD   tamsulosin (FLOMAX) 0.4 MG capsule Take 0.4 mg by mouth daily    Historical Provider, MD   lisinopril (PRINIVIL;ZESTRIL) 20 MG tablet Take 1 tablet by mouth 2 times daily  Patient taking differently: Take 10 mg by mouth 2 times daily  6/3/20   MELITON Niño   Needles & Syringes MISC 1 each by Does not apply route every 21 days Needs 3 cc syringes with 22 G  1/2 inch needle to give Testosterone and needs 18 Gauge to draw up med. 3/9/20   MD Jeff [facility] 100 UNIT/ML injection pen Inject 50 Units into the skin 2 times daily  2/20/20   Brandy Friedman MD   glipiZIDE (GLUCOTROL) 10 MG tablet Take 10 mg by mouth 2 times daily (before meals)    Historical Provider, MD       Allergies:    Patient has no known allergies. Social History:    The patient currently lives at home  Tobacco:   reports that he quit smoking about 2 years ago. His smoking use included cigarettes. He has a 30.00 pack-year smoking history. He has never used smokeless tobacco.  Alcohol:   reports current alcohol use. Illicit Drugs: denies    Family History:      Problem Relation Age of Onset    Heart Disease Other     High Blood Pressure Other     Diabetes Other            Review of Systems   Constitutional: Negative for chills, diaphoresis, fatigue and fever. HENT: Negative for congestion and ear pain. Eyes: Negative for visual disturbance. Respiratory: Positive for shortness of breath. Negative for cough and wheezing.          Reports ARRINGTON, and orthopnea   Cardiovascular: Positive for leg swelling. Negative for chest pain and palpitations. Gastrointestinal: Positive for abdominal pain and nausea. Negative for abdominal distention, blood in stool, constipation, diarrhea and vomiting. Reports epigastric pain   Endocrine: Negative for cold intolerance and heat intolerance. Genitourinary: Negative for difficulty urinating, flank pain, frequency and urgency. Musculoskeletal: Positive for back pain. Negative for arthralgias and myalgias. Skin: Negative for color change and wound. Neurological: Negative for dizziness, syncope, weakness, light-headedness, numbness and headaches. Hematological: Does not bruise/bleed easily. Psychiatric/Behavioral: Negative for agitation, confusion and dysphoric mood. Physical Examination:  BP (!) 149/88   Pulse 86   Temp 98.1 °F (36.7 °C) (Oral)   Resp 17   SpO2 97%     Physical Exam  Constitutional:       General: He is not in acute distress. Appearance: Normal appearance. He is not ill-appearing. HENT:      Head: Normocephalic and atraumatic. Right Ear: External ear normal.      Left Ear: External ear normal.      Nose: Nose normal.      Mouth/Throat:      Mouth: Mucous membranes are moist.   Eyes:      Extraocular Movements: Extraocular movements intact. Conjunctiva/sclera: Conjunctivae normal.      Pupils: Pupils are equal, round, and reactive to light. Cardiovascular:      Rate and Rhythm: Normal rate and regular rhythm. Pulses: Normal pulses. Heart sounds: Normal heart sounds. Pulmonary:      Effort: Pulmonary effort is normal. No respiratory distress. Breath sounds: Normal breath sounds. No wheezing, rhonchi or rales. Abdominal:      General: Bowel sounds are normal. There is no distension. Palpations: Abdomen is soft. Tenderness: There is no abdominal tenderness. Musculoskeletal:         General: Swelling present. No tenderness or deformity. Normal range of motion.       Cervical back: Normal range of motion and neck supple. No muscular tenderness. Right lower leg: No edema. Left lower leg: No edema. Comments: BLE swelling   Skin:     General: Skin is warm and dry. Findings: No bruising or lesion. Neurological:      Mental Status: He is alert and oriented to person, place, and time. Psychiatric:         Mood and Affect: Mood normal.         Behavior: Behavior normal.         Thought Content: Thought content normal.          Diagnostic Data:  CBC:  Recent Labs     01/27/22  1145   WBC 8.5   HGB 13.4*   HCT 44.8        BMP:  Recent Labs     01/27/22  1145      K 4.9      CO2 29   BUN 19   CREATININE 1.1   CALCIUM 9.0     Recent Labs     01/27/22  1145   AST 15   ALT 21   BILITOT 0.5   ALKPHOS 106     Coag Panel:   Recent Labs     01/27/22  1145 01/27/22  1512   INR 0.97  --    PROTIME 13.1  --    APTT  --  31.7     Cardiac Enzymes:   Recent Labs     01/27/22  1145 01/27/22  1346   TROPONINI 0.30* 0.31*     ABGs:  Lab Results   Component Value Date    PO2ART 144 03/15/2012     Urinalysis:  Lab Results   Component Value Date    NITRU Negative 01/27/2022    WBCUA 1 01/27/2022    BACTERIA NEGATIVE 01/27/2022    RBCUA 2 01/27/2022    BLOODU SMALL 01/27/2022    SPECGRAV 1.018 01/27/2022    GLUCOSEU 250 01/27/2022     A1C: No results for input(s): LABA1C in the last 72 hours. ABG:No results for input(s): PHART, NPQ0ATQ, PO2ART, FXF7NLS, BEART, HGBAE, X5YAHTUP, CARBOXHGBART in the last 72 hours. RAD:    XR CHEST (2 VW)    Result Date: 1/27/2022  Exam:   XR CHEST (2 VW)  Date:  1/27/2022 History:  Male, age  46 years; R06.02 COMPARISON:  None. Findings : Mild-to-moderate cardiomegaly. Median sternotomy wires appear similar. Interstitial edema. Small left pleural effusion. Trace right pleural effusion. Associated opacities. The bones show no acute pathology. Impression: Fluid overload. Clinical correlation to exclude superimposed infection is recommended. Signed by Dr Emily Whitfield    XR CHEST PORTABLE    Result Date: 1/27/2022  Examination. XR CHEST PORTABLE 1/27/2022 12:10 PM History: Chest pain. A frontal portable upright view of the chest is compared with the previous study dated 1/27/2022. The lungs are poorly inflated. There is a persistent pulmonary vascular congestion in the lungs bilaterally. There are atelectatic changes in the lower lungs, left more than the right, similar to the previous study. There is small bibasilar pleural effusion. There is moderate cardiomegaly. There is evidence of previous cardiac surgery. No acute bony abnormality. 1. The persistent pulmonary vascular congestion, small bibasilar pleural effusion and cardiomegaly. 2. Discoid atelectatic changes in the lower lungs, more pronounced in the left lower lung. Signed by Dr Scotty Jeffers    Result Date: 1/27/2022  Examination. CTA PULMONARY W CONTRAST 1/27/2022 12:37 PM History: Chest pain and shortness of breath. DLP: 7 7 7 mGycm. The CT angiography of the chest are performed after intravenous contrast enhancement. The images are acquired in axial plane and subsequent 2-D reconstruction in coronal and sagittal planes and 3-D maximum intensity projection reconstruction. There is no previous study for comparison. The correlation made with radiographs of the chest obtained earlier today. There is normal opacification of the pulmonary arteries and branches. No filling defects in the visualized pulmonary arterial bed. The RV/LV ratio is 43/59 which is normal. No finding to suggest right heart strain. Atheromatous changes thoracic aorta are noted. No aneurysmal dilatation. Severe atheromatous changes of coronary arteries are seen. There is evidence of mediastinal lymphadenopathy. An aortopulmonic window lymph node measures 1.7 cm. A level 2R lymph node measures 1.8 cm. A subcarinal lymph node measures 1.9 cm.  The limited visualized soft tissues of the neck appears unremarkable. There is a low-density nodule in the right lobe of the thyroid gland which is incompletely evaluated. No axillary lymphadenopathy. There is extensive groundglass infiltrate in the lungs bilaterally. There is bilateral lower lobar consolidation and moderate to large volume bibasal pleural effusion. The limited visualized liver and spleen are unremarkable. Moderate lobulation of the anterior glands bilaterally is noted. The kidneys, the pancreas and gallbladder is incompletely visualized and not fully evaluated. The images reviewed in bone window show chronic degenerative changes of the lumbar spine with mild dextroscoliosis. 1. No evidence of pulmonary emboli. 2. No aortic aneurysm or dissection. Atheromatous changes of coronary arteries. 3. Extensive groundglass infiltrate in the lungs bilaterally suggesting an inflammatory/infectious process. The second possibility may suggest pulmonary congestion/pulmonary edema. 4. Moderate to large bibasal pleural effusion. Signed by Dr Neto Serna      Assessment/Plan:  Principal Problem:    NSTEMI (non-ST elevated myocardial infarction) [facility])  Active Problems:    Coronary artery disease involving native coronary artery of native heart without angina pectoris    Essential hypertension    Mixed hyperlipidemia    Type 2 diabetes mellitus with foot ulcer, with long-term current use of insulin (HCC)    Epigastric pain  Resolved Problems:    * No resolved hospital problems.  *     Principal Problem:    NSTEMI (non-ST elevated myocardial infarction) ([facility])/  Coronary artery disease involving native coronary artery of native heart without angina pectoris-   - Cardiology consultation and recommendations appreciated  - Aspirin and Lipitor  - Heparin gtt   - SL Nitro PRN  - ECHO   - Trend troponin   - FLP in a.m./ HgbA1c  - Serial and PRN EKGs  - Monitor on telemetry  - NPO after midnight        Active Problems:    Essential hypertension- noted, resume home medications, monitor BP closely      Mixed hyperlipidemia- noted, resume home medications      Type 2 diabetes mellitus with foot ulcer, with long-term current use of insulin (HCC)- Lantus, SSI, Accu-Chek, hypoglycemia treatment protocol in place      Epigastric pain- Gallbladder US, antiemetics as needed           DVT Prophylaxis:  Heparin gtt     Further Orders per Clinical course/attending. Signed:  Electronically signed by MELITON Schaefer CNP on 1/27/22 at 3:29 PM CST       EMR Dragon/Transcription disclaimer:   Much of this encounter note is an electronic transcription/translation of spoken language to printed text.  The electronic translation of spoken language may permit erroneous, or at times, nonsensical words or phrases to be inadvertently transcribed; although attempts have made to review the note for such errors, some may still exist.

## 2022-01-27 NOTE — ED NOTES
Pt continues to deny CP at this time. Pt does c/o dyspnea on exertion when he stood up to use urinal. O2 down to 89% but quickly increase to 94% once pt in bed.       Tressa Fishman RN  01/27/22 1399

## 2022-01-27 NOTE — TELEPHONE ENCOUNTER
Dr. Janusz Askew office called stating pt was in there with chest pain, SOA, middle back pain, abnormal EKG. Advised to sent pt to ER.

## 2022-01-27 NOTE — PROGRESS NOTES
Subjective:      Patient ID: Cindy Carrion is a 46 y.o. male. HPI this patient is seen here intermittently by the undersigned for management of chronic disease states. Additionally, we do see him when new problems arise. He was seen virtually on 1/25/22. The patient complained at that time that he had been gaining some weight recently. He had just reported that several days prior he started a diet in an effort to try to lose some of this weight. He was complaining of some pressure sensation inferior to the xiphoid process. He causes him to have increased belching. We did discuss the possibility of it being gallbladder disease and did in fact set him up for ultrasound which is scheduled to be done tomorrow. He stated that when he was trying to eat he could only eat 2 or 3 bites and then felt full. He had a lot of eructation. Patient then stated after short period of time he was able to go back and finish eating some more. On the night prior to our evaluation virtually he complained that he was having some difficulty breathing. Previously we did recognize the patient had had some shortness of breath quite some time ago and in fact got him oxygen. He use this only short period of time and then insisted on returning the oxygen. He ask us after his virtual visit if we can get him oxygen once again. We did have him come in and do resting and exercise pulse ox which did reflect change significant enough to get him oxygen. He did get the oxygen and states that this did help some. Nevertheless he presents today for evaluation with shortness of breath. Again he mentions that it he does have the pain in the inferior xiphoid process area. The patient is a known heart patient. He does have a history of prior CABG. He also presented with discomfort in July 2021.   He was found to have evolving myocardial infarction was transferred to [facility].  There he did undergo evaluation by Dr. Bhavik Renee and he did have placement of 1 stent he believes. The only pain the patient is having now is that pain described in the inferior xiphoid process. He does get short of breath much of the time. He states if he sits back in his recliner that he can be there for short period of time and then he has to sit up as he gets even short of breath then. EKG does show some change noted when compared to that of 7/21. There is a change in V2 whereas the R wave progression seems slow after lead to where it was negative in lead I. This was not noted on EKG of July. Additionally, both EKGs do show widening in the QRS complex indicative of conduction delay. Nonspecific ST wave changes are noted. We did obtain chest x-ray on Joe and this shows mild to moderate cardiomegaly. It was noted that he did have median sternotomy wires present. There was evidence of interstitial edema. He was noted to have a small left pleural effusion and a trace right pleural effusion. Associated opacities were noted. Radiology impression was fluid overload but we were unable to exclude superimposed infection. It is noted that the patient has not had any symptoms that would be suspicious for Covid. He states he stays tired all of the time but that is nothing new. He has not had testing for Covid though he does not know of a known contact. I wanted to send him to the cardiology clinic for further evaluation but upon calling to make referral there for evaluation to be seen today we were told that he should go to the emergency room so we are sending him to the emergency room for further evaluation and management. He does have a history of hyperlipidemia and hypertension. For his hypertension, currently he is taking Lasix at 40 mg twice daily. ,  Lisinopril 20 mg twice daily, Coreg 25 mg twice daily.   Previously he was a smoker but he quit smoking on the day that he has the myocardial infarction in July 2021.     At the last time that I was evaluating him in the office on 7/2/2021 he was having an acute non-ST elevated myocardial infarction. [facility] was transferred from this facility to [facility] for further evaluation and management. [facility] states that with regard to his heart he is doing well at this time though he is having some other issues.  Did receive stent placement and currently is on Plavix 75 mg p.o. daily which she is reportedly directed to take for 1 years duration. [facility] does have available nitroglycerin 0.4 to use sublingually as needed.  Additionally, he is on an 81 mg aspirin daily.     He does have diabetes mellitus. [facility] has been having extremely poor control of his diabetes. [facility] has been using only 40 units of Basaglar insulin but he has occasionally increase that to 60 units and states that it does not appear to make much difference.  He states that oftentimes his sugar runs between 3 and 400.  We did do A1c on him today since it had been back in January since it was last done.  A1c here today was in excess of 13.  I therefore I am going to have him increase his Basaglar up to 50 units but I will have him take it twice daily.  Also, I am going to add Humalog with each meal 3 times daily at 10 units to help improve postprandial sugars.  I did tell him that I wanted to follow him up in 1 month's time. He does have a Dexcom G6 and is wearing it today. He states that his blood sugar this morning before eating was 182.   He states it has been staying under 200 much of the time at this time.     Skyler does have significant peripheral neuropathy affecting bilateral lower extremities most notably at the feet.  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 bAner Robertsonb significant neuropathy that he has likely has not contributed to the fact that he does not know the nature of the wound affecting his right foot.  Certainly it could be even Temp 97.9 °F (36.6 °C) (Infrared)   Resp 16   Ht 6' 3\" (1.905 m)   Wt 279 lb (126.6 kg)   SpO2 96%   BMI 34.87 kg/m²   Assessment:        Diagnosis Orders   1. Shortness of breath  XR CHEST STANDARD (2 VW)    EKG 12 Lead   2. Epigastric pain     3. Edema, unspecified type      Interstitial edema noted on chest x-ray done here today. 4. Bilateral pleural effusion      Left greater than right. 5. NSTEMI (non-ST elevation myocardial infarction) ([address]     6. Essential hypertension     7. Coronary artery disease involving native coronary artery of native heart without angina pectoris     8. Type 2 diabetes mellitus with foot ulcer, with long-term current use of insulin ([address]     9.  Mixed hyperlipidemia               Plan:      I am having Mr. Chairez Carrier maintain his :   Outpatient Medications Marked as Taking for the 1/27/22 encounter (Office Visit) with Santos Hernandes MD   Medication Sig Dispense Refill    pantoprazole (PROTONIX) 40 MG tablet Take 1 tablet by mouth every morning (before breakfast) 30 tablet 0    Continuous Blood Gluc Sensor (DEXCOM G6 SENSOR) MISC 1 each by Does not apply route continuous E11.62, L97.509, Z79.4 6 each 5    Continuous Blood Gluc Transmit (DEXCOM G6 TRANSMITTER) MISC 1 each by Does not apply route continuous E11.62, L97.509, Z79.4 1 each 5    buPROPion (WELLBUTRIN XL) 150 MG extended release tablet TAKE 1 TABLET BY MOUTH 2 TIMES DAILY 180 tablet 1    furosemide (LASIX) 40 MG tablet TAKE 1 TABLET BY MOUTH TWICE A  tablet 1    busPIRone (BUSPAR) 15 MG tablet TAKE 1 TABLET BY MOUTH TWICE A  tablet 3    montelukast (SINGULAIR) 10 MG tablet TAKE 1 TABLET BY MOUTH EVERY DAY 90 tablet 3    atorvastatin (LIPITOR) 40 MG tablet Take 1 tablet by mouth nightly 90 tablet 3    clopidogrel (PLAVIX) 75 MG tablet Take 1 tablet by mouth daily 90 tablet 3    meloxicam (MOBIC) 15 MG tablet Take 1 tablet by mouth daily 90 tablet 3    Diabetic Shoe MISC by Does not apply route Dx E11.621, Z79.4 1 each 0    insulin lispro, 1 Unit Dial, (HUMALOG KWIKPEN) 100 UNIT/ML SOPN Inject 10 Units into the skin 3 times daily (before meals) 1 pen 5    testosterone cypionate (DEPOTESTOTERONE CYPIONATE) 200 MG/ML injection INJECT 1 ML INTRAMUSCULARLY EVERY 21 DAYS 3 mL 2    tiZANidine (ZANAFLEX) 4 MG tablet Take 4 mg by mouth every 6 hours as needed      Misc Natural Products (APPLE CIDER VINEGAR DIET PO) Take by mouth      albuterol sulfate  (90 Base) MCG/ACT inhaler INHALE 1 PUFF INTO THE LUNGS EVERY 6 HOURS AS NEEDED FOR WHEEZING OR SHORTNESS OF BREATH. 6.7 Inhaler 0    aspirin 81 MG EC tablet Take 1 tablet by mouth daily 30 tablet 3    carvedilol (COREG) 25 MG tablet Take 1 tablet by mouth 2 times daily (with meals) 60 tablet 3    nitroGLYCERIN (NITROSTAT) 0.4 MG SL tablet Place 1 tablet under the tongue every 5 minutes as needed for Chest pain up to max of 3 total doses. If no relief after 1 dose, call 911. 25 tablet 3    gabapentin (NEURONTIN) 300 MG capsule Take 1 capsule by mouth 2 times daily for 30 days. Patient is advised to take 1 afternoon around 4 or 5 and the other at bedtime. This is for peripheral neuropathy.  60 capsule 1    zinc 50 MG TABS tablet Take 50 mg by mouth daily      Ascorbic Acid (VITAMIN C) 1000 MG tablet Take 500 mg by mouth daily       vitamin D (ERGOCALCIFEROL) 1.25 MG (85342 UT) CAPS capsule Take 1 capsule by mouth twice a week 32 capsule 3    ipratropium-albuterol (DUONEB) 0.5-2.5 (3) MG/3ML SOLN nebulizer solution INHALE 3 MLS INTO THE LUNGS EVERY 6 HOURS AS NEEDED FOR SHORTNESS OF BREATH 360 mL 0    Nebulizers (COMPRESSOR/NEBULIZER) MISC 1 each by Does not apply route 4 times daily as needed (cough) 1 each 3    Continuous Blood Gluc  (DEXCOM G6 ) JOVANY 1 each by Does not apply route continuous DX E11.62, L97.509, Z79.4 1 Device 0    tamsulosin (FLOMAX) 0.4 MG capsule Take 0.4 mg by mouth daily      lisinopril (PRINIVIL;ZESTRIL) 20 MG tablet Take 1 tablet by mouth 2 times daily (Patient taking differently: Take 10 mg by mouth 2 times daily ) 60 tablet 3    Needles & Syringes MISC 1 each by Does not apply route every 21 days Needs 3 cc syringes with 22 G  1/2 inch needle to give Testosterone and needs 18 Gauge to draw up med. 10 each 5    BASAGLAR KWIKPEN 100 UNIT/ML injection pen Inject 50 Units into the skin 2 times daily  5 pen 6    glipiZIDE (GLUCOTROL) 10 MG tablet Take 10 mg by mouth 2 times daily (before meals)     ,Patient states they are no longer utilizing these medication: There are no discontinued medications. I have refilled the following medication today:       Orders Placed This Encounter   Procedures    XR CHEST STANDARD (2 VW)     Standing Status:   Future     Number of Occurrences:   1     Standing Expiration Date:   1/27/2023     Order Specific Question:   Reason for exam:     Answer:   short of breath    EKG 12 Lead     Order Specific Question:   Reason for Exam?     Answer:   Shortness of Breath    We attempted to refer the patient to the office of cardiology but were instructed to take him to the emergency room for further evaluation and management. The patient therefore is being sent to [facility] emergency room as directed.      Bill Holstein, MD

## 2022-01-28 PROBLEM — I50.9 CHF (CONGESTIVE HEART FAILURE) (HCC): Status: ACTIVE | Noted: 2022-01-01

## 2022-01-28 PROBLEM — I50.21 ACUTE SYSTOLIC CONGESTIVE HEART FAILURE (HCC): Status: ACTIVE | Noted: 2022-01-01

## 2022-01-28 PROBLEM — N28.9 ACUTE RENAL INSUFFICIENCY: Status: ACTIVE | Noted: 2022-01-01

## 2022-01-28 NOTE — CONSULTS
Consultation History & Physical    Date of Admission:  1/27/2022 11:35 AM  Date of Consultation:  1/28/2022    Surgeon: Dr. Bethany Aviles      Cardiologist: Dr. Jose Broussard      PCP:  Radha Larson MD       Reason for Consultation:  CAD, Failed Bypass Grafts    History of Present Illness:   Mr. Cyndi Wilburn is a 46 y.o. uncontrolled diabetic male, with PMHx CAD - having 3V OPCAB in 2012 by Dr. Jasen Gutiérrez, HTN, HLD, CKD, BPH, who was seen by his PCP yesterday for complaint chest pressure with associated shortness of breath. EKG demonstrated changes from previous, therefore, he was sent to [facility] for evaluation. Work-up in the ER, patient noted to have elevated troponin and proBNP. CXR demonstrated bilateral pleural effusions with vascular congestion. CTA chest was negative for PE with extensive ground-glass infiltrate in bilateral lungs, likely pulmonary congestion. Patient was treated with IV diuretics and started on a heparin drip. Admitted to hospitalist service with cardiology consultation. He was seen by Dr. Beba Bhakta, who performed cardiac catheterization this morning. This demonstrated the LV that showed global hypokinesis with inferior basilar akinesis. EF estimated around 30%. The LAD has in-stent restenosis as does the stent in the native OM. The RCA is small vessel with proximal occlusion. The LIMA - diagonal branch is open with good flow. The SVG - OM is occluded. The SVG - RCA is open with 95% proximal stenosis. Due to in-stent restenosis and failed bypass grafts, CT surgery has been asked to see for possible redo CABG.        Past Medical History:    Past Medical History:   Diagnosis Date    Arthritis     CAD (coronary artery disease)     Cigarette smoker 9/2/2014    Diabetes ([facility] [address]     Erectile dysfunction     History of nephrolithiasis     Hyperlipidemia     Hypertension     Obesity     Type II or unspecified type diabetes mellitus without mention of complication, not stated as uncontrolled        Past Surgical History:    Past Surgical History:   Procedure Laterality Date    CARDIAC CATHETERIZATION  2014    CARDIAC CATHETERIZATION  03/14/2012    CARDIAC CATHETERIZATION  9/2/14  JDT    stent to mid LAD. EF 50%    COLONOSCOPY N/A 07/01/2020    Dr MANUEL Choudhury-increased tortuosity of the colon, piecemeal, AP x 1, HP x 2, 1 yr recall    COLONOSCOPY  07/01/2020    Dr Kimberly Choudhury-increased tortuosity of the colon, piecemeal, AP x 1, HP x 2, 1 yr recall    CORONARY ARTERY BYPASS GRAFT  03/15/2012    3V CABG (LIMA-Diag, SVG-RCA, SVG-OM) JPO    DIAGNOSTIC CARDIAC CATH LAB PROCEDURE      EXPLORATION OF WOUND OF EXTREMITY N/A 01/28/2019    EXCISIONAL DEBRIDEMENT OF SKIN, SUBCUTANEOUS TISSUE, MUSCLE AND BONE 14CM X 3CM X 3CM DEEP [facility] 01/25/2019    EXPLORATION OF LEFT FOOT; EXCISIONAL DEBRIDEMENT OF SUBCUTANEOUS SKIN, TISSUE AND MUSCLE; AMPUTATION OF THIRD TOE AT METATARSAL PHALYNGEAL JOINT performed by Aparna Park MD at [address] [place] [address]      TOE AMPUTATION  2017    TONSILLECTOMY           Home Medications:   Prior to Admission medications    Medication Sig Start Date End Date Taking?  Authorizing Provider   pantoprazole (PROTONIX) 40 MG tablet Take 1 tablet by mouth every morning (before breakfast) 1/25/22   Nae Sams MD   Continuous Blood Gluc Sensor (DEXCOM G6 SENSOR) MISC 1 each by Does not apply route continuous E11.62, L97.509, Z79.4 1/4/22   Nea Sams MD   Continuous Blood Gluc Transmit (DEXCOM G6 TRANSMITTER) MISC 1 each by Does not apply route continuous E11.62, L97.509, Z79.4 1/4/22   Nae Sams MD   buPROPion (WELLBUTRIN XL) 150 MG extended release tablet TAKE 1 TABLET BY MOUTH 2 TIMES DAILY 12/7/21   Nae Sams MD   furosemide (LASIX) 40 MG tablet TAKE 1 TABLET BY MOUTH TWICE A DAY 12/7/21   Nae Sams MD   busPIRone (BUSPAR) 15 MG tablet TAKE 1 TABLET BY MOUTH TWICE A DAY 12/7/21   Brandy Friedman MD   montelukast (SINGULAIR) 10 MG tablet TAKE 1 TABLET BY MOUTH EVERY DAY 10/5/21   Brandy Friedman MD   atorvastatin (LIPITOR) 40 MG tablet Take 1 tablet by mouth nightly 10/5/21   Brandy Friedman MD   clopidogrel (PLAVIX) 75 MG tablet Take 1 tablet by mouth daily 10/5/21   Brandy Friedman MD   meloxicam [facility]) 15 MG tablet Take 1 tablet by mouth daily 10/5/21   Brandy Friedman MD   Diabetic Shoe MISC by Does not apply route Dx E11.621, Z79.4 10/5/21   Brandy Friedman MD   insulin lispro, 1 Unit Dial, ([facility]) 100 UNIT/ML SOPN Inject 10 Units into the skin 3 times daily (before meals) 10/4/21   Brandy Friedman MD   testosterone cypionate (DEPOTESTOTERONE CYPIONATE) 200 MG/ML injection INJECT 1 ML INTRAMUSCULARLY EVERY 21 DAYS 8/20/21 1/27/22  Brandy Friedman MD   tiZANidine (ZANAFLEX) 4 MG tablet Take 4 mg by mouth every 6 hours as needed    Historical Provider, MD   Misc Natural Products (APPLE CIDER VINEGAR DIET PO) Take by mouth    Historical Provider, MD   albuterol sulfate  (90 Base) MCG/ACT inhaler INHALE 1 PUFF INTO THE LUNGS EVERY 6 HOURS AS NEEDED FOR WHEEZING OR SHORTNESS OF BREATH. 8/2/21   Brandy Friedman MD   aspirin 81 MG EC tablet Take 1 tablet by mouth daily 7/5/21   Judy Hoffman MD   carvedilol (COREG) 25 MG tablet Take 1 tablet by mouth 2 times daily (with meals) 7/4/21   Judy Hoffman MD   nitroGLYCERIN (NITROSTAT) 0.4 MG SL tablet Place 1 tablet under the tongue every 5 minutes as needed for Chest pain up to max of 3 total doses. If no relief after 1 dose, call 911. 7/2/21   Brandy Friedman MD   gabapentin (NEURONTIN) 300 MG capsule Take 1 capsule by mouth 2 times daily for 30 days. Patient is advised to take 1 afternoon around 4 or 5 and the other at bedtime. This is for peripheral neuropathy.  6/28/21 1/27/22  Brandy Friedman MD   zinc 50 MG TABS tablet Take 50 mg by mouth daily Historical Provider, MD   Ascorbic Acid (VITAMIN C) 1000 MG tablet Take 500 mg by mouth daily     Historical Provider, MD   vitamin D (ERGOCALCIFEROL) 1.25 MG (93962 UT) CAPS capsule Take 1 capsule by mouth twice a week 3/25/21   Nae Sams MD   ipratropium-albuterol (DUONEB) 0.5-2.5 (3) MG/3ML SOLN nebulizer solution INHALE 3 MLS INTO THE LUNGS EVERY 6 HOURS AS NEEDED FOR SHORTNESS OF BREATH 10/21/20   Nae Sams MD   Nebulizers (COMPRESSOR/NEBULIZER) MISC 1 each by Does not apply route 4 times daily as needed (cough) 9/28/20   Nae Sams MD   Continuous Blood Gluc  ([address]) [address] 1 each by Does not apply route continuous DX E11.62, L97.509, Z79.4 9/28/20   Nae Sams MD   tamsulosin (FLOMAX) 0.4 MG capsule Take 0.4 mg by mouth daily    Historical Provider, MD   lisinopril (PRINIVIL;ZESTRIL) 20 MG tablet Take 1 tablet by mouth 2 times daily  Patient taking differently: Take 10 mg by mouth 2 times daily  6/3/20   [facility], APRN   Needles & Syringes MISC 1 each by Does not apply route every 21 days Needs 3 cc syringes with 22 G  1/2 inch needle to give Testosterone and needs 18 Gauge to draw up med.  3/9/20   Nae Sams MD   [facility] 100 UNIT/ML injection pen Inject 50 Units into the skin 2 times daily  2/20/20   Nae Sams MD   glipiZIDE (GLUCOTROL) 10 MG tablet Take 10 mg by mouth 2 times daily (before meals)    Historical Provider, MD        Facility Administered Medications:    aspirin  81 mg Oral Daily    atorvastatin  40 mg Oral Nightly    buPROPion  150 mg Oral BID    busPIRone  15 mg Oral BID    carvedilol  25 mg Oral BID WC    [Held by provider] clopidogrel  75 mg Oral Daily    gabapentin  300 mg Oral BID    pantoprazole  40 mg Oral QAM AC    tamsulosin  0.4 mg Oral Daily    sodium chloride flush  5-40 mL IntraVENous 2 times per day    furosemide  40 mg IntraVENous Daily    insulin lispro  0-6 Units SubCUTAneous TID WC    insulin lispro 0-3 Units SubCUTAneous Nightly    insulin glargine  10 Units SubCUTAneous Nightly       Allergies:  Patient has no known allergies. Social History:       Social History     Socioeconomic History    Marital status:      Spouse name: Not on file    Number of children: Not on file    Years of education: Not on file    Highest education level: Not on file   Occupational History    Not on file   Tobacco Use    Smoking status: Former Smoker     Packs/day: 1.00     Years: 30.00     Pack years: 30.00     Types: Cigarettes     Quit date: 2019     Years since quittin.3    Smokeless tobacco: Never Used   Vaping Use    Vaping Use: Never used   Substance and Sexual Activity    Alcohol use: Yes     Comment: Rarely    Drug use: No    Sexual activity: Yes     Partners: Female   Other Topics Concern    Not on file   Social History Narrative    ** Merged History Encounter **          Social Determinants of Health     Financial Resource Strain:     Difficulty of Paying Living Expenses: Not on file   Food Insecurity:     Worried About [employer] in the Last Year: Not on file    Mario of Food in the Last Year: Not on file   Transportation Needs:     Lack of Transportation (Medical): Not on file    Lack of Transportation (Non-Medical):  Not on file   Physical Activity:     Days of Exercise per Week: Not on file    Minutes of Exercise per Session: Not on file   Stress:     Feeling of Stress : Not on file   Social Connections:     Frequency of Communication with Friends and Family: Not on file    Frequency of Social Gatherings with Friends and Family: Not on file    Attends Restorationism Services: Not on file    Active Member of Clubs or Organizations: Not on file    Attends Club or Organization Meetings: Not on file    Marital Status: Not on file   Intimate Partner Violence:     Fear of Current or Ex-Partner: Not on file    Emotionally Abused: Not on file    Physically Abused: Not on file    Sexually Abused: Not on file   Housing Stability:     Unable to Pay for Housing in the Last Year: Not on file    Number of Places Lived in the Last Year: Not on file    Unstable Housing in the Last Year: Not on file       Family History:     Family History   Problem Relation Age of Onset    Heart Disease Other     High Blood Pressure Other     Diabetes Other          Review of Systems:  Constitutional:  No fever, chills, night sweats, or weight loss. HEENT: No vision loss, double vision, blurred vision, or tearing. No hearing loss, tinnitus, or infection. No nasal discharge or epistaxis. No dysphagia. Respiratory:  No cough, or sputum production. No history of TB exposure. + shortness of breath. Cardiovascular: No hypertension, hypotension, dizziness, or syncopal spells. No history of palpitations. Substernal chest pain. Peripheral Vascular:  No history of claudication. Gastrointestinal:  No nausea, vomiting, diarrhea, or constipation. No reflux or gastroesophageal reflux disease. Genitourinary:  No dysuria, urgency, frequency, or history of urinary tract infections. No history of nephrolithiasis or renal insufficiency. Hx BPH  Neurological:  No history of cerebrovascular accident, transient ischemic attack, or amaurosis fugax. No history of seizure disorder. Integumentary: No rash, history of nonhealing wounds or skin cancer removal.   Psychiatric:  No anxiety or depression. Endocrine: No polyuria, polydipsia, or significant weight gain. No heat or cold intolerance. Musculoskeletal: No limit to range of motion of joints or swelling of limbs. Hematologic: No history of DVT, PE, or anemia. Physical Examination:    /86   Pulse 71   Temp 97 °F (36.1 °C) (Temporal)   Resp 20   Ht 6' 3\" (1.905 m)   Wt 268 lb 4.8 oz (121.7 kg)   SpO2 95%   BMI 33.54 kg/m²       General:  Alert & Oriented x 3 in no apparent distress. HEENT:  Normocephalic. Atraumatic. CARLOS. Neck: No JVD, no lymphadenopathy. Supple. Trachea midline. Thyroid normal.   Respiratory: Effort is unlabored. Clear bilateral breath sounds without crackles, wheezes or rubs  Cardiovascular: Normal HT with RRR. No murmurs, gallops or rubs. No carotid bruits. No edema or varicosities. Pulses: Normal  GI: Abdomen soft, nondistended, no organomegaly. Musculoskeletal: Strength and tone normal  Extremities: PPP, no edema  Skin: Warm & Dry  Neuro/psychiatric: Grossly intact    MEDICAL DECISION MAKING/TESTING    2D Echo:    Completed. Report pending. CXR:   1. The persistent pulmonary vascular congestion, small bibasilar pleural effusion and cardiomegaly. 2. Discoid atelectatic changes in the lower lungs, more pronounced in the left lower lung. CTA Chest with Contrast:   1. No evidence of pulmonary emboli. 2. No aortic aneurysm or dissection. Atheromatous changes of coronary arteries. 3. Extensive groundglass infiltrate in the lungs bilaterally suggesting an inflammatory/infectious process. The second possibility may suggest pulmonary congestion/pulmonary edema. 4. Moderate to large bibasal pleural effusion. Carotid duplex:  Bilateral Carotid Artery Duplex Completed. Predicted Degree of Stenosis:       Rt ICA: <50%      Lt ICA:  <50%      Bilateral Vertebral Arteries appear antegrade.     BLE Vein Mapping:  GSV                 RIGHT             LEFT  Zone 1             2.7mm                         6.1mm  Zone 2             1.8                               5.7  Zone 3             1.7                               5  Zone 4             1.7                               4.8  Zone 5             1.7                               4.6  Zone 6             1.9                               3.8  Zone 7             2.5                               2.7  Zone 8             1.6                               3.3       Labs:   CBC:   Recent Labs     01/27/22  1145 01/28/22  0152   WBC 8.5 9.0   HGB 13.4* 13.0*   HCT 44.8 42.0   MCV 95.3* 94.2*    296     BMP:   Recent Labs     01/27/22  1145 01/28/22  0152    139   K 4.9 4.7    99   CO2 29 29   BUN 19 22*   CREATININE 1.1 1.6*     Cardiac Enzymes:   Recent Labs     01/27/22  1346 01/27/22  1804 01/27/22  2100   TROPONINI 0.31* 0.32* 0.35*     PT/INR:   Recent Labs     01/27/22  1145   PROTIME 13.1   INR 0.97     APTT:   Recent Labs     01/27/22  1512 01/27/22  2100 01/28/22  0152   APTT 31.7 33.3 53.5*     Liver Profile:  Lab Results   Component Value Date    AST 12 01/28/2022    ALT 22 01/28/2022    BILITOT 0.4 01/28/2022    ALKPHOS 104 01/28/2022     Lab Results   Component Value Date    CHOL 155 01/28/2022    HDL 31 01/28/2022    TRIG 134 01/28/2022     UA:   Lab Results   Component Value Date    COLORU YELLOW 01/27/2022    PHUR 5.5 01/27/2022    WBCUA 1 01/27/2022    RBCUA 2 01/27/2022    BACTERIA NEGATIVE 01/27/2022    CLARITYU Clear 01/27/2022    SPECGRAV 1.018 01/27/2022    LEUKOCYTESUR Negative 01/27/2022    UROBILINOGEN 0.2 01/27/2022    BILIRUBINUR Negative 01/27/2022    BILIRUBINUR NEGATIVE 03/14/2012    BLOODU SMALL 01/27/2022    GLUCOSEU 250 01/27/2022       Diagnosis:      1. NSTEMI Associated with Severe Multivessel CAD, Failed Bypass Grafts  2. DM, Type 2, Uncontrolled with HgbA1c 12.4  3. LV Systolic Dysfunction  4. CKD, Stage 3a  5. Essential HTN  6. Mixed Hyperlipidemia  7. BPH  8. COPD      Plan:     1. Check P2Y12  2. Bedside PFT with RA Blood Gas      Dr. Deirdre Warner to review records/ images. He will discuss recommendations as well as R/B/A with patient and family. Further recommendations per Dr. Deirdre Warner.          Nils Brain, APRN  1/28/2022  10:48 AM

## 2022-01-28 NOTE — FLOWSHEET NOTE
01/27/22 1919   Encounter Summary   Services provided to: Patient   Referral/Consult From: Nurse   Support System Family members   Complexity of Encounter Moderate   Length of Encounter 15 minutes   Spiritual/Scientology   Type Spiritual support   Assessment Anxious; Hopeful   Intervention Active listening;Explored feelings, thoughts, concerns   Outcome Expressed gratitude     Received referral from the nurse to assist with LW. Patient is at peace. He is interested in establishing LW, but not today. Maybe tomorrow after the procedure. Provided the form and explained the purpose. Will follow up.    Electronically signed by Nico Chow on 1/27/2022 at 7:26 PM

## 2022-01-28 NOTE — PROGRESS NOTES
Becky Stock arrived to room # [phone]. Presented with: NSTEMI/CHF  Mental Status: Patient is oriented, alert, coherent, logical, thought processes intact and able to concentrate and follow conversation. Vitals:    01/27/22 1801   BP: (!) 146/94   Pulse: 82   Resp: 18   Temp: 98.4 °F (36.9 °C)   SpO2:      Patient safety contract and falls prevention contract reviewed with patient Yes. Oriented Patient to room. Call light within reach. Yes.   Needs, issues or concerns expressed at this time: no.      Electronically signed by Cammy Stark RN on 1/27/2022 at 6:43 PM

## 2022-01-28 NOTE — PROGRESS NOTES
[facility]      Progress Note    Patient:  Zeferino Jonas  YOB: 1970  Date of Service: 1/28/2022  MRN: [id number]   Acct: [de-identified]   Primary Care Physician: Macario Myers MD  Advance Directive: Full Code  Admit Date: 1/27/2022       Hospital Day: 1    Portions of this note have been copied forward, however, updated to reflect the most current clinical status of this patient. CHIEF COMPLAINT epigastric pain, nausea     SUBJECTIVE:  Mr. Cipriano Leong was resting in bed after returning back from cath lab this am. Reports SOB is improving. Denies chest pain, epigastric pain, or nausea at this time. CUMULATIVE HOSPITAL COURSE:   The patient is a 46 y.o. male with past medical history of CAD with recent stents, CABG, HTN, HLD, diabetes, and arthritis who presented to [facility] ED complaining of worsening dyspnea on exertion over the past week. Mr. Cipriano Leong reported epigastric pain and worsening dyspnea on exertion over the past week. Reported orthopnea. Also reported having nausea. Stated he was supposed to have gallbladder ultrasound to rule out gallbladder disease. Stated he was started on 2 L O2 at nighttime and daytime naps day prior to admission. Reported cardiac stents in July 2021. Work-up in ED revealed troponin 0.30, with repeat 0.31, BNP 3485, Hgb 13.4, chest x-ray showed persistent pulmonary vascular congestion, small bibasilar pleural effusion and cardiomegaly. CTA negative for PE, extensive groundglass infiltrate in the lungs bilaterally suggesting inflammatory/infectious process, may suggest pulmonary congestion/pulmonary edema, moderate to large bibasal pleural effusion. Patient was admitted to hospital medicine for NSTEMI with cardiology consultation. Heparin drip was started in ED. Cardiology recommended cardiac catheterization.   Patient underwent cardiac catheterization this morning with findings of LV function impaired with EF of 20 to 25%, with LIMA graft LAD patent, vein graft OM occluded, stent placement OM 90% in-stent restenosis, severe 90% plus ostial stenosis of the vein graft to the RCA, 70% ostial ramus branch stenosis. Cardiology recommended CT surgery consultation for consideration of redo CABG. CT surgery assessing for consideration for redo of CABG based on repeat assessment and labs over the weekend. Echo showed LV moderately dilated with severe global hypokinesis, concentric LVH, grade 3 diastolic dysfunction, EF of 10 to 15%, mild mitral regurgitation, mild tricuspid insufficiency with estimated RVSP between 50 to 55 mm, moderately elevated, mildly dilated RV with decreased systolic function. Review of Systems   Constitutional: Negative for chills, diaphoresis, fatigue and fever. HENT: Negative for congestion and ear pain. Eyes: Negative for visual disturbance. Respiratory: Negative for cough, shortness of breath and wheezing. Reports SOB is improving    Cardiovascular: Negative for chest pain, palpitations and leg swelling. Denies chest pain    Gastrointestinal: Negative for abdominal distention, abdominal pain, blood in stool, constipation, diarrhea, nausea and vomiting. Endocrine: Negative for cold intolerance and heat intolerance. Genitourinary: Negative for difficulty urinating, flank pain, frequency and urgency. Musculoskeletal: Negative for arthralgias and myalgias. Skin: Negative for color change and wound. Neurological: Negative for dizziness, syncope, weakness, light-headedness, numbness and headaches. Hematological: Does not bruise/bleed easily. Psychiatric/Behavioral: Negative for agitation, confusion and dysphoric mood.         Objective:   VITALS:  /74   Pulse 60   Temp 97.3 °F (36.3 °C) (Oral)   Resp 18   Ht 6' 3\" (1.905 m)   Wt 268 lb 4.8 oz (121.7 kg)   SpO2 95%   BMI 33.54 kg/m²   24HR INTAKE/OUTPUT:      Intake/Output Summary (Last 24 hours) at 1/28/2022 1542  Last data filed at 1/28/2022 1405  Gross per 24 hour   Intake 640 ml   Output --   Net 640 ml           Physical Exam  Constitutional:       General: He is not in acute distress. Appearance: He is obese. He is not ill-appearing. HENT:      Head: Normocephalic and atraumatic. Right Ear: External ear normal.      Left Ear: External ear normal.      Nose: Nose normal.      Mouth/Throat:      Mouth: Mucous membranes are moist.   Eyes:      Extraocular Movements: Extraocular movements intact. Conjunctiva/sclera: Conjunctivae normal.      Pupils: Pupils are equal, round, and reactive to light. Cardiovascular:      Rate and Rhythm: Normal rate and regular rhythm. Pulses: Normal pulses. Heart sounds: Normal heart sounds. Pulmonary:      Effort: Pulmonary effort is normal. No respiratory distress. Breath sounds: Normal breath sounds. No wheezing, rhonchi or rales. Abdominal:      General: Bowel sounds are normal. There is no distension. Palpations: Abdomen is soft. Tenderness: There is no abdominal tenderness. Musculoskeletal:         General: Swelling present. No tenderness or deformity. Normal range of motion. Cervical back: Normal range of motion and neck supple. No muscular tenderness. Right lower leg: No edema. Left lower leg: No edema. Comments: Trace swelling to BLE    Skin:     General: Skin is warm and dry. Findings: No bruising or lesion. Neurological:      Mental Status: He is alert and oriented to person, place, and time. Psychiatric:         Mood and Affect: Mood normal.         Behavior: Behavior normal.         Thought Content:  Thought content normal.            Medications:      sodium chloride 1,000 mL (01/28/22 1002)    heparin (PORCINE) Infusion 10 Units/kg/hr (01/28/22 1219)    sodium chloride      sodium chloride Stopped (01/28/22 0958)    dextrose        aspirin  81 mg Oral Daily    atorvastatin  40 mg Oral Nightly    buPROPion  150 mg Oral BID  busPIRone  15 mg Oral BID    carvedilol  25 mg Oral BID WC    [Held by provider] clopidogrel  75 mg Oral Daily    gabapentin  300 mg Oral BID    pantoprazole  40 mg Oral QAM AC    tamsulosin  0.4 mg Oral Daily    sodium chloride flush  5-40 mL IntraVENous 2 times per day    furosemide  40 mg IntraVENous Daily    insulin lispro  0-6 Units SubCUTAneous TID WC    insulin lispro  0-3 Units SubCUTAneous Nightly    insulin glargine  10 Units SubCUTAneous Nightly     hydrALAZINE, heparin (porcine), heparin (porcine), sodium chloride flush, sodium chloride, ondansetron **OR** ondansetron, acetaminophen **OR** acetaminophen, polyethylene glycol, nitroGLYCERIN, ondansetron, glucose, glucagon (rDNA), dextrose, dextrose bolus (hypoglycemia) **OR** dextrose bolus (hypoglycemia), albuterol  ADULT DIET; Regular; 3 carb choices (45 gm/meal)     Lab and other Data:     Recent Labs     01/27/22  1145 01/28/22  0152 01/28/22  1137   WBC 8.5 9.0  --    HGB 13.4* 13.0*  --     296 285     Recent Labs     01/27/22  1145 01/28/22  0152 01/28/22  1220    139  --    K 4.9 4.7 4.7    99  --    CO2 29 29  --    BUN 19 22*  --    CREATININE 1.1 1.6*  --    GLUCOSE 242* 173*  --      Recent Labs     01/27/22  1145 01/28/22  0152   AST 15 12   ALT 21 22   BILITOT 0.5 0.4   ALKPHOS 106 104     Troponin T:   Recent Labs     01/27/22  1346 01/27/22  1804 01/27/22  2100   TROPONINI 0.31* 0.32* 0.35*     INR:   Recent Labs     01/27/22  1145   INR 0.97     UA:  Recent Labs     01/27/22  1314   COLORU YELLOW   PHUR 5.5   WBCUA 1   RBCUA 2   BACTERIA NEGATIVE*   CLARITYU Clear   SPECGRAV 1.018   LEUKOCYTESUR Negative   UROBILINOGEN 0.2   BILIRUBINUR Negative   BLOODU SMALL*   GLUCOSEU 250*     A1C:   Recent Labs     01/27/22  1804   LABA1C 12.4*       RAD:     XR CHEST (2 VW)  Result Date: 1/27/2022    Impression: Fluid overload. Clinical correlation to exclude superimposed infection is recommended.  Signed by Dr Mindy Mckeon Mague      XR CHEST PORTABLE  Result Date: 1/27/2022    1. The persistent pulmonary vascular congestion, small bibasilar pleural effusion and cardiomegaly. 2. Discoid atelectatic changes in the lower lungs, more pronounced in the left lower lung. Signed by Dr Leeann Kaur  Result Date: 1/27/2022    1. No evidence of pulmonary emboli. 2. No aortic aneurysm or dissection. Atheromatous changes of coronary arteries. 3. Extensive groundglass infiltrate in the lungs bilaterally suggesting an inflammatory/infectious process. The second possibility may suggest pulmonary congestion/pulmonary edema. 4. Moderate to large bibasal pleural effusion. Signed by Dr Gustavo Canas:      COVID-19, Rapid [[id number]] Collected: 01/27/22 1432   Order Status: Completed Specimen: Nasopharyngeal Swab Updated: 01/27/22 1453    SARS-CoV-2, NAAT Not Detected         Assessment/Plan   Principal Problem:    NSTEMI (non-ST elevated myocardial infarction) ([facility] [address]  Active Problems:    Coronary artery disease involving native coronary artery of native heart without angina pectoris    Essential hypertension    Mixed hyperlipidemia    Type 2 diabetes mellitus with foot ulcer, with long-term current use of insulin (HCC)    Epigastric pain    CHF (congestive heart failure) ([facility] [address]    Acute renal insufficiency  Resolved Problems:    * No resolved hospital problems.  *      Principal Problem:    NSTEMI (non-ST elevated myocardial infarction) ([facility])/  Coronary artery disease involving native coronary artery of native heart without angina pectoris-   - Cardiology following    - Cardiology recommended cardiac catheterization.   - Underwent cardiac catheterization this morning with findings of LV function impaired with EF of 20 to 25%, with LIMA graft LAD patent, vein graft OM occluded, stent placement OM 90% in-stent restenosis, severe 90% plus ostial stenosis of the vein graft to the RCA, 70% ostial ramus branch stenosis    - Cardiology recommended CT surgery consultation for consideration of redo CABG    - CT surgery assessing for consideration on redo of CABG based on repeat assessment and labs over the weekend   - Aspirin and Lipitor  - Heparin gtt   - SL Nitro PRN  - Trend troponin   - FLP HDL 31, LDL 97 / HgbA1c- 12.4  - Serial and PRN EKGs  - Monitor on telemetry           Active Problems:   CHF (congestive heart failure) ([address]-               - ECHO showed LV moderately dilated with severe global hypokinesis, concentric LVH, grade 3 diastolic dysfunction, EF of 10 to 15%, mild mitral regurgitation, mild tricuspid insufficiency with estimated RVSP between 50 to 55 mm, moderately elevated, mildly dilated RV with decreased systolic function              - Continue IV Lasix               - Low-sodium diet              - Monitor I's and O's closely              - Daily weights              - Monitor on telemetry      Acute renal insufficiency-               - Creatinine 1.6 today               - Monitor I's and O's closely              - Monitor labs closely              - Avoid hypotension              - Avoid nephrotoxic agents         Essential hypertension- stable at this time, continue current medications, monitor BP closely       Mixed hyperlipidemia- noted, continue home medications       Type 2 diabetes mellitus with foot ulcer, with long-term current use of insulin (HCC)- Lantus, SSI, Accu-Chek, hypoglycemia treatment protocol in place       Epigastric pain- Gallbladder US, antiemetics as needed               DVT Prophylaxis: Heparin gtt     GI prophylaxis:  Protonix     Discharge planning: TBD        Further Orders per Clinical course/attending. Electronically signed by MELITON You CNP on 1/28/2022 at 3:42 PM       EMR Dragon/Transcription disclaimer:   Much of this encounter note is an electronic transcription/translation of spoken language to printed text.  The electronic translation of spoken

## 2022-01-28 NOTE — PLAN OF CARE
Problem: Discharge Planning:  Goal: Participates in care planning  Description: Participates in care planning  Outcome: Ongoing  Goal: Discharged to appropriate level of care  Description: Discharged to appropriate level of care  Outcome: Ongoing     Problem: Activity Intolerance:  Goal: Ability to tolerate increased activity will improve  Description: Ability to tolerate increased activity will improve  Outcome: Ongoing     Problem: Anxiety/Stress:  Goal: Level of anxiety will decrease  Description: Level of anxiety will decrease  Outcome: Ongoing     Problem:  Bowel Function - Altered:  Goal: Bowel elimination is within specified parameters  Description: Bowel elimination is within specified parameters  Outcome: Ongoing     Problem: Fluid Volume - Deficit:  Goal: Absence of fluid volume deficit signs and symptoms  Description: Absence of fluid volume deficit signs and symptoms  Outcome: Ongoing  Goal: Electrolytes within specified parameters  Description: Electrolytes within specified parameters  Outcome: Ongoing     Problem: Mental Status - Impaired:  Goal: Absence of continued neurological deterioration signs and symptoms  Description: Absence of continued neurological deterioration signs and symptoms  Outcome: Ongoing  Goal: Absence of physical injury  Description: Absence of physical injury  Outcome: Ongoing  Goal: Mental status will be restored to baseline  Description: Mental status will be restored to baseline  Outcome: Ongoing     Problem: Mobility - Impaired:  Goal: Mobility will improve to maximum level  Description: Mobility will improve to maximum level  Outcome: Ongoing     Problem: Nutrition Deficit:  Goal: Ability to achieve adequate nutritional intake will improve  Description: Ability to achieve adequate nutritional intake will improve  Outcome: Ongoing     Problem: Pain:  Goal: Pain level will decrease  Description: Pain level will decrease  Outcome: Ongoing  Goal: Ability to notify healthcare provider of pain before it becomes unmanageable or unbearable will improve  Description: Ability to notify healthcare provider of pain before it becomes unmanageable or unbearable will improve  Outcome: Ongoing  Goal: Control of acute pain  Description: Control of acute pain  Outcome: Ongoing  Goal: Control of chronic pain  Description: Control of chronic pain  Outcome: Ongoing     Problem: Serum Glucose Level - Abnormal:  Goal: Ability to maintain appropriate glucose levels will improve to within specified parameters  Description: Ability to maintain appropriate glucose levels will improve to within specified parameters  Outcome: Ongoing     Problem: Skin Integrity - Impaired:  Goal: Will show no infection signs and symptoms  Description: Will show no infection signs and symptoms  Outcome: Ongoing  Goal: Absence of new skin breakdown  Description: Absence of new skin breakdown  Outcome: Ongoing     Problem: Sleep Pattern Disturbance:  Goal: Appears well-rested  Description: Appears well-rested  Outcome: Ongoing     Problem: Falls - Risk of:  Goal: Absence of physical injury  Description: Absence of physical injury  Outcome: Ongoing  Goal: Will remain free from falls  Description: Will remain free from falls  Outcome: Ongoing

## 2022-01-28 NOTE — PROGRESS NOTES
Cardiac catheterization pulmonary note right common femoral artery retrograde approach tolerated well left ventricular function impaired ejection fraction 20 to 25% estimated LIMA graft diagonal patent vein graft OM occluded stent placement OM 90% in-stent restenosis  Severe 90% plus ostial stenosis of the vein graft to the right coronary artery  70% ostial ramus branch stenosis  Severe disease mid LAD  Recommend cardiovascular surgical consultation for consideration of redo CABG

## 2022-01-28 NOTE — CONSULTS
[id number] [facility]  Cardiology Consult      Requesting MD:  Craig Bradshaw MD   Admit Status:  Inpatient [101]       History obtained from:   [] Patient  [] Other (specify):     Patient:  Nicolas Gist  [id number]     Chief Complaint:   Chief Complaint   Patient presents with    Abnormal Test Results     abnormal EKG, sent frm Dr Brenda Resendez office    Abdominal Pain     epigastric pain         HPI: Mr. Isauro Torres is a 46 y.o. male with a history of previous CABG ischemic cardiomyopathy underwent cath with PCI occluded OM 7/2/2021 quit smoking thereafter in his usual state of health until the past few days has had increased shortness of breath along with pressure in the lower part of his chest brought on by activities relieved by rest seen yesterday admitted CTA pulmonary no evidence of pulmonary embolism bibasilar pleural effusions groundglass infiltrates. Troponins elevated 0.35. Covid-19 status not detected. proBNP 3485. Compared to 2397 on 7/2/2021    Review of Systems:  Review of Systems   Constitutional: Negative. Negative for chills, fever and unexpected weight change. HENT: Negative. Eyes: Negative. Respiratory: Negative. Negative for shortness of breath. Cardiovascular: Negative. Negative for chest pain. Gastrointestinal: Negative. Negative for diarrhea, nausea and vomiting. Endocrine: Negative. Genitourinary: Negative. Musculoskeletal: Negative. Skin: Negative. Neurological: Negative. All other systems reviewed and are negative.       Cardiac Specific Data:  Specialty Problems        Cardiology Problems    Coronary artery disease involving native coronary artery of native heart without angina pectoris        Essential hypertension        Mixed hyperlipidemia        Pre-syncope        Chest pain due to myocardial ischemia        NSTEMI (non-ST elevation myocardial infarction) ([facility] [address]        * (Principal) NSTEMI (non-ST elevated myocardial infarction) ([facility] [address] Past Medical History:  Past Medical History:   Diagnosis Date    Arthritis     CAD (coronary artery disease)     Cigarette smoker 9/2/2014    Diabetes ([facility] [address]     Erectile dysfunction     History of nephrolithiasis     Hyperlipidemia     Hypertension     Obesity     Type II or unspecified type diabetes mellitus without mention of complication, not stated as uncontrolled         Past Surgical History:  Past Surgical History:   Procedure Laterality Date    CARDIAC CATHETERIZATION  2014    CARDIAC CATHETERIZATION  3/14/12    CARDIAC CATHETERIZATION  9/2/14  JDT    stent to mid LAD.  EF 50%    COLONOSCOPY N/A 7/1/2020    Dr MANUEL Choudhury-increased tortuosity of the colon, piecemeal, AP x 1, HP x 2, 1 yr recall    COLONOSCOPY  7/1/2020    Dr Pollo Choudhury-increased tortuosity of the colon, piecemeal, AP x 1, HP x 2, 1 yr recall    CORONARY ARTERY BYPASS GRAFT      CORONARY ARTERY BYPASS GRAFT  3/15/2012    3V CABG (LIMA-Diag, SVG-RCA, SVG-OM) JPO    DIAGNOSTIC CARDIAC CATH LAB PROCEDURE      EXPLORATION OF WOUND OF EXTREMITY N/A 1/28/2019    EXCISIONAL DEBRIDEMENT OF SKIN, SUBCUTANEOUS TISSUE, MUSCLE AND BONE 14CM X 3CM X 3CM DEEP performed by Belinda Oliver MD at [facility] 238 Left 1/25/2019    EXPLORATION OF LEFT FOOT; EXCISIONAL DEBRIDEMENT OF SUBCUTANEOUS SKIN, TISSUE AND MUSCLE; AMPUTATION OF THIRD TOE AT METATARSAL PHALYNGEAL JOINT performed by Belinda Oliver MD at [address] [place] [address]      TOE AMPUTATION  2017    TONSILLECTOMY         Past Family History:  Family History   Problem Relation Age of Onset    Heart Disease Other     High Blood Pressure Other     Diabetes Other        Past Social History:  Social History     Socioeconomic History    Marital status:      Spouse name: Not on file    Number of children: Not on file    Years of education: Not on file    Highest education level: Not on file Occupational History    Not on file   Tobacco Use    Smoking status: Former Smoker     Packs/day: 1.00     Years: 30.00     Pack years: 30.00     Types: Cigarettes     Quit date: 2019     Years since quittin.3    Smokeless tobacco: Never Used   Vaping Use    Vaping Use: Never used   Substance and Sexual Activity    Alcohol use: Yes     Comment: Rarely    Drug use: No    Sexual activity: Yes     Partners: Female   Other Topics Concern    Not on file   Social History Narrative    ** Merged History Encounter **          Social Determinants of Health     Financial Resource Strain:     Difficulty of Paying Living Expenses: Not on file   Food Insecurity:     Worried About Running Out of Food in the Last Year: Not on file    Mario of Food in the Last Year: Not on file   Transportation Needs:     Lack of Transportation (Medical): Not on file    Lack of Transportation (Non-Medical): Not on file   Physical Activity:     Days of Exercise per Week: Not on file    Minutes of Exercise per Session: Not on file   Stress:     Feeling of Stress : Not on file   Social Connections:     Frequency of Communication with Friends and Family: Not on file    Frequency of Social Gatherings with Friends and Family: Not on file    Attends Faith Services: Not on file    Active Member of [address] [place] [address] or Organizations: Not on file    Attends Club or Organization Meetings: Not on file    Marital Status: Not on file   Intimate Partner Violence:     Fear of Current or Ex-Partner: Not on file    Emotionally Abused: Not on file    Physically Abused: Not on file    Sexually Abused: Not on file   Housing Stability:     Unable to Pay for Housing in the Last Year: Not on file    Number of [place] in the Last Year: Not on file    Unstable Housing in the Last Year: Not on file       Allergies:  No Known Allergies    Home Meds:  Prior to Admission medications    Medication Sig Start Date End Date Taking?  Authorizing Provider   pantoprazole (PROTONIX) 40 MG tablet Take 1 tablet by mouth every morning (before breakfast) 1/25/22   Bushra Christopher MD   Continuous Blood Gluc Sensor (DEXCOM G6 SENSOR) MISC 1 each by Does not apply route continuous E11.62, L97.509, Z79.4 1/4/22   Bushra Christopher MD   Continuous Blood Gluc Transmit (DEXCOM G6 TRANSMITTER) MISC 1 each by Does not apply route continuous E11.62, L97.509, Z79.4 1/4/22   Bushra Christopher MD   buPROPion (WELLBUTRIN XL) 150 MG extended release tablet TAKE 1 TABLET BY MOUTH 2 TIMES DAILY 12/7/21   Bushra Christopher MD   furosemide (LASIX) 40 MG tablet TAKE 1 TABLET BY MOUTH TWICE A DAY 12/7/21   Bushra Christopher MD   busPIRone (BUSPAR) 15 MG tablet TAKE 1 TABLET BY MOUTH TWICE A DAY 12/7/21   Bushra Christopher MD   montelukast (SINGULAIR) 10 MG tablet TAKE 1 TABLET BY MOUTH EVERY DAY 10/5/21   Bushra Christopher MD   atorvastatin (LIPITOR) 40 MG tablet Take 1 tablet by mouth nightly 10/5/21   Bushra Christopher MD   clopidogrel (PLAVIX) 75 MG tablet Take 1 tablet by mouth daily 10/5/21   Bushra Christopher MD   meloxicam [facility]) 15 MG tablet Take 1 tablet by mouth daily 10/5/21   Bushra Christopher MD   Diabetic Shoe MISC by Does not apply route Dx E11.621, Z79.4 10/5/21   Bushra Christopher MD   insulin lispro, 1 Unit Dial, (HUMALOG [facility]) 100 UNIT/ML SOPN Inject 10 Units into the skin 3 times daily (before meals) 10/4/21   Bushra Christopher MD   testosterone cypionate (DEPOTESTOTERONE CYPIONATE) 200 MG/ML injection INJECT 1 ML INTRAMUSCULARLY EVERY 21 DAYS 8/20/21 1/27/22  Bushra Christopher MD   tiZANidine (ZANAFLEX) 4 MG tablet Take 4 mg by mouth every 6 hours as needed    Historical Provider, MD   Misc Natural Products (APPLE CIDER VINEGAR DIET PO) Take by mouth    Historical Provider, MD   albuterol sulfate  (90 Base) MCG/ACT inhaler INHALE 1 PUFF INTO THE LUNGS EVERY 6 HOURS AS NEEDED FOR WHEEZING OR SHORTNESS OF BREATH. 8/2/21   Bushra Christopher MD   aspirin 81 MG EC tablet Take 1 tablet by mouth daily 7/5/21   Enmanuel Soto MD   carvedilol (COREG) 25 MG tablet Take 1 tablet by mouth 2 times daily (with meals) 7/4/21   Enmanuel Soto MD   nitroGLYCERIN (NITROSTAT) 0.4 MG SL tablet Place 1 tablet under the tongue every 5 minutes as needed for Chest pain up to max of 3 total doses. If no relief after 1 dose, call 911. 7/2/21   Juan Piña MD   gabapentin (NEURONTIN) 300 MG capsule Take 1 capsule by mouth 2 times daily for 30 days. Patient is advised to take 1 afternoon around 4 or 5 and the other at bedtime. This is for peripheral neuropathy. 6/28/21 1/27/22  Juan Piña MD   zinc 50 MG TABS tablet Take 50 mg by mouth daily    Historical Provider, MD   Ascorbic Acid (VITAMIN C) 1000 MG tablet Take 500 mg by mouth daily     Historical Provider, MD   vitamin D (ERGOCALCIFEROL) 1.25 MG (65352 UT) CAPS capsule Take 1 capsule by mouth twice a week 3/25/21   Juan Piña MD   ipratropium-albuterol (DUONEB) 0.5-2.5 (3) MG/3ML SOLN nebulizer solution INHALE 3 MLS INTO THE LUNGS EVERY 6 HOURS AS NEEDED FOR SHORTNESS OF BREATH 10/21/20   Juan Piña MD   Nebulizers (COMPRESSOR/NEBULIZER) MISC 1 each by Does not apply route 4 times daily as needed (cough) 9/28/20   Juan Piña MD   Continuous Blood Gluc  ([address]) [address] 1 each by Does not apply route continuous DX E11.62, L97.509, Z79.4 9/28/20   Juan Piña MD   tamsulosin (FLOMAX) 0.4 MG capsule Take 0.4 mg by mouth daily    Historical Provider, MD   lisinopril (PRINIVIL;ZESTRIL) 20 MG tablet Take 1 tablet by mouth 2 times daily  Patient taking differently: Take 10 mg by mouth 2 times daily  6/3/20   MELITON Cope   Needles & Syringes MISC 1 each by Does not apply route every 21 days Needs 3 cc syringes with 22 G  1/2 inch needle to give Testosterone and needs 18 Gauge to draw up med.  3/9/20   Juan Piña MD   [facility] KWIKPEN 100 UNIT/ML injection pen Inject 50 Units into the skin 2 times daily  2/20/20   Marga Andres Madonna Reyes MD   glipiZIDE (GLUCOTROL) 10 MG tablet Take 10 mg by mouth 2 times daily (before meals)    Historical Provider, MD       Current Meds:   aspirin  81 mg Oral Daily    atorvastatin  40 mg Oral Nightly    buPROPion  150 mg Oral BID    busPIRone  15 mg Oral BID    carvedilol  25 mg Oral BID WC    clopidogrel  75 mg Oral Daily    gabapentin  300 mg Oral BID    pantoprazole  40 mg Oral QAM AC    tamsulosin  0.4 mg Oral Daily    sodium chloride flush  5-40 mL IntraVENous 2 times per day    furosemide  40 mg IntraVENous Daily    insulin lispro  0-6 Units SubCUTAneous TID WC    insulin lispro  0-3 Units SubCUTAneous Nightly    insulin glargine  10 Units SubCUTAneous Nightly       Current Infused Meds:   heparin (PORCINE) Infusion 9.89 Units/kg/hr (01/27/22 2318)    sodium chloride      sodium chloride 75 mL/hr at 01/28/22 0609    dextrose         Physical Exam:  Vitals:    01/28/22 0602   BP: (!) 119/91   Pulse: 58   Resp: 18   Temp: 97 °F (36.1 °C)   SpO2: 97%       Intake/Output Summary (Last 24 hours) at 1/28/2022 0737  Last data filed at 1/27/2022 2151  Gross per 24 hour   Intake 400 ml   Output --   Net 400 ml     Estimated body mass index is 33.54 kg/m² as calculated from the following:    Height as of this encounter: 6' 3\" (1.905 m). Weight as of this encounter: 268 lb 4.8 oz (121.7 kg). Physical Exam  Constitutional:       General: He is not in acute distress. Appearance: He is well-developed. He is obese. He is not ill-appearing, toxic-appearing or diaphoretic. HENT:      Head: Normocephalic and atraumatic. Nose: Nose normal.      Mouth/Throat:      Mouth: Mucous membranes are moist.      Pharynx: Oropharynx is clear. Eyes:      General: No scleral icterus. Extraocular Movements: Extraocular movements intact. Pupils: Pupils are equal, round, and reactive to light. Neck:      Vascular: No carotid bruit or JVD.    Cardiovascular:      Rate and Rhythm: Normal rate and regular rhythm. Heart sounds: Normal heart sounds. No murmur heard. No friction rub. No gallop. Pulmonary:      Effort: Pulmonary effort is normal. No respiratory distress. Breath sounds: Normal breath sounds. No stridor. No wheezing, rhonchi or rales. Chest:      Chest wall: No tenderness. Abdominal:      General: Abdomen is flat. Bowel sounds are normal. There is no distension. Palpations: Abdomen is soft. There is no mass. Tenderness: There is no abdominal tenderness. There is no right CVA tenderness, left CVA tenderness, guarding or rebound. Hernia: No hernia is present. Musculoskeletal:         General: No swelling, tenderness, deformity or signs of injury. Cervical back: Normal range of motion and neck supple. No rigidity or tenderness. Right lower leg: No edema. Left lower leg: No edema. Lymphadenopathy:      Cervical: No cervical adenopathy. Skin:     General: Skin is warm and dry. Neurological:      General: No focal deficit present. Mental Status: He is alert and oriented to person, place, and time. Mental status is at baseline. Cranial Nerves: No cranial nerve deficit. Sensory: No sensory deficit. Motor: No weakness. Coordination: Coordination normal.   Psychiatric:         Mood and Affect: Mood normal.         Behavior: Behavior normal.         Thought Content: Thought content normal.         Judgment: Judgment normal.         Labs:  Recent Labs     01/27/22  1145 01/28/22  0152   WBC 8.5 9.0   HGB 13.4* 13.0*    296       Recent Labs     01/27/22  1145 01/28/22  0152    139   K 4.9 4.7    99   CO2 29 29   BUN 19 22*   CREATININE 1.1 1.6*   LABGLOM >60 45*   CALCIUM 9.0 9.5       CK, CKMB, Troponin: @LABRCNT (CKTOTAL:3, CKMB:3, TROPONINI:3)@    Last 3 BNP:  No results for input(s): BNP in the last 72 hours.         IMAGING:  XR CHEST (2 VW)    Result Date: 1/27/2022  Exam:   XR CHEST (2 VW)  Date: 1/27/2022 History:  Male, age  46 years; R06.02 COMPARISON:  None. Findings : Mild-to-moderate cardiomegaly. Median sternotomy wires appear similar. Interstitial edema. Small left pleural effusion. Trace right pleural effusion. Associated opacities. The bones show no acute pathology. Impression: Fluid overload. Clinical correlation to exclude superimposed infection is recommended. Signed by Dr Ines Crane    XR CHEST PORTABLE    Result Date: 1/27/2022  Examination. XR CHEST PORTABLE 1/27/2022 12:10 PM History: Chest pain. A frontal portable upright view of the chest is compared with the previous study dated 1/27/2022. The lungs are poorly inflated. There is a persistent pulmonary vascular congestion in the lungs bilaterally. There are atelectatic changes in the lower lungs, left more than the right, similar to the previous study. There is small bibasilar pleural effusion. There is moderate cardiomegaly. There is evidence of previous cardiac surgery. No acute bony abnormality. 1. The persistent pulmonary vascular congestion, small bibasilar pleural effusion and cardiomegaly. 2. Discoid atelectatic changes in the lower lungs, more pronounced in the left lower lung. Signed by Dr Tanesha Dougherty    Result Date: 1/27/2022  Examination. CTA PULMONARY W CONTRAST 1/27/2022 12:37 PM History: Chest pain and shortness of breath. DLP: 7 7 7 mGycm. The CT angiography of the chest are performed after intravenous contrast enhancement. The images are acquired in axial plane and subsequent 2-D reconstruction in coronal and sagittal planes and 3-D maximum intensity projection reconstruction. There is no previous study for comparison. The correlation made with radiographs of the chest obtained earlier today. There is normal opacification of the pulmonary arteries and branches. No filling defects in the visualized pulmonary arterial bed.  The RV/LV ratio is 43/59 which is normal. No finding to suggest right heart strain. Atheromatous changes thoracic aorta are noted. No aneurysmal dilatation. Severe atheromatous changes of coronary arteries are seen. There is evidence of mediastinal lymphadenopathy. An aortopulmonic window lymph node measures 1.7 cm. A level 2R lymph node measures 1.8 cm. A subcarinal lymph node measures 1.9 cm. The limited visualized soft tissues of the neck appears unremarkable. There is a low-density nodule in the right lobe of the thyroid gland which is incompletely evaluated. No axillary lymphadenopathy. There is extensive groundglass infiltrate in the lungs bilaterally. There is bilateral lower lobar consolidation and moderate to large volume bibasal pleural effusion. The limited visualized liver and spleen are unremarkable. Moderate lobulation of the anterior glands bilaterally is noted. The kidneys, the pancreas and gallbladder is incompletely visualized and not fully evaluated. The images reviewed in bone window show chronic degenerative changes of the lumbar spine with mild dextroscoliosis. 1. No evidence of pulmonary emboli. 2. No aortic aneurysm or dissection. Atheromatous changes of coronary arteries. 3. Extensive groundglass infiltrate in the lungs bilaterally suggesting an inflammatory/infectious process. The second possibility may suggest pulmonary congestion/pulmonary edema. 4. Moderate to large bibasal pleural effusion. Signed by Dr Eleazar Gallardo:  1. Complaints of increased shortness of breath lower chest pressure and elevated troponin suggestive of non-STEMI  2. Coronary artery disease  3. Hypertension  4. Hyperlipidemia  5. Diabetes mellitus type 2  6. History of tobacco abuse  7. History of diabetic foot ulceration  8. CTA pulmonary yesterday no evidence of pulmonary emboli extensive groundglass infiltrate in the lungs bilaterally suggesting inflammatory or infectious process moderate to large bibasilar pleural effusion  9.  CTA abdomen pelvis disease with functional limitations    Mallampati: I (soft palate, uvula, fauces, tonsillar pillars visible)    Preferred vascular access site will be: Right common femoral artery retrograde

## 2022-01-28 NOTE — PROGRESS NOTES
Attempted to take a photo of R foot ulcer to add to media, pt refused. Stated that he \"has taken care of it and it is already bandaged\". 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  Electronically signed by Lilli Ramirez RN on 1/27/2022 at 6:42 PM

## 2022-01-28 NOTE — PROGRESS NOTES
Vascular Preliminary Report      Bilateral Lower Extremity Vein Mapping Completed. GSV   RIGHT  LEFT  Zone 1  2.7mm   6.1mm  Zone 2  1.8   5.7  Zone 3  1.7   5  Zone 4  1.7   4.8  Zone 5  1.7   4.6  Zone 6  1.9   3.8  Zone 7  2.5   2.7  Zone 8  1.6   3.3        Bilateral Carotid Artery Duplex Completed. Predicted Degree of Stenosis:     Rt ICA: <50%      Lt ICA:  <50%      Bilateral Vertebral Arteries appear antegrade. Final Reports Pending.

## 2022-01-28 NOTE — PROGRESS NOTES
Component Ref Range & Units 1/28/22 1220 3/15/12 1641 3/15/12 1607 3/15/12 1451 3/15/12 1404   pH, Arterial 7.350 - 7.450 7.380        pCO2, Arterial 35.0 - 45.0 mmHg 52. 0 High         pO2, Arterial 80.0 - 100.0 mmHg 49. 0 Low Panic   144 R  269 R  199 R  200 R    HCO3, Arterial 22.0 - 26.0 mmol/L 30.8 High         Base Excess, Arterial -2.0 - 2.0 mmol/L 4.3 High         Hemoglobin, Art, Extended 14.0 - 18.0 g/dL 14.8        O2 Sat, Arterial >92 % 85.5 Low Panic         Carboxyhgb, Arterial 0.0 - 5.0 % 3.0        Comment:      0.0-1.5   (Smokers 1.5-5.0)    Methemoglobin, Arterial <1.5 % 1.0        O2 Content, Arterial Not Established mL/dL 17.7        O2 Therapy  Unknown        Sodium   132 R  132 R  132 R  129 R    Base Excess (Calculated)   -2 R  -2 R  1 R  3 R    sO2   99 R  100 R  100 R  100 R    Potassium   5.4 R  5.3 R  4.8 R  4.2 R    POC Ionized Calcium   0.92 R  1.01 R  1.06 R  0.80 R    Hematocrit   47 R  46 R  48 R  47 R    Hemoglobin   16.0 R  15.6 R  16.3 R  16.0 R    pH:   7.323 R  7.322 R  7.368 R  7.345 R    PCO2   45.9 R  45.7 R  45.9 R  52.6 R    TC02 (Calc), Vinicio   25 R  25 R  28 R  30 R    HCO3   23.8 R  23.6 R  26.4 R  28.7 R    Resulting Agency  [address]             Narrative  Performed by: [facility]  CALL  S[place]  KLU tel. , verbal   MOOK LYNNE RN, 01/28/2022 12:22, by [facility] [place]      Specimen Collected: 01/28/22 12:20 Last Resulted: 01/28/22 12:21        Lab Flowsheet     Order Details     View Encounter     Lab and Collection Details     Routing     Result History        R=Reference range differs from displayed range          Result Care Coordination      Patient Communication    Released Not seen Back to Top             Testing Performed By:    [address] [place] [address] LAB   [address] [address]   Tel: [phone]   : Steven Herbert M.D.                Result Information    Flag: Critical Panic   Status: Final result (Collected: 1/28/2022 12:20) Provider Status: Ordered     BLOOD GAS, ARTERIAL: Patient Communication    Released Not seen     Routing History    Priority Sent On From To Message Type    1/27/2022  6:33 PM Arleen Flores Incoming Lab Results From Leann Koehler MD    Click to Print Result      View [employer] Info    BLOOD GAS, ARTERIAL (Order #[id number]) on 1/28/22     Order Report    Order Details      PASCUAL RR

## 2022-01-28 NOTE — PROGRESS NOTES
4 Eyes Skin Assessment    Cindy Carrion is being assessed upon: Admission    I agree that Nestor Jean RN, along with Mohsen Solis (either 2 RN's or 1 LPN and 1 RN) have performed a thorough Head to Toe Skin Assessment on the patient. ALL assessment sites listed below have been assessed. Areas assessed by both nurses:     [x]   Head, Face, and Ears   [x]   Shoulders, Back, and Chest  [x]   Arms, Elbows, and Hands   [x]   Coccyx, Sacrum, and Ischium  [x]   Legs, Feet, and Heels    Does the Patient have Skin Breakdown? Yes, wound(s) noted upon assessment. It is the responsibility of the Primary Nurse to assure that the following documentation, preventions, orders, and consults are complete on the above noted wound(s): Wound LDA initiated. LDA Flowsheet Documentation includes the Velia-wound, Wound Assessment, Measurements, Dressing Treatment, Drainage, and Color.     Miguelito Prevention initiated: NA  Wound Care Orders initiated: NA    WOC nurse consulted for Pressure Injury (Stage 3,4, Unstageable, DTI, NWPT, and Complex wounds) and New or Established Ostomies: No        Primary Nurse eSignature: Diana Coles RN on 1/27/2022 at 6:44 PM      Co-Signer eSignature: Electronically signed by Mohsen Solsi RN on 1/27/22 at 6:45 PM CST

## 2022-01-28 NOTE — PROGRESS NOTES
Comprehensive Nutrition Assessment    Type and Reason for Visit:  Initial    Nutrition Recommendations/Plan: follow for diet advancement, diet hx and possible diet education    Nutrition Assessment:  Pt appears well nourished AEB fat and muscle mass. At current time pt is NPO for heart cath. Aware current A1C 12.4---A1C in 7/2021 was 9.4. Will continue to follow    Malnutrition Assessment:  Malnutrition Status:  No malnutrition    Context:  Acute Illness     Findings of the 6 clinical characteristics of malnutrition:  Energy Intake:  Mild decrease in energy intake (Comment)  Weight Loss:  No significant weight loss     Body Fat Loss:  No significant body fat loss     Muscle Mass Loss:  No significant muscle mass loss    Fluid Accumulation:  1 - Mild Extremities   Strength:  Not Performed    Estimated Daily Nutrient Needs:  Energy (kcal):  7723-7430 kcals (15-18 kcals/kg); Weight Used for Energy Requirements:  Current     Protein (g):  108-167 g; Weight Used for Protein Requirements:  Ideal        Fluid (ml/day):  3292-5810 ml; Method Used for Fluid Requirements:  1 ml/kcal      Nutrition Related Findings:  adequately nourished. Donzell Pyo Hx of poor DM control      Wounds:  Diabetic Ulcer       Current Nutrition Therapies:    Diet NPO    Anthropometric Measures:  · Height: 6' 3\" (190.5 cm)  · Current Body Weight: 268 lb 4.8 oz (121.7 kg)   · Admission Body Weight: 279 lb (126.6 kg)    · Usual Body Weight: 255 lb 12.8 oz (116 kg) (10/2021)     · Ideal Body Weight: 196 lbs; % Ideal Body Weight 136.9 %   · BMI: 33.5  · Adjusted Body Weight:  ; No Adjustment   · BMI Categories: Obese Class 1 (BMI 30.0-34. 9)       Nutrition Diagnosis:   · Altered nutrition-related lab values,Increased nutrient needs related to endocrine dysfuntion,increase demand for energy/nutrients as evidenced by lab values,wounds      Nutrition Interventions:   Food and/or Nutrient Delivery:  Continue NPO  Nutrition Education/Counseling:  Education needed   Coordination of Nutrition Care:  Continue to monitor while inpatient    Goals:  PO intake 50% or greater. Accuchek's below 200       Nutrition Monitoring and Evaluation:   Behavioral-Environmental Outcomes:      Food/Nutrient Intake Outcomes:  Diet Advancement/Tolerance,Food and Nutrient Intake  Physical Signs/Symptoms Outcomes:        Discharge Planning:     Too soon to determine     Electronically signed by Irene Andrade MS, RD, LD on 1/28/22 at 7:46 AM CST    Contact: [phone]

## 2022-01-29 NOTE — PROGRESS NOTES
Cardiology Daily Note Sherman Gallego MD      Patient:  Kayy Hercules  [id number]    Patient Active Problem List    Diagnosis Date Noted    Abnormal stress echocardiogram     Pre-syncope     Cigarette smoker 09/02/2014    Coronary artery disease involving native coronary artery of native heart without angina pectoris     Essential hypertension     Mixed hyperlipidemia     Acute systolic congestive heart failure ([address] 01/28/2022    Acute renal insufficiency 01/28/2022    NSTEMI (non-ST elevated myocardial infarction) ([address] 01/27/2022    Epigastric pain 01/27/2022    NSTEMI (non-ST elevation myocardial infarction) ([address] 07/02/2021    Foot ulcer with fat layer exposed, left ([address] 01/25/2019    Cellulitis of left foot     Non-pressure chronic ulcer of other part of left foot with fat layer exposed ([address] 12/17/2018    Chest pain due to myocardial ischemia 03/21/2018    Skin ulcer of small toe of right foot with fat layer exposed ([address] 08/16/2017    Skin ulcer of toe of left foot with fat layer exposed ([address] 08/03/2017    Type 2 diabetes mellitus with foot ulcer, with long-term current use of insulin ([address] 08/03/2017       Admit Date:  1/27/2022    Admission Problem List: Present on Admission:   NSTEMI (non-ST elevated myocardial infarction) ([address]   Coronary artery disease involving native coronary artery of native heart without angina pectoris   Essential hypertension   Mixed hyperlipidemia   Type 2 diabetes mellitus with foot ulcer, with long-term current use of insulin ([facility])   Epigastric pain   Acute systolic congestive heart failure ([address]   Acute renal insufficiency      Cardiac Specific Data:  Specialty Problems        Cardiology Problems    Coronary artery disease involving native coronary artery of native heart without angina pectoris        Essential hypertension        Mixed hyperlipidemia        Pre-syncope        Chest pain due to myocardial ischemia        NSTEMI (non-ST elevation myocardial infarction) ([facility]        * (Principal) NSTEMI (non-ST elevated myocardial infarction) ([facility]        Acute systolic congestive heart failure (HCC)              Subjective:  Mr. Raymundo Nogueira has no new c/o    Objective:   /82   Pulse 81   Temp 98 °F (36.7 °C) (Oral)   Resp 20   Ht 6' 3\" (1.905 m)   Wt 290 lb 3.2 oz (131.6 kg)   SpO2 94%   BMI 36.27 kg/m²       Intake/Output Summary (Last 24 hours) at 1/29/2022 0750  Last data filed at 1/29/2022 9214  Gross per 24 hour   Intake 1850 ml   Output 500 ml   Net 1350 ml       Prior to Admission medications    Medication Sig Start Date End Date Taking? Authorizing Provider   oxyCODONE-acetaminophen (PERCOCET)  MG per tablet Take 1 tablet by mouth every 6 hours as needed for Pain.    Yes Historical Provider, MD   pantoprazole (PROTONIX) 40 MG tablet Take 1 tablet by mouth every morning (before breakfast) 1/25/22   Nahomi Mcgovern MD   Continuous Blood Gluc Sensor (DEXCOM G6 SENSOR) MISC 1 each by Does not apply route continuous E11.62, L97.509, Z79.4 1/4/22   Nahomi Mcgovern MD   Continuous Blood Gluc Transmit (DEXCOM G6 TRANSMITTER) MISC 1 each by Does not apply route continuous E11.62, L97.509, Z79.4 1/4/22   Nahomi Mcgovern MD   buPROPion (WELLBUTRIN XL) 150 MG extended release tablet TAKE 1 TABLET BY MOUTH 2 TIMES DAILY 12/7/21   Nahomi Mcgovern MD   furosemide (LASIX) 40 MG tablet TAKE 1 TABLET BY MOUTH TWICE A DAY 12/7/21   Nahomi Mcgovern MD   busPIRone (BUSPAR) 15 MG tablet TAKE 1 TABLET BY MOUTH TWICE A DAY 12/7/21   Nahomi Mcgovern MD   montelukast (SINGULAIR) 10 MG tablet TAKE 1 TABLET BY MOUTH EVERY DAY 10/5/21   Nahomi Mcgovern MD   atorvastatin (LIPITOR) 40 MG tablet Take 1 tablet by mouth nightly 10/5/21   Nahomi Mcgovern MD   clopidogrel (PLAVIX) 75 MG tablet Take 1 tablet by mouth daily 10/5/21   Nahomi Mcgovern MD   meloxicam [facility]) 15 MG tablet Take 1 tablet by mouth daily 10/5/21   Nahomi Mcgovern MD   Diabetic Shoe MISC by Does not apply route Dx E11.621, Z79.4 10/5/21   Bushra Christopher MD   insulin lispro, 1 Unit Dial, ([facility]) 100 UNIT/ML SOPN Inject 10 Units into the skin 3 times daily (before meals) 10/4/21   Bushra Christopher MD   testosterone cypionate (DEPOTESTOTERONE CYPIONATE) 200 MG/ML injection INJECT 1 ML INTRAMUSCULARLY EVERY 21 DAYS 8/20/21 1/27/22  Buhsra Christopher MD   tiZANidine (ZANAFLEX) 4 MG tablet Take 4 mg by mouth every 6 hours as needed    Historical Provider, MD   Misc Natural Products (APPLE CIDER VINEGAR DIET PO) Take by mouth    Historical Provider, MD   albuterol sulfate  (90 Base) MCG/ACT inhaler INHALE 1 PUFF INTO THE LUNGS EVERY 6 HOURS AS NEEDED FOR WHEEZING OR SHORTNESS OF BREATH. 8/2/21   Bushra Christopher MD   aspirin 81 MG EC tablet Take 1 tablet by mouth daily 7/5/21   Shahana Friedman MD   carvedilol (COREG) 25 MG tablet Take 1 tablet by mouth 2 times daily (with meals) 7/4/21   Shahana Friedman MD   nitroGLYCERIN (NITROSTAT) 0.4 MG SL tablet Place 1 tablet under the tongue every 5 minutes as needed for Chest pain up to max of 3 total doses. If no relief after 1 dose, call 911. 7/2/21   Bushra Christopher MD   gabapentin (NEURONTIN) 300 MG capsule Take 1 capsule by mouth 2 times daily for 30 days. Patient is advised to take 1 afternoon around 4 or 5 and the other at bedtime. This is for peripheral neuropathy.  6/28/21 1/27/22  Bushra Christopher MD   zinc 50 MG TABS tablet Take 50 mg by mouth daily    Historical Provider, MD   Ascorbic Acid (VITAMIN C) 1000 MG tablet Take 500 mg by mouth daily     Historical Provider, MD   vitamin D (ERGOCALCIFEROL) 1.25 MG (34383 UT) CAPS capsule Take 1 capsule by mouth twice a week 3/25/21   Bushra Christopher MD   ipratropium-albuterol (DUONEB) 0.5-2.5 (3) MG/3ML SOLN nebulizer solution INHALE 3 MLS INTO THE LUNGS EVERY 6 HOURS AS NEEDED FOR SHORTNESS OF BREATH 10/21/20   Bushra Christopher MD   Nebulizers (COMPRESSOR/NEBULIZER) MISC 1 each by Does not apply route 4 times daily as needed (cough) 9/28/20   Nae Sams MD   Continuous Blood Gluc  ([address]) [address] 1 each by Does not apply route continuous DX E11.62, L97.509, Z79.4 9/28/20   Nae Sams MD   tamsulosin (FLOMAX) 0.4 MG capsule Take 0.4 mg by mouth daily    Historical Provider, MD   lisinopril (PRINIVIL;ZESTRIL) 20 MG tablet Take 1 tablet by mouth 2 times daily  Patient taking differently: Take 10 mg by mouth 2 times daily  6/3/20   [facility] MELITON Hall   Needles & Syringes MISC 1 each by Does not apply route every 21 days Needs 3 cc syringes with 22 G  1/2 inch needle to give Testosterone and needs 18 Gauge to draw up med. 3/9/20   Nae Sams MD   [facility] 100 UNIT/ML injection pen Inject 50 Units into the skin 2 times daily  2/20/20   Nae Sams MD   glipiZIDE (GLUCOTROL) 10 MG tablet Take 10 mg by mouth 2 times daily (before meals)    Historical Provider, MD        isosorbide mononitrate  30 mg Oral Daily    aspirin  81 mg Oral Daily    atorvastatin  40 mg Oral Nightly    buPROPion  150 mg Oral BID    busPIRone  15 mg Oral BID    carvedilol  25 mg Oral BID WC    [Held by provider] clopidogrel  75 mg Oral Daily    gabapentin  300 mg Oral BID    pantoprazole  40 mg Oral QAM AC    tamsulosin  0.4 mg Oral Daily    sodium chloride flush  5-40 mL IntraVENous 2 times per day    furosemide  40 mg IntraVENous Daily    insulin lispro  0-6 Units SubCUTAneous TID WC    insulin lispro  0-3 Units SubCUTAneous Nightly    insulin glargine  10 Units SubCUTAneous Nightly       TELEMETRY: Sinus     Physical Exam:      Physical Exam      General:  Awake, alert, NAD  Skin:  Warm and dry  Neck:  no jvd , no carotid bruits  Chest:  Clear to auscultation, no wheezing or rales  Cardiovascular:  RRR U7U7 no murmurs, clicks, gallups, or rubs  Abdomen:  Soft nontender, nondistended, bowel sounds present  Extremities:  Edema: Trace left Radial Artery withnl palpable pulses;  Hematoma: No  Neuro: Intact neurovascular function Yes    Lab Data:  CBC:   Recent Labs     01/27/22  1145 01/27/22  1145 01/28/22  0152 01/28/22  1137 01/29/22  0251   WBC 8.5  --  9.0  --  7.6   HGB 13.4*  --  13.0*  --  12.5*   HCT 44.8   < > 42.0 43.3 42.7   MCV 95.3*  --  94.2*  --  98.2*      < > 296 285 287    < > = values in this interval not displayed. BMP:   Recent Labs     01/27/22  1145 01/28/22  0152 01/28/22  1220 01/29/22  0251    139  --  138   K 4.9 4.7 4.7 5.0    99  --  100   CO2 29 29  --  28   BUN 19 22*  --  31*   CREATININE 1.1 1.6*  --  1.9*     LIVER PROFILE:   Recent Labs     01/27/22  1145 01/28/22  0152 01/29/22  0251   AST 15 12 9   ALT 21 22 17   LIPASE 74*  --   --    BILITOT 0.5 0.4 0.3   ALKPHOS 106 104 95     PT/INR:   Recent Labs     01/27/22  1145   PROTIME 13.1   INR 0.97     APTT:   Recent Labs     01/28/22  1812 01/28/22  2309 01/29/22  0251   APTT 30.9 52.8* 86.4*     BNP:  No results for input(s): BNP in the last 72 hours. CK, CKMB, Troponin: @LABRCNT (CKTOTAL:3, CKMB:3, TROPONINI:3)@    IMAGING:  Echo Complete    Result Date: 1/28/2022  Transthoracic Echocardiography Report (TTE)  Demographics   Patient Name   Emilia Rivera  Date of Study           01/28/2022   MRN            [id number]            Gender                  Male   Date of Birth  1970        Room Number             [id number]   Age            46 year(s)   Height:        75 inches         Referring Physician     Angelito Medina MD   Weight:        268 pounds        Sonographer             Stephy Ramesh   BSA:           2.49 m^2          Interpreting Physician  Aneesh Peres MD   BMI:           33.5 kg/m^2  Procedure Type of Study   TTE procedure:ECHO NO CONTRAST WITH DOP/COLR. Study Location: Echo Lab Technical Quality: Poor visualization due to body habitus. Patient Status: Inpatient HR: 11 bpm BP: 119/91 mmHg Indications:Chest pain and Dyspnea/SOB.   Conclusions   Summary  Left ventricle was markedly dilated with severe global hypokinesis  There is concentric hypertrophy of the left ventricle  Grade 3 diastolic dysfunction  Estimated ejection fraction of 10% to 15%  Mitral annulus calcification with mild mitral regurgitation and some  thickening of the mitral leaflets  Mild tricuspid insufficiency with estimated right ventricular systolic  pressure between 50 to 55 mm which is moderately elevated  Mildly dilated right ventricle with decreased systolic function   Signature   ----------------------------------------------------------------  Electronically signed by Keron Briones MD(Interpreting physician)  on 01/28/2022 01:57 PM  ----------------------------------------------------------------   Findings   Aortic Valve  Structurally normal aortic valve. Pulmonic Valve  The pulmonic valve was not well visualized . Left Atrium  Normal size left atrium. Allergies   - No known allergies. M-Mode Measurements (cm)   LVIDd: 6.14 cm                           LVIDs: 5.6 cm  IVSd: 2.73 cm  LVPWd: 1.63 cm                           AO Root Dimension: 2 cm  Rt. Vent. Dimension: 2.65 cm             LA: 4.9 cm  % Ejection Fraction: 18.9 %              LVOT: 2.5 cm  Doppler Measurements:   AV Peak Velocity:87.5 cm/s           MV Peak E-Wave: 95.5 cm/s  AV Peak Gradient: 3.06 mmHg          MV Peak A-Wave: 29.1 cm/s  AV Mean Gradient: 2 mmHg             MV E/A Ratio: 3.28 %  AV Area (Continuity):4.15 cm^2       MV Peak Gradient: 3.65 mmHg  TR Velocity:328 cm/s                 MV P1/2t: 126 msec  TR Gradient:43.03 mmHg               MVA by PHT1.75 cm^2      XR CHEST (2 VW)    Result Date: 1/27/2022  Exam:   XR CHEST (2 VW)  Date:  1/27/2022 History:  Male, age  46 years; R06.02 COMPARISON:  None. Findings : Mild-to-moderate cardiomegaly. Median sternotomy wires appear similar. Interstitial edema. Small left pleural effusion. Trace right pleural effusion. Associated opacities. The bones show no acute pathology. Impression: Fluid overload. Clinical correlation to exclude superimposed infection is recommended. Signed by Dr Gwendolyn Ca    Result Date: 1/28/2022  EXAMINATION: US GALLBLADDER RUQ 1/28/2022 6:15 PM HISTORY: US GALLBLADDER RUQ 1/28/2022 4:58 PM HISTORY: Epigastric pain COMPARISON: NONE TECHNIQUE: Multiple longitudinal and transverse realtime sonographic images of the right upper quadrant of the abdomen are obtained. FINDINGS:  Pancreas: Normal in size and echogenicity. Liver: Normal in size, echogenicity and echotexture. Hepatopedal flow is present in the portal vein. No focal lesion. Gallbladder: Multiple internal acoustic reflection's are present in the gallbladder consistent with cholelithiasis. . Wall the gallbladder is thickened at 4 mm. There is no fluid around the gallbladder however a thickened gallbladder wall may indicate cholecystitis Bile ducts: The CBD measures 0.48 cm in diameter. There is no intrahepatic or extrahepatic ductal dilation. Right kidney: Normal in size, shape and contour. Right renal contour is 6.5 x 5.2 x 13.9 cm No mass, hydronephrosis or calculi. No perinephric fluid collection. Other: The visualized abdominal aorta is normal in caliber. Cholelithiasis. The wall the gallbladder is thickened at 4 mm. Signed by Dr Joe Sauceda    XR CHEST PORTABLE    Result Date: 1/27/2022  Examination. XR CHEST PORTABLE 1/27/2022 12:10 PM History: Chest pain. A frontal portable upright view of the chest is compared with the previous study dated 1/27/2022. The lungs are poorly inflated. There is a persistent pulmonary vascular congestion in the lungs bilaterally. There are atelectatic changes in the lower lungs, left more than the right, similar to the previous study. There is small bibasilar pleural effusion. There is moderate cardiomegaly. There is evidence of previous cardiac surgery. No acute bony abnormality.     1. The persistent pulmonary vascular congestion, small bibasilar pleural effusion and cardiomegaly. 2. Discoid atelectatic changes in the lower lungs, more pronounced in the left lower lung. Signed by Dr Acacia Pepper    VL DUP CAROTID BILATERAL    Result Date: 1/28/2022  Vascular Carotid Procedure  Demographics   Patient Name    Alanna Painting  Age                  46                  J   Patient Number  [id number]          Gender               Male   Visit Number    [id number]       Interpreting         Ibrahima Infante                                  Physician   Date of Birth   1970      Referring Physician  Vince Kaur   Accession       [id number]      [address], [place],  Number                                               RDMS  Procedure Type of Study:   Cerebral:Carotid, VL CAROTID BILATERAL. Indications for Study:Pre-op CABG. Risk Factors History of Disease +--------------------------+----------+------------------------------------+ ! Diagnosis                 ! Date      ! Comments                            ! +--------------------------+----------+------------------------------------+ ! Circulatory->CAD          !          !CAD, MI, HTN                        ! +--------------------------+----------+------------------------------------+ ! Chronic lung disease      !03/02/2011! SPONTANEOUS LUNG COLLAPSE           ! +--------------------------+----------+------------------------------------+ ! Musculoskeletal->Arthritis!          !                                    ! +--------------------------+----------+------------------------------------+ ! Endocrine->Diabetes       !          !                                    ! +--------------------------+----------+------------------------------------+   - The patient's risk factor(s) include: diabetes mellitus, dyslipidemia,     arterial hypertension, prior MI and prior CABG. - The patient has a former tobacco history. - The patient's last creatinine was 1.6 mg/dl. Allergies   - No known allergies.   Impression   There is mixed plaque visualized in the bilateral internal carotid  artery(ies). There is less than 50% stenosis in the right internal carotid artery. There is less than 50% stenosis of the left internal carotid artery. There is normal antegrade flow in the bilateral vertebral artery(ies). Signature   ----------------------------------------------------------------  Electronically signed by Roge NevesInterpreting  physician) on 01/28/2022 04:09 PM  ----------------------------------------------------------------  Blood Pressure:Right arm 120/82 mmHg. Left arm 124/78 mmHg. Velocities are measured in cm/s ; Diameters are measured in mm Carotid Right Measurements +------------+-------+-------+--------+-------+------------+---------------+ ! Location    ! PSV    ! EDV    ! Angle   ! RI     !%Stenosis   ! Tortuosity     ! +------------+-------+-------+--------+-------+------------+---------------+ ! Prox CCA    !89.5   !14.9   !60      !0.83   !            !               ! +------------+-------+-------+--------+-------+------------+---------------+ ! Mid CCA     !81.4   !21.7   !60      !0.73   !            !               ! +------------+-------+-------+--------+-------+------------+---------------+ ! Prox ICA    !53.4   !28.6   ! 60      !0.46   !            !               ! +------------+-------+-------+--------+-------+------------+---------------+ ! Mid ICA     !93.8   !43.5   !60      !0.54   !            !               ! +------------+-------+-------+--------+-------+------------+---------------+ ! Dist ICA    !124    !55.3   !60      !0.55   !            !               ! +------------+-------+-------+--------+-------+------------+---------------+ ! Prox ECA    !98.2   !14.9   !60      !0.85   !            !               ! +------------+-------+-------+--------+-------+------------+---------------+ ! Vertebral   !59     !26.7   ! 60      !0.55   !            !               ! +------------+-------+-------+--------+-------+------------+---------------+   - There is antegrade vertebral flow noted on the right side. - Additional Measurements:ICAPSV/CCAPSV 1.39. ICAEDV/CCAEDV 3.71. Carotid Left Measurements +------------+-------+-------+--------+-------+------------+---------------+ ! Location    ! PSV    ! EDV    ! Angle   ! RI     !%Stenosis   ! Tortuosity     ! +------------+-------+-------+--------+-------+------------+---------------+ ! Prox CCA    ! 85.7   !24.9   !60      !0.71   !            !               ! +------------+-------+-------+--------+-------+------------+---------------+ ! Mid CCA     !74.6   !22.4   !60      !0.7    ! !               ! +------------+-------+-------+--------+-------+------------+---------------+ ! Prox ICA    ! 90.7   !39.1   ! 60      !0.57   !            !               ! +------------+-------+-------+--------+-------+------------+---------------+ ! Mid ICA     !92.6   !28.6   ! 60      !0.69   !            !               ! +------------+-------+-------+--------+-------+------------+---------------+ ! Dist ICA    !135    !62.3   !60      !0.54   !            !               ! +------------+-------+-------+--------+-------+------------+---------------+ ! Prox ECA    !83.9   !       !60      !       !            !               ! +------------+-------+-------+--------+-------+------------+---------------+ ! Vertebral   !54.7   !19.9   !60      !0.64   !            !               ! +------------+-------+-------+--------+-------+------------+---------------+   - There is antegrade vertebral flow noted on the left side. - Additional Measurements:ICAPSV/CCAPSV 1.58. ICAEDV/CCAEDV 2.5. CTA PULMONARY W CONTRAST    Result Date: 1/27/2022  Examination. CTA PULMONARY W CONTRAST 1/27/2022 12:37 PM History: Chest pain and shortness of breath. DLP: 7 7 7 mGycm. The CT angiography of the chest are performed after intravenous contrast enhancement.  The images are acquired in axial plane and subsequent 2-D reconstruction in coronal and sagittal planes and 3-D maximum intensity projection reconstruction. There is no previous study for comparison. The correlation made with radiographs of the chest obtained earlier today. There is normal opacification of the pulmonary arteries and branches. No filling defects in the visualized pulmonary arterial bed. The RV/LV ratio is 43/59 which is normal. No finding to suggest right heart strain. Atheromatous changes thoracic aorta are noted. No aneurysmal dilatation. Severe atheromatous changes of coronary arteries are seen. There is evidence of mediastinal lymphadenopathy. An aortopulmonic window lymph node measures 1.7 cm. A level 2R lymph node measures 1.8 cm. A subcarinal lymph node measures 1.9 cm. The limited visualized soft tissues of the neck appears unremarkable. There is a low-density nodule in the right lobe of the thyroid gland which is incompletely evaluated. No axillary lymphadenopathy. There is extensive groundglass infiltrate in the lungs bilaterally. There is bilateral lower lobar consolidation and moderate to large volume bibasal pleural effusion. The limited visualized liver and spleen are unremarkable. Moderate lobulation of the anterior glands bilaterally is noted. The kidneys, the pancreas and gallbladder is incompletely visualized and not fully evaluated. The images reviewed in bone window show chronic degenerative changes of the lumbar spine with mild dextroscoliosis. 1. No evidence of pulmonary emboli. 2. No aortic aneurysm or dissection. Atheromatous changes of coronary arteries. 3. Extensive groundglass infiltrate in the lungs bilaterally suggesting an inflammatory/infectious process. The second possibility may suggest pulmonary congestion/pulmonary edema. 4. Moderate to large bibasal pleural effusion.  Signed by Dr Vesta Piña    VL Vein Mapping Lower Bilateral    Result Date: 1/28/2022  Vascular Lower Extremity Vein Mapping Procedure  Demographics   Patient Name    Enedina Block Age                   46   Patient Number  [id number]           Gender                Male   Visit Number    [id number]        Interpreting          Guzman Randall                                   Physician   Date of Birth   1970       Referring Physician   Accession       [id number]       Sonographer  Number  Procedure Type of Study:   [address]. Risk Factors History of Disease +--------------------------+----------+------------------------------------+ ! Diagnosis                 ! Date      ! Comments                            ! +--------------------------+----------+------------------------------------+ ! Circulatory->CAD          !          !CAD, MI, HTN                        ! +--------------------------+----------+------------------------------------+ ! Chronic lung disease      !03/02/2011! SPONTANEOUS LUNG COLLAPSE           ! +--------------------------+----------+------------------------------------+ ! Musculoskeletal->Arthritis!          !                                    ! +--------------------------+----------+------------------------------------+ ! Endocrine->Diabetes       !          !                                    ! +--------------------------+----------+------------------------------------+   - The patient's risk factor(s) include: diabetes mellitus, dyslipidemia,     arterial hypertension, prior MI and prior CABG. - The patient has a former tobacco history. - The patient's last creatinine was 1.6 mg/dl. Allergies   - No known allergies. Impression   Usable greater saphenous vein conduit in the left lower extremity.    Signature   ----------------------------------------------------------------  Electronically signed by Franco NevesInterpreting  physician) on 01/28/2022 03:16 PM  ----------------------------------------------------------------  Velocities are measured in cm/s ; Diameters are measured in mm +--------------------------------------++--------+-----+----+--------+-----+ ! Superficial - Great Saphenous Vein    ! ! Right   ! ! Left!        !     ! +--------------------------------------++--------+-----+----+--------+-----+ ! Location                              ! !Diameter! Depth! !Diameter! Depth! +--------------------------------------++--------+-----+----+--------+-----+ ! GSV 2 cm Distal to Junction           ! !2.7     !     !    !6.1     !     ! +--------------------------------------++--------+-----+----+--------+-----+ ! GSV High Thigh                        ! !1.8     !     !    !5.7     !     ! +--------------------------------------++--------+-----+----+--------+-----+ ! GSV Mid Thigh                         ! !1.7     !     !    !5       !     ! +--------------------------------------++--------+-----+----+--------+-----+ ! GSV Low Thigh                         ! !1.7     !     !    !4.8     !     ! +--------------------------------------++--------+-----+----+--------+-----+ ! GSV Knee                              !!1.7     !     !    !4.6     !     ! +--------------------------------------++--------+-----+----+--------+-----+ ! GSV High Calf                         !!1.9     !     !    !3.8     !     ! +--------------------------------------++--------+-----+----+--------+-----+ ! GSV Mid Calf                          !!2.5     !     !    !2.7     !     ! +--------------------------------------++--------+-----+----+--------+-----+ ! GSV Ankle                             !!1.6     !     !    !3.3     !     ! +--------------------------------------++--------+-----+----+--------+-----+    Cardiac catheterization pulmonary note right common femoral artery retrograde approach tolerated well left ventricular function impaired ejection fraction 20 to 25% estimated LIMA graft LAD patent vein graft OM occluded stent placement OM 90% in-stent restenosis  Severe 90% plus ostial stenosis of the vein graft to the right coronary artery  70% ostial ramus branch stenosis  Recommend cardiovascular surgical consultation for consideration of redo CABG    Assessment:  1. Complaints of increased shortness of breath lower chest pressure and elevated troponin suggestive of non-STEMI  2. Coronary artery disease  3. Hypertension  4. Hyperlipidemia  5. Diabetes mellitus type 2  6. History of tobacco abuse  7. History of diabetic foot ulceration  8. CTA pulmonary yesterday no evidence of pulmonary emboli extensive groundglass infiltrate in the lungs bilaterally suggesting inflammatory or infectious process moderate to large bibasilar pleural effusion  9. CTA abdomen pelvis 4/5/2021 bilateral perinephric peripelvic and periureteral fat infiltration may represent chronic inflammatory process moderate thick-walled incompletely distended urinary bladder enlarged prostate stable left adrenal nodule  10. Cardiac cath 7-21 EF 25% posterior basilar inferior hypokinesis and inferolateral hypokinesis 90% proximal right patent vein graft to right PDA patent LIMA graft LAD occluded vein graft to third OM severe 95% stenosis mid circumflex into the OM successful PCI placement drug-eluting stent 2.5 x 23  11.  Echo 7/2/2021 EF 25 to 30% mild to moderate MR mild TR restrictive filling pattern IVC dilated        Plan:  Await CABG  Val Mcnamara MD, MD 1/29/2022 7:50 AM

## 2022-01-29 NOTE — FLOWSHEET NOTE
01/29/22 1600   Encounter Summary   Services provided to: Patient and family together  (sister at bedside)   Referral/Consult From: Nurse   Complexity of Encounter Moderate   Length of Encounter 15 minutes   Spiritual/Temple   Type Spiritual support   Assessment Anxious; Concerns with suffering   Intervention Active listening;Explored feelings, thoughts, concerns;Prayer   Outcome Expressed gratitude     Follow-up visit with pt's sister at bedside. Silvino Ramos reported his heart is very weak and only functioning 14%. May be he is in need of heart transplant. He will consider LW.  But now he is to tried and in need of rest.   Electronically signed by Dashawn Kirkland on 1/29/2022 at 5:15 PM

## 2022-01-29 NOTE — PROGRESS NOTES
[facility]      Progress Note    Patient:  Maxine Leyden  YOB: 1970  Date of Service: 1/29/2022  MRN: [id number]   Acct: [de-identified]   Primary Care Physician: Lucy Mcclure MD  Advance Directive: Full Code  Admit Date: 1/27/2022       Hospital Day: 2    Portions of this note have been copied forward, however, updated to reflect the most current clinical status of this patient. CHIEF COMPLAINT epigastric pain, nausea     SUBJECTIVE:  Mr. Yenny Wood was resting in bed this morning. Reported SOB has improved. Denies chest pain. CUMULATIVE HOSPITAL COURSE:   The patient is a 46 y.o. male with past medical history of CAD with recent stents, CABG, HTN, HLD, diabetes, and arthritis who presented to [facility] ED complaining of worsening dyspnea on exertion over the past week. Mr. Yenny Wood reported epigastric pain and worsening dyspnea on exertion over the past week. Reported orthopnea. Also reported having nausea. Stated he was supposed to have gallbladder ultrasound to rule out gallbladder disease. Stated he was started on 2 L O2 at nighttime and daytime naps day prior to admission. Reported cardiac stents in July 2021. Work-up in ED revealed troponin 0.30, with repeat 0.31, BNP 3485, Hgb 13.4, chest x-ray showed persistent pulmonary vascular congestion, small bibasilar pleural effusion and cardiomegaly. CTA negative for PE, extensive groundglass infiltrate in the lungs bilaterally suggesting inflammatory/infectious process, may suggest pulmonary congestion/pulmonary edema, moderate to large bibasal pleural effusion. Patient was admitted to hospital medicine for NSTEMI with cardiology consultation. Heparin drip was started in ED. Cardiology recommended cardiac catheterization.   Patient underwent cardiac catheterization this morning with findings of LV function impaired with EF of 20 to 25%, with LIMA graft LAD patent, vein graft OM occluded, stent placement OM 90% in-stent restenosis, severe 90% plus ostial stenosis of the vein graft to the RCA, 70% ostial ramus branch stenosis. Cardiology recommended CT surgery consultation for consideration of redo CABG. CT surgery assessing for consideration for redo of CABG based on repeat assessment and labs over the weekend. Echo showed LV moderately dilated with severe global hypokinesis, concentric LVH, grade 3 diastolic dysfunction, EF of 10 to 15%, mild mitral regurgitation, mild tricuspid insufficiency with estimated RVSP between 50 to 55 mm, moderately elevated, mildly dilated RV with decreased systolic function. Gallbladder US showed Cholelithiasis. Review of Systems   Constitutional: Negative for chills, diaphoresis, fatigue and fever. HENT: Negative for congestion and ear pain. Eyes: Negative for visual disturbance. Respiratory: Negative for cough, shortness of breath and wheezing. Reports SOB is improving    Cardiovascular: Negative for chest pain, palpitations and leg swelling. Denies chest pain    Gastrointestinal: Negative for abdominal distention, abdominal pain, blood in stool, constipation, diarrhea, nausea and vomiting. Endocrine: Negative for cold intolerance and heat intolerance. Genitourinary: Negative for difficulty urinating, flank pain, frequency and urgency. Musculoskeletal: Positive for back pain. Negative for arthralgias and myalgias. Skin: Negative for color change and wound. Neurological: Negative for dizziness, syncope, weakness, light-headedness, numbness and headaches. Hematological: Does not bruise/bleed easily. Psychiatric/Behavioral: Negative for agitation, confusion and dysphoric mood.         Objective:   VITALS:  /82   Pulse 81   Temp 98 °F (36.7 °C) (Oral)   Resp 20   Ht 6' 3\" (1.905 m)   Wt 290 lb 3.2 oz (131.6 kg)   SpO2 94%   BMI 36.27 kg/m²   24HR INTAKE/OUTPUT:      Intake/Output Summary (Last 24 hours) at 1/29/2022 0161  Last data filed at 1/29/2022 5584  Gross per 24 hour   Intake 1850 ml   Output 500 ml   Net 1350 ml           Physical Exam  Constitutional:       General: He is not in acute distress. Appearance: He is obese. He is not ill-appearing. HENT:      Head: Normocephalic and atraumatic. Right Ear: External ear normal.      Left Ear: External ear normal.      Nose: Nose normal.      Mouth/Throat:      Mouth: Mucous membranes are moist.   Eyes:      Extraocular Movements: Extraocular movements intact. Conjunctiva/sclera: Conjunctivae normal.      Pupils: Pupils are equal, round, and reactive to light. Cardiovascular:      Rate and Rhythm: Normal rate and regular rhythm. Pulses: Normal pulses. Heart sounds: Normal heart sounds. Pulmonary:      Effort: Pulmonary effort is normal. No respiratory distress. Breath sounds: Normal breath sounds. No wheezing, rhonchi or rales. Abdominal:      General: Bowel sounds are normal. There is no distension. Palpations: Abdomen is soft. Tenderness: There is no abdominal tenderness. Musculoskeletal:         General: Swelling present. No tenderness or deformity. Normal range of motion. Cervical back: Normal range of motion and neck supple. No muscular tenderness. Right lower leg: No edema. Left lower leg: No edema. Comments: Trace swelling to BLE    Skin:     General: Skin is warm and dry. Findings: No bruising or lesion. Neurological:      Mental Status: He is alert and oriented to person, place, and time. Psychiatric:         Mood and Affect: Mood normal.         Behavior: Behavior normal.         Thought Content:  Thought content normal.            Medications:      sodium chloride 1,000 mL (01/28/22 1002)    heparin (PORCINE) Infusion 11 Units/kg/hr (01/29/22 0402)    sodium chloride      sodium chloride Stopped (01/28/22 0958)    dextrose        isosorbide mononitrate  30 mg Oral Daily    aspirin  81 mg Oral Daily    atorvastatin 40 mg Oral Nightly    buPROPion  150 mg Oral BID    busPIRone  15 mg Oral BID    carvedilol  25 mg Oral BID WC    [Held by provider] clopidogrel  75 mg Oral Daily    gabapentin  300 mg Oral BID    pantoprazole  40 mg Oral QAM AC    tamsulosin  0.4 mg Oral Daily    sodium chloride flush  5-40 mL IntraVENous 2 times per day    furosemide  40 mg IntraVENous Daily    insulin lispro  0-6 Units SubCUTAneous TID WC    insulin lispro  0-3 Units SubCUTAneous Nightly    insulin glargine  10 Units SubCUTAneous Nightly     hydrALAZINE, oxyCODONE-acetaminophen, heparin (porcine), heparin (porcine), sodium chloride flush, sodium chloride, ondansetron **OR** ondansetron, acetaminophen **OR** acetaminophen, polyethylene glycol, nitroGLYCERIN, ondansetron, glucose, glucagon (rDNA), dextrose, dextrose bolus (hypoglycemia) **OR** dextrose bolus (hypoglycemia), albuterol  ADULT DIET; Regular; 3 carb choices (45 gm/meal)     Lab and other Data:     Recent Labs     01/27/22  1145 01/27/22  1145 01/28/22  0152 01/28/22  1137 01/29/22  0251   WBC 8.5  --  9.0  --  7.6   HGB 13.4*  --  13.0*  --  12.5*      < > 296 285 287    < > = values in this interval not displayed.      Recent Labs     01/27/22  1145 01/28/22  0152 01/28/22  1220 01/29/22  0251    139  --  138   K 4.9 4.7 4.7 5.0    99  --  100   CO2 29 29  --  28   BUN 19 22*  --  31*   CREATININE 1.1 1.6*  --  1.9*   GLUCOSE 242* 173*  --  338*     Recent Labs     01/27/22  1145 01/28/22  0152 01/29/22  0251   AST 15 12 9   ALT 21 22 17   BILITOT 0.5 0.4 0.3   ALKPHOS 106 104 95     Troponin T:   Recent Labs     01/27/22  1346 01/27/22  1804 01/27/22  2100   TROPONINI 0.31* 0.32* 0.35*     INR:   Recent Labs     01/27/22  1145   INR 0.97     UA:  Recent Labs     01/27/22  1314   COLORU YELLOW   PHUR 5.5   WBCUA 1   RBCUA 2   BACTERIA NEGATIVE*   CLARITYU Clear   SPECGRAV 1.018   LEUKOCYTESUR Negative   UROBILINOGEN 0.2   BILIRUBINUR Negative   BLOODU catheterization this morning with findings of LV function impaired with EF of 20 to 25%, with LIMA graft LAD patent, vein graft OM occluded, stent placement OM 90% in-stent restenosis, severe 90% plus ostial stenosis of the vein graft to the RCA, 70% ostial ramus branch stenosis    - Cardiology recommended CT surgery consultation for consideration of redo CABG    - CT surgery assessing for consideration on redo of CABG based on repeat assessment and labs over the weekend   - Aspirin and Lipitor  - Heparin gtt   - SL Nitro PRN  - Trend troponin   - FLP HDL 31, LDL 97 / HgbA1c- 12.4  - Serial and PRN EKGs  - Monitor on telemetry             Active Problems:   CHF (congestive heart failure) ([address]-               - ECHO showed LV moderately dilated with severe global hypokinesis, concentric LVH, grade 3 diastolic dysfunction, EF of 10 to 15%, mild mitral regurgitation, mild tricuspid insufficiency with estimated RVSP between 50 to 55 mm, moderately elevated, mildly dilated RV with decreased systolic function              - Continue IV Lasix               - Low-sodium diet              - Monitor I's and O's closely              - Daily weights              - Monitor on telemetry      Acute renal insufficiency-               - Creatinine 1.9 today               - Monitor I's and O's closely              - Monitor labs closely               - Avoid hypotension              - Avoid nephrotoxic agents         Essential hypertension- stable at this time, continue current medications, monitor BP closely       Mixed hyperlipidemia- noted, continue home medications       Type 2 diabetes mellitus with foot ulcer, with long-term current use of insulin (HCC)- Lantus, SSI, Accu-Chek, hypoglycemia treatment protocol in place       Epigastric pain- Gallbladder US showed Cholelithiasis, antiemetics as needed               DVT Prophylaxis: Heparin gtt     GI prophylaxis:  Protonix     Discharge planning: TBD        Further Orders per Clinical course/attending. Electronically signed by MELITON Lutz CNP on 1/29/2022 at 9:07 AM       EMR Dragon/Transcription disclaimer:   Much of this encounter note is an electronic transcription/translation of spoken language to printed text.  The electronic translation of spoken language may permit erroneous, or at times, nonsensical words or phrases to be inadvertently transcribed; although attempts have made to review the note for such errors, some may still exist.

## 2022-01-30 NOTE — PROGRESS NOTES
[facility]      Progress Note    Patient:  Kayy Hercules  YOB: 1970  Date of Service: 1/30/2022  MRN: [id number]   Acct: [de-identified]   Primary Care Physician: Katie Pike MD  Advance Directive: Full Code  Admit Date: 1/27/2022       Hospital Day: 3    Portions of this note have been copied forward, however, updated to reflect the most current clinical status of this patient. CHIEF COMPLAINT epigastric pain, nausea     SUBJECTIVE:  Mr. Ileana Cervantes was resting in bed this morning. Offers no complaints today. CUMULATIVE HOSPITAL COURSE:   The patient is a 46 y.o. male with past medical history of CAD with recent stents, CABG, HTN, HLD, diabetes, and arthritis who presented to [facility] ED complaining of worsening dyspnea on exertion over the past week. Mr. Ileana Cervantes reported epigastric pain and worsening dyspnea on exertion over the past week. Reported orthopnea. Also reported having nausea. Stated he was supposed to have gallbladder ultrasound to rule out gallbladder disease. Stated he was started on 2 L O2 at nighttime and daytime naps day prior to admission. Reported cardiac stents in July 2021. Work-up in ED revealed troponin 0.30, with repeat 0.31, BNP 3485, Hgb 13.4, chest x-ray showed persistent pulmonary vascular congestion, small bibasilar pleural effusion and cardiomegaly. CTA negative for PE, extensive groundglass infiltrate in the lungs bilaterally suggesting inflammatory/infectious process, may suggest pulmonary congestion/pulmonary edema, moderate to large bibasal pleural effusion. Patient was admitted to hospital medicine for NSTEMI with cardiology consultation. Heparin drip was started in ED. Cardiology recommended cardiac catheterization.   Patient underwent cardiac catheterization on 1/28/22 with findings of LV function impaired with EF of 20 to 25%, with LIMA graft LAD patent, vein graft OM occluded, stent placement OM 90% in-stent restenosis, severe 90% plus ostial stenosis of the vein graft to the RCA, 70% ostial ramus branch stenosis. Cardiology recommended CT surgery consultation for consideration of redo CABG. CT surgery assessing for consideration for redo of CABG based on repeat assessment and labs over the weekend. Echo showed LV moderately dilated with severe global hypokinesis, concentric LVH, grade 3 diastolic dysfunction, EF of 10 to 15%, mild mitral regurgitation, mild tricuspid insufficiency with estimated RVSP between 50 to 55 mm, moderately elevated, mildly dilated RV with decreased systolic function. Gallbladder US showed Cholelithiasis. CT surgery recommended transfer to Center with transplantation capabilities. Review of Systems   Constitutional: Negative for chills, diaphoresis, fatigue and fever. HENT: Negative for congestion and ear pain. Eyes: Negative for visual disturbance. Respiratory: Negative for cough, shortness of breath and wheezing. Reports SOB is improving    Cardiovascular: Negative for chest pain, palpitations and leg swelling. Denies chest pain    Gastrointestinal: Negative for abdominal distention, abdominal pain, blood in stool, constipation, diarrhea, nausea and vomiting. Endocrine: Negative for cold intolerance and heat intolerance. Genitourinary: Negative for difficulty urinating, flank pain, frequency and urgency. Musculoskeletal: Negative for arthralgias, back pain and myalgias. Skin: Negative for color change and wound. Neurological: Negative for dizziness, syncope, weakness, light-headedness, numbness and headaches. Hematological: Does not bruise/bleed easily. Psychiatric/Behavioral: Negative for agitation, confusion and dysphoric mood.         Objective:   VITALS:  /87   Pulse 82   Temp 98.1 °F (36.7 °C) (Temporal)   Resp 20   Ht 6' 3\" (1.905 m)   Wt 275 lb (124.7 kg)   SpO2 91%   BMI 34.37 kg/m²   24HR INTAKE/OUTPUT:      Intake/Output Summary (Last 24 hours) at 1/30/2022 0945  Last data filed at 1/30/2022 0036  Gross per 24 hour   Intake 570 ml   Output 1350 ml   Net -780 ml           Physical Exam  Constitutional:       General: He is not in acute distress. Appearance: He is obese. He is not ill-appearing. HENT:      Head: Normocephalic and atraumatic. Right Ear: External ear normal.      Left Ear: External ear normal.      Nose: Nose normal.      Mouth/Throat:      Mouth: Mucous membranes are moist.   Eyes:      Extraocular Movements: Extraocular movements intact. Conjunctiva/sclera: Conjunctivae normal.      Pupils: Pupils are equal, round, and reactive to light. Cardiovascular:      Rate and Rhythm: Normal rate and regular rhythm. Pulses: Normal pulses. Heart sounds: Normal heart sounds. Pulmonary:      Effort: Pulmonary effort is normal. No respiratory distress. Breath sounds: Normal breath sounds. No wheezing, rhonchi or rales. Abdominal:      General: Bowel sounds are normal. There is no distension. Palpations: Abdomen is soft. Tenderness: There is no abdominal tenderness. Musculoskeletal:         General: Swelling present. No tenderness or deformity. Normal range of motion. Cervical back: Normal range of motion and neck supple. No muscular tenderness. Right lower leg: No edema. Left lower leg: No edema. Comments: Trace swelling to BLE    Skin:     General: Skin is warm and dry. Findings: No bruising or lesion. Neurological:      Mental Status: He is alert and oriented to person, place, and time. Psychiatric:         Mood and Affect: Mood normal.         Behavior: Behavior normal.         Thought Content:  Thought content normal.            Medications:      sodium chloride 1,000 mL (01/28/22 1002)    heparin (PORCINE) Infusion 15 Units/kg/hr (01/30/22 0146)    sodium chloride      sodium chloride Stopped (01/28/22 0958)    dextrose        sodium zirconium cyclosilicate  10 g Oral Once    isosorbide mononitrate  30 mg Oral Daily    aspirin  81 mg Oral Daily    atorvastatin  40 mg Oral Nightly    buPROPion  150 mg Oral BID    busPIRone  15 mg Oral BID    carvedilol  25 mg Oral BID     [Held by provider] clopidogrel  75 mg Oral Daily    gabapentin  300 mg Oral BID    pantoprazole  40 mg Oral QAM AC    tamsulosin  0.4 mg Oral Daily    sodium chloride flush  5-40 mL IntraVENous 2 times per day    [Held by provider] furosemide  40 mg IntraVENous Daily    insulin lispro  0-6 Units SubCUTAneous TID     insulin lispro  0-3 Units SubCUTAneous Nightly    insulin glargine  10 Units SubCUTAneous Nightly     hydrALAZINE, oxyCODONE-acetaminophen, heparin (porcine), heparin (porcine), sodium chloride flush, sodium chloride, ondansetron **OR** ondansetron, acetaminophen **OR** acetaminophen, polyethylene glycol, nitroGLYCERIN, ondansetron, glucose, glucagon (rDNA), dextrose, dextrose bolus (hypoglycemia) **OR** dextrose bolus (hypoglycemia), albuterol  ADULT DIET; Regular; 3 carb choices (45 gm/meal)     Lab and other Data:     Recent Labs     01/28/22  0152 01/28/22  0152 01/28/22  1137 01/29/22  0251 01/30/22  0045   WBC 9.0  --   --  7.6 7.4   HGB 13.0*  --   --  12.5* 11.8*      < > 285 287 243    < > = values in this interval not displayed.      Recent Labs     01/28/22  0152 01/28/22  1220 01/29/22  0251 01/30/22  0045     --  138 138   K 4.7 4.7 5.0 5.5*   CL 99  --  100 101   CO2 29  --  28 28   BUN 22*  --  31* 36*   CREATININE 1.6*  --  1.9* 2.0*   GLUCOSE 173*  --  338* 333*     Recent Labs     01/28/22  0152 01/29/22  0251 01/30/22  0045   AST 12 9 8   ALT 22 17 16   BILITOT 0.4 0.3 0.3   ALKPHOS 104 95 96     Troponin T:   Recent Labs     01/27/22  1346 01/27/22  1804 01/27/22  2100   TROPONINI 0.31* 0.32* 0.35*     INR:   Recent Labs     01/27/22  1145   INR 0.97     UA:  Recent Labs     01/27/22  1314   COLORU YELLOW   PHUR 5.5   WBCUA 1   RBCUA 2   BACTERIA NEGATIVE* CLARITYU Clear   SPECGRAV 1.018   LEUKOCYTESUR Negative   UROBILINOGEN 0.2   BILIRUBINUR Negative   BLOODU SMALL*   GLUCOSEU 250*     A1C:   Recent Labs     01/27/22  1804   LABA1C 12.4*       RAD:     XR CHEST (2 VW)  Result Date: 1/27/2022    Impression: Fluid overload. Clinical correlation to exclude superimposed infection is recommended. Signed by Dr Marleny Chapman      XR CHEST PORTABLE  Result Date: 1/27/2022    1. The persistent pulmonary vascular congestion, small bibasilar pleural effusion and cardiomegaly. 2. Discoid atelectatic changes in the lower lungs, more pronounced in the left lower lung. Signed by Dr Jesús Aj  Result Date: 1/27/2022    1. No evidence of pulmonary emboli. 2. No aortic aneurysm or dissection. Atheromatous changes of coronary arteries. 3. Extensive groundglass infiltrate in the lungs bilaterally suggesting an inflammatory/infectious process. The second possibility may suggest pulmonary congestion/pulmonary edema. 4. Moderate to large bibasal pleural effusion. Signed by Dr Karin Miranda:      COVID-19, Rapid [[id number]] Collected: 01/27/22 1432   Order Status: Completed Specimen: Nasopharyngeal Swab Updated: 01/27/22 1453    SARS-CoV-2, NAAT Not Detected         Assessment/Plan   Principal Problem:    NSTEMI (non-ST elevated myocardial infarction) ([address]  Active Problems:    Coronary artery disease involving native coronary artery of native heart without angina pectoris    Essential hypertension    Mixed hyperlipidemia    Type 2 diabetes mellitus with foot ulcer, with long-term current use of insulin (HCC)    Epigastric pain    Acute systolic congestive heart failure ([address]    Acute renal insufficiency  Resolved Problems:    * No resolved hospital problems.  *        Principal Problem:    NSTEMI (non-ST elevated myocardial infarction) (HCC)/  Coronary artery disease involving native coronary artery of native heart without angina pectoris-   - Cardiology following    - Underwent cardiac catheterization with findings of LV function impaired with EF of 20 to 25%, with LIMA graft LAD patent, vein graft OM occluded, stent placement OM 90% in-stent restenosis, severe 90% plus ostial stenosis of the vein graft to the RCA, 70% ostial ramus branch stenosis    - Cardiology recommended CT surgery consultation for consideration of redo CABG    -  CT surgery recommended transfer to Center with transplantation capabilities   - Aspirin and Lipitor  - Heparin gtt   - SL Nitro PRN  - Trend troponin   - FLP HDL 31, LDL 97 / HgbA1c- 12.4  - Serial and PRN EKGs  - Monitor on telemetry             Active Problems:   CHF (congestive heart failure) ([address]-               - ECHO showed LV moderately dilated with severe global hypokinesis, concentric LVH, grade 3 diastolic dysfunction, EF of 10 to 15%, mild mitral regurgitation, mild tricuspid insufficiency with estimated RVSP between 50 to 55 mm, moderately elevated, mildly dilated RV with decreased systolic function              - IV Lasix held due to BERTHA               - Low-sodium diet              - Monitor I's and O's closely              - Daily weights              - Monitor on telemetry      Acute renal insufficiency-               - Creatinine 2.0 today               - Monitor I's and O's closely              - Monitor labs closely               - Avoid hypotension              - Avoid nephrotoxic agents         Essential hypertension- stable at this time, continue current medications, monitor BP closely       Mixed hyperlipidemia- noted, continue home medications       Type 2 diabetes mellitus with foot ulcer, with long-term current use of insulin (HCC)- Lantus, SSI, Accu-Chek, hypoglycemia treatment protocol in place       Epigastric pain- Gallbladder US showed Cholelithiasis, antiemetics as needed               DVT Prophylaxis: Heparin gtt     GI prophylaxis:  Protonix     Discharge planning: TBD Further Orders per Clinical course/attending. Electronically signed by MELITON Park CNP on 1/30/2022 at 9:45 AM       EMR Dragon/Transcription disclaimer:   Much of this encounter note is an electronic transcription/translation of spoken language to printed text.  The electronic translation of spoken language may permit erroneous, or at times, nonsensical words or phrases to be inadvertently transcribed; although attempts have made to review the note for such errors, some may still exist.

## 2022-01-30 NOTE — PROGRESS NOTES
PROGRESS NOTE FOR CONSULT PATIENTS    SUBJECTIVE: No new problems    /69   Pulse 75   Temp 96.8 °F (36 °C) (Temporal)   Resp 18   Ht 6' 3\" (1.905 m)   Wt 275 lb (124.7 kg)   SpO2 95%   BMI 34.37 kg/m²   Average, Min, and Max for last 24 hours Vitals:  TEMPERATURE:  Temp  Av.7 °F (36.5 °C)  Min: 96.8 °F (36 °C)  Max: 98.6 °F (37 °C)  RESPIRATIONS RANGE: Resp  Av.6  Min: 18  Max: 20  PULSE RANGE: Pulse  Av.8  Min: 68  Max: 87  BLOOD PRESSURE RANGE:  Systolic (87YXF), TSZ:939 , Min:122 , ELS:234   ; Diastolic (25HGY), RC, Min:69, Max:89    PULSE OXIMETRY RANGE: SpO2  Av %  Min: 91 %  Max: 95 %    EXAM: still has some rales on pulmonary exam, minimal leg edema, RRR with no murmur    LABS:  Creat 2.0    CHEST XRAY: bilateral effusions    ASSESSMENT: Multivessel CAD with ischemic cardiomyopathy. Given severe lung/renal/cardiac function, he is too high risk [facility]. I have discussed this opinion with the patient. PLAN: Transfer to center with transplantation capabilities.     Electronically signed by Andrei Hawk MD on 22 at 11:21 AM CST

## 2022-01-31 NOTE — PROGRESS NOTES
Patient has had one unmeasured urine occurrence today. Nurse has educated patient on use of urinal for measurement and importance of accurate measurement, especially since lab results suggest worsening kidney function. Patient has verbalized understanding and agreed to comply. Nurse has also notified attending physician. Will continue to monitor patient.   Electronically signed by Amita Main RN on 1/30/2022 at 6:19 PM

## 2022-01-31 NOTE — PROGRESS NOTES
[facility] Hospitalists      Patient:  Elisabeth Peacock  YOB: 1970  Date of Service: 1/31/2022  MRN: [id number]   Acct: [de-identified]   Primary Care Physician: Eduar Awad MD  Advance Directive: Full Code  Admit Date: 1/27/2022       Hospital Day: 4  Portions of this note have been copied forward, however, changed to reflect the most current clinical status of this patient. CHIEF COMPLAINT epigastric pain, nausea    SUBJECTIVE:  Patient reports increased shortness of breath today. He also endorses abdominal distention. CUMULATIVE HOSPITAL COURSE:  The patient is a 68-year-old male with past medical history of CAD requiring CABG and stents, hypertension, hyperlipidemia, diabetes, and arthritis who presented to [facility] ED complaining of epigastric pain and nausea. The patient also indicated worsening dyspnea for a week prior to admission. The patient also reported orthopnea. The patient indicated he was supposed to be having an ultrasound of his gallbladder to rule out gallbladder disease. He also indicates he was recently started on 2 L of oxygen at night and with daytime naps. Patient reported having cardiac stents placed in July 2021. ED work-up indicated troponin 0 0.30 with a repeat of 0.31, BNP 3485, and chest x-ray indicating persistent pulmonary vascular congestion, small bibasilar pleural effusions, and cardiomegaly. CTA unremarkable for PE, extensive groundglass infiltrates in the lungs bilaterally suggesting inflammatory/infectious process, may suggest pulmonary congestion/pulmonary edema, moderate to large bibasilar pleural effusion. Patient was admitted to the hospitalist for NSTEMI with cardiac consultation. The patient was initiated on a heparin drip in the ED.   Echo obtained and indicated grade 3 diastolic dysfunction with estimated EF 10 to 15%, some thickening of the mitral leaflets with mild regurgitation, mild tricuspid insufficiency with an estimated right ventricular systolic pressure between 50 and 55. Cardiology recommended cardiac catheterization. Patient underwent cardiac catheterization on 1/28/2022 which indicated left ventricular function impaired ejection fraction 20 to 25% estimated LIMA graft diagonal patent vein graft OM occluded stent placement OM 90% in-stent restenosis, Severe 90% plus ostial stenosis of the vein graft to the right coronary artery, 70% ostial ramus branch stenosis, Severe disease mid LAD and recommend cardiovascular surgical consultation for consideration of redo CABG. patient was evaluated by cardiothoracic surgeon who calculated his risk for perioperative mortality 6%. Dr. Lennice Canavan did reach out to Dr. Philomena Azul for second opinion given the significance of this case. At this time CT surgery recommends transferring patient to a facility with LVAD/transplant backup. Case discussed with transfer center and patient was accepted by Dr. Dexter Gates at [facility] pending a bed. Patient is continued on diuresis for heart failure. He is also on oxygen therapy at this time. Gallbladder ultrasound was obtained and indicated mildly thickened gallbladder with cholelithiasis. Patient denies abdominal pain at this time. Review of Systems:   Review of Systems   Constitutional: Positive for activity change and fatigue. Negative for appetite change, chills and fever. Respiratory: Positive for chest tightness and shortness of breath. Negative for wheezing. Cardiovascular: Positive for leg swelling. Negative for chest pain and palpitations. Gastrointestinal: Positive for abdominal distention. Negative for abdominal pain, diarrhea, nausea and vomiting. Genitourinary: Negative for difficulty urinating, flank pain, frequency and urgency. Musculoskeletal: Negative for back pain and neck pain. Skin: Negative for color change and wound. Neurological: Negative for dizziness and light-headedness.    Psychiatric/Behavioral: Negative for agitation and confusion. 14 point review of systems is negative except as specifically addressed above. Objective:   VITALS:  /80   Pulse 71   Temp 98.4 °F (36.9 °C) (Temporal)   Resp 22   Ht 6' 3\" (1.905 m)   Wt 275 lb (124.7 kg)   SpO2 94%   BMI 34.37 kg/m²   24HR INTAKE/OUTPUT:    Intake/Output Summary (Last 24 hours) at 1/31/2022 1458  Last data filed at 1/31/2022 1449  Gross per 24 hour   Intake 1085 ml   Output 275 ml   Net 810 ml       Physical Exam  Vitals and nursing note reviewed. Constitutional:       Appearance: He is ill-appearing. HENT:      Head: Normocephalic. Eyes:      Extraocular Movements: Extraocular movements intact. Conjunctiva/sclera: Conjunctivae normal.      Pupils: Pupils are equal, round, and reactive to light. Cardiovascular:      Rate and Rhythm: Normal rate and regular rhythm. Pulses: Normal pulses. Heart sounds: Normal heart sounds. Pulmonary:      Effort: Tachypnea present. Breath sounds: Examination of the right-lower field reveals decreased breath sounds. Examination of the left-lower field reveals decreased breath sounds. Decreased breath sounds present. Abdominal:      General: Bowel sounds are normal. There is distension. Palpations: There is no mass. Tenderness: There is no abdominal tenderness. Musculoskeletal:         General: Normal range of motion. Cervical back: Normal range of motion and neck supple. No tenderness. Right lower leg: Edema (Trace) present. Left lower leg: Edema (Trace) present. Skin:     General: Skin is warm and dry. Capillary Refill: Capillary refill takes less than 2 seconds. Neurological:      General: No focal deficit present. Mental Status: He is alert and oriented to person, place, and time. Psychiatric:         Mood and Affect: Mood normal.         Behavior: Behavior normal.         Thought Content:  Thought content normal.         Judgment: Judgment normal. Medications:      sodium chloride 1,000 mL (01/28/22 1002)    heparin (PORCINE) Infusion 17 Units/kg/hr (01/31/22 0548)    sodium chloride      sodium chloride Stopped (01/28/22 0958)    dextrose        isosorbide mononitrate  30 mg Oral Daily    aspirin  81 mg Oral Daily    atorvastatin  40 mg Oral Nightly    buPROPion  150 mg Oral BID    busPIRone  15 mg Oral BID    carvedilol  25 mg Oral BID WC    [Held by provider] clopidogrel  75 mg Oral Daily    gabapentin  300 mg Oral BID    pantoprazole  40 mg Oral QAM AC    tamsulosin  0.4 mg Oral Daily    sodium chloride flush  5-40 mL IntraVENous 2 times per day    furosemide  40 mg IntraVENous Daily    insulin lispro  0-6 Units SubCUTAneous TID WC    insulin lispro  0-3 Units SubCUTAneous Nightly    insulin glargine  10 Units SubCUTAneous Nightly     hydrALAZINE, oxyCODONE-acetaminophen, heparin (porcine), heparin (porcine), sodium chloride flush, sodium chloride, ondansetron **OR** ondansetron, acetaminophen **OR** acetaminophen, polyethylene glycol, nitroGLYCERIN, ondansetron, glucose, glucagon (rDNA), dextrose, dextrose bolus (hypoglycemia) **OR** dextrose bolus (hypoglycemia), albuterol  ADULT DIET; Regular; 3 carb choices (45 gm/meal)     Lab and other Data:     Recent Labs     01/29/22 0251 01/30/22 0045 01/31/22 0357   WBC 7.6 7.4 7.5   HGB 12.5* 11.8* 12.8*    243 241     Recent Labs     01/29/22 0251 01/30/22 0045 01/31/22  0357    138 136   K 5.0 5.5* 6.0*    101 100   CO2 28 28 24   BUN 31* 36* 38*   CREATININE 1.9* 2.0* 2.0*   GLUCOSE 338* 333* 267*     Recent Labs     01/29/22 0251 01/30/22 0045 01/31/22 0357   AST 9 8 12   ALT 17 16 21   BILITOT 0.3 0.3 0.4   ALKPHOS 95 96 111     Troponin T: No results for input(s): TROPONINI in the last 72 hours. Pro-BNP: No results for input(s): BNP in the last 72 hours. INR: No results for input(s): INR in the last 72 hours.   UA:No results for input(s): NITRITE, Drew Wilson, LABCAST, WBCUA, RBCUA, MUCUS, TRICHOMONAS, YEAST, BACTERIA, CLARITYU, SPECGRAV, LEUKOCYTESUR, UROBILINOGEN, BILIRUBINUR, BLOODU, GLUCOSEU, AMORPHOUS in the last 72 hours. Invalid input(s): Dione Hancock  A1C: No results for input(s): LABA1C in the last 72 hours. ABG:No results for input(s): PHART, XXB0RMX, PO2ART, KMR9VPJ, BEART, HGBAE, I8HYUADR, CARBOXHGBART in the last 72 hours. RAD:   XR CHEST (2 VW)    Result Date: 1/27/2022  Impression: Fluid overload. Clinical correlation to exclude superimposed infection is recommended. Signed by Dr Hailee Alba    Result Date: 1/28/2022  Cholelithiasis. The wall the gallbladder is thickened at 4 mm. Signed by Dr Donnie Ramesh    XR CHEST PORTABLE    Result Date: 1/31/2022  1. Similar chest compared to 1/27/2022. Prior mediastinal surgery with similar findings of CHF. Signed by Dr Gloria Graves    XR CHEST PORTABLE    Result Date: 1/27/2022  1. The persistent pulmonary vascular congestion, small bibasilar pleural effusion and cardiomegaly. 2. Discoid atelectatic changes in the lower lungs, more pronounced in the left lower lung. Signed by Dr Diaz Fraction    Result Date: 1/27/2022  1. No evidence of pulmonary emboli. 2. No aortic aneurysm or dissection. Atheromatous changes of coronary arteries. 3. Extensive groundglass infiltrate in the lungs bilaterally suggesting an inflammatory/infectious process. The second possibility may suggest pulmonary congestion/pulmonary edema. 4. Moderate to large bibasal pleural effusion.  Signed by Dr Lopez Case       Echo:   Summary   Left ventricle was markedly dilated with severe global hypokinesis   There is concentric hypertrophy of the left ventricle   Grade 3 diastolic dysfunction   Estimated ejection fraction of 10% to 15%   Mitral annulus calcification with mild mitral regurgitation and some   thickening of the mitral leaflets   Mild tricuspid insufficiency with estimated right ventricular systolic   pressure between 50 to 55 mm which is moderately elevated   Mildly dilated right ventricle with decreased systolic function         Assessment/Plan   Principal Problem:    NSTEMI (non-ST elevated myocardial infarction) ([address] / Coronary artery disease involving native coronary artery of native heart without angina pectoris   -Continue heparin drip   -Cardiac catheterization on 1/28   -Evaluation by CT surgery, recommended transfer to Center with LVAD/transplant backup   -Patient accepted by Dr. Bravo Jacobs at [facility] pending bed   -Continue diuresis    Active Problems:  Essential hypertension   -Continue current regimen   -Monitor closely      Mixed hyperlipidemia   -Continue current statin      Type 2 diabetes mellitus with foot ulcer, with long-term current use of insulin (HCC)   -Continue Accu-Cheks   -Continue basal, prandial, and sliding scale insulin at this time   -Diabetes educator   -Hypoglycemia protocol in place      Epigastric pain   -Gallbladder ultrasound indicated cholelithiasis, will monitor and follow-up as needed      Acute systolic congestive heart failure (HCC)   -Continue diuresis   -Daily weight   -Accurate I&O      Acute renal insufficiency   -Monitor BMP   -Daily weight   -Avoid nephrotoxins hypotension      DVT Prophylaxis: Heparin drip    GI prophylaxis: Protonix    MELITON Quintero - CNP, 1/31/2022 2:58 PM

## 2022-01-31 NOTE — PROGRESS NOTES
Cardiology Daily Note Sahra Hi MD      Patient:  Morales Hagan  [id number]    Patient Active Problem List    Diagnosis Date Noted    Abnormal stress echocardiogram     Pre-syncope     Cigarette smoker 09/02/2014    Coronary artery disease involving native coronary artery of native heart without angina pectoris     Essential hypertension     Mixed hyperlipidemia     Acute systolic congestive heart failure ([address] 01/28/2022    Acute renal insufficiency 01/28/2022    NSTEMI (non-ST elevated myocardial infarction) ([address] 01/27/2022    Epigastric pain 01/27/2022    NSTEMI (non-ST elevation myocardial infarction) ([address] 07/02/2021    Foot ulcer with fat layer exposed, left ([address] 01/25/2019    Cellulitis of left foot     Non-pressure chronic ulcer of other part of left foot with fat layer exposed ([address] 12/17/2018    Chest pain due to myocardial ischemia 03/21/2018    Skin ulcer of small toe of right foot with fat layer exposed ([address] 08/16/2017    Skin ulcer of toe of left foot with fat layer exposed ([address] 08/03/2017    Type 2 diabetes mellitus with foot ulcer, with long-term current use of insulin ([address] 08/03/2017       Admit Date:  1/27/2022    Admission Problem List: Present on Admission:   NSTEMI (non-ST elevated myocardial infarction) ([address]   Coronary artery disease involving native coronary artery of native heart without angina pectoris   Essential hypertension   Mixed hyperlipidemia   Type 2 diabetes mellitus with foot ulcer, with long-term current use of insulin (HCC)   Epigastric pain   Acute systolic congestive heart failure ([address]   Acute renal insufficiency      Cardiac Specific Data:  Specialty Problems        Cardiology Problems    Coronary artery disease involving native coronary artery of native heart without angina pectoris        Essential hypertension        Mixed hyperlipidemia        Pre-syncope        Chest pain due to myocardial ischemia        NSTEMI (non-ST elevation myocardial infarction) ([facility]        * (Principal) NSTEMI (non-ST elevated myocardial infarction) ([facility]        Acute systolic congestive heart failure (HCC)              Subjective:  Mr. Silva Wade seen today cardiac cath Friday severe multivessel coronary disease surgical consultation reviewed and appreciated. Patient has had no further chest pain over the weekend he is somewhat short of breath but that seems to be somewhat better. After further discussion patient has agreed to transfer to higher level of care for either consideration of bypass surgery redo versus transplant that discussion is ongoing. Anticipate transfer in the next 24 to 48 hours. Blood pressure 135/92 heart 76. Creatinine has become elevated to 2.0. Objective:   BP (!) 135/92   Pulse 76   Temp 96.8 °F (36 °C) (Temporal)   Resp 17   Ht 6' 3\" (1.905 m)   Wt 275 lb (124.7 kg)   SpO2 93%   BMI 34.37 kg/m²       Intake/Output Summary (Last 24 hours) at 1/31/2022 1248  Last data filed at 1/31/2022 5323  Gross per 24 hour   Intake 967 ml   Output 275 ml   Net 692 ml       Prior to Admission medications    Medication Sig Start Date End Date Taking? Authorizing Provider   oxyCODONE-acetaminophen (PERCOCET)  MG per tablet Take 1 tablet by mouth every 6 hours as needed for Pain.    Yes Historical Provider, MD   pantoprazole (PROTONIX) 40 MG tablet Take 1 tablet by mouth every morning (before breakfast) 1/25/22   Daniel Raman MD   Continuous Blood Gluc Sensor (DEXCOM G6 SENSOR) MISC 1 each by Does not apply route continuous E11.62, L97.509, Z79.4 1/4/22   Daniel Raman MD   Continuous Blood Gluc Transmit (DEXCOM G6 TRANSMITTER) MISC 1 each by Does not apply route continuous E11.62, L97.509, Z79.4 1/4/22   Daniel Raman MD   buPROPion (WELLBUTRIN XL) 150 MG extended release tablet TAKE 1 TABLET BY MOUTH 2 TIMES DAILY 12/7/21   Daniel Raman MD   furosemide (LASIX) 40 MG tablet TAKE 1 TABLET BY MOUTH TWICE A DAY 12/7/21   Daniel Raman MD busPIRone (BUSPAR) 15 MG tablet TAKE 1 TABLET BY MOUTH TWICE A DAY 12/7/21   Ray Baker MD   montelukast (SINGULAIR) 10 MG tablet TAKE 1 TABLET BY MOUTH EVERY DAY 10/5/21   Ray Baker MD   atorvastatin (LIPITOR) 40 MG tablet Take 1 tablet by mouth nightly 10/5/21   Ray Baker MD   clopidogrel (PLAVIX) 75 MG tablet Take 1 tablet by mouth daily 10/5/21   Ray Baker MD   meloxicam [facility]) 15 MG tablet Take 1 tablet by mouth daily 10/5/21   Ray Baker MD   Diabetic Shoe MISC by Does not apply route Dx E11.621, Z79.4 10/5/21   Ray Baker MD   insulin lispro, 1 Unit Dial, ([facility]) 100 UNIT/ML SOPN Inject 10 Units into the skin 3 times daily (before meals) 10/4/21   Ray Baker MD   testosterone cypionate (DEPOTESTOTERONE CYPIONATE) 200 MG/ML injection INJECT 1 ML INTRAMUSCULARLY EVERY 21 DAYS 8/20/21 1/27/22  Ray Baker MD   tiZANidine (ZANAFLEX) 4 MG tablet Take 4 mg by mouth every 6 hours as needed    Historical Provider, MD   Misc Natural Products (APPLE CIDER VINEGAR DIET PO) Take by mouth    Historical Provider, MD   albuterol sulfate  (90 Base) MCG/ACT inhaler INHALE 1 PUFF INTO THE LUNGS EVERY 6 HOURS AS NEEDED FOR WHEEZING OR SHORTNESS OF BREATH. 8/2/21   Ray Baker MD   aspirin 81 MG EC tablet Take 1 tablet by mouth daily 7/5/21   Idalia Miranda MD   carvedilol (COREG) 25 MG tablet Take 1 tablet by mouth 2 times daily (with meals) 7/4/21   Idalia Miranda MD   nitroGLYCERIN (NITROSTAT) 0.4 MG SL tablet Place 1 tablet under the tongue every 5 minutes as needed for Chest pain up to max of 3 total doses. If no relief after 1 dose, call 911. 7/2/21   Ray Baker MD   gabapentin (NEURONTIN) 300 MG capsule Take 1 capsule by mouth 2 times daily for 30 days. Patient is advised to take 1 afternoon around 4 or 5 and the other at bedtime. This is for peripheral neuropathy.  6/28/21 1/27/22  Ray Baker MD   zinc 50 MG TABS tablet Take 50 mg by mouth daily Historical Provider, MD   Ascorbic Acid (VITAMIN C) 1000 MG tablet Take 500 mg by mouth daily     Historical Provider, MD   vitamin D (ERGOCALCIFEROL) 1.25 MG (34735 UT) CAPS capsule Take 1 capsule by mouth twice a week 3/25/21   Nicky Gonzales MD   ipratropium-albuterol (DUONEB) 0.5-2.5 (3) MG/3ML SOLN nebulizer solution INHALE 3 MLS INTO THE LUNGS EVERY 6 HOURS AS NEEDED FOR SHORTNESS OF BREATH 10/21/20   Nicky Gonzales MD   Nebulizers (COMPRESSOR/NEBULIZER) MISC 1 each by Does not apply route 4 times daily as needed (cough) 9/28/20   Nicky Gonzales MD   Continuous Blood Gluc  ([address]) [address] 1 each by Does not apply route continuous DX E11.62, L97.509, Z79.4 9/28/20   Nicky Gonzales MD   tamsulosin (FLOMAX) 0.4 MG capsule Take 0.4 mg by mouth daily    Historical Provider, MD   lisinopril (PRINIVIL;ZESTRIL) 20 MG tablet Take 1 tablet by mouth 2 times daily  Patient taking differently: Take 10 mg by mouth 2 times daily  6/3/20   Marnee Dys, APRN   Needles & Syringes MISC 1 each by Does not apply route every 21 days Needs 3 cc syringes with 22 G  1/2 inch needle to give Testosterone and needs 18 Gauge to draw up med.  3/9/20   MD Alicia Sarmiento [facility] 100 UNIT/ML injection pen Inject 50 Units into the skin 2 times daily  2/20/20   Nicky Gonzales MD   glipiZIDE (GLUCOTROL) 10 MG tablet Take 10 mg by mouth 2 times daily (before meals)    Historical Provider, MD        isosorbide mononitrate  30 mg Oral Daily    aspirin  81 mg Oral Daily    atorvastatin  40 mg Oral Nightly    buPROPion  150 mg Oral BID    busPIRone  15 mg Oral BID    carvedilol  25 mg Oral BID WC    [Held by provider] clopidogrel  75 mg Oral Daily    gabapentin  300 mg Oral BID    pantoprazole  40 mg Oral QAM AC    tamsulosin  0.4 mg Oral Daily    sodium chloride flush  5-40 mL IntraVENous 2 times per day    furosemide  40 mg IntraVENous Daily    insulin lispro  0-6 Units SubCUTAneous TID WC    insulin lispro  0-3 Units SubCUTAneous Nightly    insulin glargine  10 Units SubCUTAneous Nightly       TELEMETRY: Sinus     Physical Exam:      Physical Exam      General:  Awake, alert, NAD  Skin:  Warm and dry  Neck:  no jvd , no carotid bruits  Chest:  Clear to auscultation, no wheezing or rales  Cardiovascular:  RRR F0C3 no murmurs, clicks, gallups, or rubs  Abdomen:  Soft nontender, nondistended, bowel sounds present  Extremities:  Edema: none       Lab Data:  CBC:   Recent Labs     01/29/22  0251 01/30/22  0045 01/31/22  0357   WBC 7.6 7.4 7.5   HGB 12.5* 11.8* 12.8*   HCT 42.7 40.6* 44.4   MCV 98.2* 97.1* 98.4*    243 241     BMP:   Recent Labs     01/29/22  0251 01/30/22  0045 01/31/22  0357    138 136   K 5.0 5.5* 6.0*    101 100   CO2 28 28 24   BUN 31* 36* 38*   CREATININE 1.9* 2.0* 2.0*     LIVER PROFILE:   Recent Labs     01/29/22  0251 01/30/22  0045 01/31/22  0357   AST 9 8 12   ALT 17 16 21   BILITOT 0.3 0.3 0.4   ALKPHOS 95 96 111     PT/INR: No results for input(s): PROTIME, INR in the last 72 hours. APTT:   Recent Labs     01/30/22  1640 01/30/22  2145 01/31/22 0357   APTT 63.3* 81.8* 37.0*     BNP:  No results for input(s): BNP in the last 72 hours. CK, CKMB, Troponin: @LABRCNT (CKTOTAL:3, CKMB:3, TROPONINI:3)@    IMAGING:  Echo Complete    Result Date: 1/28/2022  Transthoracic Echocardiography Report (TTE)  Demographics   Patient Name   Rm Bradshaw  Date of Study           01/28/2022   MRN            [id number]            Gender                  Male   Date of Birth  1970        Room Number             [id number]   Age            46 year(s)   Height:        75 inches         Referring Physician     Sukumar Real MD   Weight:        268 pounds        Sonographer             Walt Harry   BSA:           2.49 m^2          Interpreting Physician  Maame Contreras MD   BMI:           33.5 kg/m^2  Procedure Type of Study   TTE procedure:ECHO NO CONTRAST WITH DOP/COLR.   Study Location: Echo Lab Technical Quality: Poor visualization due to body habitus. Patient Status: Inpatient HR: 11 bpm BP: 119/91 mmHg Indications:Chest pain and Dyspnea/SOB. Conclusions   Summary  Left ventricle was markedly dilated with severe global hypokinesis  There is concentric hypertrophy of the left ventricle  Grade 3 diastolic dysfunction  Estimated ejection fraction of 10% to 15%  Mitral annulus calcification with mild mitral regurgitation and some  thickening of the mitral leaflets  Mild tricuspid insufficiency with estimated right ventricular systolic  pressure between 50 to 55 mm which is moderately elevated  Mildly dilated right ventricle with decreased systolic function   Signature   ----------------------------------------------------------------  Electronically signed by Guzman Hernadez MD(Interpreting physician)  on 01/28/2022 01:57 PM  ----------------------------------------------------------------   Findings   Aortic Valve  Structurally normal aortic valve. Pulmonic Valve  The pulmonic valve was not well visualized . Left Atrium  Normal size left atrium. Allergies   - No known allergies. M-Mode Measurements (cm)   LVIDd: 6.14 cm                           LVIDs: 5.6 cm  IVSd: 2.73 cm  LVPWd: 1.63 cm                           AO Root Dimension: 2 cm  Rt. Vent. Dimension: 2.65 cm             LA: 4.9 cm  % Ejection Fraction: 18.9 %              LVOT: 2.5 cm  Doppler Measurements:   AV Peak Velocity:87.5 cm/s           MV Peak E-Wave: 95.5 cm/s  AV Peak Gradient: 3.06 mmHg          MV Peak A-Wave: 29.1 cm/s  AV Mean Gradient: 2 mmHg             MV E/A Ratio: 3.28 %  AV Area (Continuity):4.15 cm^2       MV Peak Gradient: 3.65 mmHg  TR Velocity:328 cm/s                 MV P1/2t: 126 msec  TR Gradient:43.03 mmHg               MVA by PHT1.75 cm^2      XR CHEST (2 VW)    Result Date: 1/27/2022  Exam:   XR CHEST (2 VW)  Date:  1/27/2022 History:  Male, age  46 years; R06.02 COMPARISON:  None.  Findings : Mild-to-moderate cardiomegaly. Median sternotomy wires appear similar. Interstitial edema. Small left pleural effusion. Trace right pleural effusion. Associated opacities. The bones show no acute pathology. Impression: Fluid overload. Clinical correlation to exclude superimposed infection is recommended. Signed by Dr Fu Lay    Result Date: 1/28/2022  EXAMINATION: US GALLBLADDER RUQ 1/28/2022 6:15 PM HISTORY: US GALLBLADDER RUQ 1/28/2022 4:58 PM HISTORY: Epigastric pain COMPARISON: NONE TECHNIQUE: Multiple longitudinal and transverse realtime sonographic images of the right upper quadrant of the abdomen are obtained. FINDINGS:  Pancreas: Normal in size and echogenicity. Liver: Normal in size, echogenicity and echotexture. Hepatopedal flow is present in the portal vein. No focal lesion. Gallbladder: Multiple internal acoustic reflection's are present in the gallbladder consistent with cholelithiasis. . Wall the gallbladder is thickened at 4 mm. There is no fluid around the gallbladder however a thickened gallbladder wall may indicate cholecystitis Bile ducts: The CBD measures 0.48 cm in diameter. There is no intrahepatic or extrahepatic ductal dilation. Right kidney: Normal in size, shape and contour. Right renal contour is 6.5 x 5.2 x 13.9 cm No mass, hydronephrosis or calculi. No perinephric fluid collection. Other: The visualized abdominal aorta is normal in caliber. Cholelithiasis. The wall the gallbladder is thickened at 4 mm. Signed by Dr Bee Maxwell    XR CHEST PORTABLE    Result Date: 1/31/2022  XR CHEST PORTABLE 1/31/2022 8:55 AM HISTORY: Pulmonary edema COMPARISON: 1/27/2022 CXR: A single frontal view of the chest is performed. Findings: Diminished level of inspiration. Median sternotomy wires with stable cardiomegaly. Small pleural effusions. Similar prominence of the interstitial markings with likely basilar atelectasis. No pneumothorax. No acute regional bony pathology.     1. Similar chest compared to 1/27/2022. Prior mediastinal surgery with similar findings of CHF. Signed by Dr Chitra Farley    XR CHEST PORTABLE    Result Date: 1/27/2022  Examination. XR CHEST PORTABLE 1/27/2022 12:10 PM History: Chest pain. A frontal portable upright view of the chest is compared with the previous study dated 1/27/2022. The lungs are poorly inflated. There is a persistent pulmonary vascular congestion in the lungs bilaterally. There are atelectatic changes in the lower lungs, left more than the right, similar to the previous study. There is small bibasilar pleural effusion. There is moderate cardiomegaly. There is evidence of previous cardiac surgery. No acute bony abnormality. 1. The persistent pulmonary vascular congestion, small bibasilar pleural effusion and cardiomegaly. 2. Discoid atelectatic changes in the lower lungs, more pronounced in the left lower lung. Signed by Dr Mavis Whitehead    VL DUP CAROTID BILATERAL    Result Date: 1/28/2022  Vascular Carotid Procedure  Demographics   Patient Name    Brandi Pendleton  Age                  46                  J   Patient Number  [id number]          Gender               Male   Visit Number    [id number]       Interpreting         Evie Garcia                                  Physician   Date of Birth   1970      Referring Physician  Mike Marie   Accession       [id number]      [address], [address],  Number                                               RDMS  Procedure Type of Study:   Cerebral:Carotid, VL CAROTID BILATERAL. Indications for Study:Pre-op CABG. Risk Factors History of Disease +--------------------------+----------+------------------------------------+ ! Diagnosis                 ! Date      ! Comments                            ! +--------------------------+----------+------------------------------------+ ! Circulatory->CAD          !          !CAD, MI, HTN                        ! ! +------------+-------+-------+--------+-------+------------+---------------+ ! Prox ICA    !53.4   !28.6   ! 60      !0.46   !            !               ! +------------+-------+-------+--------+-------+------------+---------------+ ! Mid ICA     !93.8   !43.5   !60      !0.54   !            !               ! +------------+-------+-------+--------+-------+------------+---------------+ ! Dist ICA    !124    !55.3   !60      !0.55   !            !               ! +------------+-------+-------+--------+-------+------------+---------------+ ! Prox ECA    !98.2   !14.9   !60      !0.85   !            !               ! +------------+-------+-------+--------+-------+------------+---------------+ ! Vertebral   !59     !26.7   ! 60      !0.55   !            !               ! +------------+-------+-------+--------+-------+------------+---------------+   - There is antegrade vertebral flow noted on the right side. - Additional Measurements:ICAPSV/CCAPSV 1.39. ICAEDV/CCAEDV 3.71. Carotid Left Measurements +------------+-------+-------+--------+-------+------------+---------------+ ! Location    ! PSV    ! EDV    ! Angle   ! RI     !%Stenosis   ! Tortuosity     ! +------------+-------+-------+--------+-------+------------+---------------+ ! Prox CCA    ! 85.7   !24.9   !60      !0.71   !            !               ! +------------+-------+-------+--------+-------+------------+---------------+ ! Mid CCA     !74.6   !22.4   !60      !0.7    ! !               ! +------------+-------+-------+--------+-------+------------+---------------+ ! Prox ICA    ! 90.7   !39.1   ! 60      !0.57   !            !               ! +------------+-------+-------+--------+-------+------------+---------------+ ! Mid ICA     !92.6   !28.6   ! 60      !0.69   !            !               ! +------------+-------+-------+--------+-------+------------+---------------+ ! Dist ICA    !135    !62.3   !60      !0.54   !            !               ! +------------+-------+-------+--------+-------+------------+---------------+ ! Prox ECA    !83.9   !       !60      !       !            !               ! +------------+-------+-------+--------+-------+------------+---------------+ ! Vertebral   !54.7   !19.9   !60      !0.64   !            !               ! +------------+-------+-------+--------+-------+------------+---------------+   - There is antegrade vertebral flow noted on the left side. - Additional Measurements:ICAPSV/CCAPSV 1.58. ICAEDV/CCAEDV 2.5. CTA PULMONARY W CONTRAST    Result Date: 1/27/2022  Examination. CTA PULMONARY W CONTRAST 1/27/2022 12:37 PM History: Chest pain and shortness of breath. DLP: 7 7 7 mGycm. The CT angiography of the chest are performed after intravenous contrast enhancement. The images are acquired in axial plane and subsequent 2-D reconstruction in coronal and sagittal planes and 3-D maximum intensity projection reconstruction. There is no previous study for comparison. The correlation made with radiographs of the chest obtained earlier today. There is normal opacification of the pulmonary arteries and branches. No filling defects in the visualized pulmonary arterial bed. The RV/LV ratio is 43/59 which is normal. No finding to suggest right heart strain. Atheromatous changes thoracic aorta are noted. No aneurysmal dilatation. Severe atheromatous changes of coronary arteries are seen. There is evidence of mediastinal lymphadenopathy. An aortopulmonic window lymph node measures 1.7 cm. A level 2R lymph node measures 1.8 cm. A subcarinal lymph node measures 1.9 cm. The limited visualized soft tissues of the neck appears unremarkable. There is a low-density nodule in the right lobe of the thyroid gland which is incompletely evaluated. No axillary lymphadenopathy. There is extensive groundglass infiltrate in the lungs bilaterally. There is bilateral lower lobar consolidation and moderate to large volume bibasal pleural effusion. The limited visualized liver and spleen are unremarkable. Moderate lobulation of the anterior glands bilaterally is noted. The kidneys, the pancreas and gallbladder is incompletely visualized and not fully evaluated. The images reviewed in bone window show chronic degenerative changes of the lumbar spine with mild dextroscoliosis. 1. No evidence of pulmonary emboli. 2. No aortic aneurysm or dissection. Atheromatous changes of coronary arteries. 3. Extensive groundglass infiltrate in the lungs bilaterally suggesting an inflammatory/infectious process. The second possibility may suggest pulmonary congestion/pulmonary edema. 4. Moderate to large bibasal pleural effusion. Signed by Dr Lianna Blackwood    VL Vein Mapping Lower Bilateral    Result Date: 1/28/2022  Vascular Lower Extremity Vein Mapping Procedure  Demographics   Patient Name    Edita Gaytan Age                   46   Patient Number  [id number]           Gender                Male   Visit Number    [id number]        Interpreting          Marino Hodgkin                                   Physician   Date of Birth   1970       Referring Physician   Accession       [id number]       Sonographer  Number  Procedure Type of Study:   [address] [id number]. Risk Factors History of Disease +--------------------------+----------+------------------------------------+ ! Diagnosis                 ! Date      ! Comments                            ! +--------------------------+----------+------------------------------------+ ! Circulatory->CAD          !          !CAD, MI, HTN                        ! +--------------------------+----------+------------------------------------+ ! Chronic lung disease      !03/02/2011! SPONTANEOUS LUNG COLLAPSE           ! +--------------------------+----------+------------------------------------+ ! Musculoskeletal->Arthritis!          !                                    ! +--------------------------+----------+------------------------------------+ ! Endocrine->Diabetes       !          !                                    ! +--------------------------+----------+------------------------------------+   - The patient's risk factor(s) include: diabetes mellitus, dyslipidemia,     arterial hypertension, prior MI and prior CABG. - The patient has a former tobacco history. - The patient's last creatinine was 1.6 mg/dl. Allergies   - No known allergies. Impression   Usable greater saphenous vein conduit in the left lower extremity. Signature   ----------------------------------------------------------------  Electronically signed by Bernardo NevesInterpreting  physician) on 01/28/2022 03:16 PM  ----------------------------------------------------------------  Velocities are measured in cm/s ; Diameters are measured in mm +--------------------------------------++--------+-----+----+--------+-----+ ! Superficial - Great Saphenous Vein    ! ! Right   ! ! Left!        !     ! +--------------------------------------++--------+-----+----+--------+-----+ ! Location                              ! !Diameter! Depth! !Diameter! Depth! +--------------------------------------++--------+-----+----+--------+-----+ ! GSV 2 cm Distal to Junction           ! !2.7     !     !    !6.1     !     ! +--------------------------------------++--------+-----+----+--------+-----+ ! GSV High Thigh                        ! !1.8     !     !    !5.7     !     ! +--------------------------------------++--------+-----+----+--------+-----+ ! GSV Mid Thigh                         ! !1.7     !     !    !5       !     ! +--------------------------------------++--------+-----+----+--------+-----+ ! GSV Low Thigh                         ! !1.7     !     !    !4.8     !     ! +--------------------------------------++--------+-----+----+--------+-----+ ! GSV Knee                              !!1.7     !     !    !4.6     ! ! +--------------------------------------++--------+-----+----+--------+-----+ ! GSV High Calf                         !!1.9     !     !    !3.8     !     ! +--------------------------------------++--------+-----+----+--------+-----+ ! GSV Mid Calf                          !!2.5     !     !    !2.7     !     ! +--------------------------------------++--------+-----+----+--------+-----+ ! GSV Ankle                             !!1.6     !     !    !3.3     !     ! +--------------------------------------++--------+-----+----+--------+-----+        Assessment:  1. Complaints of increased shortness of breath lower chest pressure and elevated troponin suggestive of non-STEMI  2. Coronary artery disease  3. Hypertension  4. Hyperlipidemia  5. Diabetes mellitus type 2  6. History of tobacco abuse  7. History of diabetic foot ulceration  8. CTA pulmonary yesterday no evidence of pulmonary emboli extensive groundglass infiltrate in the lungs bilaterally suggesting inflammatory or infectious process moderate to large bibasilar pleural effusion  9. CTA abdomen pelvis 4/5/2021 bilateral perinephric peripelvic and periureteral fat infiltration may represent chronic inflammatory process moderate thick-walled incompletely distended urinary bladder enlarged prostate stable left adrenal nodule  10. Cardiac cath 7-21 EF 25% posterior basilar inferior hypokinesis and inferolateral hypokinesis 90% proximal right patent vein graft to right PDA patent LIMA graft LAD occluded vein graft to third OM severe 95% stenosis mid circumflex into the OM successful PCI placement drug-eluting stent 2.5 x 23  11. Echo 7/2/2021 EF 25 to 30% mild to moderate MR mild TR restrictive filling pattern IVC dilated  12. Cardiac catheterization 1/28/2022 severe stenoses OM, ostium of the vein graft to the right coronary artery, occluded vein graft to the OM, patent LIMA graft to diagonal severe LAD stenosis    Plan:  1.  Continue current medical therapy anticipation is that patient will be transferred to [facility] for higher level of care surgery in the next 24 to 48 hours stable at this time    Samir Roland MD, MD 1/31/2022 12:48 PM

## 2022-01-31 NOTE — PLAN OF CARE
Problem: Discharge Planning:  Goal: Participates in care planning  Description: Participates in care planning  Outcome: Ongoing  Goal: Discharged to appropriate level of care  Description: Discharged to appropriate level of care  Outcome: Ongoing     Problem: Activity Intolerance:  Goal: Ability to tolerate increased activity will improve  Description: Ability to tolerate increased activity will improve  Outcome: Ongoing     Problem: Anxiety/Stress:  Goal: Level of anxiety will decrease  Description: Level of anxiety will decrease  Outcome: Ongoing     Problem:  Bowel Function - Altered:  Goal: Bowel elimination is within specified parameters  Description: Bowel elimination is within specified parameters  Outcome: Ongoing     Problem: Fluid Volume - Deficit:  Goal: Absence of fluid volume deficit signs and symptoms  Description: Absence of fluid volume deficit signs and symptoms  Outcome: Ongoing  Goal: Electrolytes within specified parameters  Description: Electrolytes within specified parameters  Outcome: Ongoing     Problem: Mental Status - Impaired:  Goal: Absence of continued neurological deterioration signs and symptoms  Description: Absence of continued neurological deterioration signs and symptoms  Outcome: Ongoing  Goal: Absence of physical injury  Description: Absence of physical injury  Outcome: Ongoing  Goal: Mental status will be restored to baseline  Description: Mental status will be restored to baseline  Outcome: Ongoing     Problem: Pain:  Goal: Pain level will decrease  Description: Pain level will decrease  Outcome: Ongoing  Goal: Ability to notify healthcare provider of pain before it becomes unmanageable or unbearable will improve  Description: Ability to notify healthcare provider of pain before it becomes unmanageable or unbearable will improve  Outcome: Ongoing  Goal: Control of acute pain  Description: Control of acute pain  Outcome: Ongoing  Goal: Control of chronic pain  Description: Control of chronic pain  Outcome: Ongoing     Problem: Skin Integrity - Impaired:  Goal: Will show no infection signs and symptoms  Description: Will show no infection signs and symptoms  Outcome: Ongoing  Goal: Absence of new skin breakdown  Description: Absence of new skin breakdown  Outcome: Ongoing     Problem: Falls - Risk of:  Goal: Absence of physical injury  Description: Absence of physical injury  Outcome: Ongoing  Goal: Will remain free from falls  Description: Will remain free from falls  Outcome: Ongoing

## 2022-02-01 NOTE — PROGRESS NOTES
Physician Progress Note      PATIENT:               Basilia Gunter  [facility] #:                  [id number]  :                       1970  ADMIT DATE:       2022 11:35 AM  DISCH DATE:  RESPONDING  PROVIDER #:        Terrence Tamayo          QUERY TEXT:    Pt admitted with NSTEMI. Pt noted to have a rise in creatinine. If possible,   please document in the progress notes and discharge summary if you are   evaluating and/or treating any of the following: The medical record reflects the following:  Risk Factors: DM, CKD, hypertension  Clinical Indicators: () crea 1.1, () crea 1.6, () crea 1.9  Treatment: NS @ 75 ml/hr then increased to 125 ml/hr, serial creatinine levels    Thank you,  Filiberto Cooney, CCDS  [phone]    Defined by Kidney Disease Improving Global Outcomes (KDIGO) clinical practice   guideline for acute kidney injury:  -Increase in SCr by greater than or equal to 0.3 mg/dl within 48 hours; or  -Increase or decrease in SCr to greater than or equal to 1.5 times baseline,   which is known or presumed to have occurred within the prior 7 days; or  -Urine volume < 0.5ml/kg/h for 6 hours  Options provided:  -- Acute kidney failure  -- Acute kidney injury  -- Other - I will add my own diagnosis  -- Disagree - Not applicable / Not valid  -- Disagree - Clinically unable to determine / Unknown  -- Refer to Clinical Documentation Reviewer    PROVIDER RESPONSE TEXT:    This patient has an Acute kidney injury.     Query created by: Sherlyn Fink on 2022 8:24 AM      Electronically signed by:  Terrence Tamayo 2022 10:07 AM

## 2022-02-01 NOTE — PLAN OF CARE
Problem: Discharge Planning:  Goal: Participates in care planning  Description: Participates in care planning  2/1/2022 0203 by Oli Hwang RN  Outcome: Ongoing  1/31/2022 1614 by Layne Peterson RN  Outcome: Ongoing  Goal: Discharged to appropriate level of care  Description: Discharged to appropriate level of care  2/1/2022 0203 by Oli Hwang RN  Outcome: Ongoing  1/31/2022 1614 by Layne Peterson RN  Outcome: Ongoing     Problem: Activity Intolerance:  Goal: Ability to tolerate increased activity will improve  Description: Ability to tolerate increased activity will improve  2/1/2022 0203 by Oli Hwang RN  Outcome: Ongoing  1/31/2022 1614 by Layne Peterson RN  Outcome: Ongoing     Problem: Anxiety/Stress:  Goal: Level of anxiety will decrease  Description: Level of anxiety will decrease  2/1/2022 0203 by Oli Hwang RN  Outcome: Ongoing  1/31/2022 1614 by Layne Peterson RN  Outcome: Ongoing     Problem:  Bowel Function - Altered:  Goal: Bowel elimination is within specified parameters  Description: Bowel elimination is within specified parameters  2/1/2022 0203 by Oli Hwang RN  Outcome: Ongoing  1/31/2022 1614 by Layne Peterson RN  Outcome: Ongoing

## 2022-02-01 NOTE — PROGRESS NOTES
[facility] Hospitalists      Patient:  Morales Hagan  YOB: 1970  Date of Service: 2/1/2022  MRN: [id number]   Acct: [de-identified]   Primary Care Physician: Greyson Calderon MD  Advance Directive: Full Code  Admit Date: 1/27/2022       Hospital Day: 5  Portions of this note have been copied forward, however, changed to reflect the most current clinical status of this patient. CHIEF COMPLAINT epigastric pain, nausea    SUBJECTIVE: Patient reports improvement in shortness of breath overnight. Reports he is slightly anxious about being transferred . CUMULATIVE HOSPITAL COURSE:  The patient is a 57-year-old male with past medical history of CAD requiring CABG and stents, hypertension, hyperlipidemia, diabetes, and arthritis who presented to [facility] ED complaining of epigastric pain and nausea. The patient also indicated worsening dyspnea for a week prior to admission. The patient also reported orthopnea. The patient indicated he was supposed to be having an ultrasound of his gallbladder to rule out gallbladder disease. He also indicates he was recently started on 2 L of oxygen at night and with daytime naps. Patient reported having cardiac stents placed in July 2021. ED work-up indicated troponin 0 0.30 with a repeat of 0.31, BNP 3485, and chest x-ray indicating persistent pulmonary vascular congestion, small bibasilar pleural effusions, and cardiomegaly. CTA unremarkable for PE, extensive groundglass infiltrates in the lungs bilaterally suggesting inflammatory/infectious process, may suggest pulmonary congestion/pulmonary edema, moderate to large bibasilar pleural effusion. Patient was admitted to the hospitalist for NSTEMI with cardiac consultation. The patient was initiated on a heparin drip in the ED.   Echo obtained and indicated grade 3 diastolic dysfunction with estimated EF 10 to 15%, some thickening of the mitral leaflets with mild regurgitation, mild tricuspid insufficiency with an estimated right ventricular systolic pressure between 50 and 55. Cardiology recommended cardiac catheterization. Patient underwent cardiac catheterization on 1/28/2022 which indicated left ventricular function impaired ejection fraction 20 to 25% estimated LIMA graft diagonal patent vein graft OM occluded stent placement OM 90% in-stent restenosis, Severe 90% plus ostial stenosis of the vein graft to the right coronary artery, 70% ostial ramus branch stenosis, Severe disease mid LAD and recommend cardiovascular surgical consultation for consideration of redo CABG. patient was evaluated by cardiothoracic surgeon who calculated his risk for perioperative mortality 6%. Dr. Shelby Rosales did reach out to Dr. Nona Delgadillo for second opinion given the significance of this case. CT surgery recommended transferring patient to a facility with LVAD/transplant backup. Case discussed with transfer center and patient was accepted by Dr. Noa Abebe at [facility] pending a bed. Transfer center was updated today and continuing to wait on bed. Patient is continued on diuresis for heart failure. He is also on oxygen therapy at this time. Post-cath and CTA creatinine elevated. Patient was given short stent of IV hydration. Creatinine slowly improving and output improving. Will continue to monitor BMP daily. Gallbladder ultrasound was obtained and indicated mildly thickened gallbladder with cholelithiasis. Patient denies abdominal pain at this time. Review of Systems:   Review of Systems   Constitutional: Positive for activity change and fatigue. Negative for appetite change, chills and fever. Respiratory: Positive for chest tightness and shortness of breath (reports ijmprovement overnight). Negative for wheezing. Cardiovascular: Positive for leg swelling. Negative for chest pain and palpitations. Gastrointestinal: Positive for abdominal distention. Negative for abdominal pain, diarrhea, nausea and vomiting.    Genitourinary: Negative for difficulty urinating, flank pain, frequency and urgency. Musculoskeletal: Negative for back pain and neck pain. Skin: Negative for color change and wound. Neurological: Negative for dizziness and light-headedness. Psychiatric/Behavioral: Negative for agitation and confusion. The patient is nervous/anxious. 14 point review of systems is negative except as specifically addressed above. Objective:   VITALS:  /83   Pulse 66   Temp 96.6 °F (35.9 °C) (Temporal)   Resp 22   Ht 6' 3\" (1.905 m)   Wt 275 lb (124.7 kg)   SpO2 92%   BMI 34.37 kg/m²   24HR INTAKE/OUTPUT:      Intake/Output Summary (Last 24 hours) at 2/1/2022 1345  Last data filed at 2/1/2022 1108  Gross per 24 hour   Intake 860 ml   Output 1150 ml   Net -290 ml       Physical Exam  Vitals and nursing note reviewed. Constitutional:       Appearance: He is ill-appearing. HENT:      Head: Normocephalic. Eyes:      Extraocular Movements: Extraocular movements intact. Conjunctiva/sclera: Conjunctivae normal.      Pupils: Pupils are equal, round, and reactive to light. Cardiovascular:      Rate and Rhythm: Normal rate and regular rhythm. Pulses: Normal pulses. Heart sounds: Normal heart sounds. Pulmonary:      Effort: Tachypnea present. Breath sounds: Examination of the right-lower field reveals decreased breath sounds. Examination of the left-lower field reveals decreased breath sounds. Decreased breath sounds present. Abdominal:      General: Bowel sounds are normal. There is distension. Palpations: There is no mass. Tenderness: There is no abdominal tenderness. Musculoskeletal:         General: Normal range of motion. Cervical back: Normal range of motion and neck supple. No tenderness. Right lower leg: Edema (Trace) present. Left lower leg: Edema (Trace) present. Skin:     General: Skin is warm and dry.       Capillary Refill: Capillary refill takes less than 2 seconds. Neurological:      General: No focal deficit present. Mental Status: He is alert and oriented to person, place, and time. Psychiatric:         Mood and Affect: Mood normal.         Behavior: Behavior normal.         Thought Content: Thought content normal.         Judgment: Judgment normal.       Medications:      sodium chloride 1,000 mL (01/28/22 1002)    heparin (PORCINE) Infusion 18 Units/kg/hr (02/01/22 0802)    sodium chloride      sodium chloride Stopped (01/28/22 0958)    dextrose        insulin glargine  15 Units SubCUTAneous Nightly    isosorbide mononitrate  30 mg Oral Daily    aspirin  81 mg Oral Daily    atorvastatin  40 mg Oral Nightly    buPROPion  150 mg Oral BID    busPIRone  15 mg Oral BID    carvedilol  25 mg Oral BID WC    [Held by provider] clopidogrel  75 mg Oral Daily    gabapentin  300 mg Oral BID    pantoprazole  40 mg Oral QAM AC    tamsulosin  0.4 mg Oral Daily    sodium chloride flush  5-40 mL IntraVENous 2 times per day    furosemide  40 mg IntraVENous Daily    insulin lispro  0-6 Units SubCUTAneous TID WC    insulin lispro  0-3 Units SubCUTAneous Nightly     hydrALAZINE, oxyCODONE-acetaminophen, heparin (porcine), heparin (porcine), sodium chloride flush, sodium chloride, ondansetron **OR** ondansetron, acetaminophen **OR** acetaminophen, polyethylene glycol, nitroGLYCERIN, ondansetron, glucose, glucagon (rDNA), dextrose, dextrose bolus (hypoglycemia) **OR** dextrose bolus (hypoglycemia), albuterol  ADULT DIET;  Regular; 3 carb choices (45 gm/meal)     Lab and other Data:     Recent Labs     01/30/22 0045 01/31/22 0357 02/01/22 0630   WBC 7.4 7.5 7.3   HGB 11.8* 12.8* 12.2*    241 227     Recent Labs     01/30/22 0045 01/31/22 0357 02/01/22 0630    136 136   K 5.5* 6.0* 5.2*    100 99   CO2 28 24 30*   BUN 36* 38* 42*   CREATININE 2.0* 2.0* 1.8*   GLUCOSE 333* 267* 246*     Recent Labs     01/30/22 0045 01/31/22 0357 02/01/22  0630   AST 8 12 10   ALT 16 21 22   BILITOT 0.3 0.4 0.4   ALKPHOS 96 111 111     Troponin T: No results for input(s): TROPONINI in the last 72 hours. Pro-BNP: No results for input(s): BNP in the last 72 hours. INR: No results for input(s): INR in the last 72 hours. UA:No results for input(s): NITRITE, COLORU, PHUR, LABCAST, WBCUA, RBCUA, MUCUS, TRICHOMONAS, YEAST, BACTERIA, CLARITYU, SPECGRAV, LEUKOCYTESUR, UROBILINOGEN, BILIRUBINUR, BLOODU, GLUCOSEU, AMORPHOUS in the last 72 hours. Invalid input(s): Ozie Held  A1C: No results for input(s): LABA1C in the last 72 hours. ABG:No results for input(s): PHART, VNE5BYG, PO2ART, AKF6QPC, BEART, HGBAE, D4FKXWAY, CARBOXHGBART in the last 72 hours. RAD:   XR CHEST (2 VW)    Result Date: 1/27/2022  Impression: Fluid overload. Clinical correlation to exclude superimposed infection is recommended. Signed by Dr Emeli Young    Result Date: 1/28/2022  Cholelithiasis. The wall the gallbladder is thickened at 4 mm. Signed by Dr Michelle Lynch    XR CHEST PORTABLE    Result Date: 1/31/2022  1. Similar chest compared to 1/27/2022. Prior mediastinal surgery with similar findings of CHF. Signed by Dr Elias Gaston    XR CHEST PORTABLE    Result Date: 1/27/2022  1. The persistent pulmonary vascular congestion, small bibasilar pleural effusion and cardiomegaly. 2. Discoid atelectatic changes in the lower lungs, more pronounced in the left lower lung. Signed by Dr Wiley Larios    Result Date: 1/27/2022  1. No evidence of pulmonary emboli. 2. No aortic aneurysm or dissection. Atheromatous changes of coronary arteries. 3. Extensive groundglass infiltrate in the lungs bilaterally suggesting an inflammatory/infectious process. The second possibility may suggest pulmonary congestion/pulmonary edema. 4. Moderate to large bibasal pleural effusion.  Signed by Dr Janet Coronado       Echo:   Summary   Left ventricle was markedly dilated with severe global hypokinesis   There is concentric hypertrophy of the left ventricle   Grade 3 diastolic dysfunction   Estimated ejection fraction of 10% to 15%   Mitral annulus calcification with mild mitral regurgitation and some   thickening of the mitral leaflets   Mild tricuspid insufficiency with estimated right ventricular systolic   pressure between 50 to 55 mm which is moderately elevated   Mildly dilated right ventricle with decreased systolic function         Assessment/Plan   Principal Problem:    NSTEMI (non-ST elevated myocardial infarction) ([address] / Coronary artery disease involving native coronary artery of native heart without angina pectoris   -Continue heparin drip   -Cardiac catheterization on 1/28   -Evaluation by CT surgery, recommended transfer to Center with LVAD/transplant backup   -Patient accepted by Dr. Bravo Jacobs at [facility] pending bed   -Continue diuresis    Active Problems:  Essential hypertension   -Continue current regimen   -Monitor closely      Mixed hyperlipidemia   -Continue current statin      Type 2 diabetes mellitus with foot ulcer, with long-term current use of insulin (HCC)   -Continue Accu-Cheks   -Continue basal, prandial, and sliding scale insulin at this time   -Diabetes educator   -Hypoglycemia protocol in place      Epigastric pain   -Gallbladder ultrasound indicated cholelithiasis, will monitor and follow-up as needed      Acute systolic congestive heart failure (HCC)   -Continue diuresis   -Daily weight   -Accurate I&O      BERTHA   -Monitor BMP   -Daily weight   -Avoid nephrotoxins hypotension   -slowly improving      DVT Prophylaxis: Heparin drip    GI prophylaxis: Protonix    MELITON Quintero - CNP, 2/1/2022 1:45 PM

## 2022-02-02 NOTE — PROGRESS NOTES
Comprehensive Nutrition Assessment    Type and Reason for Visit:  Reassess    Nutrition Recommendations/Plan: continue current POC. Monitor accuche's--if able increase carb control diet to 4    Nutrition Assessment:  Pt remains well nourished. Diet has been advanced to Carb control. Aware waiting for bed at [facility]    Malnutrition Assessment:  Malnutrition Status:  No malnutrition    Context:  Acute Illness     Findings of the 6 clinical characteristics of malnutrition:  Energy Intake:  No significant decrease in energy intake  Weight Loss:  No significant weight loss     Body Fat Loss:  No significant body fat loss     Muscle Mass Loss:  No significant muscle mass loss    Fluid Accumulation:  7 - Moderate to Severe Extremities,Generalized   Strength:  Not Performed    Estimated Daily Nutrient Needs:  Energy (kcal):  9046-1237 kcals (15-18 kcals/kg); Weight Used for Energy Requirements:  Current     Protein (g):  108-167 g; Weight Used for Protein Requirements:  Ideal        Fluid (ml/day):  7442-4639 ml; Method Used for Fluid Requirements:  1 ml/kcal      Nutrition Related Findings:  adequately nourished. Reginald Oseguera Hx of poor DM control      Wounds:  Diabetic Ulcer       Current Nutrition Therapies:    ADULT DIET; Regular; 3 carb choices (45 gm/meal)    Anthropometric Measures:  · Height: 6' 3\" (190.5 cm)  · Current Body Weight: 275 lb (124.7 kg)   · Admission Body Weight: 279 lb (126.6 kg)    · Usual Body Weight: 255 lb 12.8 oz (116 kg) (10/2021)     · Ideal Body Weight: 196 lbs; % Ideal Body Weight 140.3 %   · BMI: 34.4  · Adjusted Body Weight:  ; No Adjustment   · BMI Categories: Obese Class 1 (BMI 30.0-34. 9)       Nutrition Diagnosis:   · Increased nutrient needs,Altered nutrition-related lab values related to endocrine dysfuntion,increase demand for energy/nutrients as evidenced by lab values,wounds      Nutrition Interventions:   Food and/or Nutrient Delivery:  Continue Current Diet  Nutrition Education/Counseling:  No recommendation at this time   Coordination of Nutrition Care:  Continue to monitor while inpatient    Goals:  PO intake 50% or greater. Accuchek's below 200       Nutrition Monitoring and Evaluation:   Behavioral-Environmental Outcomes:  Readiness for Change   Food/Nutrient Intake Outcomes:  Food and Nutrient Intake  Physical Signs/Symptoms Outcomes:  Biochemical Data,Weight,Skin,Nutrition Focused Physical Findings,Fluid Status or Edema     Discharge Planning:     Too soon to determine     Electronically signed by Christine Bonilla, MS, RD, LD on 2/2/22 at 1:32 PM CST    Contact: [phone]

## 2022-02-02 NOTE — PLAN OF CARE
Problem: Discharge Planning:  Goal: Participates in care planning  Description: Participates in care planning  Outcome: Ongoing  Goal: Discharged to appropriate level of care  Description: Discharged to appropriate level of care  Outcome: Ongoing     Problem: Activity Intolerance:  Goal: Ability to tolerate increased activity will improve  Description: Ability to tolerate increased activity will improve  Outcome: Ongoing     Problem: Anxiety/Stress:  Goal: Level of anxiety will decrease  Description: Level of anxiety will decrease  Outcome: Ongoing     Problem:  Bowel Function - Altered:  Goal: Bowel elimination is within specified parameters  Description: Bowel elimination is within specified parameters  Outcome: Ongoing     Problem: Fluid Volume - Deficit:  Goal: Absence of fluid volume deficit signs and symptoms  Description: Absence of fluid volume deficit signs and symptoms  Outcome: Ongoing  Goal: Electrolytes within specified parameters  Description: Electrolytes within specified parameters  Outcome: Ongoing

## 2022-02-02 NOTE — ADT AUTH CERT
Myocardial Infarction - Care Day 6 (2/1/2022) by Mechelle Angelo RN       Review Status Review Entered   Completed 2/1/2022 14:35      Criteria Review      Care Day: 6 Care Date: 2/1/2022 Level of Care: Inpatient Floor    Guideline Day 2    Clinical Status    (X) * Hemodynamic stability    ( ) * Chest pain, dyspnea, or anginal equivalent absent    Routes    (X) * Oral hydration    (X) * Oral medications    (X) Parenteral medications    2/1/2022 3:35 PM EST by Aurelia Khalil      furosemide (LASIX) injection 40 mg  Dose: 40 mg  Freq: DAILY Route: IV    Interventions    ( ) * Oxygen absent    2/1/2022 3:35 PM EST by [email or web address]    (X) PT/PTT and platelet count    Medications    (X) * IV nitroglycerin absent    (X) Anticoagulants    2/1/2022 3:35 PM EST by Aurelia Khalil      heparin 25,000 units in dextrose 5% 250 mL (premix) infusion    (X) Antiplatelet agents (eg, aspirin, clopidogrel, ticagrelor)    2/1/2022 3:35 PM EST by Aurelia Khalil      aspirin EC tablet 81 mg  Dose: 81 mg  Freq: DAILY Route: PO    (X) Beta-blocker    2/1/2022 3:35 PM EST by Aurelia Khalil      carvedilol (COREG) tablet 25 mg  Dose: 25 mg  Freq: 2 TIMES DAILY WITH MEALS Route: PO    (X) Statin    2/1/2022 3:35 PM EST by Aurelia Khalil      atorvastatin (LIPITOR) tablet 40 mg  Dose: 40 mg  Freq: NIGHTLY Route: PO    * Milestone   Additional Notes   CARE DAY 6      2/1/2022      CHIEF COMPLAINT epigastric pain, nausea       SUBJECTIVE: Patient reports improvement in shortness of breath overnight.  Reports he is slightly anxious about being transferred .       Yobany 10:   The patient is a 52-year-old male with past medical history of CAD requiring CABG and stents, hypertension, hyperlipidemia, diabetes, and arthritis who presented to [facility] ED complaining of epigastric pain and nausea.  The patient also indicated worsening dyspnea for a week prior to admission.  The patient also reported orthopnea.  The patient indicated he was supposed to be having an ultrasound of his gallbladder to rule out gallbladder disease.  He also indicates he was recently started on 2 L of oxygen at night and with daytime naps.  Patient reported having cardiac stents placed in July 2021.  ED work-up indicated troponin 0 0.30 with a repeat of 0.31, BNP 3485, and chest x-ray indicating persistent pulmonary vascular congestion, small bibasilar pleural effusions, and cardiomegaly.  CTA unremarkable for PE, extensive groundglass infiltrates in the lungs bilaterally suggesting inflammatory/infectious process, may suggest pulmonary congestion/pulmonary edema, moderate to large bibasilar pleural effusion.  Patient was admitted to the hospitalist for NSTEMI with cardiac consultation.  The patient was initiated on a heparin drip in the ED. [facility] obtained and indicated grade 3 diastolic dysfunction with estimated EF 10 to 15%, some thickening of the mitral leaflets with mild regurgitation, mild tricuspid insufficiency with an estimated right ventricular systolic pressure between 50 and 55.  Cardiology recommended cardiac catheterization.  Patient underwent cardiac catheterization on 1/28/2022 which indicated left ventricular function impaired ejection fraction 20 to 25% estimated LIMA graft diagonal patent vein graft OM occluded stent placement OM 90% in-stent restenosis, Severe 90% plus ostial stenosis of the vein graft to the right coronary artery, 70% ostial ramus branch stenosis, Severe disease mid LAD and recommend cardiovascular surgical consultation for consideration of redo CABG. patient was evaluated by cardiothoracic surgeon who calculated his risk for perioperative mortality 6%.  Dr. Renata Fernandez did reach out to Dr. Francisco Pemberton for second opinion given the significance of this case.  CT surgery recommended transferring patient to a facility with LVAD/transplant backup. Linsey Dunne discussed with transfer center and patient was accepted by Dr. Vasiliy Terry at [facility] pending a bed.  Transfer center was updated today and continuing to wait on bed. Velia Perfect is continued on diuresis for heart failure.  He is also on oxygen therapy at this time.  Post-cath and CTA creatinine elevated.  Patient was given short stent of IV hydration.  Creatinine slowly improving and output improving.  Will continue to monitor BMP daily.  Gallbladder ultrasound was obtained and indicated mildly thickened gallbladder with cholelithiasis.  Patient denies abdominal pain at this time. Objective:   VITALS:  /83   Pulse 66   Temp 96.6 °F (35.9 °C) (Temporal)   Resp 22   Ht 6' 3\" (1.905 m)   Wt 275 lb (124.7 kg)   SpO2 92%   BMI 34.37 kg/m²       Pulmonary:       Effort: Tachypnea present.       Breath sounds: Examination of the right-lower field reveals decreased breath sounds. Examination of the left-lower field reveals decreased breath sounds. Decreased breath sounds present. Abdominal:       General: Bowel sounds are normal. There is distension.       Palpations: There is no mass.       Tenderness: There is no abdominal tenderness. Musculoskeletal:          General: Normal range of motion.       Cervical back: Normal range of motion and neck supple. No tenderness.       Right lower leg: Edema (Trace) present.       Left lower leg: Edema (Trace) present.        · sodium chloride 1,000 mL (01/28/22 1002)   · heparin (PORCINE) Infusion 18 Units/kg/hr (02/01/22 0802)   · sodium chloride    · sodium chloride Stopped (01/28/22 0958)   · dextrose           Scheduled Medications   · insulin glargine 15 Units SubCUTAneous Nightly   · isosorbide mononitrate 30 mg Oral Daily   · aspirin 81 mg Oral Daily   · atorvastatin 40 mg Oral Nightly   · buPROPion 150 mg Oral BID   · busPIRone 15 mg Oral BID   · carvedilol 25 mg Oral BID WC   · [Held by provider] clopidogrel 75 mg Oral Daily   · gabapentin 300 mg Oral BID   · pantoprazole 40 mg Oral QAM AC   · tamsulosin 0.4 mg Oral Daily   · sodium chloride flush 5-40 mL IntraVENous 2 times per day   · furosemide 40 mg IntraVENous Daily   · insulin lispro 0-6 Units SubCUTAneous TID WC   · insulin lispro 0-3 Units SubCUTAneous Nightly          K 5.2   BUN 42   CREAT 1.8      Assessment/Plan   Principal Problem:     NSTEMI (non-ST elevated myocardial infarction) (HCC) / Coronary artery disease involving native coronary artery of native heart without angina pectoris               -Continue heparin drip               -Cardiac catheterization on 1/28               -Evaluation by CT surgery, recommended transfer to Center with LVAD/transplant backup               -Patient accepted by Dr. Romelia Skinner at [facility] pending bed               -Continue diuresis       Active Problems:   Essential hypertension               -Continue current regimen               -Monitor closely         Mixed hyperlipidemia               -Continue current statin         Type 2 diabetes mellitus with foot ulcer, with long-term current use of insulin (HCC)               -Continue Accu-Cheks               -Continue basal, prandial, and sliding scale insulin at this time               -Diabetes educator               -Hypoglycemia protocol in place         Epigastric pain               -Gallbladder ultrasound indicated cholelithiasis, will monitor and follow-up as needed         Acute systolic congestive heart failure (HCC)               -Continue diuresis               -Daily weight               -Accurate I&O         BERTHA               -Monitor BMP               -Daily weight               -Avoid nephrotoxins hypotension               -slowly improving           DVT Prophylaxis: Heparin drip       GI prophylaxis: Protonix              Myocardial Infarction - Care Day 5 (1/31/2022) by Ramsey Mcclelland RN       Review Status Review Entered   Completed 1/31/2022 13:35      Criteria Review      Care Day: 5 Care Date: 1/31/2022 Level of Care: Inpatient Floor    Guideline Day 2    Clinical Status    (X) * Hemodynamic stability    1/31/2022 2:35 PM EST by Anderson Car      76 135/92    ( ) * Chest pain, dyspnea, or anginal equivalent absent    Routes    (X) * Oral hydration    1/31/2022 2:35 PM EST by [employer] Car      usual diet    (X) * Oral medications    (X) Parenteral medications    Interventions    ( ) * Oxygen absent    (X) PT/PTT and platelet count    4/90/8875 2:35 PM EST by Gypsy Car      aPTT37.0 (H)73.4 (HH)    Medications    (X) * IV nitroglycerin absent    (X) Anticoagulants    (X) Beta-blocker    (X) Statin    * Milestone   Additional Notes   1/31/2022 VS- 96.8 18 2L nc    Potassium 6.0 (HH)    BUN 38 (H)    Creatinine 2.0 (H)    GFR Non- 35 (A)    GFR African American 42 (L)    Glucose 267 (H)    POC Glucose 248 (H) 292 (H)    Total Protein 5.8 (L)    Pro-BNP 5,196 (H)   Albumin 3.4 (L)    RBC 4.51 (L)    Hemoglobin Quant 12.8 (L)    MCV 98.4 (H)    MCHC 28.8 (L)             73 BPM   Sinus rhythm   Leftward axis   IV conduction defect   Anteroseptal infarct as previously   Lateral ST-T abnormality may be due to myocardial ischemia   Comparison Summary: Some abnormalities no longer present   Summary: Abnormal ECG      Meds:    isosorbide mononitrate 30 mg Oral Daily   o aspirin 81 mg Oral Daily   o atorvastatin 40 mg Oral Nightly   o buPROPion 150 mg Oral BID   o busPIRone 15 mg Oral BID   o carvedilol 25 mg Oral BID WC   o [Held by provider] clopidogrel 75 mg Oral Daily   o gabapentin 300 mg Oral BID   o pantoprazole 40 mg Oral QAM AC   o tamsulosin 0.4 mg Oral Daily   o sodium chloride flush 5-40 mL IntraVENous 2 times per day   o furosemide 40 mg IntraVENous Daily   o insulin lispro 0-6 Units SubCUTAneous TID WC   o insulin lispro 0-3 Units SubCUTAneous Nightly   o insulin glargine 10 Units SubCUTAneous Nightly             Assessment:   1. Complaints of increased shortness of breath lower chest pressure and elevated troponin suggestive of non-STEMI   2.  Coronary artery disease   3. Hypertension   4. Hyperlipidemia   5. Diabetes mellitus type 2   6. History of tobacco abuse   7. History of diabetic foot ulceration   8. CTA pulmonary yesterday no evidence of pulmonary emboli extensive groundglass infiltrate in the lungs bilaterally suggesting inflammatory or infectious process moderate to large bibasilar pleural effusion   9. CTA abdomen pelvis 4/5/2021 bilateral perinephric peripelvic and periureteral fat infiltration may represent chronic inflammatory process moderate thick-walled incompletely distended urinary bladder enlarged prostate stable left adrenal nodule   10. Cardiac cath 7-21 EF 25% posterior basilar inferior hypokinesis and inferolateral hypokinesis 90% proximal right patent vein graft to right PDA patent LIMA graft LAD occluded vein graft to third OM severe 95% stenosis mid circumflex into the OM successful PCI placement drug-eluting stent 2.5 x 23   11. Echo 7/2/2021 EF 25 to 30% mild to moderate MR mild TR restrictive filling pattern IVC dilated   12. Cardiac catheterization 1/28/2022 severe stenoses OM, ostium of the vein graft to the right coronary artery, occluded vein graft to the OM, patent LIMA graft to diagonal severe LAD stenosis       Plan:   1.  Continue current medical therapy anticipation is that patient will be transferred to [facility] for higher level of care surgery in the next 24 to 48 hours stable at this time         Admission Problem List: Present on Admission:   o NSTEMI (non-ST elevated myocardial infarction) ([address]   o Coronary artery disease involving native coronary artery of native heart without angina pectoris   o Essential hypertension   o Mixed hyperlipidemia   o Type 2 diabetes mellitus with foot ulcer, with long-term current use of insulin (HCC)   o Epigastric pain   o Acute systolic congestive heart failure ([address]   o Acute renal insufficiency           Myocardial Infarction - Care Day 4 (1/30/2022) by Vern Hough RN       Review Status Review Entered   Completed 1/31/2022 11:36      Criteria Review      Care Day: 4 Care Date: 1/30/2022 Level of Care: Inpatient Floor    Guideline Day 2    Clinical Status    (X) * Hemodynamic stability    1/31/2022 12:36 PM EST by Miryam Diaz      135/92    (X) * Chest pain, dyspnea, or anginal equivalent absent    Routes    (X) * Oral hydration    1/31/2022 12:36 PM EST by Miryam Diaz      usual diet    (X) * Oral medications    1/31/2022 12:36 PM EST by Miryam Diaz      isosorbide mononitrate 30 mgOralDaily  aspirin 81 mgOralDaily  atorvastatin 40 mgOralNightly  buPROPion 150 mgOralBID  busPIRone 15 mgOralBID  carvedilol 25 mgOralBID WC    Interventions    ( ) * Oxygen absent    (X) PT/PTT and platelet count    Medications    (X) * IV nitroglycerin absent    (X) Beta-blocker    (X) Statin    * Milestone   Additional Notes   1/30   VS- 97 20 2L nc    POC Glucose 288 (H) 246 (H) 230 (H)   aPTT 45.0 (H) 183.8 (HH) 63.3 (HH)       Potassium 5.5 (H)    BUN 36 (H)    Creatinine 2.0 (H)    GFR Non- 35 (A)    GFR African American 42 (L)    Glucose 333 (H)    POC Glucose 272 (H) 288 (H)   CALCIUM, SERUM, 322585 8.7    Total Protein 5.4 (L)    Albumin 3.1 (L)    RBC 4.18 (L)    Hemoglobin Quant 11.8 (L)    Hematocrit 40.6 (L)    MCV 97.1 (H)    MCHC 29.1 (L)    aPTT 37.2 (H) 45.0 (H)       Mr. Andry Chester was resting in bed this morning.  Offers no complaints today.         o sodium zirconium cyclosilicate 10 g Oral Once                     o [Held by provider] clopidogrel 75 mg Oral Daily   o gabapentin 300 mg Oral BID   o pantoprazole 40 mg Oral QAM AC   o tamsulosin 0.4 mg Oral Daily   o sodium chloride flush 5-40 mL IntraVENous 2 times per day   o [Held by provider] furosemide 40 mg IntraVENous Daily   o insulin lispro 0-6 Units SubCUTAneous TID WC   o insulin lispro 0-3 Units SubCUTAneous Nightly   o insulin glargine 10 Units SubCUTAneous Nightly          Assessment/Plan   Principal Problem:     NSTEMI (non-ST elevated myocardial infarction) ([facility] [address]   Active Problems:     Coronary artery disease involving native coronary artery of native heart without angina pectoris     Essential hypertension     Mixed hyperlipidemia     Type 2 diabetes mellitus with foot ulcer, with long-term current use of insulin ([facility])     Epigastric pain     Acute systolic congestive heart failure ([facility])     Acute renal insufficiency   Resolved Problems:     * No resolved hospital problems.  *               Principal Problem:     NSTEMI (non-ST elevated myocardial infarction) ([facility])/  Coronary artery disease involving native coronary artery of native heart without angina pectoris-    - Cardiology following                - Underwent cardiac catheterization with findings of LV function impaired with EF of 20 to 25%, with LIMA graft LAD patent, vein graft OM occluded, stent placement OM 90% in-stent restenosis, severe 90% plus ostial stenosis of the vein graft to the RCA, 70% ostial ramus branch stenosis                - Cardiology recommended CT surgery consultation for consideration of redo CABG                -  CT surgery recommended transfer to Center with transplantation capabilities    - Aspirin and Lipitor   - Heparin gtt    - SL Nitro PRN   - Trend troponin    - FLP HDL 31, LDL 97 / HgbA1c- 12.4   - Serial and PRN EKGs   - Monitor on telemetry                 Active Problems:    CHF (congestive heart failure) ([facility])-                - ECHO showed LV moderately dilated with severe global hypokinesis, concentric LVH, grade 3 diastolic dysfunction, EF of 10 to 15%, mild mitral regurgitation, mild tricuspid insufficiency with estimated RVSP between 50 to 55 mm, moderately elevated, mildly dilated RV with decreased systolic function               - IV Lasix held due to BERTHA                - Low-sodium diet               - Monitor I's and O's closely               - Daily weights               - Monitor on telemetry         Acute renal insufficiency-                - Creatinine 2.0 today                - Monitor I's and O's closely               - Monitor labs closely                - Avoid hypotension               - Avoid nephrotoxic agents           Cardiothoracic surg   ASSESSMENT: Multivessel CAD with ischemic cardiomyopathy.  Given severe lung/renal/cardiac function, he is too high risk candidate [facility].  I have discussed this opinion with the patient.       PLAN: Transfer to center with transplantation capabilities.           Myocardial Infarction - Care Day 3 (1/29/2022) by Rickey Humphries RN       Review Status Review Entered   Completed 1/31/2022 10:20      Criteria Review      Care Day: 3 Care Date: 1/29/2022 Level of Care: Inpatient Floor    Guideline Day 2    Clinical Status    (X) * Hemodynamic stability    1/31/2022 11:20 AM EST by Clementina Sutherland      82 140/88    (X) * Chest pain, dyspnea, or anginal equivalent absent    Routes    (X) * Oral hydration    (X) * Oral medications    1/31/2022 11:20 AM EST by Clementina Sutherland      isosorbide mononitrate 30 mgOralDaily  aspirin 81 mgOralDaily  atorvastatin 40 mgOralNightly  buPROPion 150 mgOralBID    Interventions    ( ) * Oxygen absent    (X) PT/PTT and platelet count    6/93/9723 11:20 AM EST by Clementina Sutherland      aPTT86.4 (HH)36.4 (H)32.9    Medications    (X) * IV nitroglycerin absent    (X) Antiplatelet agents (eg, aspirin, clopidogrel, ticagrelor)    (X) Beta-blocker    (X) Statin    * Milestone   Additional Notes   1/29   VS- 98.6 20 3L nc 95%   BUN 31 (H)    Creatinine 1.9 (H)    GFR Non- 37 (A)    GFR African American 45 (L)    Glucose 338 (H)    POC Glucose 290 (H) 239 (H)   Total Protein 5.7 (L)    Albumin 2.9 (L)    WBC 7.6    RBC 4.35 (L)    Hemoglobin Quant 12.5 (L)    Hematocrit 42.7    MCV 98.2 (H)    MCHC 29.3 (L)    Platelet Count 390                o busPIRone 15 mg Oral BID   o carvedilol 25 mg Oral BID WC   o gabapentin 300 mg Oral BID   o pantoprazole 40 mg Oral QAM AC   o tamsulosin 0.4 mg Oral Daily   o sodium chloride flush 5-40 mL IntraVENous 2 times per day   o furosemide 40 mg IntraVENous Daily   o insulin lispro 0-6 Units SubCUTAneous TID WC   o insulin lispro 0-3 Units SubCUTAneous Nightly   o insulin glargine 10 Units SubCUTAneous Nightly             Mr. Romel Gregorio was resting in bed this morning. Reported SOB has improved. Denies chest pain. Assessment/Plan   Principal Problem:     NSTEMI (non-ST elevated myocardial infarction) ([facility] [address]   Active Problems:     Coronary artery disease involving native coronary artery of native heart without angina pectoris     Essential hypertension     Mixed hyperlipidemia     Type 2 diabetes mellitus with foot ulcer, with long-term current use of insulin ([facility])     Epigastric pain     Acute systolic congestive heart failure ([facility])     Acute renal insufficiency   Resolved Problems:     * No resolved hospital problems.  *               Principal Problem:     NSTEMI (non-ST elevated myocardial infarction) ([facility])/  Coronary artery disease involving native coronary artery of native heart without angina pectoris-    - Cardiology following                - Cardiology recommended cardiac catheterization.               - Underwent cardiac catheterization this morning with findings of LV function impaired with EF of 20 to 25%, with LIMA graft LAD patent, vein graft OM occluded, stent placement OM 90% in-stent restenosis, severe 90% plus ostial stenosis of the vein graft to the RCA, 70% ostial ramus branch stenosis                - Cardiology recommended CT surgery consultation for consideration of redo CABG                - CT surgery assessing for consideration on redo of CABG based on repeat assessment and labs over the weekend    - Aspirin and Lipitor   - Heparin gtt    - SL Nitro PRN   - Trend troponin    - FLP HDL 31, LDL 97 / HgbA1c- 12.4   - Serial and PRN EKGs   - Monitor on telemetry

## 2022-02-02 NOTE — PROGRESS NOTES
Cardiology Daily Note Maribel Braxton MD      Patient:  Adela Dick  [id number]    Patient Active Problem List    Diagnosis Date Noted    Abnormal stress echocardiogram     Pre-syncope     Cigarette smoker 09/02/2014    Coronary artery disease involving native coronary artery of native heart without angina pectoris     Essential hypertension     Mixed hyperlipidemia     Acute systolic congestive heart failure ([address] 01/28/2022    Acute renal insufficiency 01/28/2022    NSTEMI (non-ST elevated myocardial infarction) ([address] 01/27/2022    Epigastric pain 01/27/2022    NSTEMI (non-ST elevation myocardial infarction) ([address] 07/02/2021    Foot ulcer with fat layer exposed, left ([address] 01/25/2019    Cellulitis of left foot     Non-pressure chronic ulcer of other part of left foot with fat layer exposed ([address] 12/17/2018    Chest pain due to myocardial ischemia 03/21/2018    Skin ulcer of small toe of right foot with fat layer exposed ([address] 08/16/2017    Skin ulcer of toe of left foot with fat layer exposed ([address] 08/03/2017    Type 2 diabetes mellitus with foot ulcer, with long-term current use of insulin ([address] 08/03/2017       Admit Date:  1/27/2022    Admission Problem List: Present on Admission:   NSTEMI (non-ST elevated myocardial infarction) ([address]   Coronary artery disease involving native coronary artery of native heart without angina pectoris   Essential hypertension   Mixed hyperlipidemia   Type 2 diabetes mellitus with foot ulcer, with long-term current use of insulin (HCC)   Epigastric pain   Acute systolic congestive heart failure ([address]   Acute renal insufficiency      Cardiac Specific Data:  Specialty Problems        Cardiology Problems    Coronary artery disease involving native coronary artery of native heart without angina pectoris        Essential hypertension        Mixed hyperlipidemia        Pre-syncope        Chest pain due to myocardial ischemia        [facility] (non-ST elevation [facility]) [facility]        * (Principal) NSTEMI (non-ST elevated myocardial infarction) ([facility]        Acute systolic congestive heart failure (HCC)              Subjective:  Mr. Mckenna Tejeda seen today resting comfortably denies chest pain or dyspnea no new complaints or issues blood pressure 120/72 heart 57    Objective:   /72   Pulse 57   Temp 97.2 °F (36.2 °C) (Temporal)   Resp 20   Ht 6' 3\" (1.905 m)   Wt 275 lb (124.7 kg)   SpO2 98%   BMI 34.37 kg/m²       Intake/Output Summary (Last 24 hours) at 2/2/2022 5078  Last data filed at 2/2/2022 0108  Gross per 24 hour   Intake 970.91 ml   Output 2050 ml   Net -1079.09 ml       Prior to Admission medications    Medication Sig Start Date End Date Taking? Authorizing Provider   oxyCODONE-acetaminophen (PERCOCET)  MG per tablet Take 1 tablet by mouth every 6 hours as needed for Pain.    Yes Historical Provider, MD   pantoprazole (PROTONIX) 40 MG tablet Take 1 tablet by mouth every morning (before breakfast) 1/25/22   Juan Piña MD   Continuous Blood Gluc Sensor (DEXCOM G6 SENSOR) MISC 1 each by Does not apply route continuous E11.62, L97.509, Z79.4 1/4/22   Juan Piña MD   Continuous Blood Gluc Transmit (DEXCOM G6 TRANSMITTER) MISC 1 each by Does not apply route continuous E11.62, L97.509, Z79.4 1/4/22   Juan Piña MD   buPROPion (WELLBUTRIN XL) 150 MG extended release tablet TAKE 1 TABLET BY MOUTH 2 TIMES DAILY 12/7/21   Juan Piña MD   furosemide (LASIX) 40 MG tablet TAKE 1 TABLET BY MOUTH TWICE A DAY 12/7/21   Juan Piña MD   busPIRone (BUSPAR) 15 MG tablet TAKE 1 TABLET BY MOUTH TWICE A DAY 12/7/21   Juan Piña MD   montelukast (SINGULAIR) 10 MG tablet TAKE 1 TABLET BY MOUTH EVERY DAY 10/5/21   Juan Piña MD   atorvastatin (LIPITOR) 40 MG tablet Take 1 tablet by mouth nightly 10/5/21   Juan Piña MD   clopidogrel (PLAVIX) 75 MG tablet Take 1 tablet by mouth daily 10/5/21   MD edyta [facility]) 15 MG tablet Take 1 tablet by mouth daily 10/5/21   John Christensen MD   Diabetic Shoe MISC by Does not apply route Dx E11.621, Z79.4 10/5/21   John Christensen MD   insulin lispro, 1 Unit Dial, (HUMALOG [facility]) 100 UNIT/ML SOPN Inject 10 Units into the skin 3 times daily (before meals) 10/4/21   John Christensen MD   testosterone cypionate (DEPOTESTOTERONE CYPIONATE) 200 MG/ML injection INJECT 1 ML INTRAMUSCULARLY EVERY 21 DAYS 8/20/21 1/27/22  John Christensen MD   tiZANidine (ZANAFLEX) 4 MG tablet Take 4 mg by mouth every 6 hours as needed    Historical Provider, MD   Misc Natural Products (APPLE CIDER VINEGAR DIET PO) Take by mouth    Historical Provider, MD   albuterol sulfate  (90 Base) MCG/ACT inhaler INHALE 1 PUFF INTO THE LUNGS EVERY 6 HOURS AS NEEDED FOR WHEEZING OR SHORTNESS OF BREATH. 8/2/21   John Christensen MD   aspirin 81 MG EC tablet Take 1 tablet by mouth daily 7/5/21   Tao Britton MD   carvedilol (COREG) 25 MG tablet Take 1 tablet by mouth 2 times daily (with meals) 7/4/21   Tao Britton MD   nitroGLYCERIN (NITROSTAT) 0.4 MG SL tablet Place 1 tablet under the tongue every 5 minutes as needed for Chest pain up to max of 3 total doses. If no relief after 1 dose, call 911. 7/2/21   John Christensen MD   gabapentin (NEURONTIN) 300 MG capsule Take 1 capsule by mouth 2 times daily for 30 days. Patient is advised to take 1 afternoon around 4 or 5 and the other at bedtime. This is for peripheral neuropathy.  6/28/21 1/27/22  John Christensen MD   zinc 50 MG TABS tablet Take 50 mg by mouth daily    Historical Provider, MD   Ascorbic Acid (VITAMIN C) 1000 MG tablet Take 500 mg by mouth daily     Historical Provider, MD   vitamin D (ERGOCALCIFEROL) 1.25 MG (51209 UT) CAPS capsule Take 1 capsule by mouth twice a week 3/25/21   John Christensen MD   ipratropium-albuterol (DUONEB) 0.5-2.5 (3) MG/3ML SOLN nebulizer solution INHALE 3 MLS INTO THE LUNGS EVERY 6 HOURS AS NEEDED FOR SHORTNESS OF BREATH 10/21/20 Nae Sams MD   Nebulizers (COMPRESSOR/NEBULIZER) MISC 1 each by Does not apply route 4 times daily as needed (cough) 9/28/20   Nae Sams MD   Continuous Blood Gluc  ([address]) [address] 1 each by Does not apply route continuous DX E11.62, L97.509, Z79.4 9/28/20   Nae Sams MD   tamsulosin (FLOMAX) 0.4 MG capsule Take 0.4 mg by mouth daily    Historical Provider, MD   lisinopril (PRINIVIL;ZESTRIL) 20 MG tablet Take 1 tablet by mouth 2 times daily  Patient taking differently: Take 10 mg by mouth 2 times daily  6/3/20   MELITON Murphy   Needles & Syringes MISC 1 each by Does not apply route every 21 days Needs 3 cc syringes with 22 G  1/2 inch needle to give Testosterone and needs 18 Gauge to draw up med.  3/9/20   Nae Sams MD   [facility] 100 UNIT/ML injection pen Inject 50 Units into the skin 2 times daily  2/20/20   Nae Sams MD   glipiZIDE (GLUCOTROL) 10 MG tablet Take 10 mg by mouth 2 times daily (before meals)    Historical Provider, MD [place] insulin glargine  20 Units SubCUTAneous Nightly    insulin lispro  5 Units SubCUTAneous TID WC    isosorbide mononitrate  30 mg Oral Daily    aspirin  81 mg Oral Daily    atorvastatin  40 mg Oral Nightly    buPROPion  150 mg Oral BID    busPIRone  15 mg Oral BID    carvedilol  25 mg Oral BID WC    [Held by provider] clopidogrel  75 mg Oral Daily    gabapentin  300 mg Oral BID    pantoprazole  40 mg Oral QAM AC    tamsulosin  0.4 mg Oral Daily    sodium chloride flush  5-40 mL IntraVENous 2 times per day    furosemide  40 mg IntraVENous Daily    insulin lispro  0-6 Units SubCUTAneous TID WC    insulin lispro  0-3 Units SubCUTAneous Nightly       TELEMETRY: Sinus     Physical Exam:      Physical Exam      General:  Awake, alert, NAD  Skin:  Warm and dry  Neck:  no jvd , no carotid bruits  Chest:  Clear to auscultation, no wheezing or rales  Cardiovascular:  RRR D1R1 no murmurs, clicks, gallups, or rubs  Abdomen:  Soft nontender, nondistended, bowel sounds present  Extremities:  Edema: none      Lab Data:  CBC:   Recent Labs     01/31/22  0357 02/01/22 0630 02/02/22  0017   WBC 7.5 7.3 8.3   HGB 12.8* 12.2* 11.4*   HCT 44.4 40.3* 39.6*   MCV 98.4* 97.3* 98.8*    227 240     BMP:   Recent Labs     01/31/22  0357 02/01/22 0630 02/02/22  0017    136 137   K 6.0* 5.2* 4.9    99 99   CO2 24 30* 28   BUN 38* 42* 37*   CREATININE 2.0* 1.8* 1.8*     LIVER PROFILE:   Recent Labs     01/31/22 0357 02/01/22 0630 02/02/22  0017   AST 12 10 11   ALT 21 22 20   BILITOT 0.4 0.4 0.3   ALKPHOS 111 111 94     PT/INR: No results for input(s): PROTIME, INR in the last 72 hours. APTT:   Recent Labs     02/01/22  1751 02/02/22  0017 02/02/22  0701   APTT 48.5* 46.8* >200.0*     BNP:  No results for input(s): BNP in the last 72 hours. CK, CKMB, Troponin: @LABRCNT (CKTOTAL:3, CKMB:3, TROPONINI:3)@    IMAGING:  Echo Complete    Result Date: 1/28/2022  Transthoracic Echocardiography Report (TTE)  Demographics   Patient Name   Karena Gutierrez  Date of Study           01/28/2022   MRN            [id number]            Gender                  Male   Date of Birth  1970        Room Number             [id number]   Age            46 year(s)   Height:        75 inches         Referring Physician     Houston Eastman MD   Weight:        268 pounds        Sonographer             Yas Nghiayashira   BSA:           2.49 m^2          Interpreting Physician  Laurian Cogan, MD   BMI:           33.5 kg/m^2  Procedure Type of Study   TTE procedure:ECHO NO CONTRAST WITH DOP/COLR. Study Location: Echo Lab Technical Quality: Poor visualization due to body habitus. Patient Status: Inpatient HR: 11 bpm BP: 119/91 mmHg Indications:Chest pain and Dyspnea/SOB.   Conclusions   Summary  Left ventricle was markedly dilated with severe global hypokinesis  There is concentric hypertrophy of the left ventricle  Grade 3 diastolic dysfunction  Estimated ejection fraction of 10% to 15%  Mitral annulus calcification with mild mitral regurgitation and some  thickening of the mitral leaflets  Mild tricuspid insufficiency with estimated right ventricular systolic  pressure between 50 to 55 mm which is moderately elevated  Mildly dilated right ventricle with decreased systolic function   Signature   ----------------------------------------------------------------  Electronically signed by Damaris Myrick MD(Interpreting physician)  on 01/28/2022 01:57 PM  ----------------------------------------------------------------   Findings   Aortic Valve  Structurally normal aortic valve. Pulmonic Valve  The pulmonic valve was not well visualized . Left Atrium  Normal size left atrium. Allergies   - No known allergies. M-Mode Measurements (cm)   LVIDd: 6.14 cm                           LVIDs: 5.6 cm  IVSd: 2.73 cm  LVPWd: 1.63 cm                           AO Root Dimension: 2 cm  Rt. Vent. Dimension: 2.65 cm             LA: 4.9 cm  % Ejection Fraction: 18.9 %              LVOT: 2.5 cm  Doppler Measurements:   AV Peak Velocity:87.5 cm/s           MV Peak E-Wave: 95.5 cm/s  AV Peak Gradient: 3.06 mmHg          MV Peak A-Wave: 29.1 cm/s  AV Mean Gradient: 2 mmHg             MV E/A Ratio: 3.28 %  AV Area (Continuity):4.15 cm^2       MV Peak Gradient: 3.65 mmHg  TR Velocity:328 cm/s                 MV P1/2t: 126 msec  TR Gradient:43.03 mmHg               MVA by PHT1.75 cm^2      XR CHEST (2 VW)    Result Date: 1/27/2022  Exam:   XR CHEST (2 VW)  Date:  1/27/2022 History:  Male, age  46 years; R06.02 COMPARISON:  None. Findings : Mild-to-moderate cardiomegaly. Median sternotomy wires appear similar. Interstitial edema. Small left pleural effusion. Trace right pleural effusion. Associated opacities. The bones show no acute pathology. Impression: Fluid overload. Clinical correlation to exclude superimposed infection is recommended.  Signed by Dr Sandi Espinoza RUQ    Result Date: 1/28/2022  EXAMINATION: US GALLBLADDER RUQ 1/28/2022 6:15 PM HISTORY: US GALLBLADDER RUQ 1/28/2022 4:58 PM HISTORY: Epigastric pain COMPARISON: NONE TECHNIQUE: Multiple longitudinal and transverse realtime sonographic images of the right upper quadrant of the abdomen are obtained. FINDINGS:  Pancreas: Normal in size and echogenicity. Liver: Normal in size, echogenicity and echotexture. Hepatopedal flow is present in the portal vein. No focal lesion. Gallbladder: Multiple internal acoustic reflection's are present in the gallbladder consistent with cholelithiasis. . Wall the gallbladder is thickened at 4 mm. There is no fluid around the gallbladder however a thickened gallbladder wall may indicate cholecystitis Bile ducts: The CBD measures 0.48 cm in diameter. There is no intrahepatic or extrahepatic ductal dilation. Right kidney: Normal in size, shape and contour. Right renal contour is 6.5 x 5.2 x 13.9 cm No mass, hydronephrosis or calculi. No perinephric fluid collection. Other: The visualized abdominal aorta is normal in caliber. Cholelithiasis. The wall the gallbladder is thickened at 4 mm. Signed by Dr Damion Mariano    XR CHEST PORTABLE    Result Date: 1/31/2022  XR CHEST PORTABLE 1/31/2022 8:55 AM HISTORY: Pulmonary edema COMPARISON: 1/27/2022 CXR: A single frontal view of the chest is performed. Findings: Diminished level of inspiration. Median sternotomy wires with stable cardiomegaly. Small pleural effusions. Similar prominence of the interstitial markings with likely basilar atelectasis. No pneumothorax. No acute regional bony pathology. 1. Similar chest compared to 1/27/2022. Prior mediastinal surgery with similar findings of CHF. Signed by Dr Kenya Rivera    XR CHEST PORTABLE    Result Date: 1/27/2022  Examination. XR CHEST PORTABLE 1/27/2022 12:10 PM History: Chest pain. A frontal portable upright view of the chest is compared with the previous study dated 1/27/2022.  The lungs are poorly inflated. There is a persistent pulmonary vascular congestion in the lungs bilaterally. There are atelectatic changes in the lower lungs, left more than the right, similar to the previous study. There is small bibasilar pleural effusion. There is moderate cardiomegaly. There is evidence of previous cardiac surgery. No acute bony abnormality. 1. The persistent pulmonary vascular congestion, small bibasilar pleural effusion and cardiomegaly. 2. Discoid atelectatic changes in the lower lungs, more pronounced in the left lower lung. Signed by Dr Kandis Cr    VL DUP CAROTID BILATERAL    Result Date: 1/28/2022  Vascular Carotid Procedure  Demographics   Patient Name    Leland Baca  Age                  46                  J   Patient Number  [id number]          Gender               Male   Visit Number    [id number]       Interpreting         Itzel Lazar                                  Physician   Date of Birth   1970      Referring Physician  Nikky Rodrigues   Accession       [id number]      [address], [address],  Number                                               RDMS  Procedure Type of Study:   Cerebral:Carotid, VL CAROTID BILATERAL. Indications for Study:Pre-op CABG. Risk Factors History of Disease +--------------------------+----------+------------------------------------+ ! Diagnosis                 ! Date      ! Comments                            ! +--------------------------+----------+------------------------------------+ ! Circulatory->CAD          !          !CAD, MI, HTN                        ! +--------------------------+----------+------------------------------------+ ! Chronic lung disease      !03/02/2011! SPONTANEOUS LUNG COLLAPSE           ! +--------------------------+----------+------------------------------------+ ! Musculoskeletal->Arthritis!          !                                    ! +--------------------------+----------+------------------------------------+ ! Endocrine->Diabetes       !          !                                    ! +--------------------------+----------+------------------------------------+   - The patient's risk factor(s) include: diabetes mellitus, dyslipidemia,     arterial hypertension, prior MI and prior CABG. - The patient has a former tobacco history. - The patient's last creatinine was 1.6 mg/dl. Allergies   - No known allergies. Impression   There is mixed plaque visualized in the bilateral internal carotid  artery(ies). There is less than 50% stenosis in the right internal carotid artery. There is less than 50% stenosis of the left internal carotid artery. There is normal antegrade flow in the bilateral vertebral artery(ies). Signature   ----------------------------------------------------------------  Electronically signed by Bernardo NevesInterpreting  physician) on 01/28/2022 04:09 PM  ----------------------------------------------------------------  Blood Pressure:Right arm 120/82 mmHg. Left arm 124/78 mmHg. Velocities are measured in cm/s ; Diameters are measured in mm Carotid Right Measurements +------------+-------+-------+--------+-------+------------+---------------+ ! Location    ! PSV    ! EDV    ! Angle   ! RI     !%Stenosis   ! Tortuosity     ! +------------+-------+-------+--------+-------+------------+---------------+ ! Prox CCA    !89.5   !14.9   !60      !0.83   !            !               ! +------------+-------+-------+--------+-------+------------+---------------+ ! Mid CCA     !81.4   !21.7   !60      !0.73   !            !               ! +------------+-------+-------+--------+-------+------------+---------------+ ! Prox ICA    !53.4   !28.6   ! 60      !0.46   !            !               ! +------------+-------+-------+--------+-------+------------+---------------+ ! Mid ICA     !93.8   !43.5   !60      !0.54   !            ! ! +------------+-------+-------+--------+-------+------------+---------------+ ! Dist ICA    !124    !55.3   !60      !0.55   !            !               ! +------------+-------+-------+--------+-------+------------+---------------+ ! Prox ECA    !98.2   !14.9   !60      !0.85   !            !               ! +------------+-------+-------+--------+-------+------------+---------------+ ! Vertebral   !59     !26.7   ! 60      !0.55   !            !               ! +------------+-------+-------+--------+-------+------------+---------------+   - There is antegrade vertebral flow noted on the right side. - Additional Measurements:ICAPSV/CCAPSV 1.39. ICAEDV/CCAEDV 3.71. Carotid Left Measurements +------------+-------+-------+--------+-------+------------+---------------+ ! Location    ! PSV    ! EDV    ! Angle   ! RI     !%Stenosis   ! Tortuosity     ! +------------+-------+-------+--------+-------+------------+---------------+ ! Prox CCA    ! 85.7   !24.9   !60      !0.71   !            !               ! +------------+-------+-------+--------+-------+------------+---------------+ ! Mid CCA     !74.6   !22.4   !60      !0.7    ! !               ! +------------+-------+-------+--------+-------+------------+---------------+ ! Prox ICA    ! 90.7   !39.1   ! 60      !0.57   !            !               ! +------------+-------+-------+--------+-------+------------+---------------+ ! Mid ICA     !92.6   !28.6   ! 60      !0.69   !            !               ! +------------+-------+-------+--------+-------+------------+---------------+ ! Dist ICA    !135    !62.3   !60      !0.54   !            !               ! +------------+-------+-------+--------+-------+------------+---------------+ ! Prox ECA    !83.9   !       !60      !       !            !               ! +------------+-------+-------+--------+-------+------------+---------------+ ! Vertebral   !54.7   !19.9   !60      !0.64   !            !               ! +------------+-------+-------+--------+-------+------------+---------------+   - There is antegrade vertebral flow noted on the left side. - Additional Measurements:ICAPSV/CCAPSV 1.58. ICAEDV/CCAEDV 2.5. CTA PULMONARY W CONTRAST    Result Date: 1/27/2022  Examination. CTA PULMONARY W CONTRAST 1/27/2022 12:37 PM History: Chest pain and shortness of breath. DLP: 7 7 7 mGycm. The CT angiography of the chest are performed after intravenous contrast enhancement. The images are acquired in axial plane and subsequent 2-D reconstruction in coronal and sagittal planes and 3-D maximum intensity projection reconstruction. There is no previous study for comparison. The correlation made with radiographs of the chest obtained earlier today. There is normal opacification of the pulmonary arteries and branches. No filling defects in the visualized pulmonary arterial bed. The RV/LV ratio is 43/59 which is normal. No finding to suggest right heart strain. Atheromatous changes thoracic aorta are noted. No aneurysmal dilatation. Severe atheromatous changes of coronary arteries are seen. There is evidence of mediastinal lymphadenopathy. An aortopulmonic window lymph node measures 1.7 cm. A level 2R lymph node measures 1.8 cm. A subcarinal lymph node measures 1.9 cm. The limited visualized soft tissues of the neck appears unremarkable. There is a low-density nodule in the right lobe of the thyroid gland which is incompletely evaluated. No axillary lymphadenopathy. There is extensive groundglass infiltrate in the lungs bilaterally. There is bilateral lower lobar consolidation and moderate to large volume bibasal pleural effusion. The limited visualized liver and spleen are unremarkable. Moderate lobulation of the anterior glands bilaterally is noted. The kidneys, the pancreas and gallbladder is incompletely visualized and not fully evaluated.  The images reviewed in bone window show chronic degenerative changes of the lumbar spine with mild dextroscoliosis. 1. No evidence of pulmonary emboli. 2. No aortic aneurysm or dissection. Atheromatous changes of coronary arteries. 3. Extensive groundglass infiltrate in the lungs bilaterally suggesting an inflammatory/infectious process. The second possibility may suggest pulmonary congestion/pulmonary edema. 4. Moderate to large bibasal pleural effusion. Signed by Dr Denise Sams    VL Vein Mapping Lower Bilateral    Result Date: 1/28/2022  Vascular Lower Extremity Vein Mapping Procedure  Demographics   Patient Name    Emilia Rivera Age                   46   Patient Number  [id number]           Gender                Male   Visit Number    [id number]        Interpreting          Jacquelyn Gonzales                                   Physician   Date of Birth   1970       Referring Physician   Accession       [id number]       Sonographer  Number  Procedure Type of Study:   [facility]. Risk Factors History of Disease +--------------------------+----------+------------------------------------+ ! Diagnosis                 ! Date      ! Comments                            ! +--------------------------+----------+------------------------------------+ ! Circulatory->CAD          !          !CAD, MI, HTN                        ! +--------------------------+----------+------------------------------------+ ! Chronic lung disease      !03/02/2011! SPONTANEOUS LUNG COLLAPSE           ! +--------------------------+----------+------------------------------------+ ! Musculoskeletal->Arthritis!          !                                    ! +--------------------------+----------+------------------------------------+ ! Endocrine->Diabetes       !          !                                    ! +--------------------------+----------+------------------------------------+   - The patient's risk factor(s) include: diabetes mellitus, dyslipidemia,     arterial hypertension, prior MI and prior CABG.   - The patient has a former tobacco history. - The patient's last creatinine was 1.6 mg/dl. Allergies   - No known allergies. Impression   Usable greater saphenous vein conduit in the left lower extremity. Signature   ----------------------------------------------------------------  Electronically signed by Connie NevesInterpreting  physician) on 01/28/2022 03:16 PM  ----------------------------------------------------------------  Velocities are measured in cm/s ; Diameters are measured in mm +--------------------------------------++--------+-----+----+--------+-----+ ! Superficial - Great Saphenous Vein    ! ! Right   ! ! Left!        !     ! +--------------------------------------++--------+-----+----+--------+-----+ ! Location                              ! !Diameter! Depth! !Diameter! Depth! +--------------------------------------++--------+-----+----+--------+-----+ ! GSV 2 cm Distal to Junction           ! !2.7     !     !    !6.1     !     ! +--------------------------------------++--------+-----+----+--------+-----+ ! GSV High Thigh                        ! !1.8     !     !    !5.7     !     ! +--------------------------------------++--------+-----+----+--------+-----+ ! GSV Mid Thigh                         ! !1.7     !     !    !5       !     ! +--------------------------------------++--------+-----+----+--------+-----+ ! GSV Low Thigh                         ! !1.7     !     !    !4.8     !     ! +--------------------------------------++--------+-----+----+--------+-----+ ! GSV Knee                              !!1.7     !     !    !4.6     !     ! +--------------------------------------++--------+-----+----+--------+-----+ ! GSV High Calf                         !!1.9     !     !    !3.8     !     ! +--------------------------------------++--------+-----+----+--------+-----+ ! GSV Mid Calf                          !!2.5     !     !    !2.7     ! ! +--------------------------------------++--------+-----+----+--------+-----+ ! GSV Ankle                             !!1.6     !     !    !3.3     !     ! +--------------------------------------++--------+-----+----+--------+-----+        Assessment:  1. Complaints of increased shortness of breath lower chest pressure and elevated troponin suggestive of non-STEMI  2. Coronary artery disease  3. Hypertension  4. Hyperlipidemia  5. Diabetes mellitus type 2  6. History of tobacco abuse  7. History of diabetic foot ulceration  8. CTA pulmonary yesterday no evidence of pulmonary emboli extensive groundglass infiltrate in the lungs bilaterally suggesting inflammatory or infectious process moderate to large bibasilar pleural effusion  9. CTA abdomen pelvis 4/5/2021 bilateral perinephric peripelvic and periureteral fat infiltration may represent chronic inflammatory process moderate thick-walled incompletely distended urinary bladder enlarged prostate stable left adrenal nodule  10. Cardiac cath 7-21 EF 25% posterior basilar inferior hypokinesis and inferolateral hypokinesis 90% proximal right patent vein graft to right PDA patent LIMA graft LAD occluded vein graft to third OM severe 95% stenosis mid circumflex into the OM successful PCI placement drug-eluting stent 2.5 x 23  11. Echo 7/2/2021 EF 25 to 30% mild to moderate MR mild TR restrictive filling pattern IVC dilated  12. Cardiac catheterization 1/28/2022 severe stenoses OM, ostium of the vein graft to the right coronary artery, occluded vein graft to the OM, patent LIMA graft to diagonal severe LAD stenosis      Plan:  1.  Continue current medical therapy awaiting transfer stable at this time    Wally Pavon MD, MD 2/2/2022 9:18 AM

## 2022-02-02 NOTE — PROGRESS NOTES
Cardiology Daily Note Leandro Hatchet, MD      Patient:  Dave Wilder  [id number]    Patient Active Problem List    Diagnosis Date Noted    Abnormal stress echocardiogram     Pre-syncope     Cigarette smoker 09/02/2014    Coronary artery disease involving native coronary artery of native heart without angina pectoris     Essential hypertension     Mixed hyperlipidemia     Acute systolic congestive heart failure ([address] 01/28/2022    Acute renal insufficiency 01/28/2022    NSTEMI (non-ST elevated myocardial infarction) ([address] 01/27/2022    Epigastric pain 01/27/2022    NSTEMI (non-ST elevation myocardial infarction) ([address] 07/02/2021    Foot ulcer with fat layer exposed, left ([address] 01/25/2019    Cellulitis of left foot     Non-pressure chronic ulcer of other part of left foot with fat layer exposed ([address] 12/17/2018    Chest pain due to myocardial ischemia 03/21/2018    Skin ulcer of small toe of right foot with fat layer exposed ([address] 08/16/2017    Skin ulcer of toe of left foot with fat layer exposed ([address] 08/03/2017    Type 2 diabetes mellitus with foot ulcer, with long-term current use of insulin ([address] 08/03/2017       Admit Date:  1/27/2022    Admission Problem List: Present on Admission:   NSTEMI (non-ST elevated myocardial infarction) ([address]   Coronary artery disease involving native coronary artery of native heart without angina pectoris   Essential hypertension   Mixed hyperlipidemia   Type 2 diabetes mellitus with foot ulcer, with long-term current use of insulin (HCC)   Epigastric pain   Acute systolic congestive heart failure ([address]   Acute renal insufficiency      Cardiac Specific Data:  Specialty Problems        Cardiology Problems    Coronary artery disease involving native coronary artery of native heart without angina pectoris        Essential hypertension        Mixed hyperlipidemia        Pre-syncope        Chest pain due to myocardial ischemia        NSTEMI (non-ST elevation Mary Anne Upton MD   Nebulizers (COMPRESSOR/NEBULIZER) MISC 1 each by Does not apply route 4 times daily as needed (cough) 9/28/20   Levon Peña MD   Continuous Blood Gluc  ([address]) [address] 1 each by Does not apply route continuous DX E11.62, L97.509, Z79.4 9/28/20   Levon Peña MD   tamsulosin (FLOMAX) 0.4 MG capsule Take 0.4 mg by mouth daily    Historical Provider, MD   lisinopril (PRINIVIL;ZESTRIL) 20 MG tablet Take 1 tablet by mouth 2 times daily  Patient taking differently: Take 10 mg by mouth 2 times daily  6/3/20   MELITON Mayers   Needles & Syringes MISC 1 each by Does not apply route every 21 days Needs 3 cc syringes with 22 G  1/2 inch needle to give Testosterone and needs 18 Gauge to draw up med.  3/9/20   Levon Peña MD   [facility] 100 UNIT/ML injection pen Inject 50 Units into the skin 2 times daily  2/20/20   Levon Peña MD   glipiZIDE (GLUCOTROL) 10 MG tablet Take 10 mg by mouth 2 times daily (before meals)    Historical Provider, MD       [facility] insulin glargine  20 Units SubCUTAneous Nightly    insulin lispro  5 Units SubCUTAneous TID WC    isosorbide mononitrate  30 mg Oral Daily    aspirin  81 mg Oral Daily    atorvastatin  40 mg Oral Nightly    buPROPion  150 mg Oral BID    busPIRone  15 mg Oral BID    carvedilol  25 mg Oral BID WC    [Held by provider] clopidogrel  75 mg Oral Daily    gabapentin  300 mg Oral BID    pantoprazole  40 mg Oral QAM AC    tamsulosin  0.4 mg Oral Daily    sodium chloride flush  5-40 mL IntraVENous 2 times per day    furosemide  40 mg IntraVENous Daily    insulin lispro  0-6 Units SubCUTAneous TID WC    insulin lispro  0-3 Units SubCUTAneous Nightly       TELEMETRY: Sinus     Physical Exam:      Physical Exam      General:  Awake, alert, NAD  Skin:  Warm and dry  Neck:  no jvd , no carotid bruits  Chest:  Clear to auscultation, no wheezing or rales  Cardiovascular:  RRR G3X3 no murmurs, clicks, gallups, or rubs  Abdomen:  Soft nontender, nondistended, bowel sounds present  Extremities:  Edema: none       Lab Data:  CBC:   Recent Labs     01/31/22  0357 02/01/22 0630 02/02/22  0017   WBC 7.5 7.3 8.3   HGB 12.8* 12.2* 11.4*   HCT 44.4 40.3* 39.6*   MCV 98.4* 97.3* 98.8*    227 240     BMP:   Recent Labs     01/31/22  0357 02/01/22 0630 02/02/22  0017    136 137   K 6.0* 5.2* 4.9    99 99   CO2 24 30* 28   BUN 38* 42* 37*   CREATININE 2.0* 1.8* 1.8*     LIVER PROFILE:   Recent Labs     01/31/22 0357 02/01/22 0630 02/02/22  0017   AST 12 10 11   ALT 21 22 20   BILITOT 0.4 0.4 0.3   ALKPHOS 111 111 94     PT/INR: No results for input(s): PROTIME, INR in the last 72 hours. APTT:   Recent Labs     02/01/22  1751 02/02/22  0017 02/02/22  0701   APTT 48.5* 46.8* >200.0*     BNP:  No results for input(s): BNP in the last 72 hours. CK, CKMB, Troponin: @LABRCNT (CKTOTAL:3, CKMB:3, TROPONINI:3)@    IMAGING:  Echo Complete    Result Date: 1/28/2022  Transthoracic Echocardiography Report (TTE)  Demographics   Patient Name   David Drum  Date of Study           01/28/2022   MRN            [id number]            Gender                  Male   Date of Birth  1970        Room Number             [facility]HL-0716   Age            46 year(s)   Height:        75 inches         Referring Physician     Mejia Davenport MD   Weight:        268 pounds        Sonographer             Ileana Coker   BSA:           2.49 m^2          Interpreting Physician  Laly Arreaga MD   BMI:           33.5 kg/m^2  Procedure Type of Study   TTE procedure:ECHO NO CONTRAST WITH DOP/COLR. Study Location: Echo Lab Technical Quality: Poor visualization due to body habitus. Patient Status: Inpatient HR: 11 bpm BP: 119/91 mmHg Indications:Chest pain and Dyspnea/SOB.   Conclusions   Summary  Left ventricle was markedly dilated with severe global hypokinesis  There is concentric hypertrophy of the left ventricle  Grade 3 diastolic dysfunction  Estimated ejection fraction of 10% to 15%  Mitral annulus calcification with mild mitral regurgitation and some  thickening of the mitral leaflets  Mild tricuspid insufficiency with estimated right ventricular systolic  pressure between 50 to 55 mm which is moderately elevated  Mildly dilated right ventricle with decreased systolic function   Signature   ----------------------------------------------------------------  Electronically signed by Gee Hernandez MD(Interpreting physician)  on 01/28/2022 01:57 PM  ----------------------------------------------------------------   Findings   Aortic Valve  Structurally normal aortic valve. Pulmonic Valve  The pulmonic valve was not well visualized . Left Atrium  Normal size left atrium. Allergies   - No known allergies. M-Mode Measurements (cm)   LVIDd: 6.14 cm                           LVIDs: 5.6 cm  IVSd: 2.73 cm  LVPWd: 1.63 cm                           AO Root Dimension: 2 cm  Rt. Vent. Dimension: 2.65 cm             LA: 4.9 cm  % Ejection Fraction: 18.9 %              LVOT: 2.5 cm  Doppler Measurements:   AV Peak Velocity:87.5 cm/s           MV Peak E-Wave: 95.5 cm/s  AV Peak Gradient: 3.06 mmHg          MV Peak A-Wave: 29.1 cm/s  AV Mean Gradient: 2 mmHg             MV E/A Ratio: 3.28 %  AV Area (Continuity):4.15 cm^2       MV Peak Gradient: 3.65 mmHg  TR Velocity:328 cm/s                 MV P1/2t: 126 msec  TR Gradient:43.03 mmHg               MVA by PHT1.75 cm^2      XR CHEST (2 VW)    Result Date: 1/27/2022  Exam:   XR CHEST (2 VW)  Date:  1/27/2022 History:  Male, age  46 years; R06.02 COMPARISON:  None. Findings : Mild-to-moderate cardiomegaly. Median sternotomy wires appear similar. Interstitial edema. Small left pleural effusion. Trace right pleural effusion. Associated opacities. The bones show no acute pathology. Impression: Fluid overload. Clinical correlation to exclude superimposed infection is recommended.  Signed by Dr Kaushik Cabello    Result Date: 1/28/2022  EXAMINATION: US GALLBLADDER RUQ 1/28/2022 6:15 PM HISTORY: US GALLBLADDER RUQ 1/28/2022 4:58 PM HISTORY: Epigastric pain COMPARISON: NONE TECHNIQUE: Multiple longitudinal and transverse realtime sonographic images of the right upper quadrant of the abdomen are obtained. FINDINGS:  Pancreas: Normal in size and echogenicity. Liver: Normal in size, echogenicity and echotexture. Hepatopedal flow is present in the portal vein. No focal lesion. Gallbladder: Multiple internal acoustic reflection's are present in the gallbladder consistent with cholelithiasis. . Wall the gallbladder is thickened at 4 mm. There is no fluid around the gallbladder however a thickened gallbladder wall may indicate cholecystitis Bile ducts: The CBD measures 0.48 cm in diameter. There is no intrahepatic or extrahepatic ductal dilation. Right kidney: Normal in size, shape and contour. Right renal contour is 6.5 x 5.2 x 13.9 cm No mass, hydronephrosis or calculi. No perinephric fluid collection. Other: The visualized abdominal aorta is normal in caliber. Cholelithiasis. The wall the gallbladder is thickened at 4 mm. Signed by Dr Chinyere Estrada    XR CHEST PORTABLE    Result Date: 1/31/2022  XR CHEST PORTABLE 1/31/2022 8:55 AM HISTORY: Pulmonary edema COMPARISON: 1/27/2022 CXR: A single frontal view of the chest is performed. Findings: Diminished level of inspiration. Median sternotomy wires with stable cardiomegaly. Small pleural effusions. Similar prominence of the interstitial markings with likely basilar atelectasis. No pneumothorax. No acute regional bony pathology. 1. Similar chest compared to 1/27/2022. Prior mediastinal surgery with similar findings of CHF. Signed by Dr Kurtis Dacosta    XR CHEST PORTABLE    Result Date: 1/27/2022  Examination. XR CHEST PORTABLE 1/27/2022 12:10 PM History: Chest pain. A frontal portable upright view of the chest is compared with the previous study dated 1/27/2022.  The lungs are poorly inflated. There is a persistent pulmonary vascular congestion in the lungs bilaterally. There are atelectatic changes in the lower lungs, left more than the right, similar to the previous study. There is small bibasilar pleural effusion. There is moderate cardiomegaly. There is evidence of previous cardiac surgery. No acute bony abnormality. 1. The persistent pulmonary vascular congestion, small bibasilar pleural effusion and cardiomegaly. 2. Discoid atelectatic changes in the lower lungs, more pronounced in the left lower lung. Signed by Dr Freda Butt    VL DUP CAROTID BILATERAL    Result Date: 1/28/2022  Vascular Carotid Procedure  Demographics   Patient Name    Tammy Araiza  Age                  46                  J   Patient Number  [id number]          Gender               Male   Visit Number    [id number]       Interpreting         Eugenioope Seek                                  Physician   Date of Birth   1970      Referring Physician  Kameron Murrieta   Accession       [id number]      [address], RV[address],  Number                                               RDMS  Procedure Type of Study:   Cerebral:Carotid, VL CAROTID BILATERAL. Indications for Study:Pre-op CABG. Risk Factors History of Disease +--------------------------+----------+------------------------------------+ ! Diagnosis                 ! Date      ! Comments                            ! +--------------------------+----------+------------------------------------+ ! Circulatory->CAD          !          !CAD, MI, HTN                        ! +--------------------------+----------+------------------------------------+ ! Chronic lung disease      !03/02/2011! SPONTANEOUS LUNG COLLAPSE           ! +--------------------------+----------+------------------------------------+ ! Musculoskeletal->Arthritis!          !                                    ! +--------------------------+----------+------------------------------------+ !Endocrine->Diabetes       !          !                                    ! +--------------------------+----------+------------------------------------+   - The patient's risk factor(s) include: diabetes mellitus, dyslipidemia,     arterial hypertension, prior MI and prior CABG. - The patient has a former tobacco history. - The patient's last creatinine was 1.6 mg/dl. Allergies   - No known allergies. Impression   There is mixed plaque visualized in the bilateral internal carotid  artery(ies). There is less than 50% stenosis in the right internal carotid artery. There is less than 50% stenosis of the left internal carotid artery. There is normal antegrade flow in the bilateral vertebral artery(ies). Signature   ----------------------------------------------------------------  Electronically signed by Diego Liu(Interpreting  physician) on 01/28/2022 04:09 PM  ----------------------------------------------------------------  Blood Pressure:Right arm 120/82 mmHg. Left arm 124/78 mmHg. Velocities are measured in cm/s ; Diameters are measured in mm Carotid Right Measurements +------------+-------+-------+--------+-------+------------+---------------+ ! Location    ! PSV    ! EDV    ! Angle   ! RI     !%Stenosis   ! Tortuosity     ! +------------+-------+-------+--------+-------+------------+---------------+ ! Prox CCA    !89.5   !14.9   !60      !0.83   !            !               ! +------------+-------+-------+--------+-------+------------+---------------+ ! Mid CCA     !81.4   !21.7   !60      !0.73   !            !               ! +------------+-------+-------+--------+-------+------------+---------------+ ! Prox ICA    !53.4   !28.6   ! 60      !0.46   !            !               ! +------------+-------+-------+--------+-------+------------+---------------+ ! Mid ICA     !93.8   !43.5   !60      !0.54   !            !               ! +------------+-------+-------+--------+-------+------------+---------------+ ! Dist ICA    !124    !55.3   !60      !0.55   !            !               ! +------------+-------+-------+--------+-------+------------+---------------+ ! Prox ECA    !98.2   !14.9   !60      !0.85   !            !               ! +------------+-------+-------+--------+-------+------------+---------------+ ! Vertebral   !59     !26.7   ! 60      !0.55   !            !               ! +------------+-------+-------+--------+-------+------------+---------------+   - There is antegrade vertebral flow noted on the right side. - Additional Measurements:ICAPSV/CCAPSV 1.39. ICAEDV/CCAEDV 3.71. Carotid Left Measurements +------------+-------+-------+--------+-------+------------+---------------+ ! Location    ! PSV    ! EDV    ! Angle   ! RI     !%Stenosis   ! Tortuosity     ! +------------+-------+-------+--------+-------+------------+---------------+ ! Prox CCA    ! 85.7   !24.9   !60      !0.71   !            !               ! +------------+-------+-------+--------+-------+------------+---------------+ ! Mid CCA     !74.6   !22.4   !60      !0.7    ! !               ! +------------+-------+-------+--------+-------+------------+---------------+ ! Prox ICA    ! 90.7   !39.1   ! 60      !0.57   !            !               ! +------------+-------+-------+--------+-------+------------+---------------+ ! Mid ICA     !92.6   !28.6   ! 60      !0.69   !            !               ! +------------+-------+-------+--------+-------+------------+---------------+ ! Dist ICA    !135    !62.3   !60      !0.54   !            !               ! +------------+-------+-------+--------+-------+------------+---------------+ ! Prox ECA    !83.9   !       !60      !       !            !               ! +------------+-------+-------+--------+-------+------------+---------------+ ! Vertebral   !54.7   !19.9   !60      !0.64   !            !               ! +------------+-------+-------+--------+-------+------------+---------------+   - There is antegrade vertebral flow noted on the left side. - Additional Measurements:ICAPSV/CCAPSV 1.58. ICAEDV/CCAEDV 2.5. CTA PULMONARY W CONTRAST    Result Date: 1/27/2022  Examination. CTA PULMONARY W CONTRAST 1/27/2022 12:37 PM History: Chest pain and shortness of breath. DLP: 7 7 7 mGycm. The CT angiography of the chest are performed after intravenous contrast enhancement. The images are acquired in axial plane and subsequent 2-D reconstruction in coronal and sagittal planes and 3-D maximum intensity projection reconstruction. There is no previous study for comparison. The correlation made with radiographs of the chest obtained earlier today. There is normal opacification of the pulmonary arteries and branches. No filling defects in the visualized pulmonary arterial bed. The RV/LV ratio is 43/59 which is normal. No finding to suggest right heart strain. Atheromatous changes thoracic aorta are noted. No aneurysmal dilatation. Severe atheromatous changes of coronary arteries are seen. There is evidence of mediastinal lymphadenopathy. An aortopulmonic window lymph node measures 1.7 cm. A level 2R lymph node measures 1.8 cm. A subcarinal lymph node measures 1.9 cm. The limited visualized soft tissues of the neck appears unremarkable. There is a low-density nodule in the right lobe of the thyroid gland which is incompletely evaluated. No axillary lymphadenopathy. There is extensive groundglass infiltrate in the lungs bilaterally. There is bilateral lower lobar consolidation and moderate to large volume bibasal pleural effusion. The limited visualized liver and spleen are unremarkable. Moderate lobulation of the anterior glands bilaterally is noted. The kidneys, the pancreas and gallbladder is incompletely visualized and not fully evaluated.  The images reviewed in bone window show chronic degenerative changes of the lumbar spine with mild dextroscoliosis. 1. No evidence of pulmonary emboli. 2. No aortic aneurysm or dissection. Atheromatous changes of coronary arteries. 3. Extensive groundglass infiltrate in the lungs bilaterally suggesting an inflammatory/infectious process. The second possibility may suggest pulmonary congestion/pulmonary edema. 4. Moderate to large bibasal pleural effusion. Signed by Dr Susana Hansen    VL Vein Mapping Lower Bilateral    Result Date: 1/28/2022  Vascular Lower Extremity Vein Mapping Procedure  Demographics   Patient Name    Russel Carranza Age                   46   Patient Number  [id number]           Gender                Male   Visit Number    [id number]        Interpreting          Glen Beaver                                   Physician   Date of Birth   1970       Referring Physician   Accession       [id number]       Sonographer  Number  Procedure Type of Study:   2025 [facility]. Risk Factors History of Disease +--------------------------+----------+------------------------------------+ ! Diagnosis                 ! Date      ! Comments                            ! +--------------------------+----------+------------------------------------+ ! Circulatory->CAD          !          !CAD, MI, HTN                        ! +--------------------------+----------+------------------------------------+ ! Chronic lung disease      !03/02/2011! SPONTANEOUS LUNG COLLAPSE           ! +--------------------------+----------+------------------------------------+ ! Musculoskeletal->Arthritis!          !                                    ! +--------------------------+----------+------------------------------------+ ! Endocrine->Diabetes       !          !                                    ! +--------------------------+----------+------------------------------------+   - The patient's risk factor(s) include: diabetes mellitus, dyslipidemia,     arterial hypertension, prior MI and prior CABG.   - The patient has a former tobacco history. - The patient's last creatinine was 1.6 mg/dl. Allergies   - No known allergies. Impression   Usable greater saphenous vein conduit in the left lower extremity. Signature   ----------------------------------------------------------------  Electronically signed by Brittany NevesInterpreting  physician) on 01/28/2022 03:16 PM  ----------------------------------------------------------------  Velocities are measured in cm/s ; Diameters are measured in mm +--------------------------------------++--------+-----+----+--------+-----+ ! Superficial - Great Saphenous Vein    ! ! Right   ! ! Left!        !     ! +--------------------------------------++--------+-----+----+--------+-----+ ! Location                              ! !Diameter! Depth! !Diameter! Depth! +--------------------------------------++--------+-----+----+--------+-----+ ! GSV 2 cm Distal to Junction           ! !2.7     !     !    !6.1     !     ! +--------------------------------------++--------+-----+----+--------+-----+ ! GSV High Thigh                        ! !1.8     !     !    !5.7     !     ! +--------------------------------------++--------+-----+----+--------+-----+ ! GSV Mid Thigh                         ! !1.7     !     !    !5       !     ! +--------------------------------------++--------+-----+----+--------+-----+ ! GSV Low Thigh                         ! !1.7     !     !    !4.8     !     ! +--------------------------------------++--------+-----+----+--------+-----+ ! GSV Knee                              !!1.7     !     !    !4.6     !     ! +--------------------------------------++--------+-----+----+--------+-----+ ! GSV High Calf                         !!1.9     !     !    !3.8     !     ! +--------------------------------------++--------+-----+----+--------+-----+ ! GSV Mid Calf                          !!2.5     !     !    !2.7     ! ! +--------------------------------------++--------+-----+----+--------+-----+ ! GSV Ankle                             !!1.6     !     !    !3.3     !     ! +--------------------------------------++--------+-----+----+--------+-----+        Assessment:  1. Complaints of increased shortness of breath lower chest pressure and elevated troponin suggestive of non-STEMI  2. Coronary artery disease  3. Hypertension  4. Hyperlipidemia  5. Diabetes mellitus type 2  6. History of tobacco abuse  7. History of diabetic foot ulceration  8. CTA pulmonary yesterday no evidence of pulmonary emboli extensive groundglass infiltrate in the lungs bilaterally suggesting inflammatory or infectious process moderate to large bibasilar pleural effusion  9. CTA abdomen pelvis 4/5/2021 bilateral perinephric peripelvic and periureteral fat infiltration may represent chronic inflammatory process moderate thick-walled incompletely distended urinary bladder enlarged prostate stable left adrenal nodule  10. Cardiac cath 7-21 EF 25% posterior basilar inferior hypokinesis and inferolateral hypokinesis 90% proximal right patent vein graft to right PDA patent LIMA graft LAD occluded vein graft to third OM severe 95% stenosis mid circumflex into the OM successful PCI placement drug-eluting stent 2.5 x 23  11. Echo 7/2/2021 EF 25 to 30% mild to moderate MR mild TR restrictive filling pattern IVC dilated  12. Cardiac catheterization 1/28/2022 severe stenoses OM, ostium of the vein graft to the right coronary artery, occluded vein graft to the OM, patent LIMA graft to diagonal severe LAD stenosis      Plan:  1.  Continue current medical therapy awaiting transfer    Glenna Urena MD, MD 2/2/2022 9:16 AM

## 2022-02-02 NOTE — PROGRESS NOTES
ventricular systolic pressure between 50 and 55. Cardiology recommended cardiac catheterization. Patient underwent cardiac catheterization on 1/28/2022 which indicated left ventricular function impaired ejection fraction 20 to 25% estimated LIMA graft diagonal patent vein graft OM occluded stent placement OM 90% in-stent restenosis, Severe 90% plus ostial stenosis of the vein graft to the right coronary artery, 70% ostial ramus branch stenosis, Severe disease mid LAD and recommend cardiovascular surgical consultation for consideration of redo CABG. patient was evaluated by cardiothoracic surgeon who calculated his risk for perioperative mortality 6%. Dr. Leon Davalos did reach out to Dr. Lydia Hancock for second opinion given the significance of this case. CT surgery recommended transferring patient to a facility with LVAD/transplant backup. Case discussed with transfer center and patient was accepted by Dr. Trinidad Lundborg at [facility] pending a bed. Transfer center was updated today and continuing to wait on bed. Patient is continued on diuresis for heart failure. He is also on oxygen therapy at this time. Post-cath and CTA creatinine elevated. Patient was given short stent of IV hydration. Creatinine remaining stable. Will continue to monitor BMP daily. Gallbladder ultrasound was obtained and indicated mildly thickened gallbladder with cholelithiasis. Patient denies abdominal pain at this time. And nonemergent at this time. Review of Systems:   Review of Systems   Constitutional: Positive for activity change and fatigue. Negative for appetite change, chills and fever. Respiratory: Positive for chest tightness and shortness of breath (reports ijmprovement overnight). Negative for wheezing. Cardiovascular: Positive for leg swelling. Negative for chest pain and palpitations. Gastrointestinal: Positive for abdominal distention. Negative for abdominal pain, diarrhea, nausea and vomiting. Genitourinary: Negative for difficulty urinating, flank pain, frequency and urgency. Musculoskeletal: Negative for back pain and neck pain. Skin: Negative for color change and wound. Neurological: Negative for dizziness and light-headedness. Psychiatric/Behavioral: Negative for agitation and confusion. The patient is nervous/anxious. 14 point review of systems is negative except as specifically addressed above. Objective:   VITALS:  /79   Pulse 54   Temp 97.9 °F (36.6 °C) (Oral)   Resp 20   Ht 6' 3\" (1.905 m)   Wt 275 lb (124.7 kg)   SpO2 93%   BMI 34.37 kg/m²   24HR INTAKE/OUTPUT:      Intake/Output Summary (Last 24 hours) at 2/2/2022 1317  Last data filed at 2/2/2022 1014  Gross per 24 hour   Intake 1210.91 ml   Output 1350 ml   Net -139.09 ml       Physical Exam  Vitals and nursing note reviewed. Constitutional:       Appearance: He is ill-appearing. HENT:      Head: Normocephalic. Eyes:      Extraocular Movements: Extraocular movements intact. Conjunctiva/sclera: Conjunctivae normal.      Pupils: Pupils are equal, round, and reactive to light. Cardiovascular:      Rate and Rhythm: Normal rate and regular rhythm. Pulses: Normal pulses. Heart sounds: Normal heart sounds. Pulmonary:      Effort: Tachypnea present. Breath sounds: Examination of the right-lower field reveals decreased breath sounds. Examination of the left-lower field reveals decreased breath sounds. Decreased breath sounds present. Abdominal:      General: Bowel sounds are normal. There is distension. Palpations: There is no mass. Tenderness: There is no abdominal tenderness. Musculoskeletal:         General: Normal range of motion. Cervical back: Normal range of motion and neck supple. No tenderness. Right lower leg: Edema (Trace) present. Left lower leg: Edema (Trace) present. Skin:     General: Skin is warm and dry.       Capillary Refill: Capillary 267* 246* 269*     Recent Labs     01/31/22  0357 02/01/22  0630 02/02/22  0017   AST 12 10 11   ALT 21 22 20   BILITOT 0.4 0.4 0.3   ALKPHOS 111 111 94     Troponin T: No results for input(s): TROPONINI in the last 72 hours. Pro-BNP: No results for input(s): BNP in the last 72 hours. INR: No results for input(s): INR in the last 72 hours. UA:No results for input(s): NITRITE, COLORU, PHUR, LABCAST, WBCUA, RBCUA, MUCUS, TRICHOMONAS, YEAST, BACTERIA, CLARITYU, SPECGRAV, LEUKOCYTESUR, UROBILINOGEN, BILIRUBINUR, BLOODU, GLUCOSEU, AMORPHOUS in the last 72 hours. Invalid input(s): Julianna Giron  A1C: No results for input(s): LABA1C in the last 72 hours. ABG:No results for input(s): PHART, QZL2WUQ, PO2ART, CAS0KQC, BEART, HGBAE, I5SUYCHS, CARBOXHGBART in the last 72 hours. RAD:   XR CHEST (2 VW)    Result Date: 1/27/2022  Impression: Fluid overload. Clinical correlation to exclude superimposed infection is recommended. Signed by Dr Lanie Fothergill    Result Date: 1/28/2022  Cholelithiasis. The wall the gallbladder is thickened at 4 mm. Signed by Dr Cindy Lucas    XR CHEST PORTABLE    Result Date: 1/31/2022  1. Similar chest compared to 1/27/2022. Prior mediastinal surgery with similar findings of CHF. Signed by Dr Mat Velázquez    XR CHEST PORTABLE    Result Date: 1/27/2022  1. The persistent pulmonary vascular congestion, small bibasilar pleural effusion and cardiomegaly. 2. Discoid atelectatic changes in the lower lungs, more pronounced in the left lower lung. Signed by Dr Lucille Wallace    Result Date: 1/27/2022  1. No evidence of pulmonary emboli. 2. No aortic aneurysm or dissection. Atheromatous changes of coronary arteries. 3. Extensive groundglass infiltrate in the lungs bilaterally suggesting an inflammatory/infectious process. The second possibility may suggest pulmonary congestion/pulmonary edema. 4. Moderate to large bibasal pleural effusion.  Signed by  Iesha Light       Echo:   Summary   Left ventricle was markedly dilated with severe global hypokinesis   There is concentric hypertrophy of the left ventricle   Grade 3 diastolic dysfunction   Estimated ejection fraction of 10% to 15%   Mitral annulus calcification with mild mitral regurgitation and some   thickening of the mitral leaflets   Mild tricuspid insufficiency with estimated right ventricular systolic   pressure between 50 to 55 mm which is moderately elevated   Mildly dilated right ventricle with decreased systolic function         Assessment/Plan   Principal Problem:    NSTEMI (non-ST elevated myocardial infarction) ([address] / Coronary artery disease involving native coronary artery of native heart without angina pectoris   -Continue heparin drip   -Cardiac catheterization on 1/28   -Evaluation by CT surgery, recommended transfer to Center with LVAD/transplant backup   -Patient accepted by Dr. Jonathon Dickey at [facility] pending bed   -Continue diuresis    Active Problems:  Essential hypertension   -Continue current regimen   -Monitor closely      Mixed hyperlipidemia   -Continue current statin      Type 2 diabetes mellitus with foot ulcer, with long-term current use of insulin (HCC)   -Continue Accu-Cheks   -Continue basal, prandial, and sliding scale insulin at this time   -Diabetes educator   -Hypoglycemia protocol in place      Epigastric pain   -Gallbladder ultrasound indicated cholelithiasis, will monitor and follow-up as needed      Acute systolic congestive heart failure (HCC)   -Continue diuresis   -Daily weight   -Accurate I&O      BERTHA   -Monitor BMP   -Daily weight   -Avoid nephrotoxins hypotension   -slowly improving/stable      DVT Prophylaxis: Heparin drip    GI prophylaxis: Protonix    MELITON Tavares - CNP, 2/2/2022 1:17 PM

## 2022-02-03 NOTE — PROGRESS NOTES
[facility] Hospitalists      Patient:  Jac Bliss  YOB: 1970  Date of Service: 2/3/2022  MRN: [id number]   Acct: [de-identified]   Primary Care Physician: Tirso Pope MD  Advance Directive: Full Code  Admit Date: 1/27/2022       Hospital Day: 7  Portions of this note have been copied forward, however, changed to reflect the most current clinical status of this patient. CHIEF COMPLAINT epigastric pain, nausea    SUBJECTIVE: Patient states he is having trouble sleeping. He reports feeling like he \"skips breaths\" when he falls asleep. He also reports increased anxiety and trouble falling asleep. CUMULATIVE HOSPITAL COURSE:  The patient is a 49-year-old male with past medical history of CAD requiring CABG and stents, hypertension, hyperlipidemia, diabetes, and arthritis who presented to [facility] ED complaining of epigastric pain and nausea. The patient also indicated worsening dyspnea for a week prior to admission. The patient also reported orthopnea. The patient indicated he was supposed to be having an ultrasound of his gallbladder to rule out gallbladder disease. He also indicates he was recently started on 2 L of oxygen at night and with daytime naps. Patient reported having cardiac stents placed in July 2021. ED work-up indicated troponin 0 0.30 with a repeat of 0.31, BNP 3485, and chest x-ray indicating persistent pulmonary vascular congestion, small bibasilar pleural effusions, and cardiomegaly. CTA unremarkable for PE, extensive groundglass infiltrates in the lungs bilaterally suggesting inflammatory/infectious process, may suggest pulmonary congestion/pulmonary edema, moderate to large bibasilar pleural effusion. Patient was admitted to the hospitalist for NSTEMI with cardiac consultation. The patient was initiated on a heparin drip in the ED.   Echo obtained and indicated grade 3 diastolic dysfunction with estimated EF 10 to 15%, some thickening of the mitral leaflets with mild swelling. Negative for chest pain and palpitations. Gastrointestinal: Positive for abdominal distention. Negative for abdominal pain, diarrhea, nausea and vomiting. Genitourinary: Negative for difficulty urinating, flank pain, frequency and urgency. Musculoskeletal: Negative for back pain and neck pain. Skin: Negative for color change and wound. Neurological: Negative for dizziness and light-headedness. Psychiatric/Behavioral: Positive for sleep disturbance. Negative for agitation and confusion. The patient is nervous/anxious (worse). 14 point review of systems is negative except as specifically addressed above. Objective:   VITALS:  /75   Pulse 56   Temp 96.7 °F (35.9 °C) (Temporal)   Resp 18   Ht 6' 3\" (1.905 m)   Wt 275 lb (124.7 kg)   SpO2 100%   BMI 34.37 kg/m²   24HR INTAKE/OUTPUT:      Intake/Output Summary (Last 24 hours) at 2/3/2022 1117  Last data filed at 2/3/2022 0951  Gross per 24 hour   Intake 1255.5 ml   Output 2175 ml   Net -919.5 ml       Physical Exam  Vitals and nursing note reviewed. Constitutional:       Appearance: He is ill-appearing. HENT:      Head: Normocephalic. Eyes:      Extraocular Movements: Extraocular movements intact. Conjunctiva/sclera: Conjunctivae normal.      Pupils: Pupils are equal, round, and reactive to light. Cardiovascular:      Rate and Rhythm: Normal rate and regular rhythm. Pulses: Normal pulses. Heart sounds: Normal heart sounds. Pulmonary:      Effort: Tachypnea present. Breath sounds: Examination of the right-lower field reveals decreased breath sounds. Examination of the left-lower field reveals decreased breath sounds. Decreased breath sounds present. Abdominal:      General: Bowel sounds are normal. There is distension. Palpations: There is no mass. Tenderness: There is no abdominal tenderness. Musculoskeletal:         General: Normal range of motion.       Cervical back: Normal range of motion and neck supple. No tenderness. Right lower leg: Edema (Trace) present. Left lower leg: Edema (Trace) present. Skin:     General: Skin is warm and dry. Capillary Refill: Capillary refill takes less than 2 seconds. Neurological:      General: No focal deficit present. Mental Status: He is alert and oriented to person, place, and time. Psychiatric:         Mood and Affect: Mood normal.         Behavior: Behavior normal.         Thought Content: Thought content normal.         Judgment: Judgment normal.       Medications:      heparin (PORCINE) Infusion 14 Units/kg/hr (02/02/22 7036)    sodium chloride      dextrose        busPIRone  15 mg Oral TID    melatonin  5 mg Oral Nightly    insulin glargine  20 Units SubCUTAneous Nightly    insulin lispro  5 Units SubCUTAneous TID WC    isosorbide mononitrate  30 mg Oral Daily    aspirin  81 mg Oral Daily    atorvastatin  40 mg Oral Nightly    buPROPion  150 mg Oral BID    carvedilol  25 mg Oral BID WC    [Held by provider] clopidogrel  75 mg Oral Daily    gabapentin  300 mg Oral BID    pantoprazole  40 mg Oral QAM AC    tamsulosin  0.4 mg Oral Daily    sodium chloride flush  5-40 mL IntraVENous 2 times per day    furosemide  40 mg IntraVENous Daily    insulin lispro  0-6 Units SubCUTAneous TID WC    insulin lispro  0-3 Units SubCUTAneous Nightly     hydrALAZINE, oxyCODONE-acetaminophen, heparin (porcine), heparin (porcine), sodium chloride flush, sodium chloride, ondansetron **OR** ondansetron, acetaminophen **OR** acetaminophen, polyethylene glycol, nitroGLYCERIN, ondansetron, glucose, glucagon (rDNA), dextrose, dextrose bolus (hypoglycemia) **OR** dextrose bolus (hypoglycemia), albuterol  ADULT DIET;  Regular; 3 carb choices (45 gm/meal)     Lab and other Data:     Recent Labs     02/01/22  0630 02/02/22  0017 02/03/22  0506   WBC 7.3 8.3 6.4   HGB 12.2* 11.4* 12.0*    240 219     Recent Labs     02/01/22  0630 02/02/22  0017 02/03/22  0506    137 139   K 5.2* 4.9 5.1*   CL 99 99 100   CO2 30* 28 30*   BUN 42* 37* 33*   CREATININE 1.8* 1.8* 1.5*   GLUCOSE 246* 269* 201*     Recent Labs     02/01/22  0630 02/02/22  0017 02/03/22  0506   AST 10 11 9   ALT 22 20 18   BILITOT 0.4 0.3 0.4   ALKPHOS 111 94 102     Troponin T: No results for input(s): TROPONINI in the last 72 hours. Pro-BNP: No results for input(s): BNP in the last 72 hours. INR: No results for input(s): INR in the last 72 hours. UA:No results for input(s): NITRITE, COLORU, PHUR, LABCAST, WBCUA, RBCUA, MUCUS, TRICHOMONAS, YEAST, BACTERIA, CLARITYU, SPECGRAV, LEUKOCYTESUR, UROBILINOGEN, BILIRUBINUR, BLOODU, GLUCOSEU, AMORPHOUS in the last 72 hours. Invalid input(s): Tamara Hagan  A1C: No results for input(s): LABA1C in the last 72 hours. ABG:No results for input(s): PHART, ECV3ALA, PO2ART, AJB2CWN, BEART, HGBAE, X3KELGJG, CARBOXHGBART in the last 72 hours. RAD:   XR CHEST (2 VW)    Result Date: 1/27/2022  Impression: Fluid overload. Clinical correlation to exclude superimposed infection is recommended. Signed by Dr Tio Clark    Result Date: 1/28/2022  Cholelithiasis. The wall the gallbladder is thickened at 4 mm. Signed by Dr Damion Mariano    XR CHEST PORTABLE    Result Date: 1/31/2022  1. Similar chest compared to 1/27/2022. Prior mediastinal surgery with similar findings of CHF. Signed by Dr Kenya Rivera    XR CHEST PORTABLE    Result Date: 1/27/2022  1. The persistent pulmonary vascular congestion, small bibasilar pleural effusion and cardiomegaly. 2. Discoid atelectatic changes in the lower lungs, more pronounced in the left lower lung. Signed by Dr Lord Chua    Result Date: 1/27/2022  1. No evidence of pulmonary emboli. 2. No aortic aneurysm or dissection. Atheromatous changes of coronary arteries.  3. Extensive groundglass infiltrate in the lungs bilaterally suggesting an inflammatory/infectious process. The second possibility may suggest pulmonary congestion/pulmonary edema. 4. Moderate to large bibasal pleural effusion.  Signed by Dr Erick Rader       Echo:   Summary   Left ventricle was markedly dilated with severe global hypokinesis   There is concentric hypertrophy of the left ventricle   Grade 3 diastolic dysfunction   Estimated ejection fraction of 10% to 15%   Mitral annulus calcification with mild mitral regurgitation and some   thickening of the mitral leaflets   Mild tricuspid insufficiency with estimated right ventricular systolic   pressure between 50 to 55 mm which is moderately elevated   Mildly dilated right ventricle with decreased systolic function         Assessment/Plan   Principal Problem:    NSTEMI (non-ST elevated myocardial infarction) ([address] / Coronary artery disease involving native coronary artery of native heart without angina pectoris   -Continue heparin drip   -Cardiac catheterization on 1/28   -Evaluation by CT surgery, recommended transfer to Center with LVAD/transplant backup   -Patient accepted by Dr. Saeed Torres at [facility] pending bed   -Continue diuresis    Active Problems:  Essential hypertension   -Continue current regimen   -Monitor closely      Mixed hyperlipidemia   -Continue current statin      Type 2 diabetes mellitus with foot ulcer, with long-term current use of insulin (HCC)   -Continue Accu-Cheks   -Continue basal, prandial, and sliding scale insulin at this time   -Diabetes educator   -Hypoglycemia protocol in place      Epigastric pain   -Gallbladder ultrasound indicated cholelithiasis, will monitor and follow-up as needed      Acute systolic congestive heart failure (HCC)   -Continue diuresis   -Daily weight   -Accurate I&O   -repeat chest x-ray      BERTHA   -Monitor BMP   -Daily weight   -Avoid nephrotoxins hypotension   -slowly improving/stable    Anxiety   -increase home Buspar   -melatonin to assist in sleep wake cycle    DVT Prophylaxis: Heparin drip    GI prophylaxis: Protonix    Kota Choi, APRN - CNP, 2/3/2022 11:17 AM

## 2022-02-03 NOTE — FLOWSHEET NOTE
02/02/22 1930   Encounter Summary   Services provided to: Patient   Referral/Consult From: Nurse   Complexity of Encounter Moderate   Length of Encounter 15 minutes   Spiritual/Protestant   Type Spiritual struggle   Assessment Anxious; Coping   Intervention Active listening;Explored feelings, thoughts, concerns   Outcome Expressed gratitude     Patient's nurse Mary request spiritual support for the patient. Pt is still waiting for a available bed in [place] to have his heart surgery. Patient appeared anxious, said he was \"taking care of personal business\". When reminded of LW, he stated that he is no interested in established a LW.    Electronically signed by Alberta Santos on 2/2/2022 at 9:10 PM

## 2022-02-03 NOTE — PROGRESS NOTES
Cardiology Daily Note Leandro Hatchet, MD      Patient:  Dave Wilder  [id number]    Patient Active Problem List    Diagnosis Date Noted    Abnormal stress echocardiogram     Pre-syncope     Cigarette smoker 09/02/2014    Coronary artery disease involving native coronary artery of native heart without angina pectoris     Essential hypertension     Mixed hyperlipidemia     Acute systolic congestive heart failure ([address] 01/28/2022    Acute renal insufficiency 01/28/2022    NSTEMI (non-ST elevated myocardial infarction) ([address] 01/27/2022    Epigastric pain 01/27/2022    NSTEMI (non-ST elevation myocardial infarction) ([address] 07/02/2021    Foot ulcer with fat layer exposed, left ([address] 01/25/2019    Cellulitis of left foot     Non-pressure chronic ulcer of other part of left foot with fat layer exposed ([address] 12/17/2018    Chest pain due to myocardial ischemia 03/21/2018    Skin ulcer of small toe of right foot with fat layer exposed ([address] 08/16/2017    Skin ulcer of toe of left foot with fat layer exposed ([address] 08/03/2017    Type 2 diabetes mellitus with foot ulcer, with long-term current use of insulin ([address] 08/03/2017       Admit Date:  1/27/2022    Admission Problem List: Present on Admission:   NSTEMI (non-ST elevated myocardial infarction) ([address]   Coronary artery disease involving native coronary artery of native heart without angina pectoris   Essential hypertension   Mixed hyperlipidemia   Type 2 diabetes mellitus with foot ulcer, with long-term current use of insulin (HCC)   Epigastric pain   Acute systolic congestive heart failure ([address]   Acute renal insufficiency      Cardiac Specific Data:  Specialty Problems        Cardiology Problems    Coronary artery disease involving native coronary artery of native heart without angina pectoris        Essential hypertension        Mixed hyperlipidemia        Pre-syncope        Chest pain due to myocardial ischemia        NSTEMI (non-ST elevation [facility] infarction) ([facility]        * (Principal) NSTEMI (non-ST elevated myocardial infarction) ([facility]        Acute systolic congestive heart failure ([facility]              Subjective:  Mr. Ivanna French seen today resting comfortably denies chest pain no other complaints or issues. No word yet on when the patient will be transferred. Blood pressure 114/75 heart 56 no other complaints or issues reported. Objective:   /75   Pulse 56   Temp 96.7 °F (35.9 °C) (Temporal)   Resp 18   Ht 6' 3\" (1.905 m)   Wt 275 lb (124.7 kg)   SpO2 100%   BMI 34.37 kg/m²       Intake/Output Summary (Last 24 hours) at 2/3/2022 1031  Last data filed at 2/3/2022 7762  Gross per 24 hour   Intake 1255.5 ml   Output 2175 ml   Net -919.5 ml       Prior to Admission medications    Medication Sig Start Date End Date Taking? Authorizing Provider   oxyCODONE-acetaminophen (PERCOCET)  MG per tablet Take 1 tablet by mouth every 6 hours as needed for Pain.    Yes Historical Provider, MD   pantoprazole (PROTONIX) 40 MG tablet Take 1 tablet by mouth every morning (before breakfast) 1/25/22   Brandy Friedman MD   Continuous Blood Gluc Sensor (DEXCOM G6 SENSOR) MISC 1 each by Does not apply route continuous E11.62, L97.509, Z79.4 1/4/22   Brandy Friedman MD   Continuous Blood Gluc Transmit (DEXCOM G6 TRANSMITTER) MISC 1 each by Does not apply route continuous E11.62, L97.509, Z79.4 1/4/22   Brandy Friedman MD   buPROPion (WELLBUTRIN XL) 150 MG extended release tablet TAKE 1 TABLET BY MOUTH 2 TIMES DAILY 12/7/21   Brandy Friedman MD   furosemide (LASIX) 40 MG tablet TAKE 1 TABLET BY MOUTH TWICE A DAY 12/7/21   Brandy Friedman MD   busPIRone (BUSPAR) 15 MG tablet TAKE 1 TABLET BY MOUTH TWICE A DAY 12/7/21   Brandy Friedman MD   montelukast (SINGULAIR) 10 MG tablet TAKE 1 TABLET BY MOUTH EVERY DAY 10/5/21   Brandy Friedman MD   atorvastatin (LIPITOR) 40 MG tablet Take 1 tablet by mouth nightly 10/5/21   Brandy Friedman MD   clopidogrel (PLAVIX) 75 MG tablet Take 1 tablet by mouth daily 10/5/21   Ray Baker MD   meloxicam MICHELLE EARNESTINECayla PUGH [facility]) 15 MG tablet Take 1 tablet by mouth daily 10/5/21   Ray Baker MD   Diabetic Shoe MISC by Does not apply route Dx E11.621, Z79.4 10/5/21   Ray Baker MD   insulin lispro, 1 Unit Dial, ([facility] 100 UNIT/ML SOPN Inject 10 Units into the skin 3 times daily (before meals) 10/4/21   Ray Baker MD   testosterone cypionate (DEPOTESTOTERONE CYPIONATE) 200 MG/ML injection INJECT 1 ML INTRAMUSCULARLY EVERY 21 DAYS 8/20/21 1/27/22  Ray Baker MD   tiZANidine (ZANAFLEX) 4 MG tablet Take 4 mg by mouth every 6 hours as needed    Historical Provider, MD   Misc Natural Products (APPLE CIDER VINEGAR DIET PO) Take by mouth    Historical Provider, MD   albuterol sulfate  (90 Base) MCG/ACT inhaler INHALE 1 PUFF INTO THE LUNGS EVERY 6 HOURS AS NEEDED FOR WHEEZING OR SHORTNESS OF BREATH. 8/2/21   Ray Baker MD   aspirin 81 MG EC tablet Take 1 tablet by mouth daily 7/5/21   Idalia Miranda MD   carvedilol (COREG) 25 MG tablet Take 1 tablet by mouth 2 times daily (with meals) 7/4/21   Idalia Miranda MD   nitroGLYCERIN (NITROSTAT) 0.4 MG SL tablet Place 1 tablet under the tongue every 5 minutes as needed for Chest pain up to max of 3 total doses. If no relief after 1 dose, call 911. 7/2/21   Ray Baker MD   gabapentin (NEURONTIN) 300 MG capsule Take 1 capsule by mouth 2 times daily for 30 days. Patient is advised to take 1 afternoon around 4 or 5 and the other at bedtime. This is for peripheral neuropathy.  6/28/21 1/27/22  Ray Baker MD   zinc 50 MG TABS tablet Take 50 mg by mouth daily    Historical Provider, MD   Ascorbic Acid (VITAMIN C) 1000 MG tablet Take 500 mg by mouth daily     Historical Provider, MD   vitamin D (ERGOCALCIFEROL) 1.25 MG (60547 UT) CAPS capsule Take 1 capsule by mouth twice a week 3/25/21   Ray Baker MD   ipratropium-albuterol (DUONEB) 0.5-2.5 (3) MG/3ML SOLN nebulizer solution INHALE 3 MLS INTO THE LUNGS EVERY 6 HOURS AS NEEDED FOR SHORTNESS OF BREATH 10/21/20   Funmilayo Hinojosa MD   Nebulizers (COMPRESSOR/NEBULIZER) MISC 1 each by Does not apply route 4 times daily as needed (cough) 9/28/20   Funmilayo Hinojosa MD   Continuous Blood Gluc  ([address]) [address] 1 each by Does not apply route continuous DX E11.62, L97.509, Z79.4 9/28/20   Funmilayo Hinojosa MD   tamsulosin (FLOMAX) 0.4 MG capsule Take 0.4 mg by mouth daily    Historical Provider, MD   lisinopril (PRINIVIL;ZESTRIL) 20 MG tablet Take 1 tablet by mouth 2 times daily  Patient taking differently: Take 10 mg by mouth 2 times daily  6/3/20   MELITON Haq   Needles & Syringes MISC 1 each by Does not apply route every 21 days Needs 3 cc syringes with 22 G  1/2 inch needle to give Testosterone and needs 18 Gauge to draw up med.  3/9/20   Funmilayo Hinojosa MD   [facility] 100 UNIT/ML injection pen Inject 50 Units into the skin 2 times daily  2/20/20   Funmilayo Hinojosa MD   glipiZIDE (GLUCOTROL) 10 MG tablet Take 10 mg by mouth 2 times daily (before meals)    Historical Provider, MD        busPIRone  15 mg Oral TID    melatonin  5 mg Oral Nightly    insulin glargine  20 Units SubCUTAneous Nightly    insulin lispro  5 Units SubCUTAneous TID     isosorbide mononitrate  30 mg Oral Daily    aspirin  81 mg Oral Daily    atorvastatin  40 mg Oral Nightly    buPROPion  150 mg Oral BID    carvedilol  25 mg Oral BID WC    [Held by provider] clopidogrel  75 mg Oral Daily    gabapentin  300 mg Oral BID    pantoprazole  40 mg Oral QAM AC    tamsulosin  0.4 mg Oral Daily    sodium chloride flush  5-40 mL IntraVENous 2 times per day    furosemide  40 mg IntraVENous Daily    insulin lispro  0-6 Units SubCUTAneous TID WC    insulin lispro  0-3 Units SubCUTAneous Nightly       TELEMETRY: Sinus     Physical Exam:      Physical Exam      General:  Awake, alert, NAD  Skin:  Warm and dry  Neck:  no jvd , no carotid bruits  Chest:  Clear to auscultation, no wheezing or rales  Cardiovascular:  RRR K2X8 no murmurs, clicks, gallups, or rubs  Abdomen:  Soft nontender, nondistended, bowel sounds present  Extremities:  Edema: none      Lab Data:  CBC:   Recent Labs     02/01/22  0630 02/02/22  0017 02/03/22  0506   WBC 7.3 8.3 6.4   HGB 12.2* 11.4* 12.0*   HCT 40.3* 39.6* 40.9*   MCV 97.3* 98.8* 97.1*    240 219     BMP:   Recent Labs     02/01/22  0630 02/02/22  0017 02/03/22  0506    137 139   K 5.2* 4.9 5.1*   CL 99 99 100   CO2 30* 28 30*   BUN 42* 37* 33*   CREATININE 1.8* 1.8* 1.5*     LIVER PROFILE:   Recent Labs     02/01/22  0630 02/02/22  0017 02/03/22  0506   AST 10 11 9   ALT 22 20 18   BILITOT 0.4 0.3 0.4   ALKPHOS 111 94 102     PT/INR: No results for input(s): PROTIME, INR in the last 72 hours. APTT:   Recent Labs     02/02/22  1228 02/02/22  2259 02/03/22  0506   APTT 162.1* 34.3 37.0*     BNP:  No results for input(s): BNP in the last 72 hours. CK, CKMB, Troponin: @LABRCNT (CKTOTAL:3, CKMB:3, TROPONINI:3)@    IMAGING:  Echo Complete    Result Date: 1/28/2022  Transthoracic Echocardiography Report (TTE)  Demographics   Patient Name   Enedina Block  Date of Study           01/28/2022   MRN            [id number]            Gender                  Male   Date of Birth  1970        Room Number             [id number]   Age            46 year(s)   Height:        75 inches         Referring Physician     Shona Victor MD   Weight:        268 pounds        Sonographer             Beronica Neil   BSA:           2.49 m^2          Interpreting Physician  Jerica Delgado MD   BMI:           33.5 kg/m^2  Procedure Type of Study   TTE procedure:ECHO NO CONTRAST WITH DOP/COLR. Study Location: Echo Lab Technical Quality: Poor visualization due to body habitus. Patient Status: Inpatient HR: 11 bpm BP: 119/91 mmHg Indications:Chest pain and Dyspnea/SOB.   Conclusions   Summary  Left ventricle was markedly dilated with severe global hypokinesis  There is concentric hypertrophy of the left ventricle  Grade 3 diastolic dysfunction  Estimated ejection fraction of 10% to 15%  Mitral annulus calcification with mild mitral regurgitation and some  thickening of the mitral leaflets  Mild tricuspid insufficiency with estimated right ventricular systolic  pressure between 50 to 55 mm which is moderately elevated  Mildly dilated right ventricle with decreased systolic function   Signature   ----------------------------------------------------------------  Electronically signed by Rima Chirinos MD(Interpreting physician)  on 01/28/2022 01:57 PM  ----------------------------------------------------------------   Findings   Aortic Valve  Structurally normal aortic valve. Pulmonic Valve  The pulmonic valve was not well visualized . Left Atrium  Normal size left atrium. Allergies   - No known allergies. M-Mode Measurements (cm)   LVIDd: 6.14 cm                           LVIDs: 5.6 cm  IVSd: 2.73 cm  LVPWd: 1.63 cm                           AO Root Dimension: 2 cm  Rt. Vent. Dimension: 2.65 cm             LA: 4.9 cm  % Ejection Fraction: 18.9 %              LVOT: 2.5 cm  Doppler Measurements:   AV Peak Velocity:87.5 cm/s           MV Peak E-Wave: 95.5 cm/s  AV Peak Gradient: 3.06 mmHg          MV Peak A-Wave: 29.1 cm/s  AV Mean Gradient: 2 mmHg             MV E/A Ratio: 3.28 %  AV Area (Continuity):4.15 cm^2       MV Peak Gradient: 3.65 mmHg  TR Velocity:328 cm/s                 MV P1/2t: 126 msec  TR Gradient:43.03 mmHg               MVA by PHT1.75 cm^2      XR CHEST (2 VW)    Result Date: 1/27/2022  Exam:   XR CHEST (2 VW)  Date:  1/27/2022 History:  Male, age  46 years; R06.02 COMPARISON:  None. Findings : Mild-to-moderate cardiomegaly. Median sternotomy wires appear similar. Interstitial edema. Small left pleural effusion. Trace right pleural effusion. Associated opacities. The bones show no acute pathology. Impression: Fluid overload. Clinical correlation to exclude superimposed infection is recommended. Signed by Dr Jorge Luis Jordan    Result Date: 1/28/2022  EXAMINATION: US GALLBLADDER RUQ 1/28/2022 6:15 PM HISTORY: US GALLBLADDER RUQ 1/28/2022 4:58 PM HISTORY: Epigastric pain COMPARISON: NONE TECHNIQUE: Multiple longitudinal and transverse realtime sonographic images of the right upper quadrant of the abdomen are obtained. FINDINGS:  Pancreas: Normal in size and echogenicity. Liver: Normal in size, echogenicity and echotexture. Hepatopedal flow is present in the portal vein. No focal lesion. Gallbladder: Multiple internal acoustic reflection's are present in the gallbladder consistent with cholelithiasis. . Wall the gallbladder is thickened at 4 mm. There is no fluid around the gallbladder however a thickened gallbladder wall may indicate cholecystitis Bile ducts: The CBD measures 0.48 cm in diameter. There is no intrahepatic or extrahepatic ductal dilation. Right kidney: Normal in size, shape and contour. Right renal contour is 6.5 x 5.2 x 13.9 cm No mass, hydronephrosis or calculi. No perinephric fluid collection. Other: The visualized abdominal aorta is normal in caliber. Cholelithiasis. The wall the gallbladder is thickened at 4 mm. Signed by Dr Zach Dawkins    XR CHEST PORTABLE    Result Date: 1/31/2022  XR CHEST PORTABLE 1/31/2022 8:55 AM HISTORY: Pulmonary edema COMPARISON: 1/27/2022 CXR: A single frontal view of the chest is performed. Findings: Diminished level of inspiration. Median sternotomy wires with stable cardiomegaly. Small pleural effusions. Similar prominence of the interstitial markings with likely basilar atelectasis. No pneumothorax. No acute regional bony pathology. 1. Similar chest compared to 1/27/2022. Prior mediastinal surgery with similar findings of CHF. Signed by Dr Geneva Renee    XR CHEST PORTABLE    Result Date: 1/27/2022  Examination.  XR CHEST PORTABLE 1/27/2022 12:10 PM History: Chest pain. A frontal portable upright view of the chest is compared with the previous study dated 1/27/2022. The lungs are poorly inflated. There is a persistent pulmonary vascular congestion in the lungs bilaterally. There are atelectatic changes in the lower lungs, left more than the right, similar to the previous study. There is small bibasilar pleural effusion. There is moderate cardiomegaly. There is evidence of previous cardiac surgery. No acute bony abnormality. 1. The persistent pulmonary vascular congestion, small bibasilar pleural effusion and cardiomegaly. 2. Discoid atelectatic changes in the lower lungs, more pronounced in the left lower lung. Signed by Dr Melanie Parrish    VL DUP CAROTID BILATERAL    Result Date: 1/28/2022  Vascular Carotid Procedure  Demographics   Patient Name    Rosalio Cristobal  Age                  46                  J   Patient Number  [id number]          Gender               Male   Visit Number    [id number]       Interpreting         Renae Batres                                  Physician   Date of Birth   1970      Referring Physician  Kevin Lloyd   Accession       [id number]      [address], [address],  Number                                               RDMS  Procedure Type of Study:   Cerebral:Carotid, VL CAROTID BILATERAL. Indications for Study:Pre-op CABG. Risk Factors History of Disease +--------------------------+----------+------------------------------------+ ! Diagnosis                 ! Date      ! Comments                            ! +--------------------------+----------+------------------------------------+ ! Circulatory->CAD          !          !CAD, MI, HTN                        ! +--------------------------+----------+------------------------------------+ ! Chronic lung disease      !03/02/2011! SPONTANEOUS LUNG COLLAPSE           ! +--------------------------+----------+------------------------------------+ !Musculoskeletal->Arthritis!          !                                    ! +--------------------------+----------+------------------------------------+ ! Endocrine->Diabetes       !          !                                    ! +--------------------------+----------+------------------------------------+   - The patient's risk factor(s) include: diabetes mellitus, dyslipidemia,     arterial hypertension, prior MI and prior CABG. - The patient has a former tobacco history. - The patient's last creatinine was 1.6 mg/dl. Allergies   - No known allergies. Impression   There is mixed plaque visualized in the bilateral internal carotid  artery(ies). There is less than 50% stenosis in the right internal carotid artery. There is less than 50% stenosis of the left internal carotid artery. There is normal antegrade flow in the bilateral vertebral artery(ies). Signature   ----------------------------------------------------------------  Electronically signed by Milton Liu(Interpreting  physician) on 01/28/2022 04:09 PM  ----------------------------------------------------------------  Blood Pressure:Right arm 120/82 mmHg. Left arm 124/78 mmHg. Velocities are measured in cm/s ; Diameters are measured in mm Carotid Right Measurements +------------+-------+-------+--------+-------+------------+---------------+ ! Location    ! PSV    ! EDV    ! Angle   ! RI     !%Stenosis   ! Tortuosity     ! +------------+-------+-------+--------+-------+------------+---------------+ ! Prox CCA    !89.5   !14.9   !60      !0.83   !            !               ! +------------+-------+-------+--------+-------+------------+---------------+ ! Mid CCA     !81.4   !21.7   !60      !0.73   !            !               ! +------------+-------+-------+--------+-------+------------+---------------+ ! Prox ICA    !53.4   !28.6   ! 60      !0.46   !            !               ! +------------+-------+-------+--------+-------+------------+---------------+ ! Mid ICA     !93.8   !43.5   !60      !0.54   !            !               ! +------------+-------+-------+--------+-------+------------+---------------+ ! Dist ICA    !124    !55.3   !60      !0.55   !            !               ! +------------+-------+-------+--------+-------+------------+---------------+ ! Prox ECA    !98.2   !14.9   !60      !0.85   !            !               ! +------------+-------+-------+--------+-------+------------+---------------+ ! Vertebral   !59     !26.7   ! 60      !0.55   !            !               ! +------------+-------+-------+--------+-------+------------+---------------+   - There is antegrade vertebral flow noted on the right side. - Additional Measurements:ICAPSV/CCAPSV 1.39. ICAEDV/CCAEDV 3.71. Carotid Left Measurements +------------+-------+-------+--------+-------+------------+---------------+ ! Location    ! PSV    ! EDV    ! Angle   ! RI     !%Stenosis   ! Tortuosity     ! +------------+-------+-------+--------+-------+------------+---------------+ ! Prox CCA    ! 85.7   !24.9   !60      !0.71   !            !               ! +------------+-------+-------+--------+-------+------------+---------------+ ! Mid CCA     !74.6   !22.4   !60      !0.7    ! !               ! +------------+-------+-------+--------+-------+------------+---------------+ ! Prox ICA    ! 90.7   !39.1   ! 60      !0.57   !            !               ! +------------+-------+-------+--------+-------+------------+---------------+ ! Mid ICA     !92.6   !28.6   ! 60      !0.69   !            !               ! +------------+-------+-------+--------+-------+------------+---------------+ ! Dist ICA    !135    !62.3   !60      !0.54   !            !               ! +------------+-------+-------+--------+-------+------------+---------------+ ! Prox ECA    !83.9   !       !60      !       !            !               ! +------------+-------+-------+--------+-------+------------+---------------+ ! Vertebral   !54.7   !19.9   !60      !0.64   !            !               ! +------------+-------+-------+--------+-------+------------+---------------+   - There is antegrade vertebral flow noted on the left side. - Additional Measurements:ICAPSV/CCAPSV 1.58. ICAEDV/CCAEDV 2.5. CTA PULMONARY W CONTRAST    Result Date: 1/27/2022  Examination. CTA PULMONARY W CONTRAST 1/27/2022 12:37 PM History: Chest pain and shortness of breath. DLP: 7 7 7 mGycm. The CT angiography of the chest are performed after intravenous contrast enhancement. The images are acquired in axial plane and subsequent 2-D reconstruction in coronal and sagittal planes and 3-D maximum intensity projection reconstruction. There is no previous study for comparison. The correlation made with radiographs of the chest obtained earlier today. There is normal opacification of the pulmonary arteries and branches. No filling defects in the visualized pulmonary arterial bed. The RV/LV ratio is 43/59 which is normal. No finding to suggest right heart strain. Atheromatous changes thoracic aorta are noted. No aneurysmal dilatation. Severe atheromatous changes of coronary arteries are seen. There is evidence of mediastinal lymphadenopathy. An aortopulmonic window lymph node measures 1.7 cm. A level 2R lymph node measures 1.8 cm. A subcarinal lymph node measures 1.9 cm. The limited visualized soft tissues of the neck appears unremarkable. There is a low-density nodule in the right lobe of the thyroid gland which is incompletely evaluated. No axillary lymphadenopathy. There is extensive groundglass infiltrate in the lungs bilaterally. There is bilateral lower lobar consolidation and moderate to large volume bibasal pleural effusion. The limited visualized liver and spleen are unremarkable. Moderate lobulation of the anterior glands bilaterally is noted.  The kidneys, the pancreas and gallbladder is incompletely visualized and not fully evaluated. The images reviewed in bone window show chronic degenerative changes of the lumbar spine with mild dextroscoliosis. 1. No evidence of pulmonary emboli. 2. No aortic aneurysm or dissection. Atheromatous changes of coronary arteries. 3. Extensive groundglass infiltrate in the lungs bilaterally suggesting an inflammatory/infectious process. The second possibility may suggest pulmonary congestion/pulmonary edema. 4. Moderate to large bibasal pleural effusion. Signed by Dr Cherise Bruce    VL Vein Mapping Lower Bilateral    Result Date: 1/28/2022  Vascular Lower Extremity Vein Mapping Procedure  Demographics   Patient Name    Tiffany Carreon Age                   46   Patient Number  [id number]           Gender                Male   Visit Number    [id number]        Interpreting          Erich Butt                                   Physician   Date of Birth   1970       Referring Physician   Accession       [id number]       Sonographer  Number  Procedure Type of Study:   [address] [address]. Risk Factors History of Disease +--------------------------+----------+------------------------------------+ ! Diagnosis                 ! Date      ! Comments                            ! +--------------------------+----------+------------------------------------+ ! Circulatory->CAD          !          !CAD, MI, HTN                        ! +--------------------------+----------+------------------------------------+ ! Chronic lung disease      !03/02/2011! SPONTANEOUS LUNG COLLAPSE           ! +--------------------------+----------+------------------------------------+ ! Musculoskeletal->Arthritis!          !                                    ! +--------------------------+----------+------------------------------------+ ! Endocrine->Diabetes       !          !                                    ! +--------------------------+----------+------------------------------------+   - The patient's risk factor(s) include: diabetes mellitus, dyslipidemia,     arterial hypertension, prior MI and prior CABG. - The patient has a former tobacco history. - The patient's last creatinine was 1.6 mg/dl. Allergies   - No known allergies. Impression   Usable greater saphenous vein conduit in the left lower extremity. Signature   ----------------------------------------------------------------  Electronically signed by Kyle Carmona  physician) on 01/28/2022 03:16 PM  ----------------------------------------------------------------  Velocities are measured in cm/s ; Diameters are measured in mm +--------------------------------------++--------+-----+----+--------+-----+ ! Superficial - Great Saphenous Vein    ! ! Right   ! ! Left!        !     ! +--------------------------------------++--------+-----+----+--------+-----+ ! Location                              ! !Diameter! Depth! !Diameter! Depth! +--------------------------------------++--------+-----+----+--------+-----+ ! GSV 2 cm Distal to Junction           ! !2.7     !     !    !6.1     !     ! +--------------------------------------++--------+-----+----+--------+-----+ ! GSV High Thigh                        ! !1.8     !     !    !5.7     !     ! +--------------------------------------++--------+-----+----+--------+-----+ ! GSV Mid Thigh                         ! !1.7     !     !    !5       !     ! +--------------------------------------++--------+-----+----+--------+-----+ ! GSV Low Thigh                         ! !1.7     !     !    !4.8     !     ! +--------------------------------------++--------+-----+----+--------+-----+ ! GSV Knee                              !!1.7     !     !    !4.6     !     ! +--------------------------------------++--------+-----+----+--------+-----+ ! GSV High Calf                         !!1.9     !     !    !3.8     ! ! +--------------------------------------++--------+-----+----+--------+-----+ ! GSV Mid Calf                          !!2.5     !     !    !2.7     !     ! +--------------------------------------++--------+-----+----+--------+-----+ ! GSV Ankle                             !!1.6     !     !    !3.3     !     ! +--------------------------------------++--------+-----+----+--------+-----+        Assessment:  1. Complaints of increased shortness of breath lower chest pressure and elevated troponin suggestive of non-STEMI  2. Coronary artery disease  3. Hypertension  4. Hyperlipidemia  5. Diabetes mellitus type 2  6. History of tobacco abuse  7. History of diabetic foot ulceration  8. CTA pulmonary yesterday no evidence of pulmonary emboli extensive groundglass infiltrate in the lungs bilaterally suggesting inflammatory or infectious process moderate to large bibasilar pleural effusion  9. CTA abdomen pelvis 4/5/2021 bilateral perinephric peripelvic and periureteral fat infiltration may represent chronic inflammatory process moderate thick-walled incompletely distended urinary bladder enlarged prostate stable left adrenal nodule  10. Cardiac cath 7-21 EF 25% posterior basilar inferior hypokinesis and inferolateral hypokinesis 90% proximal right patent vein graft to right PDA patent LIMA graft LAD occluded vein graft to third OM severe 95% stenosis mid circumflex into the OM successful PCI placement drug-eluting stent 2.5 x 23  11. Echo 7/2/2021 EF 25 to 30% mild to moderate MR mild TR restrictive filling pattern IVC dilated  12. Cardiac catheterization 1/28/2022 severe stenoses OM, ostium of the vein graft to the right coronary artery, occluded vein graft to the OM, patent LIMA graft to diagonal severe LAD stenosis       Plan:  1.  Continue current medical therapy awaiting transfer stable at this time    Noe Castro MD, MD 2/3/2022 10:31 AM

## 2022-02-04 NOTE — DISCHARGE SUMMARY
[place], [place], [address]        DEPARTMENT OF HOSPITALIST MEDICINE        DEATH  SUMMARY:      PATIENT NAME:  José Luis Castro  :  1970  MRN:  [id number]    Admission Date:   2022 11:35 AM Attending: No att. providers found   Discharge Date:   2022              PCP: Nahomi Mcgovern MD  Length of Stay: 8 days     Chief Complaint on Admission:   Chief Complaint   Patient presents with    Abnormal Test Results     abnormal EKG, sent frm Dr Ibrahim Ax office    Abdominal Pain     epigastric pain       Consultants:     IP CONSULT TO CARDIOLOGY  IP CONSULT TO [place]  IP CONSULT TO [address] SURGERY       Discharge Problem List:   Principal Problem:    NSTEMI (non-ST elevated myocardial infarction) ([facility] [address]  Active Problems:    Coronary artery disease involving native coronary artery of native heart without angina pectoris    Essential hypertension    Mixed hyperlipidemia    Type 2 diabetes mellitus with foot ulcer, with long-term current use of insulin (HCC)    Epigastric pain    Acute systolic congestive heart failure ([address]    Acute renal insufficiency  Resolved Problems:    * No resolved hospital problems. *         Chronic medical issues : (Please see above)    Cause of death : Systolic heart failure    Date and Time of death : 2022 at [address] [facility][1],[address]  TREATMENT:  The patient is a 57-year-old male with past medical history of CAD requiring CABG and stents, hypertension, hyperlipidemia, diabetes, and arthritis who presented to [facility] ED complaining of epigastric pain and nausea. The patient also indicated worsening dyspnea for a week prior to admission. The patient also reported orthopnea. The patient indicated he was supposed to be having an ultrasound of his gallbladder to rule out gallbladder disease. He also indicates he was recently started on 2 L of oxygen at night and with daytime naps.   Patient reported having cardiac stents placed in July 2021. ED work-up indicated troponin 0 0.30 with a repeat of 0.31, BNP 3485, and chest x-ray indicating persistent pulmonary vascular congestion, small bibasilar pleural effusions, and cardiomegaly. CTA unremarkable for PE, extensive groundglass infiltrates in the lungs bilaterally suggesting inflammatory/infectious process, may suggest pulmonary congestion/pulmonary edema, moderate to large bibasilar pleural effusion. Patient was admitted to the hospitalist for NSTEMI with cardiac consultation. The patient was initiated on a heparin drip in the ED. Echo obtained and indicated grade 3 diastolic dysfunction with estimated EF 10 to 15%, some thickening of the mitral leaflets with mild regurgitation, mild tricuspid insufficiency with an estimated right ventricular systolic pressure between 50 and 55. Cardiology recommended cardiac catheterization. Patient underwent cardiac catheterization on 1/28/2022 which indicated left ventricular function impaired ejection fraction 20 to 25% estimated LIMA graft diagonal patent vein graft OM occluded stent placement OM 90% in-stent restenosis, Severe 90% plus ostial stenosis of the vein graft to the right coronary artery, 70% ostial ramus branch stenosis, Severe disease mid LAD and recommend cardiovascular surgical consultation for consideration of redo CABG. patient was evaluated by cardiothoracic surgeon who calculated his risk for perioperative mortality 6%. Dr. Jaren Gold did reach out to Dr. Vandana Burns for second opinion given the significance of this case. CT surgery recommended transferring patient to a facility with LVAD/transplant backup. Case discussed with transfer center and patient was accepted by Dr. Hayden Bonilla at [facility] pending a bed. Transfer center was updated and continued to wait on bed. Patient was continued on diuresis for heart failure. He was also on oxygen therapy and sats remained stable on 4LPM NC.   Post-cath and CTA creatinine elevated, and patient was given short stent of IV hydration. Creatinine was stable/ improved. Patient took Buspar twice per day, and was increased to TID for increased anxiety. Added melatonin to assist with sleep wake pattern. Patient had short time with abd pain and nausea, gallbladder ultrasound was obtained and indicated mildly thickened gallbladder with cholelithiasis. Patient denied continued abdominal pain and deemed non-emergent. Overnight patient converted to pulseless [facility] with agonal respirations. [employer] called and treated by nocturnist with compressions, epi, amiodarone, and defibrillation. ROCS was achieved, patient was intubated and transferred to CCU. A short time later patient again went back into [facility] and resuscitation efforts were unsuccessful. Patient was confirmed  by Dr. Netta Velazquez at 12:01 AM on 22. Family was notified per nursing staff. Patient :  at 12:01 AM on 22    Laboratory Data:  Recent Labs     22  0017 22  0506   WBC 8.3 6.4   HGB 11.4* 12.0*    219     Recent Labs     22  0017 22  0506    139   K 4.9 5.1*   CL 99 100   CO2 28 30*   BUN 37* 33*   CREATININE 1.8* 1.5*   GLUCOSE 269* 201*     Recent Labs     22  0017 22  0506   AST 11 9   ALT 20 18   BILITOT 0.3 0.4   ALKPHOS 94 102        Impressions of imaging performed in 48 hours before discharge:    XR CHEST (2 VW)    Result Date: 2/3/2022  1. The poor lung expansion and atelectatic changes in the lungs bilaterally. These are moderately more pronounced since the previous study. Signed by Dr Scott Shah       Discharge Disposition: Patient     Total time spent during patient evaluation and assessment, discussion with the nurse/family, addressing discharge medications/scripts and coordination of care for safe discharge was in excess of 35 minutes.       Signed Electronically:    MELITON Bertrand CNP  7:17 AM 2022

## 2022-02-04 NOTE — ADT AUTH CERT
Myocardial Infarction - Care Day 8 (2/3/2022) by Harvey Carter RN       Review Status Review Entered   Completed 2/3/2022 15:25      Criteria Review      Care Day: 8 Care Date: 2/3/2022 Level of Care: Inpatient Floor    Guideline Day 3    Clinical Status    ( ) * Hemodynamic stability    2/3/2022 4:24 PM EST by Oswaldo Karimi      T 96.7 P 53 RR 20 /82 O2% 96 NC @ 4l/min    ( ) * Chest pain, dyspnea, or anginal equivalent absent    2/3/2022 4:24 PM EST by Oswaldo Karimi      Complaints of increased shortness of breath lower chest pressure    (X) * No evidence of bleeding    2/3/2022 4:24 PM EST by Oswaldo Karimi      none    (X) * No evidence of recurrent myocardial ischemia    2/3/2022 4:24 PM EST by Oswaldo Karimi      none    ( ) * Dangerous arrhythmia absent    ( ) * Vascular access site without evidence of infection, aneurysm, or growing hematoma    ( ) * Renal function at baseline or acceptable for next level of care    2/3/2022 4:24 PM EST by Oswaldo Karimi      BUN: 33 (H)  Creatinine: 1.5 (H)    ( ) * Discharge plans and education understood    Activity    ( ) * Ambulatory or acceptable for next level of care    2/3/2022 4:24 PM EST by Oswaldo Karimi      bedrest- up prn    Routes    (X) * Oral hydration    2/3/2022 4:24 PM EST by Oswaldo Karimi      Reg diet    (X) * Oral medications or regimen acceptable for next level of care    2/3/2022 4:24 PM EST by Oswaldo Karimi      ASA 81mg QD Po  Wellbutrin XL 150mg BID PO  Buspar 15mg BID PO  Coreg 25mg Bid with meals po  Neurontin 300mg BID PO  Imdur 30mg QD PO  Protonix 40mg QD PO  Flomax 0.4mg qd po  Percocet 10-325mg TID PO prn x1    (X) * Oral diet or acceptable for next level of care    2/3/2022 4:24 PM EST by Oswaldo Karimi      Reg diet    Interventions    ( ) Smoking cessation counseling if needed    2/3/2022 4:24 PM EST by Oswaldo Karimi      n/a    Medications    ( ) * Anticoagulants absent    2/3/2022 4:24 PM EST by Oswaldo Karimi      Heparin titrate iv    (X) Antiplatelet cardiac catheterization on 1/28/2022 which indicated left ventricular function impaired ejection fraction 20 to 25% estimated LIMA graft diagonal patent vein graft OM occluded stent placement OM 90% in-stent restenosis, Severe 90% plus ostial stenosis of the vein graft to the right coronary artery, 70% ostial ramus branch stenosis, Severe disease mid LAD and recommend cardiovascular surgical consultation for consideration of redo CABG. patient was evaluated by cardiothoracic surgeon who calculated his risk for perioperative mortality 6%.  Dr. Zadie Gaucher did reach out to Dr. Karli Bhakta for second opinion given the significance of this case.  CT surgery recommended transferring patient to a facility with LVAD/transplant backup. Merly Yan discussed with transfer center and patient was accepted by Dr. David Gamez at [facility] pending a bed.  Transfer center was updated today and continuing to wait on bed. Luis Willett is continued on diuresis for heart failure. Dario Andrade is also on oxygen therapy at this time.  Post-cath and CTA creatinine elevated.  Patient was given short stent of IV hydration.  Creatinine remaining stable/improved. Patient takes Buspar twice per day, will increase to TID. Will add melatonin to assist with sleep wake pattern.  Will continue to monitor BMP daily.  Gallbladder ultrasound was obtained and indicated mildly thickened gallbladder with cholelithiasis.  Patient denies abdominal pain at this time. And nonemergent at this time.     Physical Exam   Vitals and nursing note reviewed. Constitutional:        Appearance: He is ill-appearing. HENT:       Head: Normocephalic. Eyes:       Extraocular Movements: Extraocular movements intact.       Conjunctiva/sclera: Conjunctivae normal.       Pupils: Pupils are equal, round, and reactive to light. Cardiovascular:       Rate and Rhythm: Normal rate and regular rhythm.       Pulses: Normal pulses.       Heart sounds: Normal heart sounds.     Pulmonary:       Effort: Tachypnea present.       Breath sounds: Examination of the right-lower field reveals decreased breath sounds. Examination of the left-lower field reveals decreased breath sounds. Decreased breath sounds present. Abdominal:       General: Bowel sounds are normal. There is distension.       Palpations: There is no mass.       Tenderness: There is no abdominal tenderness. Musculoskeletal:          General: Normal range of motion.       Cervical back: Normal range of motion and neck supple. No tenderness.       Right lower leg: Edema (Trace) present.       Left lower leg: Edema (Trace) present. Skin:      General: Skin is warm and dry.       Capillary Refill: Capillary refill takes less than 2 seconds. Neurological:       General: No focal deficit present.       Mental Status: He is alert and oriented to person, place, and time. Psychiatric:          Mood and Affect: Mood normal.          Behavior: Behavior normal.          Thought Content:  Thought content normal.          Judgment: Judgment normal.         Assessment/Plan   Principal Problem:     NSTEMI (non-ST elevated myocardial infarction) (HCC) / Coronary artery disease involving native coronary artery of native heart without angina pectoris               -Continue heparin drip               -Cardiac catheterization on 1/28               -Evaluation by CT surgery, recommended transfer to Center with LVAD/transplant backup               -Patient accepted by Dr. Dustin Mancini at [facility] pending bed               -Continue diuresis       Active Problems:   Essential hypertension               -Continue current regimen               -Monitor closely         Mixed hyperlipidemia               -Continue current statin         Type 2 diabetes mellitus with foot ulcer, with long-term current use of insulin (HCC)               -Continue Accu-Cheks               -Continue basal, prandial, and sliding scale insulin at this time               -Diabetes educator               -Hypoglycemia protocol in place         Epigastric pain               -Gallbladder ultrasound indicated cholelithiasis, will monitor and follow-up as needed         Acute systolic congestive heart failure (HCC)               -Continue diuresis               -Daily weight               -Accurate I&O               -repeat chest x-ray         BERTHA               -Monitor BMP               -Daily weight               -Avoid nephrotoxins hypotension               -slowly improving/stable       Anxiety               -increase home Buspar               -melatonin to assist in sleep wake cycle      Cardio Note      Assessment:   1. Complaints of increased shortness of breath lower chest pressure and elevated troponin suggestive of non-STEMI   2. Coronary artery disease   3. Hypertension   4. Hyperlipidemia   5. Diabetes mellitus type 2   6. History of tobacco abuse   7. History of diabetic foot ulceration   8. CTA pulmonary yesterday no evidence of pulmonary emboli extensive groundglass infiltrate in the lungs bilaterally suggesting inflammatory or infectious process moderate to large bibasilar pleural effusion   9. CTA abdomen pelvis 4/5/2021 bilateral perinephric peripelvic and periureteral fat infiltration may represent chronic inflammatory process moderate thick-walled incompletely distended urinary bladder enlarged prostate stable left adrenal nodule   10. Cardiac cath 7-21 EF 25% posterior basilar inferior hypokinesis and inferolateral hypokinesis 90% proximal right patent vein graft to right PDA patent LIMA graft LAD occluded vein graft to third OM severe 95% stenosis mid circumflex into the OM successful PCI placement drug-eluting stent 2.5 x 23   11. Echo 7/2/2021 EF 25 to 30% mild to moderate MR mild TR restrictive filling pattern IVC dilated   12.  Cardiac catheterization 1/28/2022 severe stenoses OM, ostium of the vein graft to the right coronary artery, occluded vein graft to the OM, patent LIMA graft to diagonal severe LAD stenosis           Plan:   1. Continue current medical therapy awaiting transfer stable at this time       vitals   T 96.7 P 53 RR 20 /82 O2% 96 NC @ 4l/min   Labs   Sodium: 139   Potassium: 5.1 (H)   Chloride: 100   CO2: 30 (H)   BUN: 33 (H)   Creatinine: 1.5 (H)   Anion Gap: 9   GFR Non-: 49 (A)   GFR : >59   Glucose: 201 (H)   CALCIUM, SERUM, 331410: 9.3   Total Protein: 5.5 (L)   Albumin: 3.4 (L)   Alk Phos: 102   ALT: 18   AST: 9   Bilirubin: 0.4   WBC: 6.4   RBC: 4.21 (L)   Hemoglobin Quant: 12.0 (L)   Hematocrit: 40.9 (L)   MCV: 97.1 (H)   MCH: 28.5   MCHC: 29.3 (L)   MPV: 12.3   RDW: 13.7   Platelet Count: 685   aPTT: 37.0 (H)      2/3/2022 08:15   POC Glucose: 154 (H)      2/3/2022 10:42   aPTT: 38.6 (H)      2/3/2022 12:17   POC Glucose: 145 (H)      CXR   Impression   1. The poor lung expansion and atelectatic changes in the lungs   bilaterally. These are moderately more pronounced since the previous   study.       Meds   ASA 81mg QD Po   Wellbutrin XL 150mg BID PO   Buspar 15mg BID PO   Coreg 25mg Bid with meals po   Lasix 40mg QD IV   Neurontin 300mg BID PO   Humalog TID with meals SC   Humalog 5 U TID SC   Imdur 30mg QD PO   Protonix 40mg QD PO   Flomax 0.4mg qd po   Heparin titrate iv   Percocet 10-325mg TID PO prn x1   Plan   Reg diet,ekg,cbc,cmp,up as tolerated prn, daily weight, cont vs poc glucose, Oxygen daily,           Myocardial Infarction - Care Day 7 (2/2/2022) by Jarrell Chapa RN       Review Status Review Entered   Completed 2/3/2022 09:34      Criteria Review      Care Day: 7 Care Date: 2/2/2022 Level of Care: Inpatient Floor    Guideline Day 2    Clinical Status    (X) * Hemodynamic stability    2/3/2022 10:34 AM EST by Candace Campos      /100, TEMP 96, HR 57, RR 20, O2 89 ON 3.5L NC    (X) * Chest pain, dyspnea, or anginal equivalent absent    Routes    (X) * Oral hydration    (X) * Oral medications    (X) Parenteral medications    2/3/2022 10:34 AM EST by Winter Cabral      LASIX 40 MG QD IV    Interventions    ( ) * Oxygen absent    2/3/2022 10:34 AM EST by Candace Garcia      O2 89 ON 3.5L NC    ( ) ECG, cardiac biomarkers    2/3/2022 10:34 AM EST by Winter Cabral      NA    (X) PT/PTT and platelet count    Medications    (X) * IV nitroglycerin absent    2/3/2022 10:34 AM EST by Candace Garcia      NA    (X) Anticoagulants    2/3/2022 10:34 AM EST by Winter Cabral      HEPARIN 2000U IV X 1, HEPARIN DRIP    (X) Antiplatelet agents (eg, aspirin, clopidogrel, ticagrelor)    2/3/2022 10:34 AM EST by Winter Cabral      ASA 81 MG QD PO    (X) Beta-blocker    2/3/2022 10:34 AM EST by Candace Garcia      COREG 25 MG BID PO    (X) Statin    2/3/2022 10:34 AM EST by Candace Russell      LIPITOR 40 MG QD PO    * Milestone   Additional Notes   IM NOTE   Patient states he is feeling somewhat better today. Continues to have improvement in shortness of breath.        Physical Exam   Vitals and nursing note reviewed. Constitutional:        Appearance: He is ill-appearing. HENT:       Head: Normocephalic. Eyes:       Extraocular Movements: Extraocular movements intact.       Conjunctiva/sclera: Conjunctivae normal.       Pupils: Pupils are equal, round, and reactive to light. Cardiovascular:       Rate and Rhythm: Normal rate and regular rhythm.       Pulses: Normal pulses.       Heart sounds: Normal heart sounds. Pulmonary:       Effort: Tachypnea present.       Breath sounds: Examination of the right-lower field reveals decreased breath sounds. Examination of the left-lower field reveals decreased breath sounds. Decreased breath sounds present. Abdominal:       General: Bowel sounds are normal. There is distension.       Palpations: There is no mass.       Tenderness: There is no abdominal tenderness.     Musculoskeletal:          General: Normal range of motion.       Cervical back: Normal range of motion and neck supple. No tenderness.       Right lower leg: Edema (Trace) present.       Left lower leg: Edema (Trace) present. Skin:      General: Skin is warm and dry.       Capillary Refill: Capillary refill takes less than 2 seconds. Neurological:       General: No focal deficit present.       Mental Status: He is alert and oriented to person, place, and time. Psychiatric:          Mood and Affect: Mood normal.          Behavior: Behavior normal.          Thought Content:  Thought content normal.          Judgment: Judgment normal.        Principal Problem:     NSTEMI (non-ST elevated myocardial infarction) ([address] / Coronary artery disease involving native coronary artery of native heart without angina pectoris               -Continue heparin drip               -Cardiac catheterization on 1/28               -Evaluation by CT surgery, recommended transfer to Center with LVAD/transplant backup               -Patient accepted by Dr. Dexter Gates at [facility] pending bed               -Continue diuresis       Active Problems:   Essential hypertension               -Continue current regimen               -Monitor closely         Mixed hyperlipidemia               -Continue current statin         Type 2 diabetes mellitus with foot ulcer, with long-term current use of insulin (HCC)               -Continue Accu-Cheks               -Continue basal, prandial, and sliding scale insulin at this time               -Diabetes educator               -Hypoglycemia protocol in place         Epigastric pain               -Gallbladder ultrasound indicated cholelithiasis, will monitor and follow-up as needed         Acute systolic congestive heart failure (HCC)               -Continue diuresis               -Daily weight               -Accurate I&O         BERTHA               -Monitor BMP               -Daily weight               -Avoid nephrotoxins hypotension               -slowly improving/stable         CARD NOTE   Continue current medical therapy awaiting transfer stable at this time       BUN 37, CREAT 1.8, GLUC 269/283/238/227, PTT 34.3      NO IMAGING

## 2022-02-04 NOTE — CODE DOCUMENTATION
Karol Dugan, [address] [facility] MEDICINE    CRITICAL CARE NOTE  CODE FAVIO    Patient:  Alivia Velasquez  YOB: 1970  Date of Service: 2/4/2022  MRN: [id number]   Acct: [de-identified]   Primary Care Physician: Brandy Friedman MD  Advance Directive: Full Code  Admit Date: 1/27/2022       Hospital Day: 8  Portions of this note have been copied forward, however, changed to reflect the most current clinical status of this patient. CHIEF COMPLAINT  Called to patient's room by nursing for CODE BLUE     SUBJECTIVE:  RN reports patient doing well earlier and now pulseless with vfib agonal respirations. CUMULATIVE HOSPITAL COURSE:  The patient is a 29-year-old male with past medical history of CAD requiring CABG and stents, hypertension, hyperlipidemia, diabetes, and arthritis who presented to [facility] ED complaining of epigastric pain and nausea. The patient also indicated worsening dyspnea for a week prior to admission. The patient also reported orthopnea. The patient indicated he was supposed to be having an ultrasound of his gallbladder to rule out gallbladder disease. He also indicates he was recently started on 2 L of oxygen at night and with daytime naps. Patient reported having cardiac stents placed in July 2021. ED work-up indicated troponin 0 0.30 with a repeat of 0.31, BNP 3485, and chest x-ray indicating persistent pulmonary vascular congestion, small bibasilar pleural effusions, and cardiomegaly. CTA unremarkable for PE, extensive groundglass infiltrates in the lungs bilaterally suggesting inflammatory/infectious process, may suggest pulmonary congestion/pulmonary edema, moderate to large bibasilar pleural effusion. Patient was admitted to the hospitalist for NSTEMI with cardiac consultation. The patient was initiated on a heparin drip in the ED.   Echo obtained and indicated grade 3 diastolic dysfunction with estimated EF 10 to 15%, some thickening of the mitral leaflets with mild regurgitation, mild tricuspid insufficiency with an estimated right ventricular systolic pressure between 50 and 55. Cardiology recommended cardiac catheterization. Patient underwent cardiac catheterization on 1/28/2022 which indicated left ventricular function impaired ejection fraction 20 to 25% estimated LIMA graft diagonal patent vein graft OM occluded stent placement OM 90% in-stent restenosis, Severe 90% plus ostial stenosis of the vein graft to the right coronary artery, 70% ostial ramus branch stenosis, Severe disease mid LAD and recommend cardiovascular surgical consultation for consideration of redo CABG. patient was evaluated by cardiothoracic surgeon who calculated his risk for perioperative mortality 6%. Dr. Michelle Timmons did reach out to Dr. Reggie Winkler for second opinion given the significance of this case. CT surgery recommended transferring patient to a facility with LVAD/transplant backup. Case discussed with transfer center and patient was accepted by Dr. Earl Gil at [facility] pending a bed.        Objective:     AIRWAY:  Adequate     BREATHING:    Agonal    CIRCULATION:         VITALS:           CPR in PROGRESS                                Pulse  Absent                                Skin Color  Cyanotic     DISABILITY:  GCS 3  Eyes 1  Verbal 1  Motor 1    Pupils pinpoint and sluggish  EXPOSED:  Yes    24HR INTAKE/OUTPUT:    Intake/Output Summary (Last 24 hours) at 2/4/2022 0055  Last data filed at 2/3/2022 2038  Gross per 24 hour   Intake 1080 ml   Output 700 ml   Net 380 ml       Medications:      EPINEPHrine infusion      heparin (PORCINE) Infusion 17 Units/kg/hr (02/03/22 1821)    sodium chloride      dextrose        busPIRone  15 mg Oral TID    melatonin  5 mg Oral Nightly    insulin glargine  20 Units SubCUTAneous Nightly    insulin lispro  5 Units SubCUTAneous TID WC    isosorbide mononitrate  30 mg Oral Daily    aspirin  81 mg Oral Daily    atorvastatin  40 mg Oral Nightly    buPROPion  150 mg Oral BID    carvedilol  25 mg Oral BID WC    [Held by provider] clopidogrel  75 mg Oral Daily    gabapentin  300 mg Oral BID    pantoprazole  40 mg Oral QAM AC    tamsulosin  0.4 mg Oral Daily    sodium chloride flush  5-40 mL IntraVENous 2 times per day    furosemide  40 mg IntraVENous Daily    insulin lispro  0-6 Units SubCUTAneous TID WC    insulin lispro  0-3 Units SubCUTAneous Nightly     hydrALAZINE, oxyCODONE-acetaminophen, heparin (porcine), heparin (porcine), sodium chloride flush, sodium chloride, ondansetron **OR** ondansetron, acetaminophen **OR** acetaminophen, polyethylene glycol, nitroGLYCERIN, ondansetron, glucose, glucagon (rDNA), dextrose, dextrose bolus (hypoglycemia) **OR** dextrose bolus (hypoglycemia), albuterol  ADULT DIET; Regular; 3 carb choices (45 gm/meal)     Lab and other Data:       Recent Labs     02/01/22 0630 02/02/22 0017 02/03/22  0506   WBC 7.3 8.3 6.4   HGB 12.2* 11.4* 12.0*    240 219     Recent Labs     02/01/22  0630 02/02/22 0017 02/03/22  0506    137 139   K 5.2* 4.9 5.1*   CL 99 99 100   CO2 30* 28 30*   BUN 42* 37* 33*   CREATININE 1.8* 1.8* 1.5*   GLUCOSE 246* 269* 201*     Recent Labs     02/01/22  0630 02/02/22 0017 02/03/22  0506   AST 10 11 9   ALT 22 20 18   BILITOT 0.4 0.3 0.4   ALKPHOS 111 94 102     Troponin T: No results for input(s): TROPONINI in the last 72 hours. Pro-BNP: No results for input(s): BNP in the last 72 hours. INR: No results for input(s): INR in the last 72 hours. UA:No results for input(s): NITRITE, COLORU, PHUR, LABCAST, WBCUA, RBCUA, MUCUS, TRICHOMONAS, YEAST, BACTERIA, CLARITYU, SPECGRAV, LEUKOCYTESUR, UROBILINOGEN, BILIRUBINUR, BLOODU, GLUCOSEU, AMORPHOUS in the last 72 hours. Invalid input(s): Darliss Knock  A1C: No results for input(s): LABA1C in the last 72 hours.   ABG:No results for input(s): PHART, HME4RHO, PO2ART, OSE8GZP, BEART, HGBAE, J8HPXEOW, CARBOXHGBART in the last 72 hours. RAD:   XR CHEST (2 VW)    Result Date: 2/3/2022  1. The poor lung expansion and atelectatic changes in the lungs bilaterally. These are moderately more pronounced since the previous study. Signed by Dr Manjit Kaur    Result Date: 2022  Cholelithiasis. The wall the gallbladder is thickened at 4 mm. Signed by Dr Sherlyn Costa    XR CHEST PORTABLE    Result Date: 2022  1. Similar chest compared to 2022. Prior mediastinal surgery with similar findings of CHF. Signed by Dr Domitila Gonzales    Interventions   ACLS protocol with CPR, intubation, mutilple doses of epinephrine and amiodarone and defibrillation. (see nursing documentation)    Response   ROSC. Plan   Patient transferred to CCU room 739 and placed on ventilator    Interventions   Called back for second code blue a short time later. ACLS protocol with CPR, intubation, mutilple doses of epinephrine and amiodarone and defibrillation. (see nursing documentation)    Response   Unsuccessful resuscitation efforts. Patient  at 12:01 after extensive attempts to revive. Plan   Family notified by nursing staff      CRITICAL CARE TIME   Total Critical Care time was 88 minutes, excluding separately reportable procedures. There was a high probability of clinically significant/life threatening deterioration in the patient's condition which required my urgent intervention. Patient required my immediate presence at the bedside. Multiple reexaminations were undertaken throughout the course of care. Time was spent reviewing plan of care with nursing staff. Time is inclusive of  and clinical documentation.      Electronically signed by     Lanette Hearn MD, [facility]  Hospitalist    on 2022 12:55 AM

## 2022-02-04 NOTE — DEATH NOTES
Death Pronouncement Note  Patient's Name: Zeferino Jonas   Patient's YOB: 1970  MRN Number: [id number]    Admitting Provider: Majo Schwarz MD  Attending Provider: No att. providers found    Patient was examined and the following were absent: Pulses, Blood Pressure and Respiratory effort    Dr. Hudson Part declared the patient dead on 2/4/2022 at 12:01 AM    Preliminary Cause of Death: Heart failure     Electronically signed by MELITON Moulton CNP on 2/4/22 at 7:04 AM CST

## 2022-02-04 NOTE — PROGRESS NOTES
Argelia Walter, sister, updated of ongoing code blue at [phone]. Sister states she wants team to keep trying to resuscitate the patient but cannot make it to the hospital due to the icy road conditions. Filomena then updated of patient's passing around 0020 once time of death was called at 0001. Argelia Walter is unsure of which  home at the moment but will update hospital with her decision.  Electronically signed by Ousmane Andrew RN on 2022 at 2:26 AM

## 2022-02-04 NOTE — CARE COORDINATION
Karol Dugan lilia, [address] [facility] MEDICINE    CRITICAL CARE NOTE  CODE FAVIO    Patient:  aJc Bliss  YOB: 1970  Date of Service: 2/4/2022  MRN: [id number]   Acct: [de-identified]   Primary Care Physician: Tirso Pope MD  Advance Directive: Full Code  Admit Date: 1/27/2022       Hospital Day: 8  Portions of this note have been copied forward, however, changed to reflect the most current clinical status of this patient. CHIEF COMPLAINT  Called to patient's room by nursing for CODE BLUE     SUBJECTIVE:  RN reports patient doing well earlier and now pulseless with vfib agonal respirations. CUMULATIVE HOSPITAL COURSE:  The patient is a 38-year-old male with past medical history of CAD requiring CABG and stents, hypertension, hyperlipidemia, diabetes, and arthritis who presented to [address] [place] [address] ED complaining of epigastric pain and nausea. The patient also indicated worsening dyspnea for a week prior to admission. The patient also reported orthopnea. The patient indicated he was supposed to be having an ultrasound of his gallbladder to rule out gallbladder disease. He also indicates he was recently started on 2 L of oxygen at night and with daytime naps. Patient reported having cardiac stents placed in July 2021. ED work-up indicated troponin 0 0.30 with a repeat of 0.31, BNP 3485, and chest x-ray indicating persistent pulmonary vascular congestion, small bibasilar pleural effusions, and cardiomegaly. CTA unremarkable for PE, extensive groundglass infiltrates in the lungs bilaterally suggesting inflammatory/infectious process, may suggest pulmonary congestion/pulmonary edema, moderate to large bibasilar pleural effusion. Patient was admitted to the hospitalist for NSTEMI with cardiac consultation. The patient was initiated on a heparin drip in the ED.   Echo obtained and indicated grade 3 diastolic dysfunction with estimated EF 10 to 15%, some thickening of the mitral leaflets with mild regurgitation, mild tricuspid insufficiency with an estimated right ventricular systolic pressure between 50 and 55. Cardiology recommended cardiac catheterization. Patient underwent cardiac catheterization on 1/28/2022 which indicated left ventricular function impaired ejection fraction 20 to 25% estimated LIMA graft diagonal patent vein graft OM occluded stent placement OM 90% in-stent restenosis, Severe 90% plus ostial stenosis of the vein graft to the right coronary artery, 70% ostial ramus branch stenosis, Severe disease mid LAD and recommend cardiovascular surgical consultation for consideration of redo CABG. patient was evaluated by cardiothoracic surgeon who calculated his risk for perioperative mortality 6%. Dr. Frank Tejeda did reach out to Dr. Augustine Campbell for second opinion given the significance of this case. CT surgery recommended transferring patient to a facility with LVAD/transplant backup. Case discussed with transfer center and patient was accepted by Dr. Dustin Mancini at [facility] pending a bed.        Objective:     AIRWAY:  Adequate     BREATHING:    Agonal    CIRCULATION:         VITALS:           CPR in PROGRESS                                Pulse  Absent                                Skin Color  Cyanotic     DISABILITY:  GCS 3  Eyes 1  Verbal 1  Motor 1    Pupils pinpoint and sluggish  EXPOSED:  Yes    24HR INTAKE/OUTPUT:    Intake/Output Summary (Last 24 hours) at 2/4/2022 0055  Last data filed at 2/3/2022 2038  Gross per 24 hour   Intake 1080 ml   Output 700 ml   Net 380 ml       Medications:      EPINEPHrine infusion      heparin (PORCINE) Infusion 17 Units/kg/hr (02/03/22 1821)    sodium chloride      dextrose        busPIRone  15 mg Oral TID    melatonin  5 mg Oral Nightly    insulin glargine  20 Units SubCUTAneous Nightly    insulin lispro  5 Units SubCUTAneous TID WC    isosorbide mononitrate  30 mg Oral Daily    aspirin  81 mg Oral Daily    atorvastatin  40 mg Oral Nightly    buPROPion  150 mg Oral BID    carvedilol  25 mg Oral BID WC    [Held by provider] clopidogrel  75 mg Oral Daily    gabapentin  300 mg Oral BID    pantoprazole  40 mg Oral QAM AC    tamsulosin  0.4 mg Oral Daily    sodium chloride flush  5-40 mL IntraVENous 2 times per day    furosemide  40 mg IntraVENous Daily    insulin lispro  0-6 Units SubCUTAneous TID WC    insulin lispro  0-3 Units SubCUTAneous Nightly     hydrALAZINE, oxyCODONE-acetaminophen, heparin (porcine), heparin (porcine), sodium chloride flush, sodium chloride, ondansetron **OR** ondansetron, acetaminophen **OR** acetaminophen, polyethylene glycol, nitroGLYCERIN, ondansetron, glucose, glucagon (rDNA), dextrose, dextrose bolus (hypoglycemia) **OR** dextrose bolus (hypoglycemia), albuterol  ADULT DIET; Regular; 3 carb choices (45 gm/meal)     Lab and other Data:       Recent Labs     02/01/22 0630 02/02/22 0017 02/03/22  0506   WBC 7.3 8.3 6.4   HGB 12.2* 11.4* 12.0*    240 219     Recent Labs     02/01/22  0630 02/02/22 0017 02/03/22  0506    137 139   K 5.2* 4.9 5.1*   CL 99 99 100   CO2 30* 28 30*   BUN 42* 37* 33*   CREATININE 1.8* 1.8* 1.5*   GLUCOSE 246* 269* 201*     Recent Labs     02/01/22  0630 02/02/22 0017 02/03/22  0506   AST 10 11 9   ALT 22 20 18   BILITOT 0.4 0.3 0.4   ALKPHOS 111 94 102     Troponin T: No results for input(s): TROPONINI in the last 72 hours. Pro-BNP: No results for input(s): BNP in the last 72 hours. INR: No results for input(s): INR in the last 72 hours. UA:No results for input(s): NITRITE, COLORU, PHUR, LABCAST, WBCUA, RBCUA, MUCUS, TRICHOMONAS, YEAST, BACTERIA, CLARITYU, SPECGRAV, LEUKOCYTESUR, UROBILINOGEN, BILIRUBINUR, BLOODU, GLUCOSEU, AMORPHOUS in the last 72 hours. Invalid input(s): Ramrio Poe  A1C: No results for input(s): LABA1C in the last 72 hours.   ABG:No results for input(s): PHART, MDS8HEO, PO2ART, XBE3OOQ, BEART, HGBAE, M2JDBTTQ, CARBOXHGBART in the last 72 hours. RAD:   XR CHEST (2 VW)    Result Date: 2/3/2022  1. The poor lung expansion and atelectatic changes in the lungs bilaterally. These are moderately more pronounced since the previous study. Signed by Dr Noah Novak    Result Date: 2022  Cholelithiasis. The wall the gallbladder is thickened at 4 mm. Signed by Dr Ginette Bhagat    XR CHEST PORTABLE    Result Date: 2022  1. Similar chest compared to 2022. Prior mediastinal surgery with similar findings of CHF. Signed by Dr Blanca Brooke    Interventions   ACLS protocol with CPR, intubation, mutilple doses of epinephrine and amiodarone and defibrillation. (see nursing documentation)    Response   ROSC. Plan   Patient transferred to CCU room 739 and placed on ventilator    Interventions   Called back for second code blue a short time later. ACLS protocol with CPR, intubation, mutilple doses of epinephrine and amiodarone and defibrillation. (see nursing documentation)    Response   Unsuccessful resuscitation efforts. Patient  at 12:01 after extensive attempts to revive. Plan   Family notified by nursing staff      CRITICAL CARE TIME   Total Critical Care time was 88 minutes, excluding separately reportable procedures. There was a high probability of clinically significant/life threatening deterioration in the patient's condition which required my urgent intervention. Patient required my immediate presence at the bedside. Multiple reexaminations were undertaken throughout the course of care. Time was spent reviewing plan of care with nursing staff. Time is inclusive of  and clinical documentation.      Electronically signed by     Neena Renee MD, FAAFP  Hospitalist    on 2022 12:55 AM

## 2022-02-05 LAB
EKG P AXIS: 51 DEGREES
EKG P-R INTERVAL: 192 MS
EKG Q-T INTERVAL: 394 MS
EKG QRS DURATION: 128 MS
EKG QTC CALCULATION (BAZETT): 429 MS
EKG T AXIS: 137 DEGREES

## (undated) DEVICE — PRECISION THIN (9.0 X 0.38 X 25.0MM)

## (undated) DEVICE — CLIP LIG L235CM RESOL 360 BX/20

## (undated) DEVICE — BANDAGE GZ W45INXL4 1 10YD FLUF RL 6 PLY DERMACEA

## (undated) DEVICE — CAUTERY TIP POLISHER: Brand: DEVON

## (undated) DEVICE — GAUZE,SPONGE,FLUFF,6"X6.75",STRL,10/TRAY: Brand: MEDLINE

## (undated) DEVICE — SNARE POLYP SM W13MMXL240CM SHTH DIA2.4MM OVL FLX DISP

## (undated) DEVICE — SOLUTION IV IRRIG POUR BRL 0.9% SODIUM CHL 2F7124

## (undated) DEVICE — SUTURE ETHLN SZ 2-0 L30IN NONABSORBABLE BLK L36MM FSLX 3/8 1674H

## (undated) DEVICE — TOWEL,OR,DSP,ST,BLUE,DLX,4/PK,20PK/CS: Brand: MEDLINE

## (undated) DEVICE — ASTOUND STANDARD SURGICAL GOWN, XXL: Brand: CONVERTORS

## (undated) DEVICE — BANDAGE COMPR W4INXL15FT BGE E SGL LAYERED CLP CLSR

## (undated) DEVICE — MINOR CDS: Brand: MEDLINE INDUSTRIES, INC.

## (undated) DEVICE — SHEET,DRAPE,53X77,STERILE: Brand: MEDLINE

## (undated) DEVICE — ENDO KIT,LOURDES HOSPITAL: Brand: MEDLINE INDUSTRIES, INC.

## (undated) DEVICE — TRAY PREP DRY W/ PREM GLV 2 APPL 6 SPNG 2 UNDPD 1 OVERWRAP

## (undated) DEVICE — FORCEPS BX L240CM JAW DIA2.4MM ORNG L CAP W/ NDL DISP RAD